# Patient Record
Sex: MALE | Race: OTHER | HISPANIC OR LATINO | URBAN - METROPOLITAN AREA
[De-identification: names, ages, dates, MRNs, and addresses within clinical notes are randomized per-mention and may not be internally consistent; named-entity substitution may affect disease eponyms.]

---

## 2022-10-02 ENCOUNTER — INPATIENT (INPATIENT)
Facility: HOSPITAL | Age: 27
LOS: 32 days | Discharge: HOME | DRG: 441 | End: 2022-11-04
Attending: STUDENT IN AN ORGANIZED HEALTH CARE EDUCATION/TRAINING PROGRAM | Admitting: STUDENT IN AN ORGANIZED HEALTH CARE EDUCATION/TRAINING PROGRAM
Payer: MEDICAID

## 2022-10-02 VITALS
RESPIRATION RATE: 18 BRPM | SYSTOLIC BLOOD PRESSURE: 112 MMHG | HEART RATE: 119 BPM | OXYGEN SATURATION: 96 % | TEMPERATURE: 100 F | DIASTOLIC BLOOD PRESSURE: 67 MMHG

## 2022-10-02 DIAGNOSIS — R56.9 UNSPECIFIED CONVULSIONS: ICD-10-CM

## 2022-10-02 LAB
ALBUMIN SERPL ELPH-MCNC: 1.6 G/DL — LOW (ref 3.5–5)
ALBUMIN SERPL ELPH-MCNC: 1.9 G/DL — LOW (ref 3.5–5)
ALP SERPL-CCNC: 552 U/L — HIGH (ref 40–120)
ALP SERPL-CCNC: 669 U/L — HIGH (ref 40–120)
ALT FLD-CCNC: 19 U/L DA — SIGNIFICANT CHANGE UP (ref 10–60)
ALT FLD-CCNC: 25 U/L DA — SIGNIFICANT CHANGE UP (ref 10–60)
AMMONIA BLD-MCNC: 92 UMOL/L — HIGH (ref 11–32)
AMPHET UR-MCNC: NEGATIVE — SIGNIFICANT CHANGE UP
ANION GAP SERPL CALC-SCNC: 12 MMOL/L — SIGNIFICANT CHANGE UP (ref 5–17)
ANION GAP SERPL CALC-SCNC: 16 MMOL/L — SIGNIFICANT CHANGE UP (ref 5–17)
APAP SERPL-MCNC: <2 UG/ML — SIGNIFICANT CHANGE UP (ref 10–30)
APPEARANCE UR: CLEAR — SIGNIFICANT CHANGE UP
APTT BLD: 43.6 SEC — HIGH (ref 27.5–35.5)
AST SERPL-CCNC: 202 U/L — HIGH (ref 10–40)
AST SERPL-CCNC: 253 U/L — HIGH (ref 10–40)
BACTERIA # UR AUTO: ABNORMAL /HPF
BARBITURATES UR SCN-MCNC: NEGATIVE — SIGNIFICANT CHANGE UP
BASOPHILS # BLD AUTO: 0 K/UL — SIGNIFICANT CHANGE UP (ref 0–0.2)
BASOPHILS # BLD AUTO: 0.05 K/UL — SIGNIFICANT CHANGE UP (ref 0–0.2)
BASOPHILS NFR BLD AUTO: 0 % — SIGNIFICANT CHANGE UP (ref 0–2)
BASOPHILS NFR BLD AUTO: 0.3 % — SIGNIFICANT CHANGE UP (ref 0–2)
BENZODIAZ UR-MCNC: NEGATIVE — SIGNIFICANT CHANGE UP
BILIRUB DIRECT SERPL-MCNC: 8 MG/DL — HIGH (ref 0–0.3)
BILIRUB INDIRECT FLD-MCNC: 1.3 MG/DL — HIGH (ref 0.2–1)
BILIRUB SERPL-MCNC: 10 MG/DL — HIGH (ref 0.2–1.2)
BILIRUB SERPL-MCNC: 8.8 MG/DL — HIGH (ref 0.2–1.2)
BILIRUB SERPL-MCNC: 9.3 MG/DL — HIGH (ref 0.2–1.2)
BILIRUB UR-MCNC: ABNORMAL
BLD GP AB SCN SERPL QL: SIGNIFICANT CHANGE UP
BUN SERPL-MCNC: 2 MG/DL — LOW (ref 7–18)
BUN SERPL-MCNC: 3 MG/DL — LOW (ref 7–18)
CALCIUM SERPL-MCNC: 6.7 MG/DL — LOW (ref 8.4–10.5)
CALCIUM SERPL-MCNC: 7.6 MG/DL — LOW (ref 8.4–10.5)
CHLORIDE SERPL-SCNC: 90 MMOL/L — LOW (ref 96–108)
CHLORIDE SERPL-SCNC: 96 MMOL/L — SIGNIFICANT CHANGE UP (ref 96–108)
CK SERPL-CCNC: 49 U/L — SIGNIFICANT CHANGE UP (ref 35–232)
CO2 SERPL-SCNC: 19 MMOL/L — LOW (ref 22–31)
CO2 SERPL-SCNC: 21 MMOL/L — LOW (ref 22–31)
COCAINE METAB.OTHER UR-MCNC: NEGATIVE — SIGNIFICANT CHANGE UP
COLOR SPEC: YELLOW — SIGNIFICANT CHANGE UP
COMMENT - URINE: SIGNIFICANT CHANGE UP
CREAT SERPL-MCNC: 0.57 MG/DL — SIGNIFICANT CHANGE UP (ref 0.5–1.3)
CREAT SERPL-MCNC: 0.64 MG/DL — SIGNIFICANT CHANGE UP (ref 0.5–1.3)
DIFF PNL FLD: ABNORMAL
EGFR: 136 ML/MIN/1.73M2 — SIGNIFICANT CHANGE UP
EGFR: 140 ML/MIN/1.73M2 — SIGNIFICANT CHANGE UP
EOSINOPHIL # BLD AUTO: 0 K/UL — SIGNIFICANT CHANGE UP (ref 0–0.5)
EOSINOPHIL # BLD AUTO: 0.01 K/UL — SIGNIFICANT CHANGE UP (ref 0–0.5)
EOSINOPHIL NFR BLD AUTO: 0 % — SIGNIFICANT CHANGE UP (ref 0–6)
EOSINOPHIL NFR BLD AUTO: 0.1 % — SIGNIFICANT CHANGE UP (ref 0–6)
EPI CELLS # UR: SIGNIFICANT CHANGE UP /HPF
ETHANOL SERPL-MCNC: 34 MG/DL — HIGH (ref 0–10)
GLUCOSE BLDC GLUCOMTR-MCNC: 93 MG/DL — SIGNIFICANT CHANGE UP (ref 70–99)
GLUCOSE SERPL-MCNC: 91 MG/DL — SIGNIFICANT CHANGE UP (ref 70–99)
GLUCOSE SERPL-MCNC: 94 MG/DL — SIGNIFICANT CHANGE UP (ref 70–99)
GLUCOSE UR QL: NEGATIVE — SIGNIFICANT CHANGE UP
HAPTOGLOB SERPL-MCNC: 148 MG/DL — SIGNIFICANT CHANGE UP (ref 34–200)
HCT VFR BLD CALC: 21.9 % — LOW (ref 39–50)
HCT VFR BLD CALC: 24.6 % — LOW (ref 39–50)
HGB BLD-MCNC: 6.9 G/DL — CRITICAL LOW (ref 13–17)
HGB BLD-MCNC: 7.7 G/DL — LOW (ref 13–17)
HIV 1 & 2 AB SERPL IA.RAPID: SIGNIFICANT CHANGE UP
IMM GRANULOCYTES NFR BLD AUTO: 0.7 % — SIGNIFICANT CHANGE UP (ref 0–0.9)
INR BLD: 2.18 RATIO — HIGH (ref 0.88–1.16)
KETONES UR-MCNC: ABNORMAL
LACTATE SERPL-SCNC: 2.6 MMOL/L — HIGH (ref 0.7–2)
LACTATE SERPL-SCNC: 6.4 MMOL/L — CRITICAL HIGH (ref 0.7–2)
LDH SERPL L TO P-CCNC: 175 U/L — SIGNIFICANT CHANGE UP (ref 120–225)
LEUKOCYTE ESTERASE UR-ACNC: ABNORMAL
LIDOCAIN IGE QN: 349 U/L — SIGNIFICANT CHANGE UP (ref 73–393)
LYMPHOCYTES # BLD AUTO: 0.2 K/UL — LOW (ref 1–3.3)
LYMPHOCYTES # BLD AUTO: 0.72 K/UL — LOW (ref 1–3.3)
LYMPHOCYTES # BLD AUTO: 1 % — LOW (ref 13–44)
LYMPHOCYTES # BLD AUTO: 4.6 % — LOW (ref 13–44)
MAGNESIUM SERPL-MCNC: 1 MG/DL — CRITICAL LOW (ref 1.6–2.6)
MAGNESIUM SERPL-MCNC: 1.2 MG/DL — LOW (ref 1.6–2.6)
MCHC RBC-ENTMCNC: 27.5 PG — SIGNIFICANT CHANGE UP (ref 27–34)
MCHC RBC-ENTMCNC: 27.8 PG — SIGNIFICANT CHANGE UP (ref 27–34)
MCHC RBC-ENTMCNC: 31.3 GM/DL — LOW (ref 32–36)
MCHC RBC-ENTMCNC: 31.5 GM/DL — LOW (ref 32–36)
MCV RBC AUTO: 87.3 FL — SIGNIFICANT CHANGE UP (ref 80–100)
MCV RBC AUTO: 88.8 FL — SIGNIFICANT CHANGE UP (ref 80–100)
METHADONE UR-MCNC: NEGATIVE — SIGNIFICANT CHANGE UP
MONOCYTES # BLD AUTO: 1 K/UL — HIGH (ref 0–0.9)
MONOCYTES # BLD AUTO: 1.22 K/UL — HIGH (ref 0–0.9)
MONOCYTES NFR BLD AUTO: 6 % — SIGNIFICANT CHANGE UP (ref 2–14)
MONOCYTES NFR BLD AUTO: 6.3 % — SIGNIFICANT CHANGE UP (ref 2–14)
NEUTROPHILS # BLD AUTO: 13.87 K/UL — HIGH (ref 1.8–7.4)
NEUTROPHILS # BLD AUTO: 18.93 K/UL — HIGH (ref 1.8–7.4)
NEUTROPHILS NFR BLD AUTO: 88 % — HIGH (ref 43–77)
NEUTROPHILS NFR BLD AUTO: 90 % — HIGH (ref 43–77)
NITRITE UR-MCNC: POSITIVE
NRBC # BLD: 0 /100 WBCS — SIGNIFICANT CHANGE UP (ref 0–0)
OPIATES UR-MCNC: NEGATIVE — SIGNIFICANT CHANGE UP
OSMOLALITY SERPL: 265 MOSMOL/KG — LOW (ref 275–300)
OSMOLALITY UR: 335 MOS/KG — SIGNIFICANT CHANGE UP (ref 50–1200)
PCP SPEC-MCNC: SIGNIFICANT CHANGE UP
PCP UR-MCNC: NEGATIVE — SIGNIFICANT CHANGE UP
PH UR: 7 — SIGNIFICANT CHANGE UP (ref 5–8)
PHOSPHATE SERPL-MCNC: 1.1 MG/DL — LOW (ref 2.5–4.5)
PLATELET # BLD AUTO: 151 K/UL — SIGNIFICANT CHANGE UP (ref 150–400)
PLATELET # BLD AUTO: 178 K/UL — SIGNIFICANT CHANGE UP (ref 150–400)
POTASSIUM SERPL-MCNC: 2.7 MMOL/L — CRITICAL LOW (ref 3.5–5.3)
POTASSIUM SERPL-MCNC: 3 MMOL/L — LOW (ref 3.5–5.3)
POTASSIUM SERPL-SCNC: 2.7 MMOL/L — CRITICAL LOW (ref 3.5–5.3)
POTASSIUM SERPL-SCNC: 3 MMOL/L — LOW (ref 3.5–5.3)
PROT SERPL-MCNC: 6.2 G/DL — SIGNIFICANT CHANGE UP (ref 6–8.3)
PROT SERPL-MCNC: 7.3 G/DL — SIGNIFICANT CHANGE UP (ref 6–8.3)
PROT UR-MCNC: 100
PROTHROM AB SERPL-ACNC: 26.2 SEC — HIGH (ref 10.5–13.4)
RAPID RVP RESULT: DETECTED
RBC # BLD: 2.51 M/UL — LOW (ref 4.2–5.8)
RBC # BLD: 2.77 M/UL — LOW (ref 4.2–5.8)
RBC # FLD: 19.2 % — HIGH (ref 10.3–14.5)
RBC # FLD: 20.4 % — HIGH (ref 10.3–14.5)
RBC CASTS # UR COMP ASSIST: ABNORMAL /HPF (ref 0–2)
RV+EV RNA SPEC QL NAA+PROBE: DETECTED
SALICYLATES SERPL-MCNC: <1.7 MG/DL — LOW (ref 2.8–20)
SARS-COV-2 RNA SPEC QL NAA+PROBE: SIGNIFICANT CHANGE UP
SODIUM SERPL-SCNC: 125 MMOL/L — LOW (ref 135–145)
SODIUM SERPL-SCNC: 129 MMOL/L — LOW (ref 135–145)
SODIUM UR-SCNC: 14 MMOL/L — SIGNIFICANT CHANGE UP
SP GR SPEC: 1.01 — SIGNIFICANT CHANGE UP (ref 1.01–1.02)
THC UR QL: NEGATIVE — SIGNIFICANT CHANGE UP
TRIGL SERPL-MCNC: 349 MG/DL — HIGH
UROBILINOGEN FLD QL: 8
WBC # BLD: 15.76 K/UL — HIGH (ref 3.8–10.5)
WBC # BLD: 20.35 K/UL — HIGH (ref 3.8–10.5)
WBC # FLD AUTO: 15.76 K/UL — HIGH (ref 3.8–10.5)
WBC # FLD AUTO: 20.35 K/UL — HIGH (ref 3.8–10.5)
WBC UR QL: ABNORMAL /HPF (ref 0–5)

## 2022-10-02 PROCEDURE — 70450 CT HEAD/BRAIN W/O DYE: CPT | Mod: 26,MA

## 2022-10-02 PROCEDURE — 71045 X-RAY EXAM CHEST 1 VIEW: CPT | Mod: 26

## 2022-10-02 PROCEDURE — 99291 CRITICAL CARE FIRST HOUR: CPT

## 2022-10-02 PROCEDURE — 99292 CRITICAL CARE ADDL 30 MIN: CPT

## 2022-10-02 PROCEDURE — 74177 CT ABD & PELVIS W/CONTRAST: CPT | Mod: 26,MA

## 2022-10-02 RX ORDER — PIPERACILLIN AND TAZOBACTAM 4; .5 G/20ML; G/20ML
3.38 INJECTION, POWDER, LYOPHILIZED, FOR SOLUTION INTRAVENOUS EVERY 8 HOURS
Refills: 0 | Status: DISCONTINUED | OUTPATIENT
Start: 2022-10-02 | End: 2022-10-04

## 2022-10-02 RX ORDER — PANTOPRAZOLE SODIUM 20 MG/1
40 TABLET, DELAYED RELEASE ORAL DAILY
Refills: 0 | Status: DISCONTINUED | OUTPATIENT
Start: 2022-10-02 | End: 2022-10-03

## 2022-10-02 RX ORDER — THIAMINE MONONITRATE (VIT B1) 100 MG
100 TABLET ORAL ONCE
Refills: 0 | Status: COMPLETED | OUTPATIENT
Start: 2022-10-02 | End: 2022-10-02

## 2022-10-02 RX ORDER — POTASSIUM CHLORIDE 20 MEQ
10 PACKET (EA) ORAL
Refills: 0 | Status: COMPLETED | OUTPATIENT
Start: 2022-10-02 | End: 2022-10-02

## 2022-10-02 RX ORDER — CHLORHEXIDINE GLUCONATE 213 G/1000ML
1 SOLUTION TOPICAL
Refills: 0 | Status: DISCONTINUED | OUTPATIENT
Start: 2022-10-02 | End: 2022-10-11

## 2022-10-02 RX ORDER — FOLIC ACID 0.8 MG
1 TABLET ORAL ONCE
Refills: 0 | Status: COMPLETED | OUTPATIENT
Start: 2022-10-02 | End: 2022-10-02

## 2022-10-02 RX ORDER — LACTULOSE 10 G/15ML
200 SOLUTION ORAL ONCE
Refills: 0 | Status: COMPLETED | OUTPATIENT
Start: 2022-10-02 | End: 2022-10-02

## 2022-10-02 RX ORDER — SODIUM CHLORIDE 9 MG/ML
1000 INJECTION INTRAMUSCULAR; INTRAVENOUS; SUBCUTANEOUS ONCE
Refills: 0 | Status: COMPLETED | OUTPATIENT
Start: 2022-10-02 | End: 2022-10-02

## 2022-10-02 RX ORDER — VANCOMYCIN HCL 1 G
1000 VIAL (EA) INTRAVENOUS ONCE
Refills: 0 | Status: COMPLETED | OUTPATIENT
Start: 2022-10-02 | End: 2022-10-02

## 2022-10-02 RX ORDER — MAGNESIUM SULFATE 500 MG/ML
1 VIAL (ML) INJECTION ONCE
Refills: 0 | Status: COMPLETED | OUTPATIENT
Start: 2022-10-02 | End: 2022-10-02

## 2022-10-02 RX ORDER — PIPERACILLIN AND TAZOBACTAM 4; .5 G/20ML; G/20ML
3.38 INJECTION, POWDER, LYOPHILIZED, FOR SOLUTION INTRAVENOUS ONCE
Refills: 0 | Status: COMPLETED | OUTPATIENT
Start: 2022-10-02 | End: 2022-10-02

## 2022-10-02 RX ORDER — THIAMINE MONONITRATE (VIT B1) 100 MG
500 TABLET ORAL THREE TIMES A DAY
Refills: 0 | Status: COMPLETED | OUTPATIENT
Start: 2022-10-02 | End: 2022-10-05

## 2022-10-02 RX ORDER — LEVETIRACETAM 250 MG/1
2000 TABLET, FILM COATED ORAL ONCE
Refills: 0 | Status: COMPLETED | OUTPATIENT
Start: 2022-10-02 | End: 2022-10-02

## 2022-10-02 RX ORDER — POTASSIUM PHOSPHATE, MONOBASIC POTASSIUM PHOSPHATE, DIBASIC 236; 224 MG/ML; MG/ML
30 INJECTION, SOLUTION INTRAVENOUS ONCE
Refills: 0 | Status: COMPLETED | OUTPATIENT
Start: 2022-10-02 | End: 2022-10-02

## 2022-10-02 RX ADMIN — Medication 100 MILLIEQUIVALENT(S): at 18:55

## 2022-10-02 RX ADMIN — SODIUM CHLORIDE 1000 MILLILITER(S): 9 INJECTION INTRAMUSCULAR; INTRAVENOUS; SUBCUTANEOUS at 16:15

## 2022-10-02 RX ADMIN — LACTULOSE 200 GRAM(S): 10 SOLUTION ORAL at 21:45

## 2022-10-02 RX ADMIN — Medication 1 MILLIGRAM(S): at 17:21

## 2022-10-02 RX ADMIN — Medication 100 GRAM(S): at 19:53

## 2022-10-02 RX ADMIN — LEVETIRACETAM 2000 MILLIGRAM(S): 250 TABLET, FILM COATED ORAL at 17:41

## 2022-10-02 RX ADMIN — PIPERACILLIN AND TAZOBACTAM 3.38 GRAM(S): 4; .5 INJECTION, POWDER, LYOPHILIZED, FOR SOLUTION INTRAVENOUS at 18:40

## 2022-10-02 RX ADMIN — Medication 100 MILLIGRAM(S): at 17:20

## 2022-10-02 RX ADMIN — Medication 100 MILLIEQUIVALENT(S): at 18:04

## 2022-10-02 RX ADMIN — Medication 250 MILLIGRAM(S): at 18:39

## 2022-10-02 RX ADMIN — Medication 100 GRAM(S): at 21:18

## 2022-10-02 RX ADMIN — Medication 100 MILLIEQUIVALENT(S): at 22:24

## 2022-10-02 RX ADMIN — SODIUM CHLORIDE 1000 MILLILITER(S): 9 INJECTION INTRAMUSCULAR; INTRAVENOUS; SUBCUTANEOUS at 17:24

## 2022-10-02 RX ADMIN — Medication 105 MILLIGRAM(S): at 21:45

## 2022-10-02 RX ADMIN — Medication 100 MILLIEQUIVALENT(S): at 20:03

## 2022-10-02 RX ADMIN — Medication 10 MILLIEQUIVALENT(S): at 18:57

## 2022-10-02 RX ADMIN — PIPERACILLIN AND TAZOBACTAM 25 GRAM(S): 4; .5 INJECTION, POWDER, LYOPHILIZED, FOR SOLUTION INTRAVENOUS at 21:26

## 2022-10-02 RX ADMIN — LEVETIRACETAM 600 MILLIGRAM(S): 250 TABLET, FILM COATED ORAL at 17:22

## 2022-10-02 RX ADMIN — PIPERACILLIN AND TAZOBACTAM 200 GRAM(S): 4; .5 INJECTION, POWDER, LYOPHILIZED, FOR SOLUTION INTRAVENOUS at 18:05

## 2022-10-02 RX ADMIN — Medication 1 MILLIGRAM(S): at 17:24

## 2022-10-02 NOTE — ED PROVIDER NOTE - CLINICAL SUMMARY MEDICAL DECISION MAKING FREE TEXT BOX
26M presenting with seizure and AMS. had one witnessed seizure in ED. patient quickly brought to CT to assess for any intracranial bleeding or pathology. CT head negative. concern for obstructive etiology of jaundice, but no obstruction visualized on CT abdomen/pelvis. ICU consulted 2/2 extensive metabolic derangements.

## 2022-10-02 NOTE — H&P ADULT - HISTORY OF PRESENT ILLNESS
Patient is a 27 yo Male, with no known past medical or surgical history, presenting to the ED with altered mental status since this morning. Pt moaning, not able to provide history and pt's brother's contact information not in chart as family left ED prior to phone number being collected. As per EMS charts, pt was found to be 'asleep' when EMS arrived..as per family, pt has a seizure that lasted about 2-4 minutes. Family states pt has a seizure earlier in the day as well. Pt family denies hx of seizures. They stated pt drinks alcohol on a daily basis and hasn't drank any alcohol in the past 2 days. Family denies any recent trauma related injuries to pt.'  Patient is a 27 yo Lithuanian speaking Male, with no known past medical or surgical history, presenting to the ED with altered mental status since this morning. Pt moaning, not able to provide history and pt's brother's contact information not in chart as family left ED prior to phone number being collected. As per EMS charts, pt was found to be 'asleep' when EMS arrived..as per family, pt has a seizure that lasted about 2-4 minutes. Family states pt has a seizure earlier in the day as well. Pt family denies hx of seizures. They stated pt drinks alcohol on a daily basis and hasn't drank any alcohol in the past 2 days. Family denies any recent trauma related injuries to pt.'     In the ED, pt's vitals were  Temp 100.1, , /67, RR 18 on room air O2 sat 96%  s/p IV Vanc + Zosyn, NS 1 L bolus, Keppra 2 gm x 1, Ativan, Mg, Kcl riders, Thiamine Patient is a 25 yo Turkish speaking Male, with no known past medical history, s/p Left temporal parietal craniotomy noted in imaging, presenting to the ED with altered mental status since this morning. Pt moaning, not able to provide history and pt's brother's contact information not in chart as family left ED prior to phone number being collected. As per EMS charts, pt was found to be 'asleep' when EMS arrived..as per family, pt has a seizure that lasted about 2-4 minutes. Family states pt has a seizure earlier in the day as well. Pt family denies hx of seizures. They stated pt drinks alcohol on a daily basis and hasn't drank any alcohol in the past 2 days. Family denies any recent trauma related injuries to pt.'     In the ED, pt's vitals were  Temp 100.1, , /67, RR 18 on room air O2 sat 96%  s/p IV Vanc + Zosyn, NS 1 L bolus, Keppra 2 gm x 1, Ativan, Mg, Kcl riders, Thiamine Patient is a 25 yo [place] Islander speaking Male, with no known past medical history, s/p Left temporal parietal craniotomy noted in imaging, presenting to the ED with altered mental status since this morning. Pt moaning, not able to provide history and pt's brother's contact information not in chart as family left ED prior to phone number being collected. As per EMS charts, pt was found to be 'asleep' when EMS arrived..as per family, pt has a seizure that lasted about 2-4 minutes. Family states pt has a seizure earlier in the day as well. Pt family denies hx of seizures. They stated pt drinks alcohol on a daily basis and hasn't drank any alcohol in the past 2 days. Family denies any recent trauma related injuries to pt.' Pt's brother told ED attending that the jaundice developed acutely over the last 1 week.     In the ED, pt's vitals were  Temp 100.1, , /67, RR 18 on room air O2 sat 96%  s/p IV Vanc + Zosyn, NS 1 L bolus, Keppra 2 gm x 1, Ativan, Mg, Kcl riders, Thiamine

## 2022-10-02 NOTE — H&P ADULT - ASSESSMENT
Patient is a 27 yo Georgian speaking Male, with no known past medical or surgical history, presenting to the ED with altered mental status since this morning. Patient is a 27 yo Bengali speaking Male, with no known past medical or surgical history, presenting to the ED with altered mental status since this morning s/p seizure in the setting of Alcohol abuse. Patient admitted to ICU for multiple metabolic derangements and close monitoring of airway.    ASSESSMENT  -Acute encephalopathy 2/2 Hepatic encephalopathy vs Sepsis   -Obstructive jaundice r/out cholangitis  -Alcohol withdrawal seizures  -Anemia  -Lactic acidosis  -Hyponatremia    Neuro:  As per records, appears AAO x 3    Cardiovascular:  #Sinus tachycardia  in the setting of sepsis/acute illness  monitor for now, c/w IVF    Pulmonary:   No acute issues    Infectious Diseases:  #R/out cholangitis    Gastrointestinal:  #Obstructive jaundice  p/w transaminitis, altered mental status, low grade temp 100.1, elevated WBC 20k, direct bili 8.0  severe hepatomegaly on CT abdomen w/gall bladder sludge  C/w Zosyn for suspected cholangitis  F/u blood cultures  f/u hepatitis panel    #Transaminitis  in the setting of Alcohol abuse, BAL 34  Utox negative for salicylate and acetaminophen levels  f/u hepatitis panel    #Elevated INR and hypoalbuminemia in the setting of liver dysfunction  monitor daily, c/w venodynes for DVT ppx    #Lactic acidosis    Renal:  #Hyponatremia  on admission, Na 125> 1L NS bolus > 129  f/u serum osm, urine osm, urine sodium    Endo:  NPO for now due to altered mental status  Monitor glucose Q4 hours in the setting of liver dysfunction    Heme/onc:   #Leukocytosis    #Anemia    Skin/ catheter: Yost placed 10/2, peripheral lines    Prophylaxis: Hold chemical DVT ppx in the setting of elevated INR    Goals of Care: Full code, no information available for pt's family at this time    Dispo: Accepted to ICU   Patient is a 27 yo Sinhala speaking Male, with no known past medical history, s/p Left temporal parietal craniotomy, presenting to the ED with altered mental status since this morning s/p seizure in the setting of Alcohol abuse. Patient admitted to ICU for multiple metabolic derangements and close monitoring of airway.    ASSESSMENT  -Acute encephalopathy 2/2 Hepatic encephalopathy vs Sepsis   -Obstructive jaundice r/out cholangitis  -Alcohol withdrawal seizures  -Anemia  -Lactic acidosis  -Hyponatremia  -s/p Left temporal parietal craniotomy    Neuro:  As per records, appears AAO x 3    Cardiovascular:  #Sinus tachycardia  in the setting of sepsis/acute illness  monitor for now, c/w IVF    Pulmonary:   No acute issues    Infectious Diseases:  #R/out cholangitis    Gastrointestinal:  #Obstructive jaundice  p/w transaminitis, altered mental status, low grade temp 100.1, elevated WBC 20k, direct bili 8.0  severe hepatomegaly on CT abdomen w/gall bladder sludge  C/w Zosyn for suspected cholangitis  F/u blood cultures  f/u hepatitis panel    #Transaminitis  in the setting of Alcohol abuse, BAL 34  Utox negative for salicylate and acetaminophen levels  f/u hepatitis panel    #Elevated INR and hypoalbuminemia in the setting of liver dysfunction  monitor daily, c/w venodynes for DVT ppx    #Lactic acidosis    Renal:  #Hyponatremia  on admission, Na 125> 1L NS bolus > 129  f/u serum osm, urine osm, urine sodium    Endo:  NPO for now due to altered mental status  Monitor glucose Q4 hours in the setting of liver dysfunction    Heme/onc:   #Leukocytosis    #Anemia    Skin/ catheter: Yost placed 10/2, peripheral lines    Prophylaxis: Hold chemical DVT ppx in the setting of elevated INR    Goals of Care: Full code, no information available for pt's family at this time    Dispo: Accepted to ICU   Patient is a 27 yo Turkish speaking Male, with no known past medical history, s/p Left temporal parietal craniotomy, presenting to the ED with altered mental status since this morning s/p seizure in the setting of Alcohol abuse. Patient admitted to ICU for multiple metabolic derangements and close monitoring of airway.    ASSESSMENT  -Acute encephalopathy 2/2 Hepatic encephalopathy vs Sepsis   -Obstructive jaundice r/out cholangitis  -Alcohol withdrawal seizures  -Anemia  -Lactic acidosis  -Hyponatremia  -s/p Left temporal parietal craniotomy    Neuro:  #Acute encephalopathy due to Hepatic encephalopathy vs Sepsis  Will give 1 dose of lactulose enema for probable hepatic encephalopathy  c/w IV Zosyn for Sepsis likely 2/2 cholangitis    #Alcohol withdrawal seizures  As per EMS charts, pt drinks alcohol everyday, last drink 2 days ago  No prior hx of seizures  Started on CIWA protocol, on IV Ativan PRN (hold for sedation)  C/w thiamine, can start folic acid and MVT after resumed on PO diet    Cardiovascular:  #Sinus tachycardia  in the setting of sepsis/acute illness  monitor for now, c/w IVF    Pulmonary:   No acute issues    Infectious Diseases:  #R/out cholangitis  p/w transaminitis, altered mental status, low grade temp 100.1, elevated WBC 20k, direct bili 8.0  f/u blood cultures, c/w IV Zosyn for now    Gastrointestinal:  #Obstructive jaundice  p/w transaminitis, altered mental status, low grade temp 100.1, elevated WBC 20k, direct bili 8.0  severe hepatomegaly on CT abdomen w/gall bladder sludge  C/w Zosyn for suspected cholangitis  F/u blood cultures  f/u hepatitis panel  GI consult in AM, pt will benefit from MRCP vs ERCP    #Transaminitis  in the setting of Alcohol abuse, BAL 34  Utox negative for salicylate and acetaminophen levels  f/u hepatitis panel    #Elevated INR and hypoalbuminemia in the setting of liver dysfunction  monitor daily, c/w venodynes for DVT ppx    #Lactic acidosis   Decreased clearance w/ liver dysfunction  Likely due to post-ictal state from seizure this AM vs sepsis, downtrending from 6.4>IV 1NS bolus> 2.6    Renal:  #Hyponatremia  on admission, Na 125> 1L NS bolus > 129  f/u serum osm, urine osm, urine sodium    Endo:  NPO for now due to altered mental status  Monitor glucose Q4 hours in the setting of liver dysfunction    Heme/onc:   #Leukocytosis    #Anemia    Skin/ catheter: Yost placed 10/2, peripheral lines    Prophylaxis: Hold chemical DVT ppx in the setting of elevated INR    Goals of Care: Full code, no information available for pt's family at this time    Dispo: Accepted to ICU   Patient is a 27 yo Yi speaking Male, with no known past medical history, s/p Left temporal parietal craniotomy, presenting to the ED with altered mental status since this morning s/p seizure in the setting of Alcohol abuse. Patient admitted to ICU for multiple metabolic derangements and close monitoring of airway.    ASSESSMENT  -Acute encephalopathy 2/2 Hepatic encephalopathy vs Sepsis   -Obstructive jaundice r/out cholangitis  -Alcohol withdrawal seizures  -Anemia  -Lactic acidosis  -Hyponatremia  -s/p Left temporal parietal craniotomy    Neuro:  #Acute encephalopathy due to Hepatic encephalopathy vs Sepsis  Will give 1 dose of lactulose enema for probable hepatic encephalopathy  c/w IV Zosyn for Sepsis likely 2/2 cholangitis    #Alcohol withdrawal seizures  As per EMS charts, pt drinks alcohol everyday, last drink 2 days ago  No prior hx of seizures  Started on CIWA protocol, on IV Ativan PRN (hold for sedation)  C/w thiamine, can start folic acid and MVT after resumed on PO diet    #S/p Left temporal parietal craniotomy  unknown reason    Cardiovascular:  #Sinus tachycardia  in the setting of sepsis/acute illness  monitor for now, c/w IVF    Pulmonary:   No acute issues    Infectious Diseases:  #R/out cholangitis  p/w transaminitis, altered mental status, low grade temp 100.1, elevated WBC 20k, direct bili 8.0  f/u blood cultures, c/w IV Zosyn for now    Gastrointestinal:  #Obstructive jaundice  p/w transaminitis, altered mental status, low grade temp 100.1, elevated WBC 20k, direct bili 8.0  severe hepatomegaly w/mild ascites on CT abdomen w/gall bladder sludge  lipase level normal, low suspicion for pancreatic involvement  C/w Zosyn for suspected cholangitis  F/u blood cultures  f/u hepatitis panel  f/u dedicated hepatic USG for characterization of gall bladder  GI consult in AM, pt will benefit from MRCP vs ERCP    #Transaminitis  in the setting of Alcohol abuse and obstructive jaundice, BAL 34  Utox negative for salicylate and acetaminophen levels  f/u hepatitis panel  trend LFTs daily    #Elevated INR and hypoalbuminemia in the setting of liver dysfunction  monitor daily, c/w venodynes for DVT ppx    #Lactic acidosis   Decreased clearance w/ liver dysfunction  Likely due to post-ictal state from seizure this AM vs sepsis, downtrending from 6.4>IV 1NS bolus> 2.6    Renal:  #Hyponatremia  on admission, Na 125> 1L NS bolus > 129  f/u serum osm, urine osm, urine sodium    Endo:  NPO for now due to altered mental status  Monitor glucose Q4 hours in the setting of liver dysfunction    Heme/onc:   #Leukocytosis  WBC 20k, low grade temp 100.1 on admission, r/out sepsis  Avoid tylenol w/elevated LFTs    #Anemia  P/w Hb 6.9, MCV 87 normocytic  no active s/s bleeding  s/p 1 unit PRBC    Skin/ catheter: Yost placed 10/2, peripheral lines    Prophylaxis: Hold chemical DVT ppx in the setting of elevated INR    Goals of Care: Full code, no information available for pt's family at this time    Dispo: Accepted to ICU   Patient is a 25 yo Pashto speaking Male, with no known past medical history, s/p Left temporal parietal craniotomy, presenting to the ED with altered mental status since this morning s/p seizure in the setting of Alcohol abuse. Patient admitted to ICU for multiple metabolic derangements and close monitoring of airway.    ASSESSMENT  -Acute encephalopathy 2/2 Hepatic encephalopathy vs Sepsis   -Obstructive jaundice r/out cholangitis  -Alcohol withdrawal seizures  -Anemia  -Lactic acidosis  -Hyponatremia  -s/p Left temporal parietal craniotomy    Neuro:  #Acute encephalopathy due to Hepatic encephalopathy vs Sepsis  Will give 1 dose of lactulose enema for probable hepatic encephalopathy  c/w IV Zosyn for Sepsis likely 2/2 cholangitis    #Alcohol withdrawal seizures  As per EMS charts, pt drinks alcohol everyday, last drink 2 days ago  No prior hx of seizures  Started on CIWA protocol, on IV Ativan PRN (hold for sedation)  C/w thiamine, can start folic acid and MVT after resumed on PO diet    #S/p Left temporal parietal craniotomy  unknown reason    Cardiovascular:  #Sinus tachycardia  in the setting of sepsis/acute illness  monitor for now, c/w IVF    Pulmonary:   No acute issues    Infectious Diseases:  #R/out cholangitis  p/w transaminitis, altered mental status, low grade temp 100.1, elevated WBC 20k, direct bili 8.0  f/u blood cultures, c/w IV Zosyn for now    Gastrointestinal:  #Obstructive jaundice  p/w transaminitis, altered mental status, low grade temp 100.1, elevated WBC 20k, direct bili 8.0  severe hepatomegaly w/mild ascites on CT abdomen w/gall bladder sludge  lipase level normal, low suspicion for pancreatic involvement  C/w Zosyn for suspected cholangitis  F/u blood cultures  f/u hepatitis panel  f/u dedicated hepatic USG for characterization of gall bladder  GI consult in AM, pt will benefit from MRCP vs ERCP    #Transaminitis  in the setting of Alcohol abuse and obstructive jaundice, BAL 34  Utox negative for salicylate and acetaminophen levels  f/u hepatitis panel  trend LFTs daily    #Elevated INR and hypoalbuminemia in the setting of liver dysfunction  monitor daily, c/w venodynes for DVT ppx    #Lactic acidosis   Decreased clearance w/ liver dysfunction  Likely due to post-ictal state from seizure this AM vs sepsis, downtrending from 6.4>IV 1NS bolus> 2.6    Renal:  #Hyponatremia  on admission, Na 125> 1L NS bolus > 129  f/u serum osm, urine osm, urine sodium  ****    Endo:  NPO for now due to altered mental status  Monitor glucose Q4 hours in the setting of liver dysfunction    Heme/onc:   #Leukocytosis  WBC 20k, low grade temp 100.1 on admission, r/out sepsis  Avoid tylenol w/elevated LFTs    #Anemia  P/w Hb 6.9, MCV 87 normocytic  no active s/s bleeding  s/p 1 unit PRBC    Skin/ catheter: Yost placed 10/2, peripheral lines    Prophylaxis: Hold chemical DVT ppx in the setting of elevated INR    Goals of Care: Full code, no information available for pt's family at this time    Dispo: Accepted to ICU   Patient is a 25 yo Portuguese speaking Male, with no known past medical history, s/p Left temporal parietal craniotomy, presenting to the ED with altered mental status since this morning s/p seizure in the setting of Alcohol abuse. Patient admitted to ICU for multiple metabolic derangements and close monitoring of airway.    ASSESSMENT  -Acute encephalopathy 2/2 Hepatic encephalopathy vs Sepsis   -Obstructive jaundice r/out cholangitis  -Alcohol withdrawal seizures  -Anemia  -Lactic acidosis  -Hyponatremia  -s/p Left temporal parietal craniotomy    Neuro:  #Acute encephalopathy due to Hepatic encephalopathy vs Sepsis  Will give 1 dose of lactulose enema for probable hepatic encephalopathy  c/w IV Zosyn for Sepsis likely 2/2 cholangitis    #Alcohol withdrawal seizures  As per EMS charts, pt drinks alcohol everyday, last drink 2 days ago  No prior hx of seizures  Started on CIWA protocol, on IV Ativan PRN (hold for sedation)  C/w thiamine, can start folic acid and MVT after resumed on PO diet    #S/p Left temporal parietal craniotomy  unknown reason    Cardiovascular:  #Sinus tachycardia  in the setting of sepsis/acute illness  monitor for now, c/w IVF    Pulmonary:   No acute issues    Infectious Diseases:  #R/out cholangitis  p/w transaminitis, altered mental status, low grade temp 100.1, elevated WBC 20k, direct bili 8.0  f/u blood cultures, c/w IV Zosyn for now    #UTI  UA w/WBC 6-10, trace leukocyte esterase, unable to obtain presence of symptoms  c/w Zosyn  f/u urine cultures    Gastrointestinal:  #Obstructive jaundice  p/w transaminitis, altered mental status, low grade temp 100.1, elevated WBC 20k, direct bili 8.0  severe hepatomegaly w/mild ascites on CT abdomen w/gall bladder sludge  lipase level normal, low suspicion for pancreatic involvement  C/w Zosyn for suspected cholangitis  F/u blood cultures  f/u hepatitis panel  f/u dedicated hepatic USG for characterization of gall bladder  GI consult in AM, pt will benefit from MRCP vs ERCP    #Transaminitis  in the setting of Alcohol abuse and obstructive jaundice, BAL 34  Utox negative for salicylate and acetaminophen levels  f/u hepatitis panel  trend LFTs daily    #Elevated INR and hypoalbuminemia in the setting of liver dysfunction  monitor daily, c/w venodynes for DVT ppx    #Lactic acidosis   Decreased clearance w/ liver dysfunction  Likely due to post-ictal state from seizure this AM vs sepsis, downtrending from 6.4>IV 1NS bolus> 2.6    Renal:  #Hyponatremia  on admission, Na 125> 1L NS bolus > 129  f/u serum osm, urine osm, urine sodium  ****    Endo:  NPO for now due to altered mental status  Monitor glucose Q4 hours in the setting of liver dysfunction    Heme/onc:   #Leukocytosis  WBC 20k, low grade temp 100.1 on admission, r/out sepsis  Avoid tylenol w/elevated LFTs    #Anemia  P/w Hb 6.9, MCV 87 normocytic  no active s/s bleeding  s/p 1 unit PRBC    Skin/ catheter: Yost placed 10/2, peripheral lines    Prophylaxis: Hold chemical DVT ppx in the setting of elevated INR    Goals of Care: Full code, no information available for pt's family at this time    Dispo: Accepted to ICU   Patient is a 25 yo Bulgarian speaking Male, with no known past medical history, s/p Left temporal parietal craniotomy, presenting to the ED with altered mental status since this morning s/p seizure in the setting of Alcohol abuse. Patient admitted to ICU for multiple metabolic derangements and close monitoring of airway.    ASSESSMENT  -Acute encephalopathy 2/2 Hepatic encephalopathy vs Sepsis   -Obstructive jaundice r/out cholangitis  -Alcohol withdrawal seizures  -Anemia  -Lactic acidosis  -Hyponatremia  -s/p Left temporal parietal craniotomy    Neuro:  #Acute encephalopathy due to Hepatic encephalopathy vs Sepsis  Will give 1 dose of lactulose enema for probable hepatic encephalopathy  c/w IV Zosyn for Sepsis likely 2/2 cholangitis    #Alcohol withdrawal seizures  As per EMS charts, pt drinks alcohol everyday, last drink 2 days ago  No prior hx of seizures  Started on CIWA protocol, on IV Ativan PRN (hold for sedation)  C/w thiamine, can start folic acid and MVT after resumed on PO diet    #S/p Left temporal parietal craniotomy  unknown reason    Cardiovascular:  #Sinus tachycardia  in the setting of sepsis/acute illness  monitor for now, c/w IVF    Pulmonary:   No acute issues    Infectious Diseases:  #R/out cholangitis  p/w transaminitis, altered mental status, low grade temp 100.1, elevated WBC 20k, direct bili 8.0  f/u blood cultures, c/w IV Zosyn for now    #UTI  UA w/WBC 6-10, trace leukocyte esterase, unable to obtain presence of symptoms  c/w Zosyn  f/u urine cultures    Gastrointestinal:  #Obstructive jaundice  p/w transaminitis, altered mental status, low grade temp 100.1, elevated WBC 20k, direct bili 8.0  severe hepatomegaly w/mild ascites on CT abdomen w/gall bladder sludge  lipase level normal, low suspicion for pancreatic involvement  C/w Zosyn for suspected cholangitis  F/u blood cultures  f/u hepatitis panel  f/u dedicated hepatic USG for characterization of gall bladder  GI consult in AM, pt will benefit from MRCP vs ERCP    #Transaminitis  in the setting of Alcohol abuse and obstructive jaundice, BAL 34  Utox negative for salicylate and acetaminophen levels  f/u hepatitis panel  trend LFTs daily  Maddrey score for Alcoholic hepatitis 74, pt would benefit from glucocorticoid therapy    #Elevated INR and hypoalbuminemia in the setting of liver dysfunction  monitor daily, c/w venodynes for DVT ppx    #Lactic acidosis   Decreased clearance w/ liver dysfunction  Likely due to post-ictal state from seizure this AM vs sepsis, downtrending from 6.4>IV 1NS bolus> 2.6    Renal:  #Hyponatremia  on admission, Na 125> 1L NS bolus > 129  f/u serum osm, urine osm, urine sodium  ****    Endo:  NPO for now due to altered mental status  Monitor glucose Q4 hours in the setting of liver dysfunction    Heme/onc:   #Leukocytosis  WBC 20k, low grade temp 100.1 on admission, r/out sepsis  Avoid tylenol w/elevated LFTs    #Anemia  P/w Hb 6.9, MCV 87 normocytic  no active s/s bleeding  s/p 1 unit PRBC    Skin/ catheter: Yost placed 10/2, peripheral lines    Prophylaxis: Hold chemical DVT ppx in the setting of elevated INR    Goals of Care: Full code, no information available for pt's family at this time    Dispo: Accepted to ICU   Patient is a 25 yo Macedonian speaking Male, with no known past medical history, s/p Left temporal parietal craniotomy, presenting to the ED with altered mental status since this morning s/p seizure in the setting of Alcohol abuse. Patient admitted to ICU for multiple metabolic derangements and close monitoring of airway.    ASSESSMENT  -Acute encephalopathy 2/2 Hepatic encephalopathy vs Sepsis   -Obstructive jaundice r/out cholangitis  -Alcohol withdrawal seizures  -Anemia  -Lactic acidosis  -Hyponatremia  -s/p Left temporal parietal craniotomy    Neuro:  #Acute encephalopathy due to Hepatic encephalopathy vs Sepsis  Will give 1 dose of lactulose enema for probable hepatic encephalopathy  c/w IV Zosyn for Sepsis likely 2/2 cholangitis    #Alcohol withdrawal seizures  As per EMS charts, pt drinks alcohol everyday, last drink 2 days ago  No prior hx of seizures  Started on CIWA protocol, on IV Ativan PRN (hold for sedation)  C/w thiamine, can start folic acid and MVT after resumed on PO diet    #S/p Left temporal parietal craniotomy  unknown reason    Cardiovascular:  #Sinus tachycardia  in the setting of sepsis/acute illness  monitor for now, c/w IVF    Pulmonary:   No acute issues    Infectious Diseases:  #R/out cholangitis  p/w transaminitis, altered mental status, low grade temp 100.1, elevated WBC 20k, direct bili 8.0  f/u blood cultures, c/w IV Zosyn for now    #UTI  UA w/WBC 6-10, trace leukocyte esterase, unable to obtain presence of symptoms  c/w Zosyn  f/u urine cultures    Gastrointestinal:  #Obstructive jaundice  p/w transaminitis, altered mental status, low grade temp 100.1, elevated WBC 20k, direct bili 8.0  severe hepatomegaly w/mild ascites on CT abdomen w/gall bladder sludge  lipase level normal, low suspicion for pancreatic involvement  C/w Zosyn for suspected cholangitis  F/u blood cultures  f/u hepatitis panel  f/u dedicated hepatic USG for characterization of gall bladder  GI consult in AM, pt will benefit from MRCP vs ERCP    #Transaminitis  in the setting of Alcohol abuse and obstructive jaundice, BAL 34  Utox negative for salicylate and acetaminophen levels  f/u hepatitis panel  trend LFTs daily  Maddrey score for Alcoholic hepatitis 74, pt would benefit from glucocorticoid therapy    #Elevated INR and hypoalbuminemia in the setting of liver dysfunction  monitor daily, c/w venodynes for DVT ppx    #Lactic acidosis   Decreased clearance w/ liver dysfunction  Likely due to post-ictal state from seizure this AM vs sepsis, downtrending from 6.4>IV 1NS bolus> 2.6    Renal:  #Hyponatremia  on admission, Na 125> 1L NS bolus > 129  f/u serum osm, urine osm, urine sodium  unknown baseline, beer potomania vs hx of head surgery ?salt wasting    Endo:  NPO for now due to altered mental status  Monitor glucose Q4 hours in the setting of liver dysfunction    Heme/onc:   #Leukocytosis  WBC 20k, low grade temp 100.1 on admission, r/out sepsis  Avoid tylenol w/elevated LFTs    #Anemia  P/w Hb 6.9, MCV 87 normocytic  no active s/s bleeding  s/p 1 unit PRBC    Skin/ catheter: Yost placed 10/2, peripheral lines    Prophylaxis: Hold chemical DVT ppx in the setting of elevated INR    Goals of Care: Full code, no information available for pt's family at this time    Dispo: Accepted to ICU   Patient is a 27 yo Icelandic speaking Male, with no known past medical history, s/p Left temporal parietal craniotomy, presenting to the ED with altered mental status since this morning s/p seizure in the setting of Alcohol abuse. Patient admitted to ICU for multiple metabolic derangements.    ASSESSMENT  -Acute encephalopathy 2/2 Hepatic encephalopathy vs Sepsis   -Obstructive jaundice r/out cholangitis  -Alcohol withdrawal seizures  -Anemia  -Lactic acidosis  -Hyponatremia  -s/p Left temporal parietal craniotomy    Neuro:  #Acute encephalopathy due to Hepatic encephalopathy vs Sepsis  Will give 1 dose of lactulose enema for probable hepatic encephalopathy  c/w IV Zosyn for Sepsis likely 2/2 cholangitis    #Alcohol withdrawal seizures  As per EMS charts, pt drinks alcohol everyday, last drink 2 days ago  No prior hx of seizures  Started on CIWA protocol, on IV Ativan PRN (hold for sedation)  C/w thiamine, can start folic acid and MVT after resumed on PO diet    #S/p Left temporal parietal craniotomy  unknown reason    Cardiovascular:  #Sinus tachycardia  in the setting of sepsis/acute illness  monitor for now, c/w IVF    Pulmonary:   No acute issues    Infectious Diseases:  #R/out cholangitis  p/w transaminitis, altered mental status, low grade temp 100.1, elevated WBC 20k, direct bili 8.0  f/u blood cultures, c/w IV Zosyn for now    #UTI  UA w/WBC 6-10, trace leukocyte esterase, unable to obtain presence of symptoms  c/w Zosyn  f/u urine cultures    Gastrointestinal:  #Obstructive jaundice  p/w transaminitis, altered mental status, low grade temp 100.1, elevated WBC 20k, direct bili 8.0  severe hepatomegaly w/mild ascites on CT abdomen w/gall bladder sludge  lipase level normal, low suspicion for pancreatic involvement  C/w Zosyn for suspected cholangitis  F/u blood cultures  f/u hepatitis panel  f/u dedicated hepatic USG for characterization of gall bladder  GI consult in AM, pt will benefit from MRCP vs ERCP    #Transaminitis  in the setting of Alcohol abuse and obstructive jaundice, BAL 34  Utox negative for salicylate and acetaminophen levels  f/u hepatitis panel  trend LFTs daily  Maddrey score for Alcoholic hepatitis 74, pt would benefit from glucocorticoid therapy    #Elevated INR and hypoalbuminemia in the setting of liver dysfunction  monitor daily, c/w venodynes for DVT ppx    #Lactic acidosis   Decreased clearance w/ liver dysfunction  Likely due to post-ictal state from seizure this AM vs sepsis, downtrending from 6.4>IV 1NS bolus> 2.6    Renal:  #Hyponatremia  on admission, Na 125> 1L NS bolus > 129  f/u serum osm, urine osm, urine sodium  unknown baseline, beer potomania vs hx of head surgery ?salt wasting    Endo:  NPO for now due to altered mental status  Monitor glucose Q4 hours in the setting of liver dysfunction    Heme/onc:   #Leukocytosis  WBC 20k, low grade temp 100.1 on admission, r/out sepsis  Avoid tylenol w/elevated LFTs    #Anemia  P/w Hb 6.9, MCV 87 normocytic  no active s/s bleeding  s/p 1 unit PRBC    Skin/ catheter: Yost placed 10/2, peripheral lines    Prophylaxis: Hold chemical DVT ppx in the setting of elevated INR    Goals of Care: Full code, no information available for pt's family at this time    Dispo: Accepted to ICU   Patient is a 27 yo Arabic speaking Male, with no known past medical history, s/p Left temporal parietal craniotomy, presenting to the ED with altered mental status since this morning s/p seizure in the setting of Alcohol abuse. Patient admitted to ICU for multiple metabolic derangements.    ASSESSMENT  -Acute encephalopathy 2/2 Hepatic encephalopathy vs Sepsis   -Obstructive jaundice r/out cholangitis  -Alcohol withdrawal seizures  -Anemia  -Lactic acidosis  -Hyponatremia  -s/p Left temporal parietal craniotomy    Neuro:  #Acute encephalopathy due to Hepatic encephalopathy vs Sepsis  Will give 1 dose of lactulose enema for probable hepatic encephalopathy  c/w IV Zosyn for Sepsis likely 2/2 cholangitis    #Alcohol withdrawal seizures  As per EMS charts, pt drinks alcohol everyday, last drink 2 days ago  No prior hx of seizures  Started on CIWA protocol, on IV Ativan PRN (hold for sedation)  C/w thiamine, can start folic acid and MVT after resumed on PO diet    #S/p Left temporal parietal craniotomy  unknown reason    Cardiovascular:  #Sinus tachycardia  in the setting of sepsis/acute illness  monitor for now, c/w IVF    Pulmonary:   No acute issues    Infectious Diseases:  #R/out cholangitis  p/w transaminitis, altered mental status, low grade temp 100.1, elevated WBC 20k, direct bili 8.0  f/u blood cultures, c/w IV Zosyn for now    #UTI  UA w/WBC 6-10, trace leukocyte esterase, unable to obtain presence of symptoms  c/w Zosyn  f/u urine cultures    Gastrointestinal:  #Obstructive jaundice  p/w transaminitis, altered mental status, low grade temp 100.1, elevated WBC 20k, direct bili 8.0  severe hepatomegaly w/mild ascites on CT abdomen w/gall bladder sludge  lipase level normal, low suspicion for pancreatic involvement  C/w Zosyn for suspected cholangitis  F/u blood cultures  f/u hepatitis panel  f/u dedicated hepatic USG for characterization of gall bladder  GI consult in AM, pt will benefit from MRCP vs ERCP    #Transaminitis  in the setting of Alcohol abuse and obstructive jaundice, BAL 34  Utox negative for salicylate and acetaminophen levels  f/u hepatitis panel  trend LFTs daily  Maddrey score for Alcoholic hepatitis 74, pt would benefit from glucocorticoid therapy    #Elevated INR and hypoalbuminemia in the setting of liver dysfunction  monitor daily, c/w venodynes for DVT ppx    #Lactic acidosis   Decreased clearance w/ liver dysfunction  Likely due to post-ictal state from seizure this AM vs sepsis, downtrending from 6.4>IV 1NS bolus> 2.6    Renal:  #Hyponatremia  on admission, Na 125> 1L NS bolus > 129  f/u serum osm, urine osm, urine sodium  unknown baseline, f/u TSH, cortisol    Endo:  NPO for now due to altered mental status  Monitor glucose Q4 hours in the setting of liver dysfunction    Heme/onc:   #Leukocytosis  WBC 20k, low grade temp 100.1 on admission, r/out sepsis  Avoid tylenol w/elevated LFTs    #Anemia  P/w Hb 6.9, MCV 87 normocytic  no active s/s bleeding  s/p 1 unit PRBC    Skin/ catheter: Yost placed 10/2, peripheral lines    Prophylaxis: Hold chemical DVT ppx in the setting of elevated INR    Goals of Care: Full code, no information available for pt's family at this time    Dispo: Accepted to ICU   Patient is a 25 yo Divehi speaking Male, with no known past medical history, s/p Left temporal parietal craniotomy, presenting to the ED with altered mental status since this morning s/p seizure in the setting of Alcohol abuse. Patient admitted to ICU for multiple metabolic derangements.    ASSESSMENT  -Acute encephalopathy 2/2 Hepatic encephalopathy vs Sepsis   -Obstructive jaundice r/out cholangitis  -Alcohol withdrawal seizures  -Anemia  -Lactic acidosis  -Hyponatremia  -s/p Left temporal parietal craniotomy    Neuro:  #Acute encephalopathy due to Hepatic encephalopathy vs Sepsis  Will give 1 dose of lactulose enema for probable hepatic encephalopathy  c/w IV Zosyn for Sepsis r/out cholangitis    #Alcohol withdrawal seizures  As per EMS charts, pt drinks alcohol everyday, last drink 2 days ago  No prior hx of seizures  Started on CIWA protocol, on IV Ativan PRN (hold for sedation)  C/w thiamine, can start folic acid and MVT after resumed on PO diet    #S/p Left temporal parietal craniotomy  unknown reason    Cardiovascular:  #Sinus tachycardia  in the setting of sepsis/acute illness  monitor for now, c/w IVF    Pulmonary:   No acute issues    Infectious Diseases:  #R/out cholangitis  p/w transaminitis, altered mental status, low grade temp 100.1, elevated WBC 20k, direct bili 8.0  f/u blood cultures, c/w IV Zosyn for now    #UTI  UA w/WBC 6-10, trace leukocyte esterase, unable to obtain presence of symptoms  c/w Zosyn  f/u urine cultures    Gastrointestinal:  #Obstructive jaundice  p/w transaminitis, altered mental status, low grade temp 100.1, elevated WBC 20k, direct bili 8.0  severe hepatomegaly w/mild ascites on CT abdomen w/gall bladder sludge  lipase level normal, low suspicion for pancreatic involvement  C/w Zosyn for suspected cholangitis  F/u blood cultures  f/u hepatitis panel  f/u hepatic USG for characterization of gall bladder  GI consult in AM, pt will benefit from MRCP vs ERCP    #Transaminitis  in the setting of Alcohol abuse and obstructive jaundice, BAL 34  Utox negative for salicylate and acetaminophen levels  f/u hepatitis panel  trend LFTs daily  Maddrey score for Alcoholic hepatitis 74, pt would benefit from glucocorticoid therapy    #Elevated INR and hypoalbuminemia in the setting of liver dysfunction  monitor daily, c/w venodynes for DVT ppx    #Lactic acidosis   Decreased clearance w/ liver dysfunction  Likely due to post-ictal state from seizure this AM vs sepsis, downtrending from 6.4>IV 1NS bolus> 2.6    Renal:  #Hyponatremia  on admission, Na 125> 1L NS bolus > 129  f/u serum osm, urine osm, urine sodium  unknown baseline, f/u TSH, cortisol    Endo:  NPO for now due to altered mental status  Monitor glucose Q4 hours in the setting of liver dysfunction    Heme/onc:   #Leukocytosis  WBC 20k, low grade temp 100.1 on admission, r/out sepsis  Avoid tylenol w/elevated LFTs    #Anemia  P/w Hb 6.9, MCV 87 normocytic  no active s/s bleeding  s/p 1 unit PRBC    Skin/ catheter: Yost placed 10/2, peripheral lines    Prophylaxis: Hold chemical DVT ppx in the setting of elevated INR    Goals of Care: Full code, no information available for pt's family at this time    Dispo: Accepted to ICU

## 2022-10-02 NOTE — ED PROVIDER NOTE - PHYSICAL EXAMINATION
General: moderate distress   HEENT: normocephalic, atraumatic, PERRL, scleral icterus   Respiratory: normal work of breathing, lungs clear to auscultation bilaterally   Cardiac: regular rate and rhythm   Abdomen: distended,   MSK: no swelling or tenderness of lower extremities, moving all extremities spontaneously   Skin: warm, dry   Neuro: A&Ox3  Psych: appropriate affect General: moderate distress   HEENT: normocephalic, atraumatic, PERRL, scleral icterus   Respiratory: normal work of breathing, lungs clear to auscultation bilaterally   Cardiac: regular rate and rhythm   Abdomen: distended, hepatomegaly    MSK: no swelling or tenderness of lower extremities, moving all extremities spontaneously   Skin: warm, dry, jaundice    Neuro: responsive to verbal stimuli

## 2022-10-02 NOTE — ED PROVIDER NOTE - CARE PLAN
Principal Discharge DX:	Seizure  Secondary Diagnosis:	Altered mental status  Secondary Diagnosis:	Hyponatremia  Secondary Diagnosis:	Jaundice  Secondary Diagnosis:	Alcohol abuse   1

## 2022-10-02 NOTE — ED ADULT TRIAGE NOTE - CHIEF COMPLAINT QUOTE
biba s/p  seizures x 2 today , fevers . family reports last drink was 2 days ago . lethargic , jaundice

## 2022-10-02 NOTE — H&P ADULT - NSHPPHYSICALEXAM_GEN_ALL_CORE
PHYSICAL EXAM:  GENERAL: Opens eyes, moaning, moving head from side to side  HEAD:  Atraumatic, Normocephalic  EYES: EOMI, PERRLA, conjunctiva and sclera clear  NECK: Supple, No JVD  CHEST/LUNG: Clear to auscultation bilaterally; No wheeze  HEART: Regular rate and rhythm; s1+ s2+  ABDOMEN: ++hepatomegaly  EXTREMITIES:, No clubbing, cyanosis, or edema  NEUROLOGY: Opens eyes, moving head from side to side, does not follow commands  SKIN: scar on left eyebrow area
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
## 2022-10-03 LAB
ALBUMIN SERPL ELPH-MCNC: 1.7 G/DL — LOW (ref 3.5–5)
ALBUMIN SERPL ELPH-MCNC: 1.8 G/DL — LOW (ref 3.5–5)
ALP SERPL-CCNC: 566 U/L — HIGH (ref 40–120)
ALP SERPL-CCNC: 600 U/L — HIGH (ref 40–120)
ALT FLD-CCNC: 19 U/L DA — SIGNIFICANT CHANGE UP (ref 10–60)
ALT FLD-CCNC: 22 U/L DA — SIGNIFICANT CHANGE UP (ref 10–60)
AMMONIA BLD-MCNC: 131 UMOL/L — HIGH (ref 11–32)
ANION GAP SERPL CALC-SCNC: 10 MMOL/L — SIGNIFICANT CHANGE UP (ref 5–17)
ANION GAP SERPL CALC-SCNC: 12 MMOL/L — SIGNIFICANT CHANGE UP (ref 5–17)
ANION GAP SERPL CALC-SCNC: 12 MMOL/L — SIGNIFICANT CHANGE UP (ref 5–17)
AST SERPL-CCNC: 210 U/L — HIGH (ref 10–40)
AST SERPL-CCNC: 212 U/L — HIGH (ref 10–40)
BASOPHILS # BLD AUTO: 0.07 K/UL — SIGNIFICANT CHANGE UP (ref 0–0.2)
BASOPHILS NFR BLD AUTO: 0.4 % — SIGNIFICANT CHANGE UP (ref 0–2)
BILIRUB DIRECT SERPL-MCNC: 8.2 MG/DL — HIGH (ref 0–0.3)
BILIRUB INDIRECT FLD-MCNC: 1.6 MG/DL — HIGH (ref 0.2–1)
BILIRUB SERPL-MCNC: 10.8 MG/DL — HIGH (ref 0.2–1.2)
BILIRUB SERPL-MCNC: 9.6 MG/DL — HIGH (ref 0.2–1.2)
BILIRUB SERPL-MCNC: 9.8 MG/DL — HIGH (ref 0.2–1.2)
BUN SERPL-MCNC: 2 MG/DL — LOW (ref 7–18)
C TRACH RRNA SPEC QL NAA+PROBE: SIGNIFICANT CHANGE UP
CALCIUM SERPL-MCNC: 6.8 MG/DL — LOW (ref 8.4–10.5)
CALCIUM SERPL-MCNC: 7.1 MG/DL — LOW (ref 8.4–10.5)
CALCIUM SERPL-MCNC: 7.2 MG/DL — LOW (ref 8.4–10.5)
CHLORIDE SERPL-SCNC: 102 MMOL/L — SIGNIFICANT CHANGE UP (ref 96–108)
CHLORIDE SERPL-SCNC: 96 MMOL/L — SIGNIFICANT CHANGE UP (ref 96–108)
CHLORIDE SERPL-SCNC: 97 MMOL/L — SIGNIFICANT CHANGE UP (ref 96–108)
CHOLEST SERPL-MCNC: 363 MG/DL — HIGH
CO2 SERPL-SCNC: 20 MMOL/L — LOW (ref 22–31)
CO2 SERPL-SCNC: 20 MMOL/L — LOW (ref 22–31)
CO2 SERPL-SCNC: 21 MMOL/L — LOW (ref 22–31)
CREAT SERPL-MCNC: 0.39 MG/DL — LOW (ref 0.5–1.3)
CREAT SERPL-MCNC: 0.51 MG/DL — SIGNIFICANT CHANGE UP (ref 0.5–1.3)
CREAT SERPL-MCNC: 0.58 MG/DL — SIGNIFICANT CHANGE UP (ref 0.5–1.3)
CULTURE RESULTS: NO GROWTH — SIGNIFICANT CHANGE UP
EGFR: 140 ML/MIN/1.73M2 — SIGNIFICANT CHANGE UP
EGFR: 145 ML/MIN/1.73M2 — SIGNIFICANT CHANGE UP
EGFR: 157 ML/MIN/1.73M2 — SIGNIFICANT CHANGE UP
EOSINOPHIL # BLD AUTO: 0.03 K/UL — SIGNIFICANT CHANGE UP (ref 0–0.5)
EOSINOPHIL NFR BLD AUTO: 0.2 % — SIGNIFICANT CHANGE UP (ref 0–6)
GLUCOSE BLDC GLUCOMTR-MCNC: 85 MG/DL — SIGNIFICANT CHANGE UP (ref 70–99)
GLUCOSE BLDC GLUCOMTR-MCNC: 87 MG/DL — SIGNIFICANT CHANGE UP (ref 70–99)
GLUCOSE BLDC GLUCOMTR-MCNC: 88 MG/DL — SIGNIFICANT CHANGE UP (ref 70–99)
GLUCOSE BLDC GLUCOMTR-MCNC: 97 MG/DL — SIGNIFICANT CHANGE UP (ref 70–99)
GLUCOSE SERPL-MCNC: 79 MG/DL — SIGNIFICANT CHANGE UP (ref 70–99)
GLUCOSE SERPL-MCNC: 83 MG/DL — SIGNIFICANT CHANGE UP (ref 70–99)
GLUCOSE SERPL-MCNC: 88 MG/DL — SIGNIFICANT CHANGE UP (ref 70–99)
HAPTOGLOB SERPL-MCNC: 142 MG/DL — SIGNIFICANT CHANGE UP (ref 34–200)
HAV IGM SER-ACNC: SIGNIFICANT CHANGE UP
HAV IGM SER-ACNC: SIGNIFICANT CHANGE UP
HBV CORE AB SER-ACNC: SIGNIFICANT CHANGE UP
HBV CORE IGM SER-ACNC: SIGNIFICANT CHANGE UP
HBV SURFACE AB SER-ACNC: SIGNIFICANT CHANGE UP
HBV SURFACE AG SER-ACNC: SIGNIFICANT CHANGE UP
HBV SURFACE AG SER-ACNC: SIGNIFICANT CHANGE UP
HCT VFR BLD CALC: 23.8 % — LOW (ref 39–50)
HCT VFR BLD CALC: 26.7 % — LOW (ref 39–50)
HCV AB S/CO SERPL IA: 0.09 S/CO — SIGNIFICANT CHANGE UP (ref 0–0.99)
HCV AB S/CO SERPL IA: 0.09 S/CO — SIGNIFICANT CHANGE UP (ref 0–0.99)
HCV AB SERPL-IMP: SIGNIFICANT CHANGE UP
HCV AB SERPL-IMP: SIGNIFICANT CHANGE UP
HDLC SERPL-MCNC: 14 MG/DL — LOW
HGB BLD-MCNC: 7.5 G/DL — LOW (ref 13–17)
HGB BLD-MCNC: 8.3 G/DL — LOW (ref 13–17)
IMM GRANULOCYTES NFR BLD AUTO: 0.9 % — SIGNIFICANT CHANGE UP (ref 0–0.9)
INR BLD: 2.13 RATIO — HIGH (ref 0.88–1.16)
LACTATE SERPL-SCNC: 1 MMOL/L — SIGNIFICANT CHANGE UP (ref 0.7–2)
LACTATE SERPL-SCNC: 3 MMOL/L — HIGH (ref 0.7–2)
LDH SERPL L TO P-CCNC: 146 U/L — SIGNIFICANT CHANGE UP (ref 120–225)
LIPID PNL WITH DIRECT LDL SERPL: 285 MG/DL — HIGH
LYMPHOCYTES # BLD AUTO: 0.66 K/UL — LOW (ref 1–3.3)
LYMPHOCYTES # BLD AUTO: 4.1 % — LOW (ref 13–44)
MAGNESIUM SERPL-MCNC: 1.9 MG/DL — SIGNIFICANT CHANGE UP (ref 1.6–2.6)
MCHC RBC-ENTMCNC: 27.3 PG — SIGNIFICANT CHANGE UP (ref 27–34)
MCHC RBC-ENTMCNC: 28 PG — SIGNIFICANT CHANGE UP (ref 27–34)
MCHC RBC-ENTMCNC: 31.1 GM/DL — LOW (ref 32–36)
MCHC RBC-ENTMCNC: 31.5 GM/DL — LOW (ref 32–36)
MCV RBC AUTO: 87.8 FL — SIGNIFICANT CHANGE UP (ref 80–100)
MCV RBC AUTO: 88.8 FL — SIGNIFICANT CHANGE UP (ref 80–100)
MONOCYTES # BLD AUTO: 0.95 K/UL — HIGH (ref 0–0.9)
MONOCYTES NFR BLD AUTO: 5.9 % — SIGNIFICANT CHANGE UP (ref 2–14)
MRSA PCR RESULT.: SIGNIFICANT CHANGE UP
N GONORRHOEA RRNA SPEC QL NAA+PROBE: SIGNIFICANT CHANGE UP
NEUTROPHILS # BLD AUTO: 14.16 K/UL — HIGH (ref 1.8–7.4)
NEUTROPHILS NFR BLD AUTO: 88.5 % — HIGH (ref 43–77)
NON HDL CHOLESTEROL: 349 MG/DL — HIGH
NRBC # BLD: 0 /100 WBCS — SIGNIFICANT CHANGE UP (ref 0–0)
PHOSPHATE SERPL-MCNC: 1.5 MG/DL — LOW (ref 2.5–4.5)
PLATELET # BLD AUTO: 151 K/UL — SIGNIFICANT CHANGE UP (ref 150–400)
PLATELET # BLD AUTO: 169 K/UL — SIGNIFICANT CHANGE UP (ref 150–400)
POTASSIUM SERPL-MCNC: 2.8 MMOL/L — CRITICAL LOW (ref 3.5–5.3)
POTASSIUM SERPL-MCNC: 3.1 MMOL/L — LOW (ref 3.5–5.3)
POTASSIUM SERPL-MCNC: 3.6 MMOL/L — SIGNIFICANT CHANGE UP (ref 3.5–5.3)
POTASSIUM SERPL-SCNC: 2.8 MMOL/L — CRITICAL LOW (ref 3.5–5.3)
POTASSIUM SERPL-SCNC: 3.1 MMOL/L — LOW (ref 3.5–5.3)
POTASSIUM SERPL-SCNC: 3.6 MMOL/L — SIGNIFICANT CHANGE UP (ref 3.5–5.3)
PROT SERPL-MCNC: 6.4 G/DL — SIGNIFICANT CHANGE UP (ref 6–8.3)
PROT SERPL-MCNC: 6.8 G/DL — SIGNIFICANT CHANGE UP (ref 6–8.3)
PROTHROM AB SERPL-ACNC: 25.6 SEC — HIGH (ref 10.5–13.4)
RBC # BLD: 2.56 M/UL — LOW (ref 4.2–5.8)
RBC # BLD: 2.68 M/UL — LOW (ref 4.2–5.8)
RBC # BLD: 3.04 M/UL — LOW (ref 4.2–5.8)
RBC # FLD: 19.6 % — HIGH (ref 10.3–14.5)
RBC # FLD: 20 % — HIGH (ref 10.3–14.5)
RETICS #: 69.4 K/UL — SIGNIFICANT CHANGE UP (ref 25–125)
RETICS/RBC NFR: 2.7 % — HIGH (ref 0.5–2.5)
S AUREUS DNA NOSE QL NAA+PROBE: DETECTED
SODIUM SERPL-SCNC: 129 MMOL/L — LOW (ref 135–145)
SODIUM SERPL-SCNC: 129 MMOL/L — LOW (ref 135–145)
SODIUM SERPL-SCNC: 132 MMOL/L — LOW (ref 135–145)
SPECIMEN SOURCE: SIGNIFICANT CHANGE UP
SPECIMEN SOURCE: SIGNIFICANT CHANGE UP
T PALLIDUM AB TITR SER: NEGATIVE — SIGNIFICANT CHANGE UP
T3 SERPL-MCNC: 33 NG/DL — LOW (ref 80–200)
T4 FREE SERPL-MCNC: 1.1 NG/DL — SIGNIFICANT CHANGE UP (ref 0.9–1.8)
TRIGL SERPL-MCNC: 319 MG/DL — HIGH
TSH SERPL-MCNC: 7.22 UU/ML — HIGH (ref 0.34–4.82)
WBC # BLD: 16.02 K/UL — HIGH (ref 3.8–10.5)
WBC # FLD AUTO: 16.02 K/UL — HIGH (ref 3.8–10.5)

## 2022-10-03 PROCEDURE — 99255 IP/OBS CONSLTJ NEW/EST HI 80: CPT

## 2022-10-03 PROCEDURE — 99222 1ST HOSP IP/OBS MODERATE 55: CPT

## 2022-10-03 RX ORDER — LACTULOSE 10 G/15ML
20 SOLUTION ORAL EVERY 8 HOURS
Refills: 0 | Status: DISCONTINUED | OUTPATIENT
Start: 2022-10-03 | End: 2022-10-04

## 2022-10-03 RX ORDER — LACTULOSE 10 G/15ML
200 SOLUTION ORAL ONCE
Refills: 0 | Status: COMPLETED | OUTPATIENT
Start: 2022-10-03 | End: 2022-10-03

## 2022-10-03 RX ORDER — PANTOPRAZOLE SODIUM 20 MG/1
40 TABLET, DELAYED RELEASE ORAL
Refills: 0 | Status: DISCONTINUED | OUTPATIENT
Start: 2022-10-03 | End: 2022-10-03

## 2022-10-03 RX ORDER — LACTULOSE 10 G/15ML
20 SOLUTION ORAL
Refills: 0 | Status: DISCONTINUED | OUTPATIENT
Start: 2022-10-03 | End: 2022-10-03

## 2022-10-03 RX ORDER — POTASSIUM PHOSPHATE, MONOBASIC POTASSIUM PHOSPHATE, DIBASIC 236; 224 MG/ML; MG/ML
30 INJECTION, SOLUTION INTRAVENOUS ONCE
Refills: 0 | Status: COMPLETED | OUTPATIENT
Start: 2022-10-03 | End: 2022-10-03

## 2022-10-03 RX ORDER — POTASSIUM CHLORIDE 20 MEQ
10 PACKET (EA) ORAL
Refills: 0 | Status: COMPLETED | OUTPATIENT
Start: 2022-10-03 | End: 2022-10-03

## 2022-10-03 RX ORDER — SODIUM CHLORIDE 9 MG/ML
1000 INJECTION INTRAMUSCULAR; INTRAVENOUS; SUBCUTANEOUS
Refills: 0 | Status: DISCONTINUED | OUTPATIENT
Start: 2022-10-03 | End: 2022-10-03

## 2022-10-03 RX ORDER — SODIUM CHLORIDE 9 MG/ML
1000 INJECTION, SOLUTION INTRAVENOUS
Refills: 0 | Status: DISCONTINUED | OUTPATIENT
Start: 2022-10-03 | End: 2022-10-04

## 2022-10-03 RX ORDER — FOLIC ACID 0.8 MG
1 TABLET ORAL DAILY
Refills: 0 | Status: DISCONTINUED | OUTPATIENT
Start: 2022-10-03 | End: 2022-10-05

## 2022-10-03 RX ORDER — PANTOPRAZOLE SODIUM 20 MG/1
40 TABLET, DELAYED RELEASE ORAL
Refills: 0 | Status: DISCONTINUED | OUTPATIENT
Start: 2022-10-03 | End: 2022-10-04

## 2022-10-03 RX ORDER — MUPIROCIN 20 MG/G
1 OINTMENT TOPICAL THREE TIMES A DAY
Refills: 0 | Status: DISCONTINUED | OUTPATIENT
Start: 2022-10-03 | End: 2022-10-11

## 2022-10-03 RX ADMIN — PIPERACILLIN AND TAZOBACTAM 25 GRAM(S): 4; .5 INJECTION, POWDER, LYOPHILIZED, FOR SOLUTION INTRAVENOUS at 06:09

## 2022-10-03 RX ADMIN — Medication 105 MILLIGRAM(S): at 22:05

## 2022-10-03 RX ADMIN — LACTULOSE 200 GRAM(S): 10 SOLUTION ORAL at 03:47

## 2022-10-03 RX ADMIN — POTASSIUM PHOSPHATE, MONOBASIC POTASSIUM PHOSPHATE, DIBASIC 83.33 MILLIMOLE(S): 236; 224 INJECTION, SOLUTION INTRAVENOUS at 01:52

## 2022-10-03 RX ADMIN — PIPERACILLIN AND TAZOBACTAM 25 GRAM(S): 4; .5 INJECTION, POWDER, LYOPHILIZED, FOR SOLUTION INTRAVENOUS at 14:26

## 2022-10-03 RX ADMIN — Medication 105 MILLIGRAM(S): at 14:27

## 2022-10-03 RX ADMIN — Medication 100 MILLIEQUIVALENT(S): at 06:46

## 2022-10-03 RX ADMIN — Medication 100 MILLIEQUIVALENT(S): at 05:43

## 2022-10-03 RX ADMIN — Medication 105 MILLIGRAM(S): at 05:03

## 2022-10-03 RX ADMIN — LACTULOSE 20 GRAM(S): 10 SOLUTION ORAL at 21:48

## 2022-10-03 RX ADMIN — LACTULOSE 20 GRAM(S): 10 SOLUTION ORAL at 14:26

## 2022-10-03 RX ADMIN — Medication 100 MILLIEQUIVALENT(S): at 00:49

## 2022-10-03 RX ADMIN — POTASSIUM PHOSPHATE, MONOBASIC POTASSIUM PHOSPHATE, DIBASIC 83.33 MILLIMOLE(S): 236; 224 INJECTION, SOLUTION INTRAVENOUS at 08:02

## 2022-10-03 RX ADMIN — CHLORHEXIDINE GLUCONATE 1 APPLICATION(S): 213 SOLUTION TOPICAL at 05:03

## 2022-10-03 RX ADMIN — SODIUM CHLORIDE 70 MILLILITER(S): 9 INJECTION INTRAMUSCULAR; INTRAVENOUS; SUBCUTANEOUS at 03:47

## 2022-10-03 RX ADMIN — Medication 1 MILLIGRAM(S): at 14:27

## 2022-10-03 RX ADMIN — Medication 100 MILLIEQUIVALENT(S): at 08:03

## 2022-10-03 RX ADMIN — MUPIROCIN 1 APPLICATION(S): 20 OINTMENT TOPICAL at 21:48

## 2022-10-03 RX ADMIN — PIPERACILLIN AND TAZOBACTAM 25 GRAM(S): 4; .5 INJECTION, POWDER, LYOPHILIZED, FOR SOLUTION INTRAVENOUS at 21:47

## 2022-10-03 RX ADMIN — Medication 100 MILLIEQUIVALENT(S): at 03:18

## 2022-10-03 RX ADMIN — Medication 100 MILLIEQUIVALENT(S): at 00:20

## 2022-10-03 RX ADMIN — PANTOPRAZOLE SODIUM 40 MILLIGRAM(S): 20 TABLET, DELAYED RELEASE ORAL at 12:34

## 2022-10-03 RX ADMIN — Medication 100 MILLIEQUIVALENT(S): at 04:31

## 2022-10-03 RX ADMIN — Medication 1 TABLET(S): at 14:27

## 2022-10-03 RX ADMIN — Medication 100 MILLIEQUIVALENT(S): at 09:17

## 2022-10-03 RX ADMIN — SODIUM CHLORIDE 75 MILLILITER(S): 9 INJECTION, SOLUTION INTRAVENOUS at 23:54
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
## 2022-10-03 NOTE — PROGRESS NOTE ADULT - SUBJECTIVE AND OBJECTIVE BOX
INTERVAL HPI/OVERNIGHT EVENTS: No acute events overnight. Patient did not require PRN ativan.     PRESSORS: [ ] YES [X ] NO  WHICH:    ANTIBIOTICS:                  DATE STARTED:    Antimicrobial:  piperacillin/tazobactam IVPB.. 3.375 Gram(s) IV Intermittent every 8 hours    Cardiovascular:    Pulmonary:    Hematalogic:    Other:  chlorhexidine 2% Cloths 1 Application(s) Topical <User Schedule>  LORazepam   Injectable 2 milliGRAM(s) IV Push every 4 hours PRN  pantoprazole  Injectable 40 milliGRAM(s) IV Push daily  thiamine IVPB 500 milliGRAM(s) IV Intermittent three times a day    chlorhexidine 2% Cloths 1 Application(s) Topical <User Schedule>  LORazepam   Injectable 2 milliGRAM(s) IV Push every 4 hours PRN  pantoprazole  Injectable 40 milliGRAM(s) IV Push daily  piperacillin/tazobactam IVPB.. 3.375 Gram(s) IV Intermittent every 8 hours  thiamine IVPB 500 milliGRAM(s) IV Intermittent three times a day    Drug Dosing Weight    Weight (kg): 78.9 (02 Oct 2022 21:05)    CENTRAL LINE: [ ] YES [ X] NO  LOCATION:         [place]: [X ] YES [ ] NO          A-LINE:  [ ] YES [X ] NO  LOCATION:         ICU Vital Signs Last 24 Hrs  T(C): 37.4 (03 Oct 2022 08:00), Max: 37.8 (02 Oct 2022 15:11)  T(F): 99.4 (03 Oct 2022 08:00), Max: 100.1 (02 Oct 2022 15:11)  HR: 105 (03 Oct 2022 11:00) (104 - 119)  BP: 106/58 (03 Oct 2022 11:00) (95/54 - 137/87)  BP(mean): 70 (03 Oct 2022 11:00) (65 - 95)  ABP: --  ABP(mean): --  RR: 20 (03 Oct 2022 11:00) (15 - 22)  SpO2: 99% (03 Oct 2022 11:00) (96% - 100%)    O2 Parameters below as of 03 Oct 2022 08:00  Patient On (Oxygen Delivery Method): room air                  10-02 @ 07:01  -  10-03 @ 07:00  --------------------------------------------------------  IN: 2413.1 mL / OUT: 1460 mL / NET: 953.1 mL              PHYSICAL EXAM:    GENERAL: NAD, delirious   HEAD: Nomocephalic, Atraumatic  EYES: scleral icterus   NECK: Supple, No JVD; Normal thyroid; Trachea midline  NERVOUS SYSTEM:  Alert & Oriented X3,  Motor Strength 5/5 B/L upper and lower extremities; DTRs 2+ intact and symmetric  CHEST/LUNG: No rales, rhonchi, wheezing   HEART: Regular rate and rhythm; No murmurs,   ABDOMEN: distended   EXTREMITIES:  2+ Peripheral Pulses, No clubbing, cyanosis, or edema  SKIN: No lesions      LABS:  CBC Full  -  ( 03 Oct 2022 04:30 )  WBC Count : 16.02 K/uL  RBC Count : 3.04 M/uL  Hemoglobin : 8.3 g/dL  Hematocrit : 26.7 %  Platelet Count - Automated : 151 K/uL  Mean Cell Volume : 87.8 fl  Mean Cell Hemoglobin : 27.3 pg  Mean Cell Hemoglobin Concentration : 31.1 gm/dL  Auto Neutrophil # : 14.16 K/uL  Auto Lymphocyte # : 0.66 K/uL  Auto Monocyte # : 0.95 K/uL  Auto Eosinophil # : 0.03 K/uL  Auto Basophil # : 0.07 K/uL  Auto Neutrophil % : 88.5 %  Auto Lymphocyte % : 4.1 %  Auto Monocyte % : 5.9 %  Auto Eosinophil % : 0.2 %  Auto Basophil % : 0.4 %    10-    129<L>  |  97  |  2<L>  ----------------------------<  79  3.1<L>   |  20<L>  |  0.51    Ca    7.2<L>      03 Oct 2022 04:30  Phos  1.5     10-03  Mg     1.9     10-03    TPro  6.8  /  Alb  1.8<L>  /  TBili  10.8<H>  /  DBili  x   /  AST  212<H>  /  ALT  22  /  AlkPhos  600<H>  10-03    PT/INR - ( 03 Oct 2022 04:30 )   PT: 25.6 sec;   INR: 2.13 ratio         PTT - ( 02 Oct 2022 17:00 )  PTT:43.6 sec  Urinalysis Basic - ( 02 Oct 2022 17:20 )    Color: Yellow / Appearance: Clear / S.010 / pH: x  Gluc: x / Ketone: Small  / Bili: Large / Urobili: 8   Blood: x / Protein: 100 / Nitrite: Positive   Leuk Esterase: Trace / RBC: 2-5 /HPF / WBC 6-10 /HPF   Sq Epi: x / Non Sq Epi: Few /HPF / Bacteria: Few /HPF          RADIOLOGY & ADDITIONAL STUDIES REVIEWED:  ***    [ ]GOALS OF CARE DISCUSSION WITH PATIENT/FAMILY/PROXY:    CRITICAL CARE TIME SPENT: 35 minutes

## 2022-10-03 NOTE — CONSULT NOTE ADULT - ASSESSMENT
25yo Mongolian speaking Male s/p Left temporal parietal craniotomy and alcohol abuse (reported current drinking) presented to [facility] ED on 10/2/22 with 1 day Hx of AMS, after reported witnessed seizure episodes and 1 week Hx of jaundice and was admitted to MICU with suspected EtOH withdrawal seizure, electrolyte abnormalities (hyponatremia with Na 125->129, hypokalemia with K 2.7->3.1, hypomagnesemia with Mg 1.0->1.9, hypophosphatemia with P 1.1->1.5), lactic acidosis (with lactate 6.4->3.0, bicarb 19->20), leukocytosis with neutrophilia (WBC 20.35->16.02, roderick 90%) with low grade T (100.1F), anemia without evidence of overt bleeding (Hb 6.9->8.3 s/p 1 U PRBC) and abnormal liver tests including coagulopathy (INR 2.13), hyperbilirubinemia (Se bi 9.3->10.8, direct 8.0), hyperammonemia (131), elevated liver enzymes in AST> ALT pattern (->212, ALT 22) and elevated ->600 and CT a/p w// iv suggesting diffuse hepatic steatosis, hepatomegaly with possible GB sludge and GB wall thickening, but normal bile ducts.      Hepatomegaly and diffuse hepatic steatosis  Jaundice, mostly direct hyperbilirubinemia  Coagulopathy  Elevated transaminases in AST>ALT pattern  Elevated ALP  Positive UA  Hyponatremia  - MELD 28  - MDF > 32 (68.8)  - Given AST>ALT pattern, EtOH use, recent jaundice, alcoholic hepatitis is in differential, but would need to rule out infection, sepsis, as tachycardic with low grade T, leukocytosis with neutrophilia  - Follow up UCx, Follow up 2 sets BCx, Obtain CXR.  - Pending US abd.,   - Consider MRCP  - C/w broad spectrum antibiotics, on Zosyn day #2, and consider ID consult  - Acute hepatitis viral panel, HIV, COVID-19 are neg, also send HBcAb total HBsAb, Hep E IgM, EBV and CMV PCRs/serologies, leptospirosis serology, ceruloplasmin  - Follow up GI recommendations  - Once or If infection ruled out, and no other etiology than alcoholic hepatitis, consider prednisolone (but only after infectious workup completed and negative) with NAC   - C/w supportive measures per ICU, correction of electrolytes, iv hydration. Monitor lactate.   - Consider sending procalcitonin.   - Review images for splanchnic veins patency      Acute encephalopathy, possible s/p seizures, but also elevated ammonia, liver disease, alcohol use and Hx of craniotomy  - Please, obtain collateral information about his baseline  - Aspiration, seizure, fall precautions  - Received lactulose enema. C/w lactulose to titrate to have 2-3 BM/s day and can consider adding rifaximin.  - Currently maintaining airways and per d/w ICU team he is at his baseline  - C/w sepsis mgmt. per ICU (pending r/o cholangitis, also pos UA)  - Consider neurology consult. CT head noted.     Alcohol use  - Aspiration, seizure, fall precautions  - C/w folic, thiamine  - Collateral information how he can obtain alcohol is his mental status at baseline  - CIWA per ICU team    Anemia, s/p  1U PRBC  - Monitor H/H  - Keep Hb > 7, PLT>50 and check fibrinogen, to keep > 120 if bleeding. PLT WNL.  - F/u GI recommendations    Thank you for consult   Will continue to follow  Was d/w ICU team 25yo Yakut speaking Male s/p Left temporal parietal craniotomy and alcohol abuse (reported current drinking) presented to [facility] ED on 10/2/22 with 1 day Hx of AMS, after reported witnessed seizure episodes and 1 week Hx of jaundice and was admitted to MICU with suspected EtOH withdrawal seizure, electrolyte abnormalities (hyponatremia with Na 125->129, hypokalemia with K 2.7->3.1, hypomagnesemia with Mg 1.0->1.9, hypophosphatemia with P 1.1->1.5), lactic acidosis (with lactate 6.4->3.0, bicarb 19->20), leukocytosis with neutrophilia (WBC 20.35->16.02, roderick 90%) with low grade T (100.1F), anemia without evidence of overt bleeding (Hb 6.9->8.3 s/p 1 U PRBC) and abnormal liver tests including coagulopathy (INR 2.13), hyperbilirubinemia (Se bi 9.3->10.8, direct 8.0), hyperammonemia (131), elevated liver enzymes in AST> ALT pattern (->212, ALT 22) and elevated ->600 and CT a/p w// iv suggesting diffuse hepatic steatosis, hepatomegaly with possible GB sludge and GB wall thickening, but normal bile ducts.      Hepatomegaly and diffuse hepatic steatosis  Jaundice, mostly direct hyperbilirubinemia  Coagulopathy  Elevated transaminases in AST>ALT pattern  Elevated ALP  Positive UA  Hyponatremia  - MELD 28  - MDF > 32 (68.8)  - Given AST>ALT pattern, EtOH use, recent jaundice, alcoholic hepatitis is in differential, but would need to rule out infection, sepsis, as tachycardic with low grade T, leukocytosis with neutrophilia  - Follow up UCx, Follow up 2 sets BCx, Obtain CXR.  - Pending US abd.,   - Consider MRCP  - C/w broad spectrum antibiotics, on Zosyn day #2, and consider ID consult  - Acute hepatitis viral panel, HIV, COVID-19 are neg, also send HBcAb total HBsAb, Hep E IgM, EBV and CMV PCRs/serologies, leptospirosis serology, ceruloplasmin, and can send AI panel  - Follow up GI recommendations  - Once or If infection ruled out, and no other etiology than alcoholic hepatitis, consider prednisolone (but only after infectious workup completed and negative) with NAC   - C/w supportive measures per ICU, correction of electrolytes, iv hydration. Monitor lactate.   - Consider sending procalcitonin.   - Review images for splanchnic veins patency      Acute encephalopathy, possible s/p seizures, but also elevated ammonia, liver disease, alcohol use and Hx of craniotomy  - Please, obtain collateral information about his baseline  - Aspiration, seizure, fall precautions  - Received lactulose enema. C/w lactulose to titrate to have 2-3 BM/s day and can consider adding rifaximin.  - Currently maintaining airways and per d/w ICU team he is at his baseline  - C/w sepsis mgmt. per ICU (pending r/o cholangitis, also pos UA)  - Consider neurology consult. CT head noted.     Alcohol use  - Aspiration, seizure, fall precautions  - C/w folic, thiamine  - Collateral information how he can obtain alcohol is his mental status at baseline  - CIWA per ICU team    Anemia, s/p  1U PRBC  - Monitor H/H  - Keep Hb > 7, PLT>50 and check fibrinogen, to keep > 120 if bleeding. PLT WNL.  - F/u GI recommendations    Thank you for consult   Will continue to follow  Was d/w ICU team

## 2022-10-03 NOTE — CONSULT NOTE ADULT - NS ATTEND AMEND GEN_ALL_CORE FT
- Acute liver failure.  - Jaundice.  - AMS.  - Etoh hepatitis.  - Abnormal CT abdomen.  - Anemia.    PAtient seen and examined. Altered, AAOx0. Arousable but lethargic. Hx of etoh abuse, actively drinking. Jaundiced on exam. Bilis elevated, AST>ALT, INR 2 suspicious for etoh hepatitis. Doubt choledocholithiasis given CT with hepatomegaly/steatosis and unremarkable biliary tree. Nonurgent MRCP. Trend LFTs/INR. Hepatology consultation.

## 2022-10-03 NOTE — PROGRESS NOTE ADULT - ATTENDING COMMENTS
27 yo Canadian speaking Male, with no known past medical history, s/p Left temporal parietal craniotomy, presenting to the ED with altered mental status. Patient admitted to ICU for multiple metabolic derangements with evidence of acute liver dysfunction    ASSESSMENT  -Acute encephalopathy - concern for hepatic encephalopathy  -Acute hepatitis - infection vs. inflammatory vs. due to alcohol abuse   -Hyperbilirubinemia   -Alcohol dependence   -Anemia  -Lactic acidosis  -Hyponatremia  -s/p Left temporal parietal craniotomy    Plan:  - Patient awake and alert, responds to his name, other wise tracks around the room  - Reported to be non verbal post craniotomy  - Monitor neurologic status  - Elevated ammonia, will start oral lactulose   - Monitor for signs of alcohol withdrawal  - Empiric antibiotics  - Follow up cultures  - Hepatology consult  - Thiamine and folate supplementation  - Aspiration and seizure precautions  - Supplement electrolytes  - Repeat labs in PM  - D/c jose juan   - Transfer out of ICU today

## 2022-10-03 NOTE — CONSULT NOTE ADULT - ASSESSMENT
Patient is a 25 yo Jamaican speaking Male, with no known past medical history, s/p Left temporal parietal craniotomy noted in imaging, presenting to the ED with altered mental status since this morning. GI consult for transaminitis. CT showed  Severe hepatomegaly and diffuse steatosis. Mild ascites and infiltration of the central mesenteric fat. Possible gallbladder sludge and likely secondary/reactive wall thickening.  Left temporal parietal craniotomy. Encephalomalacia subjacent to the craniotomy. Ex vacuo dilatation of the left lateral ventricle. No intracranial hemorrhage. No mass lesion. Labs significant for MWC 16 HH 8.3/26.7 MCV 87  INR 2 Tbili 8 ALk 600   ammonia 131 lactate 3 albumin 1.8. Patient is non verbal. Most information obtained from primary team and medical records. Pt moaning, not able to provide history and pt's brother's contact information not in chart as family left ED prior to phone number being collected. As per EMS charts, pt was found to be 'asleep' when EMS arrived. As per family, pt has a seizure that lasted about 2-4 minutes. Family states pt has a seizure earlier in the day as well. Pt family denies hx of seizures. They stated pt drinks alcohol on a daily basis and hasn't drank any alcohol in the past 2 days. Family denies any recent trauma related injuries to pt.' Pt's brother told ED attending that the jaundice developed acutely over the last 1 week. Patient seen and examined at bedside. Non verbal, only moaning. Abdominal distended and soft. Sclera icteric. Skin icteric. Stool color light brown.   Jamaican   Jacqueline  [id number]    #Transaminitis   Likely ETOH abuse Vs CBD stone VS malignancy Vs autoimmune Vs other  CT showed  Severe hepatomegaly and diffuse steatosis. Mild ascites and infiltration of the central mesenteric fat. Possible gallbladder sludge and likely secondary/reactive wall thickening.  Left temporal parietal craniotomy. Encephalomalacia subjacent to the craniotomy. Ex vacuo dilatation of the left lateral ventricle. No intracranial hemorrhage. No mass lesion.  Trend hepatic panel  Avoid hepatoxic agents   obtain hepatitis panel, LUCIUS. ASM, Gamma,   MRCP when patient is stable  Bedside abdominal sono  consider Hepatology consult    #Anemia  Likely due to ETOH suppression Vs nutrition Vs EV Vs other  Will need an EGD to assess for EV when medically stable and down graded   B12 and folate level   transfuse if Hgb <7 or symptomatic          Patient is a 25 yo Nauruan speaking Male, with no known past medical history, s/p Left temporal parietal craniotomy noted in imaging, presenting to the ED with altered mental status since this morning. GI consult for transaminitis. CT showed  Severe hepatomegaly and diffuse steatosis. Mild ascites and infiltration of the central mesenteric fat. Possible gallbladder sludge and likely secondary/reactive wall thickening.  Left temporal parietal craniotomy. Encephalomalacia subjacent to the craniotomy. Ex vacuo dilatation of the left lateral ventricle. No intracranial hemorrhage. No mass lesion. Labs significant for MWC 16 HH 8.3/26.7 MCV 87  INR 2 Tbili 8 ALk 600   ammonia 131 lactate 3 albumin 1.8. Patient is non verbal. Most information obtained from primary team and medical records. Pt moaning, not able to provide history and pt's brother's contact information not in chart as family left ED prior to phone number being collected. As per EMS charts, pt was found to be 'asleep' when EMS arrived. As per family, pt has a seizure that lasted about 2-4 minutes. Family states pt has a seizure earlier in the day as well. Pt family denies hx of seizures. They stated pt drinks alcohol on a daily basis and hasn't drank any alcohol in the past 2 days. Family denies any recent trauma related injuries to pt.' Pt's brother told ED attending that the jaundice developed acutely over the last 1 week. Patient seen and examined at bedside. Non verbal, only moaning. Abdominal distended and soft. Sclera icteric. Skin icteric. Stool color light brown.   Nauruan   Jacqueline  [id number]    #Transaminitis   likely etoh hepatitis.   CT showed  Severe hepatomegaly and diffuse steatosis. Mild ascites and infiltration of the central mesenteric fat. Possible gallbladder sludge and likely secondary/reactive wall thickening.  Left temporal parietal craniotomy. Encephalomalacia subjacent to the craniotomy. Ex vacuo dilatation of the left lateral ventricle. No intracranial hemorrhage. No mass lesion.  Trend hepatic panel, INR  Avoid hepatoxic agents   obtain hepatitis panel, LUCIUS. ASM, Gamma,   MRCP when patient is stable  Bedside abdominal sono  recommend Hepatology consult    #Anemia  Likely due to ETOH suppression Vs nutrition Vs EV Vs other  no overt bleeding  B12 and folate level   transfuse if Hgb <7 or symptomatic

## 2022-10-03 NOTE — CONSULT NOTE ADULT - SUBJECTIVE AND OBJECTIVE BOX
Chief Complaint:  Patient is a 26y old  Male who presents with a chief complaint of Altered mental status (02 Oct 2022 20:13)      HPI:JESSENIA SMITH is a 26y Male    PMHX/PSHX:  No pertinent past medical history    No significant past surgical history      Allergies:  No Known Allergies      Home Medications: reviewed  Hospital Medications:  chlorhexidine 2% Cloths 1 Application(s) Topical <User Schedule>  LORazepam   Injectable 2 milliGRAM(s) IV Push every 4 hours PRN  pantoprazole  Injectable 40 milliGRAM(s) IV Push daily  piperacillin/tazobactam IVPB.. 3.375 Gram(s) IV Intermittent every 8 hours  potassium chloride  10 mEq/100 mL IVPB 10 milliEquivalent(s) IV Intermittent every 1 hour  sodium chloride 0.9%. 1000 milliLiter(s) IV Continuous <Continuous>  thiamine IVPB 500 milliGRAM(s) IV Intermittent three times a day      Social History:   Tob: Denies  EtOH: Denies  Illicit Drugs: Denies    Family history:  No pertinent family history in first degree relatives      Denies family history of colon cancer/polyps, stomach cancer/polyps, pancreatic cancer/masses, liver cancer/disease, ovarian cancer and endometrial cancer.    ROS:   General:  No  fevers, chills, night sweats, fatigue  Eyes:  Good vision, no reported pain  ENT:  No sore throat, pain, runny nose  CV:  No pain, palpitations  Pulm:  No dyspnea, cough  GI:  See HPI, otherwise negative  :  No  incontinence, nocturia  Muscle:  No pain, weakness  Neuro:  No memory problems  Psych:  No insomnia, mood problems, depression  Endocrine:  No polyuria, polydipsia, cold/heat intolerance  Heme:  No petechiae, ecchymosis, easy bruisability  Skin:  No rash    PHYSICAL EXAM:   Vital Signs:  Vital Signs Last 24 Hrs  T(C): 37.6 (03 Oct 2022 04:00), Max: 37.8 (02 Oct 2022 15:11)  T(F): 99.6 (03 Oct 2022 04:00), Max: 100.1 (02 Oct 2022 15:11)  HR: 112 (03 Oct 2022 07:00) (104 - 119)  BP: 97/77 (03 Oct 2022 07:00) (97/77 - 137/87)  BP(mean): 81 (03 Oct 2022 07:00) (72 - 95)  RR: 17 (03 Oct 2022 07:00) (15 - 22)  SpO2: 100% (03 Oct 2022 07:00) (96% - 100%)    Parameters below as of 03 Oct 2022 07:00  Patient On (Oxygen Delivery Method): room air      Daily     Daily     GENERAL: no acute distress  NEURO: alert, no asterixis  HEENT: anicteric sclera, no conjunctival pallor appreciated  CHEST: no respiratory distress, no accessory muscle use  CARDIAC: regular rate, rhythm  ABDOMEN: soft, non-tender, non-distended, no rebound or guarding  EXTREMITIES: warm, well perfused, no edema  SKIN: no lesions noted    LABS: reviewed                        8.3    16.02 )-----------( 151      ( 03 Oct 2022 04:30 )             26.7     10-03    129<L>  |  97  |  2<L>  ----------------------------<  79  3.1<L>   |  20<L>  |  0.51    Ca    7.2<L>      03 Oct 2022 04:30  Phos  1.5     10-03  Mg     1.9     10-03    TPro  6.8  /  Alb  1.8<L>  /  TBili  10.8<H>  /  DBili  x   /  AST  212<H>  /  ALT  22  /  AlkPhos  600<H>  10-03    LIVER FUNCTIONS - ( 03 Oct 2022 04:30 )  Alb: 1.8 g/dL / Pro: 6.8 g/dL / ALK PHOS: 600 U/L / ALT: 22 U/L DA / AST: 212 U/L / GGT: x               Diagnostic Studies: see sunrise for full report         Chief Complaint:  Patient is a 26y old  Male who presents with a chief complaint of Altered mental status (02 Oct 2022 20:13)    Hx obtained from chart    HPI:  CARLOS SMITH is a 25yo Malian speaking Male s/p Left temporal parietal craniotomy and alcohol abuse presented to [facility] ED on 10/2/22 with 1 day Hx of AMS, after reported witnessed seizure episodes and 1 week Hx of jaundice and was admitted to MICU with suspected EtOH withdrawal seizure, electrolyte abnormalities (hyponatremia with Na 125->129, hypokalemia with K 2.7->3.1, hypomagnesemia with Mg 1.0->1.9, hypophosphatemia with P 1.1->1.5), lactic acidosis (with lactate 6.4->3.0, bicarb 19->20), leukocytosis with neutrophilia (WBC 20.35->16.02, roderick 90%), anemia without evidence of overt bleeding (Hb 6.9->8.3 s/p 1 U PRBC) and abnormal liver tests including coagulopathy (INR 2.13), hyperbilirubinemia (Se bi 9.3->10.8, direct 8.0), hyperammonemia (131), elevated liver enzymes in AST> ALT pattern (->212, ALT 22) but also elevated ->600. CT a/p w/ iv contrast 10/2/22 showed severe hepatomegaly, diffuse hepatic steatosis, normal bile ducts, partially contracted GB with possible sludge and non specific GB wall thickening, mild ascites, infiltration of central mesenteric fat, normal spleen size.   Hepatology was consulted for abnormal liver tests.     PMHX/PSHX:  As above      Allergies:  No Known Allergies      Home Medications: reviewed  Hospital Medications:  chlorhexidine 2% Cloths 1 Application(s) Topical <User Schedule>  LORazepam   Injectable 2 milliGRAM(s) IV Push every 4 hours PRN  pantoprazole  Injectable 40 milliGRAM(s) IV Push daily  piperacillin/tazobactam IVPB.. 3.375 Gram(s) IV Intermittent every 8 hours  potassium chloride  10 mEq/100 mL IVPB 10 milliEquivalent(s) IV Intermittent every 1 hour  sodium chloride 0.9%. 1000 milliLiter(s) IV Continuous <Continuous>  thiamine IVPB 500 milliGRAM(s) IV Intermittent three times a day      Social History:   Unable to obtain due to patient condition. Pe d/w ICU team, family reported continued EtOH abuse, last drink 2 days prior to admission.     Family history:  Unable to obtain due to patient condition.     ROS:   Unable to obtain due to patient condition.     PHYSICAL EXAM:   Vital Signs:  Vital Signs Last 24 Hrs  T(C): 37.6 (03 Oct 2022 04:00), Max: 37.8 (02 Oct 2022 15:11)  T(F): 99.6 (03 Oct 2022 04:00), Max: 100.1 (02 Oct 2022 15:11)  HR: 112 (03 Oct 2022 07:00) (104 - 119)  BP: 97/77 (03 Oct 2022 07:00) (97/77 - 137/87)  BP(mean): 81 (03 Oct 2022 07:00) (72 - 95)  RR: 17 (03 Oct 2022 07:00) (15 - 22)  SpO2: 100% (03 Oct 2022 07:00) (96% - 100%)    Parameters below as of 03 Oct 2022 07:00  Patient On (Oxygen Delivery Method): room air      Daily     Daily     GENERAL: no acute distress, but ill appearing  NEURO: awake, alert, but non verbal, not following commands, attempts to verbalize but not understandable (possibly mentioned his name, Carlos)  HEENT: icteric sclera  CHEST: no respiratory distress, no accessory muscle use  CARDIAC: tachycardic  ABDOMEN: soft, non-tender, mildly distended, no rebound or guarding, BS+, reported BM w/o evidence of bleeding  EXTREMITIES: warm, well perfused, no edema  SKIN: jaundice    LABS: reviewed                        8.3    16.02 )-----------( 151      ( 03 Oct 2022 04:30 )             26.7     10-03    129<L>  |  97  |  2<L>  ----------------------------<  79  3.1<L>   |  20<L>  |  0.51    Ca    7.2<L>      03 Oct 2022 04:30  Phos  1.5     10-03  Mg     1.9     10-03    TPro  6.8  /  Alb  1.8<L>  /  TBili  10.8<H>  /  DBili  x   /  AST  212<H>  /  ALT  22  /  AlkPhos  600<H>  10-03    LIVER FUNCTIONS - ( 03 Oct 2022 04:30 )  Alb: 1.8 g/dL / Pro: 6.8 g/dL / ALK PHOS: 600 U/L / ALT: 22 U/L DA / AST: 212 U/L / GGT: x               Diagnostic Studies: see sunrise for full report

## 2022-10-03 NOTE — PROGRESS NOTE ADULT - ASSESSMENT
Patient is a 25 yo Maori speaking Male, with no known past medical history, s/p Left temporal parietal craniotomy, presenting to the ED with altered mental status since this morning s/p seizure in the setting of Alcohol abuse. Patient admitted to ICU for multiple metabolic derangements.    ASSESSMENT  -Acute encephalopathy 2/2 Hepatic encephalopathy vs Sepsis   -Obstructive jaundice r/out cholangitis  -Alcohol withdrawal seizures  -Anemia  -Lactic acidosis  -Hyponatremia  -s/p Left temporal parietal craniotomy    Neuro:  #Acute encephalopathy due to Hepatic encephalopathy vs Sepsis  s/p 1 dose of lactulose enema for probable hepatic encephalopathy  -started on PO lactulose 20grams q2h. hold for 3 or more bowel movements.     #Alcohol withdrawal seizures  As per EMS charts, pt drinks alcohol everyday, last drink 2 days ago  No prior hx of seizures  Started on CIWA protocol, on IV Ativan PRN (hold for sedation)  C/w thiamine, folic acid and MVT    #S/p Left temporal parietal craniotomy  unknown reason    Cardiovascular:  #Sinus tachycardia  in the setting of sepsis/acute illness  monitor for now, c/w IVF    Pulmonary:   No acute issues    Infectious Diseases:  #R/out cholangitis  p/w transaminitis, altered mental status, low grade temp 100.1, elevated WBC 20k, direct bili 8.0  f/u blood cultures, c/w IV Zosyn for now    #UTI  UA w/WBC 6-10, trace leukocyte esterase, unable to obtain presence of symptoms  c/w Zosyn  f/u urine cultures    Gastrointestinal:  #Obstructive jaundice  p/w transaminitis, altered mental status, low grade temp 100.1, elevated WBC 20k, direct bili 8.0  severe hepatomegaly w/mild ascites on CT abdomen w/gall bladder sludge  lipase level normal, low suspicion for pancreatic involvement  C/w Zosyn for suspected cholangitis  F/u blood cultures  f/u hepatitis panel  f/u hepatic USG for characterization of gall bladder  GI consulted, no further GI interventions as of now      #Transaminitis  in the setting of Alcohol abuse and obstructive jaundice, BAL 34  Utox negative for salicylate and acetaminophen levels  f/u hepatitis panel  trend LFTs daily  Maddrey score for Alcoholic hepatitis 74, pt would benefit from glucocorticoid therapy  Dr. Galan hepatology consulted     #Elevated INR and hypoalbuminemia in the setting of liver dysfunction  monitor daily, c/w venodynes for DVT ppx    #Lactic acidosis   Decreased clearance w/ liver dysfunction  Likely due to post-ictal state from seizure this AM vs sepsis, downtrending from 6.4>IV 1NS bolus> 2.6    Renal:  #Hyponatremia  on admission, Na 125> 1L NS bolus > 129  129 this AM    serum osm, urine osm, urine sodium noted   TSH 7.22, f/u T3 and T4     Endo:  TSh 7.22, f/u t3 and t4  Advance diet as tolerated   Monitor glucose Q4 hours in the setting of liver dysfunction    Heme/onc:   #Leukocytosis  WBC 20k, low grade temp 100.1 on admission, r/out sepsis  Avoid tylenol w/elevated LFTs    #Anemia  P/w Hb 6.9, MCV 87 normocytic  no active s/s bleeding  s/p 1 unit PRBC    Skin/ catheter: Yost placed 10/2, peripheral lines    Prophylaxis: Hold chemical DVT ppx in the setting of elevated INR/ SCD     Goals of Care: Full code, attempted to reach family/emergency contact however no number founded. Consulted social work for more assistance in reaching family     Dispo: Accepted to ICU

## 2022-10-04 DIAGNOSIS — K92.2 GASTROINTESTINAL HEMORRHAGE, UNSPECIFIED: ICD-10-CM

## 2022-10-04 DIAGNOSIS — D64.9 ANEMIA, UNSPECIFIED: ICD-10-CM

## 2022-10-04 DIAGNOSIS — K92.1 MELENA: ICD-10-CM

## 2022-10-04 DIAGNOSIS — R74.01 ELEVATION OF LEVELS OF LIVER TRANSAMINASE LEVELS: ICD-10-CM

## 2022-10-04 LAB
ALBUMIN SERPL ELPH-MCNC: 1.3 G/DL — LOW (ref 3.5–5)
ALBUMIN SERPL ELPH-MCNC: 1.5 G/DL — LOW (ref 3.5–5)
ALBUMIN SERPL ELPH-MCNC: 1.6 G/DL — LOW (ref 3.5–5)
ALP SERPL-CCNC: 322 U/L — HIGH (ref 40–120)
ALP SERPL-CCNC: 404 U/L — HIGH (ref 40–120)
ALP SERPL-CCNC: 425 U/L — HIGH (ref 40–120)
ALT FLD-CCNC: 15 U/L DA — SIGNIFICANT CHANGE UP (ref 10–60)
AMMONIA BLD-MCNC: 81 UMOL/L — HIGH (ref 11–32)
ANION GAP SERPL CALC-SCNC: 12 MMOL/L — SIGNIFICANT CHANGE UP (ref 5–17)
ANION GAP SERPL CALC-SCNC: 12 MMOL/L — SIGNIFICANT CHANGE UP (ref 5–17)
ANION GAP SERPL CALC-SCNC: 13 MMOL/L — SIGNIFICANT CHANGE UP (ref 5–17)
APTT BLD: 40.5 SEC — HIGH (ref 27.5–35.5)
AST SERPL-CCNC: 111 U/L — HIGH (ref 10–40)
AST SERPL-CCNC: 123 U/L — HIGH (ref 10–40)
AST SERPL-CCNC: 91 U/L — HIGH (ref 10–40)
BILIRUB DIRECT SERPL-MCNC: 9.2 MG/DL — HIGH (ref 0–0.3)
BILIRUB INDIRECT FLD-MCNC: 1.7 MG/DL — HIGH (ref 0.2–1)
BILIRUB SERPL-MCNC: 10.1 MG/DL — HIGH (ref 0.2–1.2)
BILIRUB SERPL-MCNC: 10.2 MG/DL — HIGH (ref 0.2–1.2)
BILIRUB SERPL-MCNC: 10.5 MG/DL — HIGH (ref 0.2–1.2)
BILIRUB SERPL-MCNC: 10.9 MG/DL — HIGH (ref 0.2–1.2)
BUN SERPL-MCNC: 1 MG/DL — LOW (ref 7–18)
BUN SERPL-MCNC: 1 MG/DL — LOW (ref 7–18)
BUN SERPL-MCNC: <1 MG/DL — LOW (ref 7–18)
CALCIUM SERPL-MCNC: 6.8 MG/DL — LOW (ref 8.4–10.5)
CALCIUM SERPL-MCNC: 6.9 MG/DL — LOW (ref 8.4–10.5)
CALCIUM SERPL-MCNC: 7.1 MG/DL — LOW (ref 8.4–10.5)
CERULOPLASMIN SERPL-MCNC: 25 MG/DL — SIGNIFICANT CHANGE UP (ref 15–30)
CHLORIDE SERPL-SCNC: 101 MMOL/L — SIGNIFICANT CHANGE UP (ref 96–108)
CHLORIDE SERPL-SCNC: 102 MMOL/L — SIGNIFICANT CHANGE UP (ref 96–108)
CHLORIDE SERPL-SCNC: 103 MMOL/L — SIGNIFICANT CHANGE UP (ref 96–108)
CO2 SERPL-SCNC: 17 MMOL/L — LOW (ref 22–31)
CO2 SERPL-SCNC: 19 MMOL/L — LOW (ref 22–31)
CO2 SERPL-SCNC: 21 MMOL/L — LOW (ref 22–31)
CREAT SERPL-MCNC: 0.32 MG/DL — LOW (ref 0.5–1.3)
CREAT SERPL-MCNC: 0.37 MG/DL — LOW (ref 0.5–1.3)
CREAT SERPL-MCNC: 0.38 MG/DL — LOW (ref 0.5–1.3)
EBV EA AB SER IA-ACNC: <5 U/ML — SIGNIFICANT CHANGE UP
EBV EA AB TITR SER IF: POSITIVE
EBV EA IGG SER-ACNC: NEGATIVE — SIGNIFICANT CHANGE UP
EBV NA IGG SER IA-ACNC: 104 U/ML — HIGH
EBV PATRN SPEC IB-IMP: SIGNIFICANT CHANGE UP
EBV VCA IGG AVIDITY SER QL IA: POSITIVE
EBV VCA IGM SER IA-ACNC: 290 U/ML — HIGH
EBV VCA IGM SER IA-ACNC: <10 U/ML — SIGNIFICANT CHANGE UP
EBV VCA IGM TITR FLD: NEGATIVE — SIGNIFICANT CHANGE UP
EGFR: 159 ML/MIN/1.73M2 — SIGNIFICANT CHANGE UP
EGFR: 160 ML/MIN/1.73M2 — SIGNIFICANT CHANGE UP
EGFR: 167 ML/MIN/1.73M2 — SIGNIFICANT CHANGE UP
GLUCOSE SERPL-MCNC: 204 MG/DL — HIGH (ref 70–99)
GLUCOSE SERPL-MCNC: 81 MG/DL — SIGNIFICANT CHANGE UP (ref 70–99)
GLUCOSE SERPL-MCNC: 84 MG/DL — SIGNIFICANT CHANGE UP (ref 70–99)
HAPTOGLOB SERPL-MCNC: 144 MG/DL — SIGNIFICANT CHANGE UP (ref 34–200)
HCT VFR BLD CALC: 22.2 % — LOW (ref 39–50)
HCT VFR BLD CALC: 23.6 % — LOW (ref 39–50)
HCT VFR BLD CALC: 23.7 % — LOW (ref 39–50)
HGB BLD-MCNC: 6.9 G/DL — CRITICAL LOW (ref 13–17)
HGB BLD-MCNC: 7.3 G/DL — LOW (ref 13–17)
HGB BLD-MCNC: 7.3 G/DL — LOW (ref 13–17)
INR BLD: 2.53 RATIO — HIGH (ref 0.88–1.16)
LDH SERPL L TO P-CCNC: 179 U/L — SIGNIFICANT CHANGE UP (ref 120–225)
MAGNESIUM SERPL-MCNC: 1.6 MG/DL — SIGNIFICANT CHANGE UP (ref 1.6–2.6)
MAGNESIUM SERPL-MCNC: 1.7 MG/DL — SIGNIFICANT CHANGE UP (ref 1.6–2.6)
MCHC RBC-ENTMCNC: 27.9 PG — SIGNIFICANT CHANGE UP (ref 27–34)
MCHC RBC-ENTMCNC: 27.9 PG — SIGNIFICANT CHANGE UP (ref 27–34)
MCHC RBC-ENTMCNC: 28.4 PG — SIGNIFICANT CHANGE UP (ref 27–34)
MCHC RBC-ENTMCNC: 30.8 GM/DL — LOW (ref 32–36)
MCHC RBC-ENTMCNC: 30.9 GM/DL — LOW (ref 32–36)
MCHC RBC-ENTMCNC: 31.1 GM/DL — LOW (ref 32–36)
MCV RBC AUTO: 90.1 FL — SIGNIFICANT CHANGE UP (ref 80–100)
MCV RBC AUTO: 90.5 FL — SIGNIFICANT CHANGE UP (ref 80–100)
MCV RBC AUTO: 91.4 FL — SIGNIFICANT CHANGE UP (ref 80–100)
NRBC # BLD: 0 /100 WBCS — SIGNIFICANT CHANGE UP (ref 0–0)
PHOSPHATE SERPL-MCNC: 1.2 MG/DL — LOW (ref 2.5–4.5)
PHOSPHATE SERPL-MCNC: 1.4 MG/DL — LOW (ref 2.5–4.5)
PHOSPHATE SERPL-MCNC: 2.2 MG/DL — LOW (ref 2.5–4.5)
PLATELET # BLD AUTO: 168 K/UL — SIGNIFICANT CHANGE UP (ref 150–400)
PLATELET # BLD AUTO: 170 K/UL — SIGNIFICANT CHANGE UP (ref 150–400)
PLATELET # BLD AUTO: 175 K/UL — SIGNIFICANT CHANGE UP (ref 150–400)
POTASSIUM SERPL-MCNC: 2.9 MMOL/L — CRITICAL LOW (ref 3.5–5.3)
POTASSIUM SERPL-MCNC: 3.6 MMOL/L — SIGNIFICANT CHANGE UP (ref 3.5–5.3)
POTASSIUM SERPL-MCNC: 4.3 MMOL/L — SIGNIFICANT CHANGE UP (ref 3.5–5.3)
POTASSIUM SERPL-SCNC: 2.9 MMOL/L — CRITICAL LOW (ref 3.5–5.3)
POTASSIUM SERPL-SCNC: 3.6 MMOL/L — SIGNIFICANT CHANGE UP (ref 3.5–5.3)
POTASSIUM SERPL-SCNC: 4.3 MMOL/L — SIGNIFICANT CHANGE UP (ref 3.5–5.3)
PROCALCITONIN SERPL-MCNC: 0.32 NG/ML — HIGH (ref 0.02–0.1)
PROT SERPL-MCNC: 5 G/DL — LOW (ref 6–8.3)
PROT SERPL-MCNC: 5.6 G/DL — LOW (ref 6–8.3)
PROT SERPL-MCNC: 5.7 G/DL — LOW (ref 6–8.3)
PROTHROM AB SERPL-ACNC: 30.4 SEC — HIGH (ref 10.5–13.4)
RBC # BLD: 2.43 M/UL — LOW (ref 4.2–5.8)
RBC # BLD: 2.62 M/UL — LOW (ref 4.2–5.8)
RBC # FLD: 20.2 % — HIGH (ref 10.3–14.5)
RBC # FLD: 20.2 % — HIGH (ref 10.3–14.5)
RBC # FLD: 20.5 % — HIGH (ref 10.3–14.5)
RETICS #: 76.8 K/UL — SIGNIFICANT CHANGE UP (ref 25–125)
RETICS/RBC NFR: 2.9 % — HIGH (ref 0.5–2.5)
SODIUM SERPL-SCNC: 130 MMOL/L — LOW (ref 135–145)
SODIUM SERPL-SCNC: 135 MMOL/L — SIGNIFICANT CHANGE UP (ref 135–145)
SODIUM SERPL-SCNC: 135 MMOL/L — SIGNIFICANT CHANGE UP (ref 135–145)
WBC # BLD: 14.73 K/UL — HIGH (ref 3.8–10.5)
WBC # BLD: 15.14 K/UL — HIGH (ref 3.8–10.5)
WBC # BLD: 15.35 K/UL — HIGH (ref 3.8–10.5)
WBC # BLD: 16.8 K/UL — HIGH (ref 3.8–10.5)
WBC # FLD AUTO: 14.73 K/UL — HIGH (ref 3.8–10.5)
WBC # FLD AUTO: 15.14 K/UL — HIGH (ref 3.8–10.5)
WBC # FLD AUTO: 15.35 K/UL — HIGH (ref 3.8–10.5)
WBC # FLD AUTO: 16.8 K/UL — HIGH (ref 3.8–10.5)

## 2022-10-04 PROCEDURE — 99291 CRITICAL CARE FIRST HOUR: CPT

## 2022-10-04 PROCEDURE — 99233 SBSQ HOSP IP/OBS HIGH 50: CPT

## 2022-10-04 PROCEDURE — 99232 SBSQ HOSP IP/OBS MODERATE 35: CPT

## 2022-10-04 RX ORDER — ACETYLCYSTEINE 200 MG/ML
8 VIAL (ML) MISCELLANEOUS ONCE
Refills: 0 | Status: COMPLETED | OUTPATIENT
Start: 2022-10-04 | End: 2022-10-04

## 2022-10-04 RX ORDER — OCTREOTIDE ACETATE 200 UG/ML
50 INJECTION, SOLUTION INTRAVENOUS; SUBCUTANEOUS
Qty: 500 | Refills: 0 | Status: DISCONTINUED | OUTPATIENT
Start: 2022-10-04 | End: 2022-10-05

## 2022-10-04 RX ORDER — POTASSIUM CHLORIDE 20 MEQ
40 PACKET (EA) ORAL ONCE
Refills: 0 | Status: COMPLETED | OUTPATIENT
Start: 2022-10-04 | End: 2022-10-04

## 2022-10-04 RX ORDER — PANTOPRAZOLE SODIUM 20 MG/1
8 TABLET, DELAYED RELEASE ORAL
Qty: 80 | Refills: 0 | Status: DISCONTINUED | OUTPATIENT
Start: 2022-10-04 | End: 2022-10-05

## 2022-10-04 RX ORDER — POTASSIUM PHOSPHATE, MONOBASIC POTASSIUM PHOSPHATE, DIBASIC 236; 224 MG/ML; MG/ML
30 INJECTION, SOLUTION INTRAVENOUS ONCE
Refills: 0 | Status: COMPLETED | OUTPATIENT
Start: 2022-10-04 | End: 2022-10-04

## 2022-10-04 RX ORDER — ACETYLCYSTEINE 200 MG/ML
3.9 VIAL (ML) MISCELLANEOUS ONCE
Refills: 0 | Status: DISCONTINUED | OUTPATIENT
Start: 2022-10-04 | End: 2022-10-04

## 2022-10-04 RX ORDER — SODIUM CHLORIDE 9 MG/ML
1000 INJECTION, SOLUTION INTRAVENOUS
Refills: 0 | Status: DISCONTINUED | OUTPATIENT
Start: 2022-10-04 | End: 2022-10-05

## 2022-10-04 RX ORDER — CEFTRIAXONE 500 MG/1
2000 INJECTION, POWDER, FOR SOLUTION INTRAMUSCULAR; INTRAVENOUS EVERY 24 HOURS
Refills: 0 | Status: DISCONTINUED | OUTPATIENT
Start: 2022-10-05 | End: 2022-10-07

## 2022-10-04 RX ORDER — PHYTONADIONE (VIT K1) 5 MG
10 TABLET ORAL DAILY
Refills: 0 | Status: DISCONTINUED | OUTPATIENT
Start: 2022-10-04 | End: 2022-10-05

## 2022-10-04 RX ORDER — CEFTRIAXONE 500 MG/1
INJECTION, POWDER, FOR SOLUTION INTRAMUSCULAR; INTRAVENOUS
Refills: 0 | Status: DISCONTINUED | OUTPATIENT
Start: 2022-10-04 | End: 2022-10-07

## 2022-10-04 RX ORDER — MAGNESIUM SULFATE 500 MG/ML
2 VIAL (ML) INJECTION ONCE
Refills: 0 | Status: COMPLETED | OUTPATIENT
Start: 2022-10-04 | End: 2022-10-04

## 2022-10-04 RX ORDER — POTASSIUM CHLORIDE 20 MEQ
10 PACKET (EA) ORAL
Refills: 0 | Status: COMPLETED | OUTPATIENT
Start: 2022-10-04 | End: 2022-10-04

## 2022-10-04 RX ORDER — ACETYLCYSTEINE 200 MG/ML
12 VIAL (ML) MISCELLANEOUS ONCE
Refills: 0 | Status: DISCONTINUED | OUTPATIENT
Start: 2022-10-04 | End: 2022-10-04

## 2022-10-04 RX ORDER — ACETYLCYSTEINE 200 MG/ML
8 VIAL (ML) MISCELLANEOUS ONCE
Refills: 0 | Status: DISCONTINUED | OUTPATIENT
Start: 2022-10-04 | End: 2022-10-04

## 2022-10-04 RX ORDER — ACETYLCYSTEINE 200 MG/ML
3.9 VIAL (ML) MISCELLANEOUS ONCE
Refills: 0 | Status: COMPLETED | OUTPATIENT
Start: 2022-10-04 | End: 2022-10-04

## 2022-10-04 RX ORDER — ACETYLCYSTEINE 200 MG/ML
12 VIAL (ML) MISCELLANEOUS ONCE
Refills: 0 | Status: COMPLETED | OUTPATIENT
Start: 2022-10-04 | End: 2022-10-04

## 2022-10-04 RX ORDER — OCTREOTIDE ACETATE 200 UG/ML
50 INJECTION, SOLUTION INTRAVENOUS; SUBCUTANEOUS ONCE
Refills: 0 | Status: DISCONTINUED | OUTPATIENT
Start: 2022-10-04 | End: 2022-10-04

## 2022-10-04 RX ORDER — CEFTRIAXONE 500 MG/1
2000 INJECTION, POWDER, FOR SOLUTION INTRAMUSCULAR; INTRAVENOUS ONCE
Refills: 0 | Status: COMPLETED | OUTPATIENT
Start: 2022-10-04 | End: 2022-10-04

## 2022-10-04 RX ORDER — ACETYLCYSTEINE 200 MG/ML
10 VIAL (ML) MISCELLANEOUS EVERY 24 HOURS
Refills: 0 | Status: DISCONTINUED | OUTPATIENT
Start: 2022-10-04 | End: 2022-10-04

## 2022-10-04 RX ADMIN — POTASSIUM PHOSPHATE, MONOBASIC POTASSIUM PHOSPHATE, DIBASIC 83.33 MILLIMOLE(S): 236; 224 INJECTION, SOLUTION INTRAVENOUS at 21:43

## 2022-10-04 RX ADMIN — PANTOPRAZOLE SODIUM 40 MILLIGRAM(S): 20 TABLET, DELAYED RELEASE ORAL at 05:13

## 2022-10-04 RX ADMIN — Medication 100 MILLIEQUIVALENT(S): at 20:14

## 2022-10-04 RX ADMIN — CEFTRIAXONE 100 MILLIGRAM(S): 500 INJECTION, POWDER, FOR SOLUTION INTRAMUSCULAR; INTRAVENOUS at 16:13

## 2022-10-04 RX ADMIN — PIPERACILLIN AND TAZOBACTAM 25 GRAM(S): 4; .5 INJECTION, POWDER, LYOPHILIZED, FOR SOLUTION INTRAVENOUS at 14:22

## 2022-10-04 RX ADMIN — CHLORHEXIDINE GLUCONATE 1 APPLICATION(S): 213 SOLUTION TOPICAL at 05:13

## 2022-10-04 RX ADMIN — Medication 310 GRAM(S): at 16:00

## 2022-10-04 RX ADMIN — Medication 105 MILLIGRAM(S): at 21:41

## 2022-10-04 RX ADMIN — Medication 129.88 GRAM(S): at 17:00

## 2022-10-04 RX ADMIN — OCTREOTIDE ACETATE 10 MICROGRAM(S)/HR: 200 INJECTION, SOLUTION INTRAVENOUS; SUBCUTANEOUS at 14:17

## 2022-10-04 RX ADMIN — Medication 2 MILLIGRAM(S): at 13:23

## 2022-10-04 RX ADMIN — PANTOPRAZOLE SODIUM 10 MG/HR: 20 TABLET, DELAYED RELEASE ORAL at 14:17

## 2022-10-04 RX ADMIN — MUPIROCIN 1 APPLICATION(S): 20 OINTMENT TOPICAL at 21:43

## 2022-10-04 RX ADMIN — Medication 10 MILLIGRAM(S): at 13:24

## 2022-10-04 RX ADMIN — Medication 2 MILLIGRAM(S): at 18:20

## 2022-10-04 RX ADMIN — MUPIROCIN 1 APPLICATION(S): 20 OINTMENT TOPICAL at 05:14

## 2022-10-04 RX ADMIN — Medication 65 GRAM(S): at 21:43

## 2022-10-04 RX ADMIN — Medication 2 MILLIGRAM(S): at 22:34

## 2022-10-04 RX ADMIN — Medication 100 MILLIEQUIVALENT(S): at 04:26

## 2022-10-04 RX ADMIN — Medication 100 MILLIEQUIVALENT(S): at 18:15

## 2022-10-04 RX ADMIN — OCTREOTIDE ACETATE 10 MICROGRAM(S)/HR: 200 INJECTION, SOLUTION INTRAVENOUS; SUBCUTANEOUS at 21:41

## 2022-10-04 RX ADMIN — Medication 25 GRAM(S): at 17:56

## 2022-10-04 RX ADMIN — POTASSIUM PHOSPHATE, MONOBASIC POTASSIUM PHOSPHATE, DIBASIC 83.33 MILLIMOLE(S): 236; 224 INJECTION, SOLUTION INTRAVENOUS at 07:18

## 2022-10-04 RX ADMIN — Medication 100 MILLIEQUIVALENT(S): at 20:25

## 2022-10-04 RX ADMIN — PIPERACILLIN AND TAZOBACTAM 25 GRAM(S): 4; .5 INJECTION, POWDER, LYOPHILIZED, FOR SOLUTION INTRAVENOUS at 05:14

## 2022-10-04 RX ADMIN — Medication 40 MILLIEQUIVALENT(S): at 04:27

## 2022-10-04 RX ADMIN — Medication 100 MILLIEQUIVALENT(S): at 05:13

## 2022-10-04 RX ADMIN — PANTOPRAZOLE SODIUM 10 MG/HR: 20 TABLET, DELAYED RELEASE ORAL at 21:41

## 2022-10-04 RX ADMIN — Medication 2 MILLIGRAM(S): at 01:59

## 2022-10-04 RX ADMIN — MUPIROCIN 1 APPLICATION(S): 20 OINTMENT TOPICAL at 14:50

## 2022-10-04 RX ADMIN — Medication 100 MILLIEQUIVALENT(S): at 06:13

## 2022-10-04 RX ADMIN — Medication 105 MILLIGRAM(S): at 05:12

## 2022-10-04 RX ADMIN — Medication 105 MILLIGRAM(S): at 15:32

## 2022-10-04 NOTE — DIETITIAN INITIAL EVALUATION ADULT - PERTINENT MEDS FT
MEDICATIONS  (STANDING):  chlorhexidine 2% Cloths 1 Application(s) Topical <User Schedule>  folic acid 1 milliGRAM(s) Oral daily  lactated ringers. 1000 milliLiter(s) (75 mL/Hr) IV Continuous <Continuous>  multivitamin 1 Tablet(s) Oral daily  mupirocin 2% Ointment 1 Application(s) Topical three times a day  pantoprazole  Injectable 40 milliGRAM(s) IV Push two times a day  phytonadione   Solution 10 milliGRAM(s) Oral daily  piperacillin/tazobactam IVPB.. 3.375 Gram(s) IV Intermittent every 8 hours  thiamine IVPB 500 milliGRAM(s) IV Intermittent three times a day    MEDICATIONS  (PRN):  LORazepam   Injectable 2 milliGRAM(s) IV Push every 4 hours PRN CIWA > 8

## 2022-10-04 NOTE — DIETITIAN INITIAL EVALUATION ADULT - OTHER INFO
Patient from home, admitted for convulsions in the setting of alcohol abuse. Visited pt awake & alert, altered mental status. Per RN, pt is disoriented. NPO except medications 09/04 secondary to possible GI bleed, no chew/swallow difficulty at this time. NKFA. Patient from home, admitted for convulsions in the setting of alcohol abuse. Visited pt awake & alert. Pt speaks Malay, per RN pt is disoriented. NPO except medications 09/03, diet advanced to soft & bite sized diet 09/04 & discontinued 09/04 secondary to possible GI bleed. Per RN, no chew/swallow difficulty at this time, NKFA. Patient from home, admitted for convulsions in the setting of alcohol abuse. Visited pt awake & alert. Pt speaks Mongolian, per RN pt is disoriented. NPO except medications 09/03, diet advanced to soft & bite sized diet 09/04 & discontinued 09/04 secondary to GI bleed. Per RN, no chew/swallow difficulty at this time, NKFA.

## 2022-10-04 NOTE — PROGRESS NOTE ADULT - SUBJECTIVE AND OBJECTIVE BOX
Chief Complaint:  Patient is a 26y old  Male who presents with a chief complaint of Altered mental status (04 Oct 2022 14:19)      Reason for consult: SYEDA likely due to EtOH    Interval Events: Patient was seen and examined at bedside. Reportedly was agitated, got Ativan prior to exam, lethargic on exam. There was 2 episodes of dark stool / melena reported, Hb downtrending, but >7. Was started on octreotide, continued on pantoprazole and antibiotics and has EGD scheduled for tomorrow. Bilirubin and INR slightly uptrending.     Hospital Medications:  acetylcysteine IVPB 3.9 Gram(s) IV Intermittent once  acetylcysteine IVPB 8 Gram(s) IV Intermittent once  cefTRIAXone   IVPB      chlorhexidine 2% Cloths 1 Application(s) Topical <User Schedule>  folic acid 1 milliGRAM(s) Oral daily  lactated ringers. 1000 milliLiter(s) IV Continuous <Continuous>  LORazepam   Injectable 2 milliGRAM(s) IV Push every 4 hours PRN  magnesium sulfate  IVPB 2 Gram(s) IV Intermittent once  multivitamin 1 Tablet(s) Oral daily  mupirocin 2% Ointment 1 Application(s) Topical three times a day  octreotide  Infusion 50 MICROgram(s)/Hr IV Continuous <Continuous>  pantoprazole Infusion 8 mG/Hr IV Continuous <Continuous>  phytonadione   Solution 10 milliGRAM(s) Oral daily  potassium chloride  10 mEq/100 mL IVPB 10 milliEquivalent(s) IV Intermittent every 1 hour  potassium phosphate IVPB 30 milliMole(s) IV Intermittent once  thiamine IVPB 500 milliGRAM(s) IV Intermittent three times a day      ROS:   Unable to obtain due to patient condition.     PHYSICAL EXAM:   Vital Signs:  Vital Signs Last 24 Hrs  T(C): 36.7 (04 Oct 2022 14:00), Max: 37.3 (03 Oct 2022 16:41)  T(F): 98 (04 Oct 2022 14:00), Max: 99.1 (03 Oct 2022 16:41)  HR: 101 (04 Oct 2022 16:00) (94 - 112)  BP: 103/63 (04 Oct 2022 16:00) (93/57 - 122/69)  BP(mean): 73 (04 Oct 2022 16:00) (63 - 84)  RR: 23 (04 Oct 2022 16:00) (18 - 25)  SpO2: 100% (04 Oct 2022 16:00) (97% - 100%)    Parameters below as of 03 Oct 2022 20:00  Patient On (Oxygen Delivery Method): room air      Daily     Daily     GENERAL: ill appearing  NEURO: lethargic, received Ativan prior to exam  HEENT: icteric sclera  CHEST: no respiratory distress, no accessory muscle use  CARDIAC: mildly tachycardic  ABDOMEN: soft, mildly distended, mild diffuse tenderness (he pushes away hand upon examining), no rebound or guarding, BS+  EXTREMITIES: warm, well perfused, no edema  SKIN: jaundice    LABS: reviewed                        7.3    15.14 )-----------( 175      ( 04 Oct 2022 12:35 )             23.7     10-04    135  |  103  |  <1<L>  ----------------------------<  84  3.6   |  19<L>  |  0.37<L>    Ca    7.1<L>      04 Oct 2022 12:35  Phos  2.2     10-04  Mg     1.6     10-04    TPro  5.6<L>  /  Alb  1.5<L>  /  TBili  10.5<H>  /  DBili  x   /  AST  111<H>  /  ALT  15  /  AlkPhos  404<H>  10-04    LIVER FUNCTIONS - ( 04 Oct 2022 12:35 )  Alb: 1.5 g/dL / Pro: 5.6 g/dL / ALK PHOS: 404 U/L / ALT: 15 U/L DA / AST: 111 U/L / GGT: x             Interval Diagnostic Studies: see sunrise for full report

## 2022-10-04 NOTE — CHART NOTE - NSCHARTNOTEFT_GEN_A_CORE
Patients brother Rosalio/HCP called hospital. Number is [phone]. Gave him an update on his brothers status.

## 2022-10-04 NOTE — PROGRESS NOTE ADULT - NS ATTEND AMEND GEN_ALL_CORE FT
- Elevated LFTs.  - Alcoholic hepatitis.  - Hematochezia.  - Anemia.     Patient seen and examined. Reported episodes of heamtochezia today. Unclear if lower or upper bleed but in light of severe liver dysfunction, warrants EGD to evaluate for varices. Plan tentatively for tomorrow.

## 2022-10-04 NOTE — PROGRESS NOTE ADULT - SUBJECTIVE AND OBJECTIVE BOX
INTERVAL HPI/OVERNIGHT EVENTS: ***    PRESSORS: [ ] YES [ ] NO  WHICH:    ANTIBIOTICS:                  DATE STARTED:    Antimicrobial:  piperacillin/tazobactam IVPB.. 3.375 Gram(s) IV Intermittent every 8 hours    Cardiovascular:    Pulmonary:    Hematalogic:    Other:  chlorhexidine 2% Cloths 1 Application(s) Topical <User Schedule>  folic acid 1 milliGRAM(s) Oral daily  lactated ringers. 1000 milliLiter(s) IV Continuous <Continuous>  LORazepam   Injectable 2 milliGRAM(s) IV Push every 4 hours PRN  multivitamin 1 Tablet(s) Oral daily  mupirocin 2% Ointment 1 Application(s) Topical three times a day  pantoprazole  Injectable 40 milliGRAM(s) IV Push two times a day  phytonadione   Solution 10 milliGRAM(s) Oral daily  thiamine IVPB 500 milliGRAM(s) IV Intermittent three times a day    chlorhexidine 2% Cloths 1 Application(s) Topical <User Schedule>  folic acid 1 milliGRAM(s) Oral daily  lactated ringers. 1000 milliLiter(s) IV Continuous <Continuous>  LORazepam   Injectable 2 milliGRAM(s) IV Push every 4 hours PRN  multivitamin 1 Tablet(s) Oral daily  mupirocin 2% Ointment 1 Application(s) Topical three times a day  pantoprazole  Injectable 40 milliGRAM(s) IV Push two times a day  phytonadione   Solution 10 milliGRAM(s) Oral daily  piperacillin/tazobactam IVPB.. 3.375 Gram(s) IV Intermittent every 8 hours  thiamine IVPB 500 milliGRAM(s) IV Intermittent three times a day    Drug Dosing Weight    Weight (kg): 78.9 (02 Oct 2022 21:05)    CENTRAL LINE: [ ] YES [ ] NO  LOCATION:         [place]ILLO: [ ] YES [ ] NO          A-LINE:  [ ] YES [ ] NO  LOCATION:         ICU Vital Signs Last 24 Hrs  T(C): 36.3 (04 Oct 2022 04:00), Max: 37.3 (03 Oct 2022 16:41)  T(F): 97.3 (04 Oct 2022 04:00), Max: 99.1 (03 Oct 2022 16:41)  HR: 103 (04 Oct 2022 11:00) (94 - 112)  BP: 110/62 (04 Oct 2022 11:00) (93/57 - 122/69)  BP(mean): 74 (04 Oct 2022 11:00) (63 - 83)  ABP: --  ABP(mean): --  RR: 20 (04 Oct 2022 11:00) (18 - 25)  SpO2: 98% (04 Oct 2022 11:00) (97% - 100%)    O2 Parameters below as of 03 Oct 2022 20:00  Patient On (Oxygen Delivery Method): room air                  10-03 @ 07:01  -  10-04 @ 07:00  --------------------------------------------------------  IN: 2404.8 mL / OUT: 1010 mL / NET: 1394.8 mL              PHYSICAL EXAM:    GENERAL: NAD, well-groomed, well-developed  HEAD: Nomocephalic, Atraumatic  EYES: EOMI, PERRLA,   NECK: Supple, No JVD; Normal thyroid; Trachea midline  NERVOUS SYSTEM:  Alert & Oriented X3,  Motor Strength 5/5 B/L upper and lower extremities; DTRs 2+ intact and symmetric  CHEST/LUNG: No rales, rhonchi, wheezing   HEART: Regular rate and rhythm; No murmurs,   ABDOMEN: Soft, Nontender, Nondistended; Bowel sounds present  EXTREMITIES:  2+ Peripheral Pulses, No clubbing, cyanosis, or edema  SKIN: No lesions      LABS:  CBC Full  -  ( 04 Oct 2022 05:49 )  WBC Count : x  RBC Count : 2.62 M/uL  Hemoglobin : x  Hematocrit : x  Platelet Count - Automated : x  Mean Cell Volume : x  Mean Cell Hemoglobin : x  Mean Cell Hemoglobin Concentration : x  Auto Neutrophil # : x  Auto Lymphocyte # : x  Auto Monocyte # : x  Auto Eosinophil # : x  Auto Basophil # : x  Auto Neutrophil % : x  Auto Lymphocyte % : x  Auto Monocyte % : x  Auto Eosinophil % : x  Auto Basophil % : x    10    135  |  102  |  1<L>  ----------------------------<  81  2.9<LL>   |  21<L>  |  0.38<L>    Ca    6.9<L>      04 Oct 2022 03:31  Phos  1.2     10-  Mg     1.6     10-    TPro  x   /  Alb  x   /  TBili  10.9<H>  /  DBili  9.2<H>  /  AST  x   /  ALT  x   /  AlkPhos  x   10-04    PT/INR - ( 04 Oct 2022 03:31 )   PT: 30.4 sec;   INR: 2.53 ratio         PTT - ( 04 Oct 2022 03:31 )  PTT:40.5 sec  Urinalysis Basic - ( 02 Oct 2022 17:20 )    Color: Yellow / Appearance: Clear / S.010 / pH: x  Gluc: x / Ketone: Small  / Bili: Large / Urobili: 8   Blood: x / Protein: 100 / Nitrite: Positive   Leuk Esterase: Trace / RBC: 2-5 /HPF / WBC 6-10 /HPF   Sq Epi: x / Non Sq Epi: Few /HPF / Bacteria: Few /HPF      Culture Results:   No growth (10-02 @ 17:20)  Culture Results:   No growth to date. (10-02 @ 16:05)  Culture Results:   No growth to date. (10-02 @ 16:00)      RADIOLOGY & ADDITIONAL STUDIES REVIEWED:  ***    [ ]GOALS OF CARE DISCUSSION WITH PATIENT/FAMILY/PROXY:    CRITICAL CARE TIME SPENT: 35 minutes INTERVAL HPI/OVERNIGHT EVENTS: Patient was agitated overnight. Received 1 dose of Ativan 2mg. Had one episode of melena.     PRESSORS: [ ] YES [X ] NO  WHICH:    ANTIBIOTICS:                  DATE STARTED:    Antimicrobial:  piperacillin/tazobactam IVPB.. 3.375 Gram(s) IV Intermittent every 8 hours    Cardiovascular:    Pulmonary:    Hematalogic:    Other:  chlorhexidine 2% Cloths 1 Application(s) Topical <User Schedule>  folic acid 1 milliGRAM(s) Oral daily  lactated ringers. 1000 milliLiter(s) IV Continuous <Continuous>  LORazepam   Injectable 2 milliGRAM(s) IV Push every 4 hours PRN  multivitamin 1 Tablet(s) Oral daily  mupirocin 2% Ointment 1 Application(s) Topical three times a day  pantoprazole  Injectable 40 milliGRAM(s) IV Push two times a day  phytonadione   Solution 10 milliGRAM(s) Oral daily  thiamine IVPB 500 milliGRAM(s) IV Intermittent three times a day    chlorhexidine 2% Cloths 1 Application(s) Topical <User Schedule>  folic acid 1 milliGRAM(s) Oral daily  lactated ringers. 1000 milliLiter(s) IV Continuous <Continuous>  LORazepam   Injectable 2 milliGRAM(s) IV Push every 4 hours PRN  multivitamin 1 Tablet(s) Oral daily  mupirocin 2% Ointment 1 Application(s) Topical three times a day  pantoprazole  Injectable 40 milliGRAM(s) IV Push two times a day  phytonadione   Solution 10 milliGRAM(s) Oral daily  piperacillin/tazobactam IVPB.. 3.375 Gram(s) IV Intermittent every 8 hours  thiamine IVPB 500 milliGRAM(s) IV Intermittent three times a day    Drug Dosing Weight    Weight (kg): 78.9 (02 Oct 2022 21:05)    CENTRAL LINE: [ ] YES [X ] NO  LOCATION:         [place]: [ ] YES [X ] NO          A-LINE:  [ ] YES [X] NO  LOCATION:         ICU Vital Signs Last 24 Hrs  T(C): 36.3 (04 Oct 2022 04:00), Max: 37.3 (03 Oct 2022 16:41)  T(F): 97.3 (04 Oct 2022 04:00), Max: 99.1 (03 Oct 2022 16:41)  HR: 103 (04 Oct 2022 11:00) (94 - 112)  BP: 110/62 (04 Oct 2022 11:00) (93/57 - 122/69)  BP(mean): 74 (04 Oct 2022 11:00) (63 - 83)  ABP: --  ABP(mean): --  RR: 20 (04 Oct 2022 11:00) (18 - 25)  SpO2: 98% (04 Oct 2022 11:00) (97% - 100%)    O2 Parameters below as of 03 Oct 2022 20:00  Patient On (Oxygen Delivery Method): room air                  10-03 @ 07:01  -  10-04 @ 07:00  --------------------------------------------------------  IN: 2404.8 mL / OUT: 1010 mL / NET: 1394.8 mL              PHYSICAL EXAM:    GENERAL: NAD, delirious   HEAD: Nomocephalic, Atraumatic  EYES: scleral icterus   NECK: Supple, No JVD; Normal thyroid; Trachea midline  NERVOUS SYSTEM:  Alert & Oriented X3,  Motor Strength 5/5 B/L upper and lower extremities; DTRs 2+ intact and symmetric  CHEST/LUNG: No rales, rhonchi, wheezing   HEART: Regular rate and rhythm; No murmurs,   ABDOMEN: distended   EXTREMITIES:  2+ Peripheral Pulses, No clubbing, cyanosis, or edema  SKIN: No lesions    LABS:  CBC Full  -  ( 04 Oct 2022 05:49 )  WBC Count : x  RBC Count : 2.62 M/uL  Hemoglobin : x  Hematocrit : x  Platelet Count - Automated : x  Mean Cell Volume : x  Mean Cell Hemoglobin : x  Mean Cell Hemoglobin Concentration : x  Auto Neutrophil # : x  Auto Lymphocyte # : x  Auto Monocyte # : x  Auto Eosinophil # : x  Auto Basophil # : x  Auto Neutrophil % : x  Auto Lymphocyte % : x  Auto Monocyte % : x  Auto Eosinophil % : x  Auto Basophil % : x    10    135  |  102  |  1<L>  ----------------------------<  81  2.9<LL>   |  21<L>  |  0.38<L>    Ca    6.9<L>      04 Oct 2022 03:31  Phos  1.2     10-  Mg     1.6     10-    TPro  x   /  Alb  x   /  TBili  10.9<H>  /  DBili  9.2<H>  /  AST  x   /  ALT  x   /  AlkPhos  x   10-04    PT/INR - ( 04 Oct 2022 03:31 )   PT: 30.4 sec;   INR: 2.53 ratio         PTT - ( 04 Oct 2022 03:31 )  PTT:40.5 sec  Urinalysis Basic - ( 02 Oct 2022 17:20 )    Color: Yellow / Appearance: Clear / S.010 / pH: x  Gluc: x / Ketone: Small  / Bili: Large / Urobili: 8   Blood: x / Protein: 100 / Nitrite: Positive   Leuk Esterase: Trace / RBC: 2-5 /HPF / WBC 6-10 /HPF   Sq Epi: x / Non Sq Epi: Few /HPF / Bacteria: Few /HPF      Culture Results:   No growth (10-02 @ 17:20)  Culture Results:   No growth to date. (10-02 @ 16:05)  Culture Results:   No growth to date. (10-02 @ 16:00)      RADIOLOGY & ADDITIONAL STUDIES REVIEWED:  ***    [ ]GOALS OF CARE DISCUSSION WITH PATIENT/FAMILY/PROXY:    CRITICAL CARE TIME SPENT: 35 minutes

## 2022-10-04 NOTE — PROGRESS NOTE ADULT - ASSESSMENT
25 yo Solomon Islander speaking Male, with no known past medical history, s/p Left temporal parietal craniotomy noted in imaging, presenting to the ED with altered mental status . GI consult for transaminitis. CT showed  Severe hepatomegaly and diffuse steatosis. Mild ascites and infiltration of the central mesenteric fat. Possible gallbladder sludge and likely secondary/reactive wall thickening.  Left temporal parietal craniotomy. Encephalomalacia subjacent to the craniotomy. Ex vacuo dilatation of the left lateral ventricle. No intracranial hemorrhage. No mass lesion. Labs significant for MWC 16 HH 8.3/26.7 MCV 87  INR 2 Tbili 8 ALk 600   ammonia 131 lactate 3 albumin 1.8. Patient is non verbal. Most information obtained from primary team and medical records. Pt moaning, not able to provide history and pt's brother's contact information not in chart as family left ED prior to phone number being collected. As per EMS charts, pt was found to be 'asleep' when EMS arrived. As per family, pt has a seizure that lasted about 2-4 minutes. Family states pt has a seizure earlier in the day as well. Pt family denies hx of seizures. They stated pt drinks alcohol on a daily basis and hasn't drank any alcohol in the past 2 days. Family denies any recent trauma related injuries to pt.' Pt's brother told ED attending that the jaundice developed acutely over the last 1 week. Patient seen and examined at bedside. Non verbal, only moaning. Abdominal distended and soft. Sclera icteric. Skin icteric. Stool color light brown.

## 2022-10-04 NOTE — PROGRESS NOTE ADULT - PROBLEM SELECTOR PLAN 3
CT showed  Severe hepatomegaly and diffuse steatosis. Mild ascites and infiltration of the central mesenteric fat. Possible gallbladder sludge and likely secondary/reactive wall thickening.  Left temporal parietal craniotomy. Encephalomalacia subjacent to the craniotomy. Ex vacuo dilatation of the left lateral ventricle. No intracranial hemorrhage. No mass lesion.  Tbili uptrending  Trend hepatic panel, INR  Avoid hepatoxic agents   US Abdominal (bedside)  MRCP when patient is stable Likely alcohol transaminitis  CT showed  Severe hepatomegaly and diffuse steatosis. Mild ascites and infiltration of the central mesenteric fat. Possible gallbladder sludge and likely secondary/reactive wall thickening.  Left temporal parietal craniotomy. Encephalomalacia subjacent to the craniotomy. Ex vacuo dilatation of the left lateral ventricle. No intracranial hemorrhage. No mass lesion.  Tbili uptrending  Trend hepatic panel, INR  Avoid hepatoxic agents   US Abdominal (bedside)  MRCP when patient is stable  Hepatology following

## 2022-10-04 NOTE — PROGRESS NOTE ADULT - ASSESSMENT
27yo Yakut speaking Male s/p Left temporal parietal craniotomy and alcohol abuse (reported current drinking) presented to [facility] ED on 10/2/22 with 1 day Hx of AMS, after reported witnessed seizure episodes and 1 week Hx of jaundice and was admitted to MICU with suspected EtOH withdrawal seizure, electrolyte abnormalities (hyponatremia with Na 125->129, hypokalemia with K 2.7->3.1, hypomagnesemia with Mg 1.0->1.9, hypophosphatemia with P 1.1->1.5), lactic acidosis (with lactate 6.4->3.0, bicarb 19->20), leukocytosis with neutrophilia (WBC 20.35->16.02, roderick 90%) with low grade T (100.1F), anemia without evidence of overt bleeding (Hb 6.9->8.3 s/p 1 U PRBC) and abnormal liver tests including coagulopathy (INR 2.13), hyperbilirubinemia (Se bi 9.3->10.8, direct 8.0), hyperammonemia (131), elevated liver enzymes in AST> ALT pattern (->212, ALT 22) and elevated ->600 and CT a/p w// iv suggesting diffuse hepatic steatosis, hepatomegaly with possible GB sludge and GB wall thickening, but normal bile ducts.      Hepatomegaly and diffuse hepatic steatosis  Jaundice, mostly direct hyperbilirubinemia - uptrending  Coagulopathy - uptrending  Elevated transaminases in AST>ALT pattern - improving  Elevated ALP - improving  Positive UA - UCx neg  Hyponatremia - resolved  - MELD 28 - >27  - MDF > 32 (68.8)  - Given AST>ALT pattern, EtOH use, recent jaundice, alcoholic hepatitis is high in differential, but would need to rule out infection, sepsis, as he was tachycardic with low grade T, leukocytosis with neutrophilia. BCx neg so far. UCx neg. CXR neg.  - Pending US abd and consider MRCP  - C/w antibiotics per ID, consult appreciated. Was switched from Zosyn to Ceftriaxone.   - Acute hepatitis viral panel, HIV, COVID-19, Hep BcAb, HBsAb all are neg, also send Hep E IgM, EBV and CMV PCRs/serologies, leptospirosis serology, ceruloplasmin, and can send AI panel  - Follow up GI recommendations  - Once or If infection ruled out, and no other etiology than alcoholic hepatitis, consider prednisolone (but only after infectious workup completed and negative) with NAC. Currently he also has concern for GIB, that would be contraindication for steroid. Consider starting the NAC until above workup completed.    - C/w supportive measures per ICU, correction of electrolytes, iv hydration. Monitor lactate.   - Procalcitonin 0.32.  - Review images for splanchnic veins patency      Acute encephalopathy, possible s/p seizures, but also elevated ammonia, liver disease, alcohol use and Hx of craniotomy  - Please, obtain collateral information about his baseline and contact information of family, NOK  - Aspiration, seizure, fall precautions  - Received lactulose enema. C/w lactulose to titrate to have 2-3 BM/s day and can consider adding rifaximin.  - Currently maintaining airways and per d/w ICU team he is at his baseline  - C/w sepsis mgmt. per ICU / ID (pending r/o cholangitis, also pos UA)   - Consider neurology consult. CT head noted.     Alcohol use  - Aspiration, seizure, fall precautions  - C/w folic, thiamine  - Collateral information how he can obtain alcohol is his mental status at baseline  - CIWA per ICU team    Anemia, s/p  1U PRBC  - Monitor H/H  - Keep Hb > 7, PLT>50 and check fibrinogen, to keep > 120. PLT WNL.  - Now concern for GIB, 2 episodes. Agree with vit K, agree with PPI and octreotide and c/w antibiotics.   - F/u GI recommendations, scheduled for EGD for tomorrow per GI    Thank you for consult   Will continue to follow  Was d/w ICU team

## 2022-10-04 NOTE — PROGRESS NOTE ADULT - PROBLEM SELECTOR PLAN 1
had hematochezia x2   Hgb downtrending  Monitor CBC and transfuse to keep Hgb >7.0, Platelets >50K, fibrinogen >120 if bleeding.   Continue Protonix 40mg IVP BID    (((incomplete)))) had hematochezia x2   Hgb downtrending  Monitor CBC and transfuse to keep Hgb >7.0, Platelets >50K, fibrinogen >120 if bleeding  Change protonix IVP to continuous drip  Start Octreotide drip  Vitamin K for elevated INR, keep less than 1.5  EGD tomorrow . Keep NPO after midnight. COVID test within 72 hrs.   D/w ICU team

## 2022-10-04 NOTE — PROGRESS NOTE ADULT - PROBLEM SELECTOR PLAN 2
likely due to ETOH suppression vs nutrition vs EV vs GIB  Noted with  hematochezia x 2 past 12 hrs. Plan as above.   B12 and folate level

## 2022-10-04 NOTE — PROGRESS NOTE ADULT - ATTENDING COMMENTS
25 yo Malian speaking Male, with no known past medical history, s/p Left temporal parietal craniotomy, presenting to the ED with altered mental status. Patient admitted to ICU for multiple metabolic derangements with evidence of acute liver dysfunction    ASSESSMENT  -Acute encephalopathy - concern for hepatic encephalopathy  -Acute hepatitis - infection vs. inflammatory vs. due to alcohol abuse   -GI bleeding  -Hyperbilirubinemia   -Alcohol dependence   -Anemia  -Lactic acidosis  -Hyponatremia  -Enter/Rhino virus infection  -s/p Left temporal parietal craniotomy    Plan:  - Remains confused. Does not follow commands. Awake and alert  - Overnight reported with episodes of melena vs. rectal bleeding   - NPO for now  - PPI BID  - Trend hgb  - Reported to be non verbal post craniotomy  - Monitor neurologic status  - Lactulose held given GI bleeding  - Monitor for signs of alcohol withdrawal  - Empiric antibiotics  - Follow up cultures  - Hepatology consult  - Thiamine and folate supplementation  - Aspiration and seizure precautions  - Supplement electrolytes  - Repeat labs in PM  - D/c jose juan
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
## 2022-10-04 NOTE — PROGRESS NOTE ADULT - ASSESSMENT
Patient is a 27 yo Moh[place] speaking Male, with no known past medical history, s/p Left temporal parietal craniotomy, presenting to the ED with altered mental status since this morning s/p seizure in the setting of Alcohol abuse. Patient admitted to ICU for multiple metabolic derangements.    ASSESSMENT  -Acute encephalopathy 2/2 Hepatic encephalopathy vs Sepsis   -Obstructive jaundice r/out cholangitis  -Alcohol withdrawal seizures  -Anemia  -Lactic acidosis  -Hyponatremia  -s/p Left temporal parietal craniotomy    Neuro:  #Acute encephalopathy due to Hepatic encephalopathy vs Sepsis  s/p 1 dose of lactulose enema for probable hepatic encephalopathy  -PO lactulose held d/t possible GI bleeding   -Unknown mental status baseline. Unable to reach family member next of kin. Social work consulted for assistance.     #Alcohol withdrawal seizures  As per EMS charts, pt drinks alcohol everyday, last drink 2 days ago  No prior hx of seizures  Started on CIWA protocol, on IV Ativan PRN (hold for sedation)  C/w thiamine, folic acid and MVT  required 2mg IV ativan overnight.     #S/p Left temporal parietal craniotomy  unknown reason    Cardiovascular:  #Sinus tachycardia  in the setting of sepsis/acute illness  monitor for now, c/w IVF  resolved     Pulmonary:   No acute issues    Infectious Diseases:  #R/out cholangitis  p/w transaminitis, altered mental status, low grade temp 100.1, elevated WBC 20k, direct bili 8.0  c/w zosyn  Dr. Puentes ID consulted     #UTI  UA w/WBC 6-10, trace leukocyte esterase, unable to obtain presence of symptoms  c/w Zosyn  f/u urine cultures. NGTD     Gastrointestinal:  #Obstructive jaundice  p/w transaminitis, altered mental status, low grade temp 100.1, elevated WBC 20k, direct bili 8.0  severe hepatomegaly w/mild ascites on CT abdomen w/gall bladder sludge  lipase level normal, low suspicion for pancreatic involvement  C/w Zosyn for suspected cholangitis  F/u blood cultures  hepatitis panel neg   hepatic USG for characterization of gall bladder attempted however patient was very agitated. Will reattempt.   GI consulted, recommending possible MRCP   Patient had episode of melena overnight. will follow up with GI   f/u CBC q6h  NPO  IV protonix BID       #Transaminitis  in the setting of Alcohol abuse and obstructive jaundice, BAL 34  Utox negative for salicylate and acetaminophen levels  f/u hepatitis panel  trend LFTs daily  Angdrey score for Alcoholic hepatitis 74, pt would benefit from glucocorticoid therapy  Dr. Galan hepatology consulted     #Elevated INR and hypoalbuminemia in the setting of liver dysfunction  monitor daily, c/w venodynes for DVT ppx  -started on vitamin K for 3 days     #Lactic acidosis   Decreased clearance w/ liver dysfunction  Likely due to post-ictal state from seizure this AM vs sepsis, downtrending from 6.4>IV 1NS bolus> 2.6  resolved     Renal:  #Hyponatremia  on admission, Na 125> 1L NS bolus > 129  129 this AM    serum osm, urine osm, urine sodium noted       Endo:  TSH 7.22, likely inaccurate in current state. Will re check after patient more stable.   Advance diet as tolerated   Monitor glucose Q4 hours in the setting of liver dysfunction    Heme/onc:   #Leukocytosis  WBC 20k, low grade temp 100.1 on admission, r/out sepsis  Avoid tylenol w/elevated LFTs    #Anemia  P/w Hb 6.9, MCV 87 normocytic  s/p 1 unit PRBC  HB 7.3 on 10/4 with episode of melena  CBC q6h     Skin/ catheter: Yost removed 10/4 , peripheral lines    Prophylaxis: Hold chemical DVT ppx in the setting of elevated INR/ SCD     Goals of Care: Full code, attempted to reach family/emergency contact however no number founded. Consulted social work for more assistance in reaching family     Dispo: Accepted to ICU

## 2022-10-04 NOTE — DIETITIAN INITIAL EVALUATION ADULT - PERTINENT LABORATORY DATA
10-04    135  |  102  |  1<L>  ----------------------------<  81  2.9<LL>   |  21<L>  |  0.38<L>    Ca    6.9<L>      04 Oct 2022 03:31  Phos  1.2     10-04  Mg     1.6     10-04    TPro  x   /  Alb  x   /  TBili  10.9<H>  /  DBili  9.2<H>  /  AST  x   /  ALT  x   /  AlkPhos  x   10-04  POCT Blood Glucose.: 87 mg/dL (10-03-22 @ 23:12)

## 2022-10-04 NOTE — PROGRESS NOTE ADULT - SUBJECTIVE AND OBJECTIVE BOX
GI Progress Note    Patient is a 26y old  Male who presents with a chief complaint of Altered mental status (04 Oct 2022 11:44)       GI INTERVAL HPI/OVERNIGHT EVENTS: RN reports BRBPR x1 last night , and 1 episode of  bright red blood streaks in BM this morning. Hgb downtrending 8.3-->>7.5-->>7.3.  Pt seen and examined in ICU this morning.   Pt awake/alert, in bed, speaking in Albanian, repeatedly saying " Im okay, feeling good. " Pt denies c/o.     MEDICATIONS  (STANDING):  chlorhexidine 2% Cloths 1 Application(s) Topical <User Schedule>  folic acid 1 milliGRAM(s) Oral daily  lactated ringers. 1000 milliLiter(s) (75 mL/Hr) IV Continuous <Continuous>  multivitamin 1 Tablet(s) Oral daily  mupirocin 2% Ointment 1 Application(s) Topical three times a day  pantoprazole  Injectable 40 milliGRAM(s) IV Push two times a day  phytonadione   Solution 10 milliGRAM(s) Oral daily  piperacillin/tazobactam IVPB.. 3.375 Gram(s) IV Intermittent every 8 hours  thiamine IVPB 500 milliGRAM(s) IV Intermittent three times a day    MEDICATIONS  (PRN):  LORazepam   Injectable 2 milliGRAM(s) IV Push every 4 hours PRN CIWA > 8      __________________________________________________  REVIEW OF SYSTEMS:    Limited due pt's cognitive status.   GI HPI above.   ________________________________________________  PHYSICAL EXAM    Vital Signs Last 24 Hrs  T(C): 36.3 (04 Oct 2022 04:00), Max: 37.3 (03 Oct 2022 16:41)  T(F): 97.3 (04 Oct 2022 04:00), Max: 99.1 (03 Oct 2022 16:41)  HR: 106 (04 Oct 2022 12:00) (94 - 112)  BP: 107/71 (04 Oct 2022 12:00) (93/57 - 122/69)  BP(mean): 76 (04 Oct 2022 12:00) (63 - 83)  RR: 19 (04 Oct 2022 12:00) (18 - 25)  SpO2: 98% (04 Oct 2022 12:00) (97% - 100%)    Parameters below as of 03 Oct 2022 20:00  Patient On (Oxygen Delivery Method): room air    Gen: NAD  HEENT: PERRL, icteric sclera, no oral mucositis  Resp: Clear breath sounds on auscultation. No rales, no wheezing  CVS: S1S2 present, regular  GI: BS(+), Soft, ND/NT. Rectal exam with light red streaks.   Extremities : BLE (+)  edema . No  calf tenderness. PPP  Neuro: Awake/alert.  no new neuro deficits  Skin: warm,dry,no rash, jaundice       _________________________________________________  LABS:                        7.3    15.35 )-----------( 170      ( 04 Oct 2022 03:31 )             23.6     10-04    135  |  102  |  1<L>  ----------------------------<  81  2.9<LL>   |  21<L>  |  0.38<L>    Ca    6.9<L>      04 Oct 2022 03:31  Phos  1.2     10-04  Mg     1.6     10-04    TPro  x   /  Alb  x   /  TBili  10.9<H>  /  DBili  9.2<H>  /  AST  x   /  ALT  x   /  AlkPhos  x   10-04    PT/INR - ( 04 Oct 2022 03:31 )   PT: 30.4 sec;   INR: 2.53 ratio         PTT - ( 04 Oct 2022 03:31 )  PTT:40.5 sec  Urinalysis Basic - ( 02 Oct 2022 17:20 )    Color: Yellow / Appearance: Clear / S.010 / pH: x  Gluc: x / Ketone: Small  / Bili: Large / Urobili: 8   Blood: x / Protein: 100 / Nitrite: Positive   Leuk Esterase: Trace / RBC: 2-5 /HPF / WBC 6-10 /HPF   Sq Epi: x / Non Sq Epi: Few /HPF / Bacteria: Few /HPF      CAPILLARY BLOOD GLUCOSE      POCT Blood Glucose.: 87 mg/dL (03 Oct 2022 23:12)  POCT Blood Glucose.: 88 mg/dL (03 Oct 2022 17:59)    SARS-CoV-2: NotDetec (02 Oct 2022 16:00)    RADIOLOGY & ADDITIONAL TESTS:    < from: CT Abdomen and Pelvis w/ IV Cont (10.02.22 @ 16:56) >  ACC: [id number] EXAM:  CT ABDOMEN AND PELVIS IC                          PROCEDURE DATE:  10/02/2022          INTERPRETATION:  CLINICAL INFORMATION: Jaundice and hepatomegaly    COMPARISON: None.    CONTRAST/COMPLICATIONS:  IV Contrast: Omnipaque 350 90 cc administered   10 cc discarded  Oral Contrast: NONE  Complications: None reported at time of study completion    PROCEDURE:  CT of the Abdomen and Pelvis was performed.  Sagittal and coronal reformats were performed.    FINDINGS:  LOWER CHEST:Within normal limits.    LIVER: Severe hepatomegaly and diffuse steatosis.  BILE DUCTS: Normal caliber.  GALLBLADDER: Partially contracted with high attenuation related to   enhancement and/or sludge. Nonspecific wall thickening, likely   secondary/reactive.  SPLEEN: Within normal limits.  PANCREAS: Within normal limits.  ADRENALS: Within normal limits.  KIDNEYS/URETERS: Too small to characterize low attenuating focus in the   left kidney.    BLADDER: Within normal limits.  REPRODUCTIVE ORGANS: Within normal limits.    BOWEL: No bowel obstruction. Appendix is normal.  PERITONEUM: Mild ascites and infiltration of the central mesenteric fat.  VESSELS: Within normal limits.  RETROPERITONEUM/LYMPH NODES: No lymphadenopathy.  ABDOMINAL WALL: Tiny fat-containing inguinal hernias.  BONES: Bilateral L5-S1 pars defects. Old fractures of the 6th and 8th-9th   left ribs.    IMPRESSION:  Severe hepatomegaly and diffuse steatosis.  Mild ascites and infiltration of the central mesenteric fat.  Possiblegallbladder sludge and likely secondary/reactive wall thickening.    < end of copied text >  < from: Xray Chest 1 View- PORTABLE-Urgent (Xray Chest 1 View- PORTABLE-Urgent .) (10.02.22 @ 23:18) >  Impression:    No acute pulmonary disease.    < end of copied text >  < from: CT Head No Cont (10.02.22 @ 16:39) >    INTERPRETATION:  INDICATIONS:  seizure    TECHNIQUE:  Serial axial images were obtained from the skull base to the   vertex without intravenous contrast. Sagittal and Coronal reformats were   performed    COMPARISON EXAMINATION: None.    FINDINGS:  VENTRICLES AND SULCI:  Ex vacuo dilatation of the left lateral ventricle   to a mild degree.  INTRA-AXIAL:  Left temporal parietal encephalomalacia subjacent to the   craniotomy.  EXTRA-AXIAL:  No mass or collection is seen.  VISUALIZED SINUSES: Left maxillary mucosal thickening. Right maxillary   mucosal thickening  VISUALIZED MASTOIDS:  Clear.  CALVARIUM: Left temporal parietal craniotomy. Right parietalsoft tissue   swelling.  MISCELLANEOUS:  None.    IMPRESSION:  Left temporal parietal craniotomy. Encephalomalacia   subjacent to the craniotomy. Ex vacuo dilatation of the left lateral   ventricle. No intracranial hemorrhage. No mass lesion    < end of copied text >

## 2022-10-05 LAB
ALBUMIN SERPL ELPH-MCNC: 1.4 G/DL — LOW (ref 3.5–5)
ALBUMIN SERPL ELPH-MCNC: 1.4 G/DL — LOW (ref 3.5–5)
ALBUMIN SERPL ELPH-MCNC: 1.6 G/DL — LOW (ref 3.5–5)
ALP SERPL-CCNC: 307 U/L — HIGH (ref 40–120)
ALP SERPL-CCNC: 323 U/L — HIGH (ref 40–120)
ALP SERPL-CCNC: 377 U/L — HIGH (ref 40–120)
ALT FLD-CCNC: 13 U/L DA — SIGNIFICANT CHANGE UP (ref 10–60)
ALT FLD-CCNC: 14 U/L DA — SIGNIFICANT CHANGE UP (ref 10–60)
ALT FLD-CCNC: 16 U/L DA — SIGNIFICANT CHANGE UP (ref 10–60)
ANION GAP SERPL CALC-SCNC: 10 MMOL/L — SIGNIFICANT CHANGE UP (ref 5–17)
ANION GAP SERPL CALC-SCNC: 11 MMOL/L — SIGNIFICANT CHANGE UP (ref 5–17)
ANION GAP SERPL CALC-SCNC: 9 MMOL/L — SIGNIFICANT CHANGE UP (ref 5–17)
AST SERPL-CCNC: 101 U/L — HIGH (ref 10–40)
AST SERPL-CCNC: 82 U/L — HIGH (ref 10–40)
AST SERPL-CCNC: 90 U/L — HIGH (ref 10–40)
BILIRUB SERPL-MCNC: 11.1 MG/DL — HIGH (ref 0.2–1.2)
BILIRUB SERPL-MCNC: 11.6 MG/DL — HIGH (ref 0.2–1.2)
BILIRUB SERPL-MCNC: 12.7 MG/DL — HIGH (ref 0.2–1.2)
BLD GP AB SCN SERPL QL: SIGNIFICANT CHANGE UP
BUN SERPL-MCNC: 1 MG/DL — LOW (ref 7–18)
BUN SERPL-MCNC: <1 MG/DL — LOW (ref 7–18)
BUN SERPL-MCNC: <1 MG/DL — LOW (ref 7–18)
CALCIUM SERPL-MCNC: 7.2 MG/DL — LOW (ref 8.4–10.5)
CALCIUM SERPL-MCNC: 7.3 MG/DL — LOW (ref 8.4–10.5)
CALCIUM SERPL-MCNC: 7.4 MG/DL — LOW (ref 8.4–10.5)
CHLORIDE SERPL-SCNC: 105 MMOL/L — SIGNIFICANT CHANGE UP (ref 96–108)
CHLORIDE SERPL-SCNC: 106 MMOL/L — SIGNIFICANT CHANGE UP (ref 96–108)
CHLORIDE SERPL-SCNC: 109 MMOL/L — HIGH (ref 96–108)
CO2 SERPL-SCNC: 18 MMOL/L — LOW (ref 22–31)
CO2 SERPL-SCNC: 19 MMOL/L — LOW (ref 22–31)
CO2 SERPL-SCNC: 20 MMOL/L — LOW (ref 22–31)
CREAT SERPL-MCNC: 0.32 MG/DL — LOW (ref 0.5–1.3)
CREAT SERPL-MCNC: 0.33 MG/DL — LOW (ref 0.5–1.3)
CREAT SERPL-MCNC: 0.37 MG/DL — LOW (ref 0.5–1.3)
EGFR: 160 ML/MIN/1.73M2 — SIGNIFICANT CHANGE UP
EGFR: 166 ML/MIN/1.73M2 — SIGNIFICANT CHANGE UP
EGFR: 167 ML/MIN/1.73M2 — SIGNIFICANT CHANGE UP
FIBRINOGEN AG PPP IA-MCNC: 446 MG/DL — SIGNIFICANT CHANGE UP (ref 206–478)
FIBRINOGEN PPP-MCNC: 307 MG/DL — LOW (ref 340–550)
GLUCOSE BLDC GLUCOMTR-MCNC: 115 MG/DL — HIGH (ref 70–99)
GLUCOSE BLDC GLUCOMTR-MCNC: 118 MG/DL — HIGH (ref 70–99)
GLUCOSE SERPL-MCNC: 116 MG/DL — HIGH (ref 70–99)
GLUCOSE SERPL-MCNC: 89 MG/DL — SIGNIFICANT CHANGE UP (ref 70–99)
GLUCOSE SERPL-MCNC: 93 MG/DL — SIGNIFICANT CHANGE UP (ref 70–99)
HCT VFR BLD CALC: 24.9 % — LOW (ref 39–50)
HCT VFR BLD CALC: 28.3 % — LOW (ref 39–50)
HCT VFR BLD CALC: 28.4 % — LOW (ref 39–50)
HGB BLD-MCNC: 7.8 G/DL — LOW (ref 13–17)
HGB BLD-MCNC: 8.8 G/DL — LOW (ref 13–17)
HGB BLD-MCNC: 8.9 G/DL — LOW (ref 13–17)
INR BLD: 2.38 RATIO — HIGH (ref 0.88–1.16)
LEPTOSPIRA AB TITR SER: NEGATIVE — SIGNIFICANT CHANGE UP
MAGNESIUM SERPL-MCNC: 1.7 MG/DL — SIGNIFICANT CHANGE UP (ref 1.6–2.6)
MAGNESIUM SERPL-MCNC: 1.8 MG/DL — SIGNIFICANT CHANGE UP (ref 1.6–2.6)
MCHC RBC-ENTMCNC: 28.3 PG — SIGNIFICANT CHANGE UP (ref 27–34)
MCHC RBC-ENTMCNC: 28.5 PG — SIGNIFICANT CHANGE UP (ref 27–34)
MCHC RBC-ENTMCNC: 29.1 PG — SIGNIFICANT CHANGE UP (ref 27–34)
MCHC RBC-ENTMCNC: 31 GM/DL — LOW (ref 32–36)
MCHC RBC-ENTMCNC: 31.3 GM/DL — LOW (ref 32–36)
MCHC RBC-ENTMCNC: 31.4 GM/DL — LOW (ref 32–36)
MCV RBC AUTO: 90.9 FL — SIGNIFICANT CHANGE UP (ref 80–100)
MCV RBC AUTO: 91.3 FL — SIGNIFICANT CHANGE UP (ref 80–100)
MCV RBC AUTO: 92.5 FL — SIGNIFICANT CHANGE UP (ref 80–100)
NRBC # BLD: 0 /100 WBCS — SIGNIFICANT CHANGE UP (ref 0–0)
PHOSPHATE SERPL-MCNC: 2.4 MG/DL — LOW (ref 2.5–4.5)
PHOSPHATE SERPL-MCNC: 2.7 MG/DL — SIGNIFICANT CHANGE UP (ref 2.5–4.5)
PLATELET # BLD AUTO: 160 K/UL — SIGNIFICANT CHANGE UP (ref 150–400)
PLATELET # BLD AUTO: 166 K/UL — SIGNIFICANT CHANGE UP (ref 150–400)
PLATELET # BLD AUTO: 173 K/UL — SIGNIFICANT CHANGE UP (ref 150–400)
POTASSIUM SERPL-MCNC: 3.6 MMOL/L — SIGNIFICANT CHANGE UP (ref 3.5–5.3)
POTASSIUM SERPL-MCNC: 3.7 MMOL/L — SIGNIFICANT CHANGE UP (ref 3.5–5.3)
POTASSIUM SERPL-MCNC: 4.1 MMOL/L — SIGNIFICANT CHANGE UP (ref 3.5–5.3)
POTASSIUM SERPL-SCNC: 3.6 MMOL/L — SIGNIFICANT CHANGE UP (ref 3.5–5.3)
POTASSIUM SERPL-SCNC: 3.7 MMOL/L — SIGNIFICANT CHANGE UP (ref 3.5–5.3)
POTASSIUM SERPL-SCNC: 4.1 MMOL/L — SIGNIFICANT CHANGE UP (ref 3.5–5.3)
PROT SERPL-MCNC: 5 G/DL — LOW (ref 6–8.3)
PROT SERPL-MCNC: 5.3 G/DL — LOW (ref 6–8.3)
PROT SERPL-MCNC: 5.9 G/DL — LOW (ref 6–8.3)
PROTHROM AB SERPL-ACNC: 28.6 SEC — HIGH (ref 10.5–13.4)
RBC # BLD: 2.74 M/UL — LOW (ref 4.2–5.8)
RBC # BLD: 3.06 M/UL — LOW (ref 4.2–5.8)
RBC # BLD: 3.11 M/UL — LOW (ref 4.2–5.8)
RBC # FLD: 18.8 % — HIGH (ref 10.3–14.5)
RBC # FLD: 19.4 % — HIGH (ref 10.3–14.5)
RBC # FLD: 19.8 % — HIGH (ref 10.3–14.5)
SARS-COV-2 RNA SPEC QL NAA+PROBE: SIGNIFICANT CHANGE UP
SODIUM SERPL-SCNC: 134 MMOL/L — LOW (ref 135–145)
SODIUM SERPL-SCNC: 136 MMOL/L — SIGNIFICANT CHANGE UP (ref 135–145)
SODIUM SERPL-SCNC: 137 MMOL/L — SIGNIFICANT CHANGE UP (ref 135–145)
WBC # BLD: 15.85 K/UL — HIGH (ref 3.8–10.5)
WBC # BLD: 16.54 K/UL — HIGH (ref 3.8–10.5)
WBC # BLD: 17.36 K/UL — HIGH (ref 3.8–10.5)
WBC # FLD AUTO: 15.85 K/UL — HIGH (ref 3.8–10.5)
WBC # FLD AUTO: 16.54 K/UL — HIGH (ref 3.8–10.5)
WBC # FLD AUTO: 17.36 K/UL — HIGH (ref 3.8–10.5)

## 2022-10-05 PROCEDURE — 43235 EGD DIAGNOSTIC BRUSH WASH: CPT

## 2022-10-05 PROCEDURE — 99233 SBSQ HOSP IP/OBS HIGH 50: CPT

## 2022-10-05 PROCEDURE — 45334 SIGMOIDOSCOPY FOR BLEEDING: CPT

## 2022-10-05 DEVICE — IMPLANTABLE DEVICE: Type: IMPLANTABLE DEVICE | Status: FUNCTIONAL

## 2022-10-05 RX ORDER — FOLIC ACID 0.8 MG
1 TABLET ORAL DAILY
Refills: 0 | Status: DISCONTINUED | OUTPATIENT
Start: 2022-10-05 | End: 2022-10-07

## 2022-10-05 RX ORDER — PHYTONADIONE (VIT K1) 5 MG
5 TABLET ORAL DAILY
Refills: 0 | Status: COMPLETED | OUTPATIENT
Start: 2022-10-05 | End: 2022-10-07

## 2022-10-05 RX ORDER — SODIUM CHLORIDE 9 MG/ML
1000 INJECTION INTRAMUSCULAR; INTRAVENOUS; SUBCUTANEOUS
Refills: 0 | Status: DISCONTINUED | OUTPATIENT
Start: 2022-10-05 | End: 2022-10-06

## 2022-10-05 RX ORDER — POTASSIUM PHOSPHATE, MONOBASIC POTASSIUM PHOSPHATE, DIBASIC 236; 224 MG/ML; MG/ML
30 INJECTION, SOLUTION INTRAVENOUS ONCE
Refills: 0 | Status: COMPLETED | OUTPATIENT
Start: 2022-10-05 | End: 2022-10-05

## 2022-10-05 RX ORDER — PANTOPRAZOLE SODIUM 20 MG/1
40 TABLET, DELAYED RELEASE ORAL ONCE
Refills: 0 | Status: COMPLETED | OUTPATIENT
Start: 2022-10-05 | End: 2022-10-06

## 2022-10-05 RX ORDER — DEXMEDETOMIDINE HYDROCHLORIDE IN 0.9% SODIUM CHLORIDE 4 UG/ML
0.2 INJECTION INTRAVENOUS
Qty: 200 | Refills: 0 | Status: DISCONTINUED | OUTPATIENT
Start: 2022-10-05 | End: 2022-10-06

## 2022-10-05 RX ORDER — MAGNESIUM SULFATE 500 MG/ML
1 VIAL (ML) INJECTION ONCE
Refills: 0 | Status: COMPLETED | OUTPATIENT
Start: 2022-10-05 | End: 2022-10-05

## 2022-10-05 RX ORDER — POTASSIUM PHOSPHATE, MONOBASIC POTASSIUM PHOSPHATE, DIBASIC 236; 224 MG/ML; MG/ML
15 INJECTION, SOLUTION INTRAVENOUS ONCE
Refills: 0 | Status: COMPLETED | OUTPATIENT
Start: 2022-10-05 | End: 2022-10-05

## 2022-10-05 RX ORDER — SODIUM CHLORIDE 9 MG/ML
1000 INJECTION INTRAMUSCULAR; INTRAVENOUS; SUBCUTANEOUS ONCE
Refills: 0 | Status: COMPLETED | OUTPATIENT
Start: 2022-10-05 | End: 2022-10-05

## 2022-10-05 RX ORDER — POTASSIUM CHLORIDE 20 MEQ
40 PACKET (EA) ORAL ONCE
Refills: 0 | Status: COMPLETED | OUTPATIENT
Start: 2022-10-05 | End: 2022-10-05

## 2022-10-05 RX ADMIN — CEFTRIAXONE 100 MILLIGRAM(S): 500 INJECTION, POWDER, FOR SOLUTION INTRAMUSCULAR; INTRAVENOUS at 14:59

## 2022-10-05 RX ADMIN — Medication 105 MILLIGRAM(S): at 05:39

## 2022-10-05 RX ADMIN — DEXMEDETOMIDINE HYDROCHLORIDE IN 0.9% SODIUM CHLORIDE 3.95 MICROGRAM(S)/KG/HR: 4 INJECTION INTRAVENOUS at 15:46

## 2022-10-05 RX ADMIN — SODIUM CHLORIDE 1000 MILLILITER(S): 9 INJECTION INTRAMUSCULAR; INTRAVENOUS; SUBCUTANEOUS at 17:45

## 2022-10-05 RX ADMIN — MUPIROCIN 1 APPLICATION(S): 20 OINTMENT TOPICAL at 23:33

## 2022-10-05 RX ADMIN — Medication 1 MILLIGRAM(S): at 14:59

## 2022-10-05 RX ADMIN — POTASSIUM PHOSPHATE, MONOBASIC POTASSIUM PHOSPHATE, DIBASIC 83.33 MILLIMOLE(S): 236; 224 INJECTION, SOLUTION INTRAVENOUS at 09:34

## 2022-10-05 RX ADMIN — MUPIROCIN 1 APPLICATION(S): 20 OINTMENT TOPICAL at 05:48

## 2022-10-05 RX ADMIN — Medication 2 MILLIGRAM(S): at 05:42

## 2022-10-05 RX ADMIN — PANTOPRAZOLE SODIUM 10 MG/HR: 20 TABLET, DELAYED RELEASE ORAL at 08:12

## 2022-10-05 RX ADMIN — Medication 101 MILLIGRAM(S): at 10:32

## 2022-10-05 RX ADMIN — Medication 2 MILLIGRAM(S): at 01:19

## 2022-10-05 RX ADMIN — CHLORHEXIDINE GLUCONATE 1 APPLICATION(S): 213 SOLUTION TOPICAL at 05:47

## 2022-10-05 RX ADMIN — Medication 100 GRAM(S): at 08:12

## 2022-10-05 RX ADMIN — POTASSIUM PHOSPHATE, MONOBASIC POTASSIUM PHOSPHATE, DIBASIC 62.5 MILLIMOLE(S): 236; 224 INJECTION, SOLUTION INTRAVENOUS at 05:42

## 2022-10-05 RX ADMIN — SODIUM CHLORIDE 75 MILLILITER(S): 9 INJECTION, SOLUTION INTRAVENOUS at 04:04

## 2022-10-05 RX ADMIN — Medication 2 MILLIGRAM(S): at 08:13

## 2022-10-05 RX ADMIN — Medication 105 MILLIGRAM(S): at 14:55

## 2022-10-05 RX ADMIN — DEXMEDETOMIDINE HYDROCHLORIDE IN 0.9% SODIUM CHLORIDE 3.95 MICROGRAM(S)/KG/HR: 4 INJECTION INTRAVENOUS at 09:33

## 2022-10-05 RX ADMIN — OCTREOTIDE ACETATE 10 MICROGRAM(S)/HR: 200 INJECTION, SOLUTION INTRAVENOUS; SUBCUTANEOUS at 10:32

## 2022-10-05 NOTE — PROGRESS NOTE ADULT - SUBJECTIVE AND OBJECTIVE BOX
Chief Complaint:  Patient is a 26y old  Male who presents with a chief complaint of Altered mental status (05 Oct 2022 11:52)      Reason for consult: SYEDA    Interval Events: Patient was seen and examined at bedside. Remains lethargic, but was also started on Precedex b/o withdrawal. Remains afebrile. Continued on ceftriaxone. Noted Hb drop, s/p 1 U PRBC last night, pending EGD today. Bilirubin still slightly up-trending. MELD remains 27.     Hospital Medications:  cefTRIAXone   IVPB      cefTRIAXone   IVPB 2000 milliGRAM(s) IV Intermittent every 24 hours  chlorhexidine 2% Cloths 1 Application(s) Topical <User Schedule>  dexMEDEtomidine Infusion 0.2 MICROgram(s)/kG/Hr IV Continuous <Continuous>  folic acid Injectable 1 milliGRAM(s) IV Push daily  LORazepam   Injectable 2 milliGRAM(s) IV Push every 4 hours PRN  multivitamin 1 Tablet(s) Oral daily  mupirocin 2% Ointment 1 Application(s) Topical three times a day  octreotide  Infusion 50 MICROgram(s)/Hr IV Continuous <Continuous>  pantoprazole Infusion 8 mG/Hr IV Continuous <Continuous>  phytonadione  IVPB 5 milliGRAM(s) IV Intermittent daily      ROS:   Unable to obtain due to patient condition.     PHYSICAL EXAM:   Vital Signs:  Vital Signs Last 24 Hrs  T(C): 36.2 (05 Oct 2022 12:00), Max: 37.2 (05 Oct 2022 07:14)  T(F): 97.2 (05 Oct 2022 12:00), Max: 98.9 (05 Oct 2022 07:14)  HR: 79 (05 Oct 2022 16:10) (79 - 120)  BP: 83/56 (05 Oct 2022 16:00) (83/56 - 133/82)  BP(mean): 62 (05 Oct 2022 15:00) (61 - 94)  RR: 10 (05 Oct 2022 16:10) (10 - 35)  SpO2: 100% (05 Oct 2022 16:00) (97% - 100%)    Parameters below as of 05 Oct 2022 12:00  Patient On (Oxygen Delivery Method): room air      Daily     Daily     GENERAL: Ill appearing  NEURO: opens eyes to verbal stimuli, but on Precedex  HEENT: icteric sclera  CHEST: no respiratory distress, no accessory muscle use  CARDIAC: tachycardic   ABDOMEN: soft, slightly more distended compared to yesterday, non tender, no rebound or guarding, BS+  EXTREMITIES: warm, well perfused, no edema  SKIN: jaundice    LABS: reviewed                        7.8    16.54 )-----------( 160      ( 05 Oct 2022 13:00 )             24.9     10-05    136  |  106  |  <1<L>  ----------------------------<  116<H>  3.7   |  20<L>  |  0.32<L>    Ca    7.3<L>      05 Oct 2022 13:00  Phos  2.4     10-05  Mg     1.7     10-05    TPro  5.0<L>  /  Alb  1.4<L>  /  TBili  11.1<H>  /  DBili  x   /  AST  82<H>  /  ALT  13  /  AlkPhos  323<H>  10-05    LIVER FUNCTIONS - ( 05 Oct 2022 13:00 )  Alb: 1.4 g/dL / Pro: 5.0 g/dL / ALK PHOS: 323 U/L / ALT: 13 U/L DA / AST: 82 U/L / GGT: x             Interval Diagnostic Studies: see sunrise for full report
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
## 2022-10-05 NOTE — PROGRESS NOTE ADULT - SUBJECTIVE AND OBJECTIVE BOX
INTERVAL HPI/OVERNIGHT EVENTS: Patient agitated overnight requiring 2mg IV ativan. Hb 6.9 s/p 1uPRBC.     PRESSORS: [ ] YES [X] NO  WHICH:    ANTIBIOTICS:                  DATE STARTED:    Antimicrobial:  cefTRIAXone   IVPB      cefTRIAXone   IVPB 2000 milliGRAM(s) IV Intermittent every 24 hours    Cardiovascular:    Pulmonary:    Hematalogic:    Other:  chlorhexidine 2% Cloths 1 Application(s) Topical <User Schedule>  dexMEDEtomidine Infusion 0.2 MICROgram(s)/kG/Hr IV Continuous <Continuous>  folic acid Injectable 1 milliGRAM(s) IV Push daily  LORazepam   Injectable 2 milliGRAM(s) IV Push every 4 hours PRN  multivitamin 1 Tablet(s) Oral daily  mupirocin 2% Ointment 1 Application(s) Topical three times a day  octreotide  Infusion 50 MICROgram(s)/Hr IV Continuous <Continuous>  pantoprazole Infusion 8 mG/Hr IV Continuous <Continuous>  phytonadione  IVPB 5 milliGRAM(s) IV Intermittent daily  thiamine IVPB 500 milliGRAM(s) IV Intermittent three times a day    cefTRIAXone   IVPB      cefTRIAXone   IVPB 2000 milliGRAM(s) IV Intermittent every 24 hours  chlorhexidine 2% Cloths 1 Application(s) Topical <User Schedule>  dexMEDEtomidine Infusion 0.2 MICROgram(s)/kG/Hr IV Continuous <Continuous>  folic acid Injectable 1 milliGRAM(s) IV Push daily  LORazepam   Injectable 2 milliGRAM(s) IV Push every 4 hours PRN  multivitamin 1 Tablet(s) Oral daily  mupirocin 2% Ointment 1 Application(s) Topical three times a day  octreotide  Infusion 50 MICROgram(s)/Hr IV Continuous <Continuous>  pantoprazole Infusion 8 mG/Hr IV Continuous <Continuous>  phytonadione  IVPB 5 milliGRAM(s) IV Intermittent daily  thiamine IVPB 500 milliGRAM(s) IV Intermittent three times a day    Drug Dosing Weight    Weight (kg): 78.9 (02 Oct 2022 21:05)    CENTRAL LINE: [ ] YES [ X] NO  LOCATION:         [place]: [ ] YES [X ] NO          A-LINE:  [ ] YES [X] NO  LOCATION:         ICU Vital Signs Last 24 Hrs  T(C): 37.2 (05 Oct 2022 07:14), Max: 37.2 (05 Oct 2022 07:14)  T(F): 98.9 (05 Oct 2022 07:14), Max: 98.9 (05 Oct 2022 07:14)  HR: 103 (05 Oct 2022 09:00) (94 - 120)  BP: 100/61 (05 Oct 2022 09:00) (91/68 - 133/82)  BP(mean): 70 (05 Oct 2022 09:00) (61 - 94)  ABP: --  ABP(mean): --  RR: 26 (05 Oct 2022 09:00) (17 - 35)  SpO2: 100% (05 Oct 2022 09:00) (97% - 100%)    O2 Parameters below as of 05 Oct 2022 08:00  Patient On (Oxygen Delivery Method): room air                  10-04 @ 07:01  -  10-05 @ 07:00  --------------------------------------------------------  IN: 3809.8 mL / OUT: 1850 mL / NET: 1959.8 mL              PHYSICAL EXAM:    GENERAL: resting comfortably   HEAD: Nomocephalic, Atraumatic  EYES: scleral icterus   NECK: Supple, No JVD; Normal thyroid; Trachea midline  NERVOUS SYSTEM:  unable to assess as patient is very lethargic   CHEST/LUNG: No rales, rhonchi, wheezing   HEART: Regular rate and rhythm; No murmurs,   ABDOMEN: distended   EXTREMITIES:  2+ Peripheral Pulses, No clubbing, cyanosis, or edema  SKIN: No lesions      LABS:  CBC Full  -  ( 05 Oct 2022 03:52 )  WBC Count : 15.85 K/uL  RBC Count : 3.11 M/uL  Hemoglobin : 8.8 g/dL  Hematocrit : 28.4 %  Platelet Count - Automated : 166 K/uL  Mean Cell Volume : 91.3 fl  Mean Cell Hemoglobin : 28.3 pg  Mean Cell Hemoglobin Concentration : 31.0 gm/dL  Auto Neutrophil # : x  Auto Lymphocyte # : x  Auto Monocyte # : x  Auto Eosinophil # : x  Auto Basophil # : x  Auto Neutrophil % : x  Auto Lymphocyte % : x  Auto Monocyte % : x  Auto Eosinophil % : x  Auto Basophil % : x    10-05    134<L>  |  105  |  1<L>  ----------------------------<  89  3.6   |  18<L>  |  0.33<L>    Ca    7.4<L>      05 Oct 2022 03:52  Phos  2.4     10-05  Mg     1.7     10-05    TPro  5.9<L>  /  Alb  1.6<L>  /  TBili  12.7<H>  /  DBili  x   /  AST  101<H>  /  ALT  16  /  AlkPhos  377<H>  10-05    PT/INR - ( 05 Oct 2022 03:52 )   PT: 28.6 sec;   INR: 2.38 ratio         PTT - ( 04 Oct 2022 03:31 )  PTT:40.5 sec    Culture Results:   No growth (10-02 @ 17:20)  Culture Results:   No growth to date. (10-02 @ 16:05)  Culture Results:   No growth to date. (10-02 @ 16:00)      RADIOLOGY & ADDITIONAL STUDIES REVIEWED:  ***    [ ]GOALS OF CARE DISCUSSION WITH PATIENT/FAMILY/PROXY:    CRITICAL CARE TIME SPENT: 35 minutes
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
## 2022-10-05 NOTE — PROGRESS NOTE ADULT - ATTENDING COMMENTS
25 yo Albanian speaking Male, with no known past medical history, s/p Left temporal parietal craniotomy, presenting to the ED with altered mental status. Patient admitted to ICU for multiple metabolic derangements with evidence of acute liver dysfunction    ASSESSMENT  -Acute encephalopathy - concern for hepatic encephalopathy  -Acute hepatitis - infection vs. inflammatory vs. due to alcohol abuse   -GI bleeding  -Hyperbilirubinemia   -Alcohol dependence   -Anemia  -Lactic acidosis  -Hyponatremia  -Enter/Rhino virus infection  -s/p Left temporal parietal craniotomy    Plan:  - Overnight with episodes of hyperactive delirium, likely alcohol withdrawal syndrome   - Start precedex with ativan for break through symptoms  - Close observation and restraints to prevent harm to self and staff  - No episodes of melena reported  - NPO for now  - PPI BID  - Trend hgb  - Reported to be non verbal post craniotomy  - Monitor neurologic status  - Lactulose held given GI bleeding  - GI follow up regarding EGD  - Aspiration precautions  - Empiric antibiotics  - Follow up cultures  - Hepatology consult  - Thiamine and folate supplementation  - Aspiration and seizure precautions  - Supplement electrolytes  - Repeat labs in PM

## 2022-10-06 LAB
ALBUMIN SERPL ELPH-MCNC: 1.3 G/DL — LOW (ref 3.5–5)
ALP SERPL-CCNC: 286 U/L — HIGH (ref 40–120)
ALT FLD-CCNC: 13 U/L DA — SIGNIFICANT CHANGE UP (ref 10–60)
ANION GAP SERPL CALC-SCNC: 8 MMOL/L — SIGNIFICANT CHANGE UP (ref 5–17)
AST SERPL-CCNC: 103 U/L — HIGH (ref 10–40)
BILIRUB SERPL-MCNC: 11.5 MG/DL — HIGH (ref 0.2–1.2)
BUN SERPL-MCNC: <1 MG/DL — LOW (ref 7–18)
CALCIUM SERPL-MCNC: 7.6 MG/DL — LOW (ref 8.4–10.5)
CHLORIDE SERPL-SCNC: 110 MMOL/L — HIGH (ref 96–108)
CO2 SERPL-SCNC: 18 MMOL/L — LOW (ref 22–31)
CREAT SERPL-MCNC: 0.42 MG/DL — LOW (ref 0.5–1.3)
EGFR: 154 ML/MIN/1.73M2 — SIGNIFICANT CHANGE UP
GLUCOSE BLDC GLUCOMTR-MCNC: 112 MG/DL — HIGH (ref 70–99)
GLUCOSE BLDC GLUCOMTR-MCNC: 115 MG/DL — HIGH (ref 70–99)
GLUCOSE BLDC GLUCOMTR-MCNC: 88 MG/DL — SIGNIFICANT CHANGE UP (ref 70–99)
GLUCOSE SERPL-MCNC: 69 MG/DL — LOW (ref 70–99)
HCT VFR BLD CALC: 29.3 % — LOW (ref 39–50)
HCT VFR BLD CALC: 29.5 % — LOW (ref 39–50)
HGB BLD-MCNC: 9.1 G/DL — LOW (ref 13–17)
HGB BLD-MCNC: 9.2 G/DL — LOW (ref 13–17)
INR BLD: 1.75 RATIO — HIGH (ref 0.88–1.16)
MAGNESIUM SERPL-MCNC: 2.1 MG/DL — SIGNIFICANT CHANGE UP (ref 1.6–2.6)
MCHC RBC-ENTMCNC: 28.8 PG — SIGNIFICANT CHANGE UP (ref 27–34)
MCHC RBC-ENTMCNC: 29.3 PG — SIGNIFICANT CHANGE UP (ref 27–34)
MCHC RBC-ENTMCNC: 31.1 GM/DL — LOW (ref 32–36)
MCHC RBC-ENTMCNC: 31.2 GM/DL — LOW (ref 32–36)
MCV RBC AUTO: 92.7 FL — SIGNIFICANT CHANGE UP (ref 80–100)
MCV RBC AUTO: 93.9 FL — SIGNIFICANT CHANGE UP (ref 80–100)
NRBC # BLD: 0 /100 WBCS — SIGNIFICANT CHANGE UP (ref 0–0)
NRBC # BLD: 0 /100 WBCS — SIGNIFICANT CHANGE UP (ref 0–0)
PHOSPHATE SERPL-MCNC: 2.8 MG/DL — SIGNIFICANT CHANGE UP (ref 2.5–4.5)
PLATELET # BLD AUTO: 182 K/UL — SIGNIFICANT CHANGE UP (ref 150–400)
PLATELET # BLD AUTO: 212 K/UL — SIGNIFICANT CHANGE UP (ref 150–400)
POTASSIUM SERPL-MCNC: 4.7 MMOL/L — SIGNIFICANT CHANGE UP (ref 3.5–5.3)
POTASSIUM SERPL-SCNC: 4.7 MMOL/L — SIGNIFICANT CHANGE UP (ref 3.5–5.3)
PROT SERPL-MCNC: 5.2 G/DL — LOW (ref 6–8.3)
PROTHROM AB SERPL-ACNC: 21 SEC — HIGH (ref 10.5–13.4)
RBC # BLD: 3.14 M/UL — LOW (ref 4.2–5.8)
RBC # BLD: 3.16 M/UL — LOW (ref 4.2–5.8)
RBC # FLD: 18.9 % — HIGH (ref 10.3–14.5)
RBC # FLD: 19.8 % — HIGH (ref 10.3–14.5)
SODIUM SERPL-SCNC: 136 MMOL/L — SIGNIFICANT CHANGE UP (ref 135–145)
WBC # BLD: 16.69 K/UL — HIGH (ref 3.8–10.5)
WBC # BLD: 17.28 K/UL — HIGH (ref 3.8–10.5)
WBC # FLD AUTO: 16.69 K/UL — HIGH (ref 3.8–10.5)
WBC # FLD AUTO: 17.28 K/UL — HIGH (ref 3.8–10.5)

## 2022-10-06 PROCEDURE — 99232 SBSQ HOSP IP/OBS MODERATE 35: CPT

## 2022-10-06 PROCEDURE — 99233 SBSQ HOSP IP/OBS HIGH 50: CPT

## 2022-10-06 RX ORDER — DEXTROSE 50 % IN WATER 50 %
10 SYRINGE (ML) INTRAVENOUS ONCE
Refills: 0 | Status: COMPLETED | OUTPATIENT
Start: 2022-10-06 | End: 2022-10-06

## 2022-10-06 RX ORDER — LACTULOSE 10 G/15ML
20 SOLUTION ORAL EVERY 8 HOURS
Refills: 0 | Status: DISCONTINUED | OUTPATIENT
Start: 2022-10-06 | End: 2022-10-09

## 2022-10-06 RX ADMIN — MUPIROCIN 1 APPLICATION(S): 20 OINTMENT TOPICAL at 06:00

## 2022-10-06 RX ADMIN — Medication 10 GRAM(S): at 08:22

## 2022-10-06 RX ADMIN — DEXMEDETOMIDINE HYDROCHLORIDE IN 0.9% SODIUM CHLORIDE 3.95 MICROGRAM(S)/KG/HR: 4 INJECTION INTRAVENOUS at 06:00

## 2022-10-06 RX ADMIN — Medication 101 MILLIGRAM(S): at 14:25

## 2022-10-06 RX ADMIN — MUPIROCIN 1 APPLICATION(S): 20 OINTMENT TOPICAL at 22:34

## 2022-10-06 RX ADMIN — Medication 50 MILLIGRAM(S): at 22:31

## 2022-10-06 RX ADMIN — SODIUM CHLORIDE 75 MILLILITER(S): 9 INJECTION INTRAMUSCULAR; INTRAVENOUS; SUBCUTANEOUS at 00:18

## 2022-10-06 RX ADMIN — CHLORHEXIDINE GLUCONATE 1 APPLICATION(S): 213 SOLUTION TOPICAL at 06:00

## 2022-10-06 RX ADMIN — CEFTRIAXONE 100 MILLIGRAM(S): 500 INJECTION, POWDER, FOR SOLUTION INTRAMUSCULAR; INTRAVENOUS at 14:26

## 2022-10-06 RX ADMIN — PANTOPRAZOLE SODIUM 40 MILLIGRAM(S): 20 TABLET, DELAYED RELEASE ORAL at 09:25

## 2022-10-06 RX ADMIN — Medication 2 MILLIGRAM(S): at 09:26

## 2022-10-06 NOTE — PROGRESS NOTE ADULT - SUBJECTIVE AND OBJECTIVE BOX
GI PROGRESS NOTE    Patient is a 27y old  Male who presents with a chief complaint of Altered mental status (06 Oct 2022 12:31)      HPI:  Patient is a 25 yo Lao speaking Male, with no known past medical history, s/p Left temporal parietal craniotomy noted in imaging, presenting to the ED with altered mental status since this morning. Pt moaning, not able to provide history and pt's brother's contact information not in chart as family left ED prior to phone number being collected. As per EMS charts, pt was found to be 'asleep' when EMS arrived..as per family, pt has a seizure that lasted about 2-4 minutes. Family states pt has a seizure earlier in the day as well. Pt family denies hx of seizures. They stated pt drinks alcohol on a daily basis and hasn't drank any alcohol in the past 2 days. Family denies any recent trauma related injuries to pt.' Pt's brother told ED attending that the jaundice developed acutely over the last 1 week.     In the ED, pt's vitals were  Temp 100.1, , /67, RR 18 on room air O2 sat 96%  s/p IV Vanc + Zosyn, NS 1 L bolus, Keppra 2 gm x 1, Ativan, Mg, Kcl riders, Thiamine (02 Oct 2022 20:13)      GI HPI:  Patient remains altered.  Tolerating CLD.  No reports of bloody BM s/p sigmoindoscopy.     ______________________________________________________________________  PMH/PSH:  PAST MEDICAL & SURGICAL HISTORY:  No pertinent past medical history      No significant past surgical history        ______________________________________________________________________  MEDS:  MEDICATIONS  (STANDING):  cefTRIAXone   IVPB      cefTRIAXone   IVPB 2000 milliGRAM(s) IV Intermittent every 24 hours  chlorhexidine 2% Cloths 1 Application(s) Topical <User Schedule>  folic acid Injectable 1 milliGRAM(s) IV Push daily  lactulose Syrup 20 Gram(s) Oral every 8 hours  multivitamin 1 Tablet(s) Oral daily  mupirocin 2% Ointment 1 Application(s) Topical three times a day  phytonadione  IVPB 5 milliGRAM(s) IV Intermittent daily    MEDICATIONS  (PRN):  LORazepam   Injectable 2 milliGRAM(s) IV Push every 4 hours PRN CIWA > 8    ______________________________________________________________________  ALL:   Allergies    No Known Allergies    Intolerances      ______________________________________________________________________  SH: Social History:  From home  Hx of Alcohol abuse from family  Unknown drug hx/smoking hx (02 Oct 2022 20:13)    ______________________________________________________________________  FH:  FAMILY HISTORY:  No pertinent family history in first degree relatives      ______________________________________________________________________  ROS:    CONSTITUTIONAL:  No weight loss, fever, chills, weakness or fatigue.    HEENT:  Eyes:  No visual loss, blurred vision, double vision or yellow sclerae. Ears, Nose, Throat:  No hearing loss, sneezing, congestion, runny nose or sore throat.    SKIN:  No rash or itching.    CARDIOVASCULAR:  No chest pain, chest pressure or chest discomfort. No palpitations or edema.    RESPIRATORY:  No shortness of breath, cough or sputum.    GASTROINTESTINAL:  SEE HPI    GENITOURINARY:  No dysuria, hematuria, urinary frequency    NEUROLOGICAL:  No headache, dizziness, syncope, paralysis, ataxia, numbness or tingling in the extremities. No change in bowel or bladder control.    MUSCULOSKELETAL:  No muscle, back pain, joint pain or stiffness.    HEMATOLOGIC:  No anemia, bleeding or bruising.    LYMPHATICS:  No enlarged nodes. No history of splenectomy.    PSYCHIATRIC:  No history of depression or anxiety.    ENDOCRINOLOGIC:  No reports of sweating, cold or heat intolerance. No polyuria or polydipsia.    ALLERGIES:  No history of asthma, hives, eczema or rhinitis.  ______________________________________________________________________  PHYSICAL EXAM:  T(C): 36.6 (10-06-22 @ 08:00), Max: 36.7 (10-06-22 @ 04:37)  HR: 85 (10-06-22 @ 12:00)  BP: 101/51 (10-06-22 @ 12:00)  RR: 22 (10-06-22 @ 12:00)  SpO2: 93% (10-06-22 @ 12:00)  Wt(kg): --    10-05  -  10-06  --------------------------------------------------------  IN:    Dexmedetomidine: 52 mL    IV PiggyBack: 100 mL    IV PiggyBack: 250 mL    Lactated Ringers: 150 mL    Octreotide: 20 mL    Pantoprazole: 20 mL  Total IN: 592 mL    OUT:  Total OUT: 0 mL    Total NET: 592 mL          GEN: NAD   CVS- S1 S2  ABD: soft nontender, non distended, bowel sounds+  LUNGS: clear to auscultation  NEURO:  AAO x 3  Extremities: no cyanosis, no calf tenderness, no edema, no clubbing      ______________________________________________________________________  LABS:                        9.1    16.69 )-----------( 182      ( 06 Oct 2022 05:38 )             29.3     10-06    136  |  110<H>  |  <1<L>  ----------------------------<  69<L>  4.7   |  18<L>  |  0.42<L>    Ca    7.6<L>      06 Oct 2022 05:38  Phos  2.8     10-06  Mg     2.1     10-06    TPro  5.2<L>  /  Alb  1.3<L>  /  TBili  11.5<H>  /  DBili  x   /  AST  103<H>  /  ALT  13  /  AlkPhos  286<H>  10-06    LIVER FUNCTIONS - ( 06 Oct 2022 05:38 )  Alb: 1.3 g/dL / Pro: 5.2 g/dL / ALK PHOS: 286 U/L / ALT: 13 U/L DA / AST: 103 U/L / GGT: x           ______________________________________________________________________  IMAGING:    < from: CT Abdomen and Pelvis w/ IV Cont (10.02.22 @ 16:56) >  FINDINGS:  LOWER CHEST:Within normal limits.    LIVER: Severe hepatomegaly and diffuse steatosis.  BILE DUCTS: Normal caliber.  GALLBLADDER: Partially contracted with high attenuation related to   enhancement and/or sludge. Nonspecific wall thickening, likely   secondary/reactive.  SPLEEN: Within normal limits.  PANCREAS: Within normal limits.  ADRENALS: Within normal limits.  KIDNEYS/URETERS: Too small to characterize low attenuating focus in the   left kidney.    BLADDER: Within normal limits.  REPRODUCTIVE ORGANS: Within normal limits.    BOWEL: No bowel obstruction. Appendix is normal.  PERITONEUM: Mild ascites and infiltration of the central mesenteric fat.  VESSELS: Within normal limits.  RETROPERITONEUM/LYMPH NODES: No lymphadenopathy.  ABDOMINAL WALL: Tiny fat-containing inguinal hernias.  BONES: Bilateral L5-S1 pars defects. Old fractures of the 6th and 8th-9th   left ribs.    IMPRESSION:  Severe hepatomegaly and diffuse steatosis.  Mild ascites and infiltration of the central mesenteric fat.  Possiblegallbladder sludge and likely secondary/reactive wall thickening.    < end of copied text >    < from: EGD-Sigmoidoscopy (10.05.22 @ 17:01) >  EGD Impressions:        Normal esophagus.        Normal duodenum.        Mild portal gastropathy.        Sigmoidoscopy Impressions:        Large rectal Dieulafoy lesion. Hemostasis achieved with over-the-scope clip    and cautery.    < end of copied text >

## 2022-10-06 NOTE — PROGRESS NOTE ADULT - SUBJECTIVE AND OBJECTIVE BOX
INTERVAL HPI/OVERNIGHT EVENTS: s/p EGD yesterday. patient received 1unit PRBC and 1 Unit FFP last night. he also received 1LR NS for hypotension overnight. Patient is more alert and oriented this AM     PRESSORS: [ ] YES [X ] NO  WHICH:    ANTIBIOTICS:                  DATE STARTED:    Antimicrobial:  cefTRIAXone   IVPB      cefTRIAXone   IVPB 2000 milliGRAM(s) IV Intermittent every 24 hours    Cardiovascular:    Pulmonary:    Hematalogic:    Other:  chlorhexidine 2% Cloths 1 Application(s) Topical <User Schedule>  folic acid Injectable 1 milliGRAM(s) IV Push daily  LORazepam   Injectable 2 milliGRAM(s) IV Push every 4 hours PRN  multivitamin 1 Tablet(s) Oral daily  mupirocin 2% Ointment 1 Application(s) Topical three times a day  phytonadione  IVPB 5 milliGRAM(s) IV Intermittent daily    cefTRIAXone   IVPB      cefTRIAXone   IVPB 2000 milliGRAM(s) IV Intermittent every 24 hours  chlorhexidine 2% Cloths 1 Application(s) Topical <User Schedule>  folic acid Injectable 1 milliGRAM(s) IV Push daily  LORazepam   Injectable 2 milliGRAM(s) IV Push every 4 hours PRN  multivitamin 1 Tablet(s) Oral daily  mupirocin 2% Ointment 1 Application(s) Topical three times a day  phytonadione  IVPB 5 milliGRAM(s) IV Intermittent daily    Drug Dosing Weight    Weight (kg): 78.9 (02 Oct 2022 21:05)    CENTRAL LINE: [ ] YES [X ] NO  LOCATION:         [place]: [ X] YES [ ] NO          A-LINE:  [ ] YES [X ] NO  LOCATION:         ICU Vital Signs Last 24 Hrs  T(C): 36.6 (06 Oct 2022 08:00), Max: 36.7 (06 Oct 2022 04:37)  T(F): 97.9 (06 Oct 2022 08:00), Max: 98 (06 Oct 2022 04:37)  HR: 77 (06 Oct 2022 08:00) (67 - 99)  BP: 97/43 (06 Oct 2022 08:00) (77/50 - 115/64)  BP(mean): 54 (06 Oct 2022 08:00) (54 - 79)  ABP: --  ABP(mean): --  RR: 31 (06 Oct 2022 08:00) (10 - 32)  SpO2: 93% (06 Oct 2022 08:00) (91% - 100%)    O2 Parameters below as of 06 Oct 2022 08:00  Patient On (Oxygen Delivery Method): room air                  10-05 @ 07:01  -  10-06 @ 07:00  --------------------------------------------------------  IN: 592 mL / OUT: 0 mL / NET: 592 mL              PHYSICAL EXAM:    GENERAL: NAD, more awake and alert   HEAD: Nomocephalic, Atraumatic  EYES: scleral icterus    NECK: Supple, No JVD; Normal thyroid; Trachea midline  NERVOUS SYSTEM: AOOX1-2   CHEST/LUNG: No rales, rhonchi, wheezing   HEART: Regular rate and rhythm; No murmurs,   ABDOMEN: distended abdomen   EXTREMITIES:  2+ Peripheral Pulses, No clubbing, cyanosis, or edema  SKIN: No lesions      LABS:  CBC Full  -  ( 06 Oct 2022 05:38 )  WBC Count : 16.69 K/uL  RBC Count : 3.16 M/uL  Hemoglobin : 9.1 g/dL  Hematocrit : 29.3 %  Platelet Count - Automated : 182 K/uL  Mean Cell Volume : 92.7 fl  Mean Cell Hemoglobin : 28.8 pg  Mean Cell Hemoglobin Concentration : 31.1 gm/dL  Auto Neutrophil # : x  Auto Lymphocyte # : x  Auto Monocyte # : x  Auto Eosinophil # : x  Auto Basophil # : x  Auto Neutrophil % : x  Auto Lymphocyte % : x  Auto Monocyte % : x  Auto Eosinophil % : x  Auto Basophil % : x    10-06    136  |  110<H>  |  <1<L>  ----------------------------<  69<L>  4.7   |  18<L>  |  0.42<L>    Ca    7.6<L>      06 Oct 2022 05:38  Phos  2.8     10-06  Mg     2.1     10-06    TPro  5.2<L>  /  Alb  1.3<L>  /  TBili  11.5<H>  /  DBili  x   /  AST  103<H>  /  ALT  13  /  AlkPhos  286<H>  10-06    PT/INR - ( 06 Oct 2022 05:38 )   PT: 21.0 sec;   INR: 1.75 ratio                 RADIOLOGY & ADDITIONAL STUDIES REVIEWED:  ***    [ ]GOALS OF CARE DISCUSSION WITH PATIENT/FAMILY/PROXY:    CRITICAL CARE TIME SPENT: 35 minutes

## 2022-10-06 NOTE — PROGRESS NOTE ADULT - ATTENDING COMMENTS
27 yo Latvian speaking Male, with no known past medical history, s/p Left temporal parietal craniotomy, presenting to the ED with altered mental status. Patient admitted to ICU for multiple metabolic derangements with evidence of acute liver dysfunction    ASSESSMENT  -Acute encephalopathy - concern for hepatic encephalopathy  -Acute hepatitis - infection vs. inflammatory vs. due to alcohol abuse   -GI bleeding  -Hyperbilirubinemia   -Alcohol dependence   -Anemia  -Lactic acidosis  -Hyponatremia  -Enter/Rhino virus infection  -s/p Left temporal parietal craniotomy    Plan:  - S/p EGD and sigmoidoscopy, showing rectal Dieulafoy lesion which was clipped  - Monitor for further bleeding  - Taper off precedex   - Cont. ativan for break through symptoms with CIWA > 8  - Close observation and restraints to prevent harm to self and staff  - No episodes of melena reported  - Clear liquid diet  - Cont. PPI  - Trend hgb  - Monitor neurologic status  - Lactulose held given GI bleeding  - Aspiration precautions  - Empiric antibiotics for SBP prophylaxis  - Follow up cultures  - Hepatology consult  - Thiamine and folate supplementation  - Aspiration and seizure precautions  - Supplement electrolytes

## 2022-10-06 NOTE — PROGRESS NOTE ADULT - NS ATTEND AMEND GEN_ALL_CORE FT
- Etoh hepatitis.  - Hematochezia.  - GI Bleed.    PAtient seen and examined. Doing well post EGD/sig yesterday. No further signs of bleeding. No hematcoehzia overnight. Hb stable. Advance diet as tolerated. Etoh hepatitis mgmt per Hepatology, no contraindication for steroids from bleeding perspective. PPI daily while on steroids. Reconsult GI PRN.
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
## 2022-10-06 NOTE — PROGRESS NOTE ADULT - SUBJECTIVE AND OBJECTIVE BOX
Chief Complaint:  Patient is a 27y old  Male who presents with a chief complaint of Altered mental status (06 Oct 2022 13:43)      Reason for consult:    Interval Events:     Hospital Medications:  cefTRIAXone   IVPB      cefTRIAXone   IVPB 2000 milliGRAM(s) IV Intermittent every 24 hours  chlorhexidine 2% Cloths 1 Application(s) Topical <User Schedule>  folic acid Injectable 1 milliGRAM(s) IV Push daily  lactulose Syrup 20 Gram(s) Oral every 8 hours  LORazepam   Injectable 2 milliGRAM(s) IV Push every 4 hours PRN  multivitamin 1 Tablet(s) Oral daily  mupirocin 2% Ointment 1 Application(s) Topical three times a day  phytonadione  IVPB 5 milliGRAM(s) IV Intermittent daily      ROS:   General:  No  fevers, chills, night sweats, fatigue  Eyes:  Good vision, no reported pain  ENT:  No sore throat, pain, runny nose  CV:  No pain, palpitations  Pulm:  No dyspnea, cough  GI:  See HPI, otherwise negative  :  No  incontinence, nocturia  Muscle:  No pain, weakness  Neuro:  No memory problems  Psych:  No insomnia, mood problems, depression  Endocrine:  No polyuria, polydipsia, cold/heat intolerance  Heme:  No petechiae, ecchymosis, easy bruisability  Skin:  No rash    PHYSICAL EXAM:   Vital Signs:  Vital Signs Last 24 Hrs  T(C): 36.4 (06 Oct 2022 16:10), Max: 36.7 (06 Oct 2022 04:37)  T(F): 97.6 (06 Oct 2022 16:10), Max: 98 (06 Oct 2022 04:37)  HR: 85 (06 Oct 2022 14:00) (67 - 93)  BP: 96/48 (06 Oct 2022 14:00) (77/50 - 115/64)  BP(mean): 60 (06 Oct 2022 14:00) (51 - 79)  RR: 26 (06 Oct 2022 14:00) (10 - 32)  SpO2: 94% (06 Oct 2022 14:00) (91% - 100%)    Parameters below as of 06 Oct 2022 12:00  Patient On (Oxygen Delivery Method): room air      Daily     Daily     GENERAL: no acute distress  NEURO: alert, no asterixis  HEENT: anicteric sclera, no conjunctival pallor appreciated  CHEST: no respiratory distress, no accessory muscle use  CARDIAC: regular rate, rhythm  ABDOMEN: soft, non-tender, non-distended, no rebound or guarding  EXTREMITIES: warm, well perfused, no edema  SKIN: no lesions noted    LABS: reviewed                        9.2    17.28 )-----------( 212      ( 06 Oct 2022 15:30 )             29.5     10-06    136  |  110<H>  |  <1<L>  ----------------------------<  69<L>  4.7   |  18<L>  |  0.42<L>    Ca    7.6<L>      06 Oct 2022 05:38  Phos  2.8     10-06  Mg     2.1     10-06    TPro  5.2<L>  /  Alb  1.3<L>  /  TBili  11.5<H>  /  DBili  x   /  AST  103<H>  /  ALT  13  /  AlkPhos  286<H>  10-06    LIVER FUNCTIONS - ( 06 Oct 2022 05:38 )  Alb: 1.3 g/dL / Pro: 5.2 g/dL / ALK PHOS: 286 U/L / ALT: 13 U/L DA / AST: 103 U/L / GGT: x             Interval Diagnostic Studies: see sunrise for full report   Chief Complaint:  Patient is a 27y old  Male who presents with a chief complaint of Altered mental status (06 Oct 2022 13:43)      Reason for consult: liver failure     Interval Events: Patient was seen and examined at bedside.    ID [id number] (Ne[place]).   Mental status improved, awake, alert, oriented to self and place, not in time, following commands and answers adequately but not consistently. Still has mild asterixis. On lactulose, but per d/w RN, has not had BM yet till afternoon. Please, uptitrate lactulose to have 2-3 BMs/day.   Hb remains stable. s/p EGD w/ mild portal gastropathy and s/p sigmoidoscopy w/ large rectal Dieulafoy lesion s/p clip and cauterization. Agree with d/c octreotide, c/w ceftriaxone to complete 7 days and pantoprazole.    Leukocytosis persists, thought to be rather due to alc hep.     Hospital Medications:  cefTRIAXone   IVPB      cefTRIAXone   IVPB 2000 milliGRAM(s) IV Intermittent every 24 hours  chlorhexidine 2% Cloths 1 Application(s) Topical <User Schedule>  folic acid Injectable 1 milliGRAM(s) IV Push daily  lactulose Syrup 20 Gram(s) Oral every 8 hours  LORazepam   Injectable 2 milliGRAM(s) IV Push every 4 hours PRN  multivitamin 1 Tablet(s) Oral daily  mupirocin 2% Ointment 1 Application(s) Topical three times a day  phytonadione  IVPB 5 milliGRAM(s) IV Intermittent daily      ROS: Still limited b/o patient condition. C/o cough. Denies abdominal or chest pain. Unable to tell if he had bowel movement.     PHYSICAL EXAM:   Vital Signs:  Vital Signs Last 24 Hrs  T(C): 36.4 (06 Oct 2022 16:10), Max: 36.7 (06 Oct 2022 04:37)  T(F): 97.6 (06 Oct 2022 16:10), Max: 98 (06 Oct 2022 04:37)  HR: 85 (06 Oct 2022 14:00) (67 - 93)  BP: 96/48 (06 Oct 2022 14:00) (77/50 - 115/64)  BP(mean): 60 (06 Oct 2022 14:00) (51 - 79)  RR: 26 (06 Oct 2022 14:00) (10 - 32)  SpO2: 94% (06 Oct 2022 14:00) (91% - 100%)    Parameters below as of 06 Oct 2022 12:00  Patient On (Oxygen Delivery Method): room air      Daily     Daily     GENERAL: no acute distress, but ill appearing  NEURO: awake, alert, improved, oriented to self and place, not in time. Still has asterixis.   HEENT: icteric sclera, no conjunctival pallor appreciated  CHEST: no respiratory distress, no accessory muscle use. Noted coughing. On RA.   CARDIAC: regular rate, rhythm  ABDOMEN: soft, non-tender, distended, no rebound or guarding, BS+. Hepatomegaly.   EXTREMITIES: warm, well perfused, no edema  SKIN: jaundice    LABS: reviewed                        9.2    17.28 )-----------( 212      ( 06 Oct 2022 15:30 )             29.5     10-06    136  |  110<H>  |  <1<L>  ----------------------------<  69<L>  4.7   |  18<L>  |  0.42<L>    Ca    7.6<L>      06 Oct 2022 05:38  Phos  2.8     10-06  Mg     2.1     10-06    TPro  5.2<L>  /  Alb  1.3<L>  /  TBili  11.5<H>  /  DBili  x   /  AST  103<H>  /  ALT  13  /  AlkPhos  286<H>  10-06    LIVER FUNCTIONS - ( 06 Oct 2022 05:38 )  Alb: 1.3 g/dL / Pro: 5.2 g/dL / ALK PHOS: 286 U/L / ALT: 13 U/L DA / AST: 103 U/L / GGT: x             Interval Diagnostic Studies: see sunrise for full report

## 2022-10-06 NOTE — PROGRESS NOTE ADULT - PROBLEM SELECTOR PLAN 3
Likely alcohol transaminitis  CT showed  Severe hepatomegaly and diffuse steatosis. Mild ascites and infiltration of the central mesenteric fat. Possible gallbladder sludge and likely secondary/reactive wall thickening.  Left temporal parietal craniotomy. Encephalomalacia subjacent to the craniotomy. Ex vacuo dilatation of the left lateral ventricle. No intracranial hemorrhage. No mass lesion.  Tbili uptrending  Trend hepatic panel, INR  Avoid hepatoxic agents   US Abdominal (bedside)  MRCP when patient is stable  Hepatology following

## 2022-10-06 NOTE — PROGRESS NOTE ADULT - ASSESSMENT
25yo Frisian speaking Male s/p Left temporal parietal craniotomy and alcohol abuse (reported current drinking) presented to [facility] ED on 10/2/22 with 1 day Hx of AMS, after reported witnessed seizure episodes and 1 week Hx of jaundice and was admitted to MICU with suspected EtOH withdrawal seizure, electrolyte abnormalities (hyponatremia with Na 125->129, hypokalemia with K 2.7->3.1, hypomagnesemia with Mg 1.0->1.9, hypophosphatemia with P 1.1->1.5), lactic acidosis (with lactate 6.4->3.0, bicarb 19->20), leukocytosis with neutrophilia (WBC 20.35->16.02, roderick 90%) with low grade T (100.1F), anemia without evidence of overt bleeding (Hb 6.9->8.3 s/p 1 U PRBC) and abnormal liver tests including coagulopathy (INR 2.13), hyperbilirubinemia (Se bi 9.3->10.8, direct 8.0), hyperammonemia (131), elevated liver enzymes in AST> ALT pattern (->212, ALT 22) and elevated ->600 and CT a/p w// iv suggesting diffuse hepatic steatosis, hepatomegaly with possible GB sludge and GB wall thickening, but normal bile ducts.      Hepatomegaly and diffuse hepatic steatosis likely due to Alcoholic hepatitis  Jaundice, mostly direct hyperbilirubinemia - Bili 10.2->12.7->11.5  Coagulopathy - improving, but got vit K and FFP   Elevated transaminases in AST>ALT pattern - improving (-->103)  Elevated ALP - improving (669->286)  Positive UA - UCx neg  Hyponatremia - resolved  - MELD 28 - >27->23  - MDF > 32 (68.8->48.3)  - Given AST>ALT pattern, EtOH use, recent jaundice, alcoholic hepatitis is high in differential, but needed to rule out infection, sepsis, as he was tachycardic with low grade T, leukocytosis with neutrophilia. BCx neg so far. UCx neg. CXR neg. Being on SBP ppx b/o GI bleed, abx day#5. Only mild ascites thus Dx paracentesis was not feasible  Noted + rhinovirus.  Procalcitonin 0.32.  - Please, still obtain US abd, abdomen even more distended today. If ascites increased and suitable pocket, would need Dx paracentesis. Consider MRCP  - C/w antibiotics per ID, s/p Zosyn 10/2-10/4. On ceftriaxone since 10/5, on therapeutic dose per ID. Appreciated.   - Liver workup so far:  Acute hepatitis viral panel, HIV, COVID-19, Hep BcAb, HBsAb all are neg. EBV past infection. Leptospirosis neg. Ceruloplasmin 25. Please, also send / follow up Hep E IgM, and CMV PCRs/serologies,  Autoimmune panel, Peth and Fungitell  - C/w supportive measures per ICU, correction of electrolytes, iv hydration. Monitor lactate.   - Was given NAC from 10/4, consider continue  - Has not been started on prednisolone yet, b/o septic workup and GIB. Was d/w GI, please, also d/w ID if steroid can be considered.   - Follow up GI recommendations     - Review images for splanchnic veins patency      Acute encephalopathy, s/p seizures, but also elevated ammonia, liver disease, alcohol use and Hx of craniotomy and on CIWA. - Improving.   - Per d/w primary team, patient was functional prior to admission, returned to work after his craniotomy / cerebral hemorrhage (that was likely due to trauma) and rehab  - Aspiration, seizure, fall precautions  - C/w lactulose to titrate to have 2-3 BM/s day. Reports only one BM, consider uptitrate.  Consider adding rifaximin.  - Currently maintaining airways.  - C/w supportive measures per  ICU and antibiotics per ID.   - CT head noted. Consider neurology consult.     Alcohol use (BAL 34 on admission)  - Aspiration, seizure, fall precautions  - C/w folic, thiamine  - CIWA per ICU team  - SW involvement, rehab once acute issues resolved    Anemia, s/p  3U PRBCs (10/2, 10/4 and 10/5)  S/p EGD - mild portal gastropathy  S/p sigmoidoscopy - Dieulafoy lesion s/p clip  Coagulopathy - improved, s/p 5 mg vit K, s/p 1U FFP  - Monitor H/H closely, keep T/S active. Hb remains stable today.  - Keep Hb > 7, PLT>50 and fibrinogen > 120. PLT WNL. Fibrinogen 307.   - F/u GI recommendations  - Complete 7 day course of ceftriaxone (day #5).     Thank you for consult   Will continue to follow  Was d/w ICU team,

## 2022-10-06 NOTE — PROGRESS NOTE ADULT - PROBLEM SELECTOR PLAN 1
Had hematochezia x2   Hgb downtrending  Vitamin K for elevated INR, keep less than 1.5  S/p EGD-Sigmoidoscopy which revealed large rectal Dieulafoy lesion.  Hemostasis achieved with over-the-scope clip and cautery.  Advance diet as tolerated  D/w ICU team Had hematochezia x2   Hgb downtrending  Vitamin K for elevated INR  S/p EGD-Sigmoidoscopy which revealed large rectal Dieulafoy lesion.  Hemostasis achieved with over-the-scope clip and cautery.  Advance diet as tolerated  D/w ICU team

## 2022-10-07 LAB
ALBUMIN SERPL ELPH-MCNC: 1.6 G/DL — LOW (ref 3.5–5)
ALP SERPL-CCNC: 294 U/L — HIGH (ref 40–120)
ALT FLD-CCNC: 11 U/L DA — SIGNIFICANT CHANGE UP (ref 10–60)
ANION GAP SERPL CALC-SCNC: 9 MMOL/L — SIGNIFICANT CHANGE UP (ref 5–17)
AST SERPL-CCNC: 75 U/L — HIGH (ref 10–40)
BILIRUB SERPL-MCNC: 13.2 MG/DL — HIGH (ref 0.2–1.2)
BUN SERPL-MCNC: <1 MG/DL — LOW (ref 7–18)
CALCIUM SERPL-MCNC: 7.9 MG/DL — LOW (ref 8.4–10.5)
CHLORIDE SERPL-SCNC: 108 MMOL/L — SIGNIFICANT CHANGE UP (ref 96–108)
CO2 SERPL-SCNC: 21 MMOL/L — LOW (ref 22–31)
CREAT SERPL-MCNC: 0.42 MG/DL — LOW (ref 0.5–1.3)
EGFR: 153 ML/MIN/1.73M2 — SIGNIFICANT CHANGE UP
GLUCOSE BLDC GLUCOMTR-MCNC: 121 MG/DL — HIGH (ref 70–99)
GLUCOSE SERPL-MCNC: 93 MG/DL — SIGNIFICANT CHANGE UP (ref 70–99)
HCT VFR BLD CALC: 28.7 % — LOW (ref 39–50)
HGB BLD-MCNC: 9.1 G/DL — LOW (ref 13–17)
INR BLD: 1.81 RATIO — HIGH (ref 0.88–1.16)
MAGNESIUM SERPL-MCNC: 1.8 MG/DL — SIGNIFICANT CHANGE UP (ref 1.6–2.6)
MCHC RBC-ENTMCNC: 29.4 PG — SIGNIFICANT CHANGE UP (ref 27–34)
MCHC RBC-ENTMCNC: 31.7 GM/DL — LOW (ref 32–36)
MCV RBC AUTO: 92.9 FL — SIGNIFICANT CHANGE UP (ref 80–100)
NRBC # BLD: 0 /100 WBCS — SIGNIFICANT CHANGE UP (ref 0–0)
PHOSPHATE SERPL-MCNC: 1.4 MG/DL — LOW (ref 2.5–4.5)
PLATELET # BLD AUTO: 235 K/UL — SIGNIFICANT CHANGE UP (ref 150–400)
POTASSIUM SERPL-MCNC: 3.3 MMOL/L — LOW (ref 3.5–5.3)
POTASSIUM SERPL-SCNC: 3.3 MMOL/L — LOW (ref 3.5–5.3)
PROT SERPL-MCNC: 5.6 G/DL — LOW (ref 6–8.3)
PROTHROM AB SERPL-ACNC: 21.7 SEC — HIGH (ref 10.5–13.4)
RBC # BLD: 3.09 M/UL — LOW (ref 4.2–5.8)
RBC # FLD: 20.3 % — HIGH (ref 10.3–14.5)
SODIUM SERPL-SCNC: 138 MMOL/L — SIGNIFICANT CHANGE UP (ref 135–145)
WBC # BLD: 18.5 K/UL — HIGH (ref 3.8–10.5)
WBC # FLD AUTO: 18.5 K/UL — HIGH (ref 3.8–10.5)

## 2022-10-07 PROCEDURE — 74018 RADEX ABDOMEN 1 VIEW: CPT | Mod: 26

## 2022-10-07 PROCEDURE — 99233 SBSQ HOSP IP/OBS HIGH 50: CPT

## 2022-10-07 PROCEDURE — 71045 X-RAY EXAM CHEST 1 VIEW: CPT | Mod: 26

## 2022-10-07 RX ORDER — FUROSEMIDE 40 MG
40 TABLET ORAL ONCE
Refills: 0 | Status: COMPLETED | OUTPATIENT
Start: 2022-10-07 | End: 2022-10-07

## 2022-10-07 RX ORDER — SODIUM,POTASSIUM PHOSPHATES 278-250MG
1 POWDER IN PACKET (EA) ORAL ONCE
Refills: 0 | Status: COMPLETED | OUTPATIENT
Start: 2022-10-07 | End: 2022-10-07

## 2022-10-07 RX ORDER — FOLIC ACID 0.8 MG
1 TABLET ORAL DAILY
Refills: 0 | Status: DISCONTINUED | OUTPATIENT
Start: 2022-10-07 | End: 2022-10-18

## 2022-10-07 RX ORDER — THIAMINE MONONITRATE (VIT B1) 100 MG
100 TABLET ORAL DAILY
Refills: 0 | Status: DISCONTINUED | OUTPATIENT
Start: 2022-10-07 | End: 2022-10-08

## 2022-10-07 RX ORDER — POTASSIUM CHLORIDE 20 MEQ
40 PACKET (EA) ORAL ONCE
Refills: 0 | Status: COMPLETED | OUTPATIENT
Start: 2022-10-07 | End: 2022-10-07

## 2022-10-07 RX ORDER — CEFTRIAXONE 500 MG/1
1000 INJECTION, POWDER, FOR SOLUTION INTRAMUSCULAR; INTRAVENOUS EVERY 24 HOURS
Refills: 0 | Status: DISCONTINUED | OUTPATIENT
Start: 2022-10-07 | End: 2022-10-12

## 2022-10-07 RX ORDER — MAGNESIUM SULFATE 500 MG/ML
2 VIAL (ML) INJECTION ONCE
Refills: 0 | Status: COMPLETED | OUTPATIENT
Start: 2022-10-07 | End: 2022-10-07

## 2022-10-07 RX ADMIN — Medication 1 PACKET(S): at 08:49

## 2022-10-07 RX ADMIN — Medication 101 MILLIGRAM(S): at 13:04

## 2022-10-07 RX ADMIN — Medication 100 MILLIGRAM(S): at 17:01

## 2022-10-07 RX ADMIN — Medication 25 GRAM(S): at 10:47

## 2022-10-07 RX ADMIN — Medication 40 MILLIEQUIVALENT(S): at 06:49

## 2022-10-07 RX ADMIN — MUPIROCIN 1 APPLICATION(S): 20 OINTMENT TOPICAL at 05:15

## 2022-10-07 RX ADMIN — Medication 2 MILLIGRAM(S): at 05:15

## 2022-10-07 RX ADMIN — Medication 50 MILLIGRAM(S): at 10:46

## 2022-10-07 RX ADMIN — CHLORHEXIDINE GLUCONATE 1 APPLICATION(S): 213 SOLUTION TOPICAL at 05:15

## 2022-10-07 RX ADMIN — Medication 2 MILLIGRAM(S): at 08:50

## 2022-10-07 RX ADMIN — Medication 1 MILLIGRAM(S): at 13:05

## 2022-10-07 RX ADMIN — Medication 40 MILLIGRAM(S): at 10:46

## 2022-10-07 RX ADMIN — Medication 50 MILLIGRAM(S): at 05:15

## 2022-10-07 RX ADMIN — CEFTRIAXONE 100 MILLIGRAM(S): 500 INJECTION, POWDER, FOR SOLUTION INTRAMUSCULAR; INTRAVENOUS at 13:04

## 2022-10-07 NOTE — PROGRESS NOTE ADULT - SUBJECTIVE AND OBJECTIVE BOX
Chief Complaint:  Patient is a 27y old  Male who presents with a chief complaint of Altered mental status (07 Oct 2022 12:59)      Reason for consult:    Interval Events:     Hospital Medications:  cefTRIAXone   IVPB 1000 milliGRAM(s) IV Intermittent every 24 hours  chlordiazePOXIDE 100 milliGRAM(s) Oral every 8 hours  chlorhexidine 2% Cloths 1 Application(s) Topical <User Schedule>  folic acid 1 milliGRAM(s) Oral daily  lactulose Syrup 20 Gram(s) Oral every 8 hours  LORazepam   Injectable 2 milliGRAM(s) IV Push every 4 hours PRN  multivitamin 1 Tablet(s) Oral daily  mupirocin 2% Ointment 1 Application(s) Topical three times a day  thiamine 100 milliGRAM(s) Oral daily      ROS:   General:  No  fevers, chills, night sweats, fatigue  Eyes:  Good vision, no reported pain  ENT:  No sore throat, pain, runny nose  CV:  No pain, palpitations  Pulm:  No dyspnea, cough  GI:  See HPI, otherwise negative  :  No  incontinence, nocturia  Muscle:  No pain, weakness  Neuro:  No memory problems  Psych:  No insomnia, mood problems, depression  Endocrine:  No polyuria, polydipsia, cold/heat intolerance  Heme:  No petechiae, ecchymosis, easy bruisability  Skin:  No rash    PHYSICAL EXAM:   Vital Signs:  Vital Signs Last 24 Hrs  T(C): 36.6 (07 Oct 2022 12:00), Max: 37.5 (06 Oct 2022 23:16)  T(F): 97.8 (07 Oct 2022 12:00), Max: 99.5 (06 Oct 2022 23:16)  HR: 115 (07 Oct 2022 13:00) (93 - 120)  BP: 133/80 (07 Oct 2022 13:00) (89/72 - 137/85)  BP(mean): 91 (07 Oct 2022 13:00) (61 - 102)  RR: 39 (07 Oct 2022 13:00) (13 - 44)  SpO2: 96% (07 Oct 2022 13:00) (81% - 100%)    Parameters below as of 07 Oct 2022 08:00  Patient On (Oxygen Delivery Method): room air      Daily     Daily     GENERAL: no acute distress  NEURO: alert, no asterixis  HEENT: anicteric sclera, no conjunctival pallor appreciated  CHEST: no respiratory distress, no accessory muscle use  CARDIAC: regular rate, rhythm  ABDOMEN: soft, non-tender, non-distended, no rebound or guarding  EXTREMITIES: warm, well perfused, no edema  SKIN: no lesions noted    LABS: reviewed                        9.1    18.50 )-----------( 235      ( 07 Oct 2022 05:18 )             28.7     10-07    138  |  108  |  <1<L>  ----------------------------<  93  3.3<L>   |  21<L>  |  0.42<L>    Ca    7.9<L>      07 Oct 2022 05:18  Phos  1.4     10-07  Mg     1.8     10-07    TPro  5.6<L>  /  Alb  1.6<L>  /  TBili  13.2<H>  /  DBili  x   /  AST  75<H>  /  ALT  11  /  AlkPhos  294<H>  10-07    LIVER FUNCTIONS - ( 07 Oct 2022 05:18 )  Alb: 1.6 g/dL / Pro: 5.6 g/dL / ALK PHOS: 294 U/L / ALT: 11 U/L DA / AST: 75 U/L / GGT: x             Interval Diagnostic Studies: see sunrise for full report   Chief Complaint:  Patient is a 27y old  Male who presents with a chief complaint of Altered mental status (07 Oct 2022 12:59)     ID: [id number]    Reason for consult: Liver failure    Interval Events: Patient was seen and examined at bedside. Noted eating at bedside. Less oriented than yesterday, oriented only to self. Was started on Librium per ICU. Only 1 BM recorded in flowsheet, but per med list he is not getting the lactulose b/o 5 BMs. MELD 23, bilirubin rising, INR now been stable. Low P.     Hospital Medications:  cefTRIAXone   IVPB 1000 milliGRAM(s) IV Intermittent every 24 hours  chlordiazePOXIDE 100 milliGRAM(s) Oral every 8 hours  chlorhexidine 2% Cloths 1 Application(s) Topical <User Schedule>  folic acid 1 milliGRAM(s) Oral daily  lactulose Syrup 20 Gram(s) Oral every 8 hours  LORazepam   Injectable 2 milliGRAM(s) IV Push every 4 hours PRN  multivitamin 1 Tablet(s) Oral daily  mupirocin 2% Ointment 1 Application(s) Topical three times a day  thiamine 100 milliGRAM(s) Oral daily      ROS:   Unable to obtain due to patient condition.    PHYSICAL EXAM:   Vital Signs:  Vital Signs Last 24 Hrs  T(C): 36.6 (07 Oct 2022 12:00), Max: 37.5 (06 Oct 2022 23:16)  T(F): 97.8 (07 Oct 2022 12:00), Max: 99.5 (06 Oct 2022 23:16)  HR: 115 (07 Oct 2022 13:00) (93 - 120)  BP: 133/80 (07 Oct 2022 13:00) (89/72 - 137/85)  BP(mean): 91 (07 Oct 2022 13:00) (61 - 102)  RR: 39 (07 Oct 2022 13:00) (13 - 44)  SpO2: 96% (07 Oct 2022 13:00) (81% - 100%)    Parameters below as of 07 Oct 2022 08:00  Patient On (Oxygen Delivery Method): room air      Daily     Daily     GENERAL: no acute distress, but ill appearing  NEURO: awake, alert, today oriented only to self, appears to be more confused compared to yesterday Still has asterixis.   HEENT: icteric sclera, no conjunctival pallor appreciated  CHEST: no respiratory distress, no accessory muscle use. Noted coughing. On RA.   CARDIAC: regular rate, rhythm  ABDOMEN: soft, non-tender, distended, no rebound or guarding, BS+. Hepatomegaly.   EXTREMITIES: warm, well perfused, no edema  SKIN: jaundice    LABS: reviewed                        9.1    18.50 )-----------( 235      ( 07 Oct 2022 05:18 )             28.7     10-07    138  |  108  |  <1<L>  ----------------------------<  93  3.3<L>   |  21<L>  |  0.42<L>    Ca    7.9<L>      07 Oct 2022 05:18  Phos  1.4     10-07  Mg     1.8     10-07    TPro  5.6<L>  /  Alb  1.6<L>  /  TBili  13.2<H>  /  DBili  x   /  AST  75<H>  /  ALT  11  /  AlkPhos  294<H>  10-07    LIVER FUNCTIONS - ( 07 Oct 2022 05:18 )  Alb: 1.6 g/dL / Pro: 5.6 g/dL / ALK PHOS: 294 U/L / ALT: 11 U/L DA / AST: 75 U/L / GGT: x             Interval Diagnostic Studies: see sunrise for full report

## 2022-10-07 NOTE — PROGRESS NOTE ADULT - ASSESSMENT
25yo German speaking Male s/p Left temporal parietal craniotomy and alcohol abuse (reported current drinking) presented to [facility] ED on 10/2/22 with 1 day Hx of AMS, after reported witnessed seizure episodes and 1 week Hx of jaundice and was admitted to MICU with suspected EtOH withdrawal seizure, electrolyte abnormalities (hyponatremia with Na 125->129, hypokalemia with K 2.7->3.1, hypomagnesemia with Mg 1.0->1.9, hypophosphatemia with P 1.1->1.5), lactic acidosis (with lactate 6.4->3.0, bicarb 19->20), leukocytosis with neutrophilia (WBC 20.35->16.02, roderick 90%) with low grade T (100.1F), anemia without evidence of overt bleeding (Hb 6.9->8.3 s/p 1 U PRBC) and abnormal liver tests including coagulopathy (INR 2.13), hyperbilirubinemia (Se bi 9.3->10.8, direct 8.0), hyperammonemia (131), elevated liver enzymes in AST> ALT pattern (->212, ALT 22) and elevated ->600 and CT a/p w// iv suggesting diffuse hepatic steatosis, hepatomegaly with possible GB sludge and GB wall thickening, but normal bile ducts.      Hepatomegaly and diffuse hepatic steatosis likely due to Alcoholic hepatitis  Jaundice, mostly direct hyperbilirubinemia - Bili 10.2->12.7->11.5  Coagulopathy - improving, but got vit K and FFP   Elevated transaminases in AST>ALT pattern - improving (-->103)  Elevated ALP - improving (669->286)  Positive UA - UCx neg  Hyponatremia - resolved  - MELD 28 - >27->23  - MDF > 32 (68.8->48.3)  - Given AST>ALT pattern, EtOH use, recent jaundice, alcoholic hepatitis is high in differential, but needed to rule out infection, sepsis, as he was tachycardic with low grade T, leukocytosis with neutrophilia. BCx neg so far. UCx neg. CXR neg. Being on SBP ppx b/o GI bleed, abx day#5. Only mild ascites thus Dx paracentesis was not feasible  Noted + rhinovirus.  Procalcitonin 0.32.  - Please, still obtain US abd, abdomen even more distended. If ascites increased and suitable pocket, would need Dx paracentesis. Consider MRCP  - C/w antibiotics per ID, s/p Zosyn 10/2-10/4. On ceftriaxone since 10/5, on therapeutic dose per ID. Appreciated.   - Liver workup so far:  Acute hepatitis viral panel, HIV, COVID-19, Hep BcAb, HBsAb all are neg. EBV past infection. Leptospirosis neg. Ceruloplasmin 25. Please, also send / follow up Hep E IgM, and CMV PCRs/serologies,  Autoimmune panel, Peth and Fungitell  - C/w supportive measures per ICU, correction of electrolytes, iv hydration. Monitor lactate.   - Was given NAC from 10/4, consider continue  - Has not been started on prednisolone yet, b/o septic workup and GIB. Was d/w GI, please, also d/w ID if steroid can be considered.   - Follow up GI recommendations     - Review images for splanchnic veins patency      Acute encephalopathy, s/p seizures, but also elevated ammonia, liver disease, alcohol use and Hx of craniotomy and on CIWA. - Improving.   - Per d/w primary team, patient was functional prior to admission, returned to work after his craniotomy / cerebral hemorrhage (that was likely due to trauma) and rehab  - Aspiration, seizure, fall precautions  - C/w lactulose to titrate to have 2-3 BM/s day. Only one BM documented in flowsheet, but per med. list, he is not getting lactulose b/o 5 BMs. Please, clarify and record BMs.  Consider adding rifaximin.  - Currently maintaining airways.  - C/w supportive measures per  ICU and antibiotics per ID.   - CT head noted. Consider neurology consult.     Alcohol use (BAL 34 on admission)  - Aspiration, seizure, fall precautions  - C/w folic, thiamine  - CIWA per ICU team. Consider avoiding long acting BZD. Consider switch back from Librium to Ativan.  - SW involvement, rehab once acute issues resolved    Anemia, s/p  3U PRBCs (10/2, 10/4 and 10/5)  S/p EGD - mild portal gastropathy  S/p sigmoidoscopy - Dieulafoy lesion s/p clip  Coagulopathy - improved, s/p 5 mg vit K, s/p 1U FFP  - Monitor H/H closely, keep T/S active. Hb remains stable today.  - Keep Hb > 7, PLT>50 and fibrinogen > 120. PLT WNL. Fibrinogen 307.   - F/u GI recommendations  - Complete 7 day course of ceftriaxone (day #6).     Thank you for consult   Will continue to follow. Hepatology returns Monday. Please, consult GI on call if change in status, questions, concerns.   Was d/w ICU team,

## 2022-10-07 NOTE — PROGRESS NOTE ADULT - ATTENDING COMMENTS
27 yo Danish speaking Male, with no known past medical history, s/p Left temporal parietal craniotomy, presenting to the ED with altered mental status. Patient admitted to ICU for multiple metabolic derangements with evidence of acute liver dysfunction    ASSESSMENT  -Acute encephalopathy - concern for hepatic encephalopathy  -Acute hepatitis - infection vs. inflammatory vs. due to alcohol abuse   -GI bleeding  -Hyperbilirubinemia   -Alcohol dependence   -Anemia  -Lactic acidosis  -Hyponatremia  -Enter/Rhino virus infection  -s/p Left temporal parietal craniotomy    Plan:  - S/p EGD and sigmoidoscopy, showing rectal Dieulafoy lesion which was clipped  - Monitor for further bleeding  - Taper off precedex   - Cont. ativan for break through symptoms with CIWA > 8  - Close observation and restraints to prevent harm to self and staff  - H/H stable   - No episodes of melena reported  - Clear liquid diet  - Cont. PPI  - Trend hgb  - Monitor neurologic status  - Lactulose held given GI bleeding  - Aspiration precautions  - Empiric antibiotics for SBP prophylaxis  - Follow up cultures  - Hepatology consult  - Thiamine and folate supplementation  - Aspiration and seizure precautions  - Supplement electrolytes .

## 2022-10-07 NOTE — PROGRESS NOTE ADULT - SUBJECTIVE AND OBJECTIVE BOX
INTERVAL HPI/OVERNIGHT EVENTS: No acute events overnight.     PRESSORS: [ ] YES [X ] NO  WHICH:    ANTIBIOTICS:                  DATE STARTED:    Antimicrobial:  cefTRIAXone   IVPB 1000 milliGRAM(s) IV Intermittent every 24 hours    Cardiovascular:    Pulmonary:    Hematalogic:    Other:  chlordiazePOXIDE 100 milliGRAM(s) Oral every 8 hours  chlorhexidine 2% Cloths 1 Application(s) Topical <User Schedule>  folic acid Injectable 1 milliGRAM(s) IV Push daily  lactulose Syrup 20 Gram(s) Oral every 8 hours  LORazepam   Injectable 2 milliGRAM(s) IV Push every 4 hours PRN  multivitamin 1 Tablet(s) Oral daily  mupirocin 2% Ointment 1 Application(s) Topical three times a day  phytonadione  IVPB 5 milliGRAM(s) IV Intermittent daily    cefTRIAXone   IVPB 1000 milliGRAM(s) IV Intermittent every 24 hours  chlordiazePOXIDE 100 milliGRAM(s) Oral every 8 hours  chlorhexidine 2% Cloths 1 Application(s) Topical <User Schedule>  folic acid Injectable 1 milliGRAM(s) IV Push daily  lactulose Syrup 20 Gram(s) Oral every 8 hours  LORazepam   Injectable 2 milliGRAM(s) IV Push every 4 hours PRN  multivitamin 1 Tablet(s) Oral daily  mupirocin 2% Ointment 1 Application(s) Topical three times a day  phytonadione  IVPB 5 milliGRAM(s) IV Intermittent daily    Drug Dosing Weight    Weight (kg): 78.9 (02 Oct 2022 21:05)    CENTRAL LINE: [ ] YES [X ] NO  LOCATION:         [place]: [ ] YES [X ] NO          A-LINE:  [ ] YES [ X] NO  LOCATION:         ICU Vital Signs Last 24 Hrs  T(C): 36.6 (07 Oct 2022 12:00), Max: 37.5 (06 Oct 2022 23:16)  T(F): 97.8 (07 Oct 2022 12:00), Max: 99.5 (06 Oct 2022 23:16)  HR: 120 (07 Oct 2022 12:00) (85 - 120)  BP: 136/64 (07 Oct 2022 12:00) (89/72 - 137/85)  BP(mean): 81 (07 Oct 2022 12:00) (58 - 102)  ABP: --  ABP(mean): --  RR: 37 (07 Oct 2022 12:00) (13 - 44)  SpO2: 96% (07 Oct 2022 12:00) (81% - 100%)    O2 Parameters below as of 07 Oct 2022 08:00  Patient On (Oxygen Delivery Method): room air                  10-06 @ 07:01  -  10-07 @ 07:00  --------------------------------------------------------  IN: 500 mL / OUT: 1 mL / NET: 499 mL              PHYSICAL EXAM:    GENERAL: drowsy lethargic, jaundiced.   HEAD: Nomocephalic, Atraumatic  EYES: sclera icterus   NECK: Supple, No JVD; Normal thyroid; Trachea midline  NERVOUS SYSTEM:  Alert & Oriented X3,  Motor Strength 5/5 B/L upper and lower extremities; DTRs 2+ intact and symmetric  CHEST/LUNG: wheezing   HEART: Regular rate and rhythm; No murmurs,   ABDOMEN: distended   EXTREMITIES:  2+ Peripheral Pulses, No clubbing, cyanosis, or edema  SKIN: No lesions      LABS:  CBC Full  -  ( 07 Oct 2022 05:18 )  WBC Count : 18.50 K/uL  RBC Count : 3.09 M/uL  Hemoglobin : 9.1 g/dL  Hematocrit : 28.7 %  Platelet Count - Automated : 235 K/uL  Mean Cell Volume : 92.9 fl  Mean Cell Hemoglobin : 29.4 pg  Mean Cell Hemoglobin Concentration : 31.7 gm/dL  Auto Neutrophil # : x  Auto Lymphocyte # : x  Auto Monocyte # : x  Auto Eosinophil # : x  Auto Basophil # : x  Auto Neutrophil % : x  Auto Lymphocyte % : x  Auto Monocyte % : x  Auto Eosinophil % : x  Auto Basophil % : x    10-07    138  |  108  |  <1<L>  ----------------------------<  93  3.3<L>   |  21<L>  |  0.42<L>    Ca    7.9<L>      07 Oct 2022 05:18  Phos  1.4     10-07  Mg     1.8     10-07    TPro  5.6<L>  /  Alb  1.6<L>  /  TBili  13.2<H>  /  DBili  x   /  AST  75<H>  /  ALT  11  /  AlkPhos  294<H>  10-07    PT/INR - ( 07 Oct 2022 05:18 )   PT: 21.7 sec;   INR: 1.81 ratio                 RADIOLOGY & ADDITIONAL STUDIES REVIEWED:  ***    [ ]GOALS OF CARE DISCUSSION WITH PATIENT/FAMILY/PROXY:    CRITICAL CARE TIME SPENT: 35 minutes

## 2022-10-07 NOTE — PROGRESS NOTE ADULT - ASSESSMENT
Patient is a 27 yo Slovenian speaking Male, with no known past medical history, s/p Left temporal parietal craniotomy, presenting to the ED with altered mental status since this morning s/p seizure in the setting of Alcohol abuse. Patient admitted to ICU for multiple metabolic derangements.    ASSESSMENT  -Acute encephalopathy 2/2 Hepatic encephalopathy vs Sepsis   -Obstructive jaundice r/out cholangitis  -Alcohol withdrawal seizures  -Anemia  -Lactic acidosis  -Hyponatremia  -s/p Left temporal parietal craniotomy    Neuro:  #Acute encephalopathy due to Hepatic encephalopathy vs Sepsis  s/p 1 dose of lactulose enema for probable hepatic encephalopathy  -PO lactulose resumed now patient is able to tolerate diet   -As per family, patient had head trauma 6 months ago which required craniotomy. He went to rehab and returned home. He was able to work after the accident.   -patient is more alert and oriented although waxes and wanes throughout the day.     #Alcohol withdrawal seizures  As per EMS charts, pt drinks alcohol everyday, last drink 2 days ago  No prior hx of seizures  Started on CIWA protocol,  C/w thiamine, folic acid and MVT   librium taper   c/w 2mg IV ativan PRN for CIWA>8     #S/p Left temporal parietal craniotomy  Head trauma 6 months ago. Family unsure which hospital he was at.     Cardiovascular:  #Sinus tachycardia  in the setting of sepsis/acute illness  monitor for now, c/w IVF  resolved     Pulmonary:   No acute issues    Infectious Diseases:  #R/out cholangitis  p/w transaminitis, altered mental status, low grade temp 100.1, elevated WBC 20k, direct bili 8.0  Dr. Puentes ID consulted, switched zosyn to ceftriaxone for SBP px    #UTI  UA w/WBC 6-10, trace leukocyte esterase, unable to obtain presence of symptoms  c/w ceftriaxone   f/u urine cultures. NGTD     Gastrointestinal:  #Obstructive jaundice in setting of Acute Liver Failure   p/w transaminitis, altered mental status, low grade temp 100.1, elevated WBC 20k, direct bili 8.0  severe hepatomegaly w/mild ascites on CT abdomen w/gall bladder sludge  lipase level normal, low suspicion for pancreatic involvement  C/w ceftriaxone for suspected cholangitis  F/u blood cultures NGTD   hepatitis panel neg   hepatic USG for characterization of gall bladder attempted however patient was very agitated. Will reattempt.   GI consulted, recommending possible MRCP   EGD on 10/5 with GI Dr. Johnson showed Large rectal Dieulafoy lesion. Hemostasis achieved with over-the-scope clip  Hb stable, no longer requiring CBC 18h   PPI  abdomen distended this AM, s/o 40mg IV lasix  Bladder scan showing no retention  will follow up abd xray       #Transaminitis  in the setting of Alcohol abuse and obstructive jaundice, BAL 34  Utox negative for salicylate and acetaminophen levels  hep panel neg   trend LFTs daily  Maddrey score for Alcoholic hepatitis 74, pt would benefit from glucocorticoid therapy  Dr. Galan hepatology consulted. started on NAC     #Elevated INR and hypoalbuminemia in the setting of liver dysfunction  monitor daily, c/w venodynes for DVT ppx  -started on vitamin K for 3 days  INR improving      #Lactic acidosis   Decreased clearance w/ liver dysfunction  Likely due to post-ictal state from seizure this AM vs sepsis, downtrending from 6.4>IV 1NS bolus> 2.6  resolved     Renal:  #Hyponatremia  on admission, Na 125> 1L NS bolus > 129  resolved    serum osm, urine osm, urine sodium noted       Endo:  TSH 7.22, likely inaccurate in current state. Will re check after patient more stable.   Advance diet as tolerated   Monitor glucose Q4 hours in the setting of liver dysfunction    Heme/onc:   #Leukocytosis  WBC 20k, low grade temp 100.1 on admission, r/out sepsis  Avoid tylenol w/elevated LFTs    #Anemia  P/w Hb 6.9, MCV 87 normocytic  s/p 1 unit PRBC  Hb 9 this AM s/p 3 units PRBC total       Skin/ catheter: Yost removed 10/4 , peripheral lines    Prophylaxis: Hold chemical DVT ppx in the setting of elevated INR/ SCD     Goals of Care: Full code,    Dispo: stays in ICU

## 2022-10-08 LAB
ALBUMIN SERPL ELPH-MCNC: 1.6 G/DL — LOW (ref 3.5–5)
ALP SERPL-CCNC: 257 U/L — HIGH (ref 40–120)
ALT FLD-CCNC: 9 U/L DA — LOW (ref 10–60)
ANION GAP SERPL CALC-SCNC: 10 MMOL/L — SIGNIFICANT CHANGE UP (ref 5–17)
ANION GAP SERPL CALC-SCNC: 9 MMOL/L — SIGNIFICANT CHANGE UP (ref 5–17)
AST SERPL-CCNC: 72 U/L — HIGH (ref 10–40)
BILIRUB SERPL-MCNC: 14.1 MG/DL — HIGH (ref 0.2–1.2)
BUN SERPL-MCNC: <1 MG/DL — LOW (ref 7–18)
BUN SERPL-MCNC: <1 MG/DL — LOW (ref 7–18)
CALCIUM SERPL-MCNC: 8.1 MG/DL — LOW (ref 8.4–10.5)
CALCIUM SERPL-MCNC: 8.5 MG/DL — SIGNIFICANT CHANGE UP (ref 8.4–10.5)
CHLORIDE SERPL-SCNC: 107 MMOL/L — SIGNIFICANT CHANGE UP (ref 96–108)
CHLORIDE SERPL-SCNC: 107 MMOL/L — SIGNIFICANT CHANGE UP (ref 96–108)
CO2 SERPL-SCNC: 22 MMOL/L — SIGNIFICANT CHANGE UP (ref 22–31)
CO2 SERPL-SCNC: 22 MMOL/L — SIGNIFICANT CHANGE UP (ref 22–31)
CREAT SERPL-MCNC: 0.26 MG/DL — LOW (ref 0.5–1.3)
CREAT SERPL-MCNC: 0.41 MG/DL — LOW (ref 0.5–1.3)
CULTURE RESULTS: SIGNIFICANT CHANGE UP
CULTURE RESULTS: SIGNIFICANT CHANGE UP
EGFR: 154 ML/MIN/1.73M2 — SIGNIFICANT CHANGE UP
EGFR: 177 ML/MIN/1.73M2 — SIGNIFICANT CHANGE UP
GLUCOSE BLDC GLUCOMTR-MCNC: 127 MG/DL — HIGH (ref 70–99)
GLUCOSE BLDC GLUCOMTR-MCNC: 85 MG/DL — SIGNIFICANT CHANGE UP (ref 70–99)
GLUCOSE BLDC GLUCOMTR-MCNC: 93 MG/DL — SIGNIFICANT CHANGE UP (ref 70–99)
GLUCOSE SERPL-MCNC: 89 MG/DL — SIGNIFICANT CHANGE UP (ref 70–99)
GLUCOSE SERPL-MCNC: 99 MG/DL — SIGNIFICANT CHANGE UP (ref 70–99)
HCT VFR BLD CALC: 29.9 % — LOW (ref 39–50)
HGB BLD-MCNC: 9.5 G/DL — LOW (ref 13–17)
INR BLD: 1.94 RATIO — HIGH (ref 0.88–1.16)
MAGNESIUM SERPL-MCNC: 1.9 MG/DL — SIGNIFICANT CHANGE UP (ref 1.6–2.6)
MAGNESIUM SERPL-MCNC: 2.2 MG/DL — SIGNIFICANT CHANGE UP (ref 1.6–2.6)
MCHC RBC-ENTMCNC: 29.5 PG — SIGNIFICANT CHANGE UP (ref 27–34)
MCHC RBC-ENTMCNC: 31.8 GM/DL — LOW (ref 32–36)
MCV RBC AUTO: 92.9 FL — SIGNIFICANT CHANGE UP (ref 80–100)
NRBC # BLD: 0 /100 WBCS — SIGNIFICANT CHANGE UP (ref 0–0)
PHOSPHATE SERPL-MCNC: 2.1 MG/DL — LOW (ref 2.5–4.5)
PHOSPHATE SERPL-MCNC: 2.8 MG/DL — SIGNIFICANT CHANGE UP (ref 2.5–4.5)
PLATELET # BLD AUTO: 256 K/UL — SIGNIFICANT CHANGE UP (ref 150–400)
POTASSIUM SERPL-MCNC: 2.6 MMOL/L — CRITICAL LOW (ref 3.5–5.3)
POTASSIUM SERPL-MCNC: 3.9 MMOL/L — SIGNIFICANT CHANGE UP (ref 3.5–5.3)
POTASSIUM SERPL-SCNC: 2.6 MMOL/L — CRITICAL LOW (ref 3.5–5.3)
POTASSIUM SERPL-SCNC: 3.9 MMOL/L — SIGNIFICANT CHANGE UP (ref 3.5–5.3)
PROT SERPL-MCNC: 5.5 G/DL — LOW (ref 6–8.3)
PROTHROM AB SERPL-ACNC: 23.3 SEC — HIGH (ref 10.5–13.4)
RBC # BLD: 3.22 M/UL — LOW (ref 4.2–5.8)
RBC # FLD: 21 % — HIGH (ref 10.3–14.5)
SODIUM SERPL-SCNC: 138 MMOL/L — SIGNIFICANT CHANGE UP (ref 135–145)
SODIUM SERPL-SCNC: 139 MMOL/L — SIGNIFICANT CHANGE UP (ref 135–145)
SPECIMEN SOURCE: SIGNIFICANT CHANGE UP
SPECIMEN SOURCE: SIGNIFICANT CHANGE UP
WBC # BLD: 17.28 K/UL — HIGH (ref 3.8–10.5)
WBC # FLD AUTO: 17.28 K/UL — HIGH (ref 3.8–10.5)

## 2022-10-08 PROCEDURE — 99291 CRITICAL CARE FIRST HOUR: CPT | Mod: GC

## 2022-10-08 RX ORDER — SODIUM CHLORIDE 9 MG/ML
1000 INJECTION, SOLUTION INTRAVENOUS
Refills: 0 | Status: DISCONTINUED | OUTPATIENT
Start: 2022-10-08 | End: 2022-11-02

## 2022-10-08 RX ORDER — POTASSIUM CHLORIDE 20 MEQ
10 PACKET (EA) ORAL
Refills: 0 | Status: COMPLETED | OUTPATIENT
Start: 2022-10-08 | End: 2022-10-08

## 2022-10-08 RX ORDER — DEXTROSE 50 % IN WATER 50 %
12.5 SYRINGE (ML) INTRAVENOUS ONCE
Refills: 0 | Status: DISCONTINUED | OUTPATIENT
Start: 2022-10-08 | End: 2022-11-02

## 2022-10-08 RX ORDER — ACETYLCYSTEINE 200 MG/ML
12 VIAL (ML) MISCELLANEOUS ONCE
Refills: 0 | Status: COMPLETED | OUTPATIENT
Start: 2022-10-08 | End: 2022-10-08

## 2022-10-08 RX ORDER — ACETYLCYSTEINE 200 MG/ML
3.9 VIAL (ML) MISCELLANEOUS ONCE
Refills: 0 | Status: DISCONTINUED | OUTPATIENT
Start: 2022-10-08 | End: 2022-10-10

## 2022-10-08 RX ORDER — HEPARIN SODIUM 5000 [USP'U]/ML
5000 INJECTION INTRAVENOUS; SUBCUTANEOUS EVERY 12 HOURS
Refills: 0 | Status: DISCONTINUED | OUTPATIENT
Start: 2022-10-08 | End: 2022-10-09

## 2022-10-08 RX ORDER — DEXTROSE 50 % IN WATER 50 %
15 SYRINGE (ML) INTRAVENOUS ONCE
Refills: 0 | Status: DISCONTINUED | OUTPATIENT
Start: 2022-10-08 | End: 2022-11-04

## 2022-10-08 RX ORDER — POTASSIUM CHLORIDE 20 MEQ
40 PACKET (EA) ORAL EVERY 4 HOURS
Refills: 0 | Status: COMPLETED | OUTPATIENT
Start: 2022-10-08 | End: 2022-10-08

## 2022-10-08 RX ORDER — DEXTROSE 50 % IN WATER 50 %
25 SYRINGE (ML) INTRAVENOUS ONCE
Refills: 0 | Status: DISCONTINUED | OUTPATIENT
Start: 2022-10-08 | End: 2022-11-04

## 2022-10-08 RX ORDER — DEXTROSE 50 % IN WATER 50 %
25 SYRINGE (ML) INTRAVENOUS ONCE
Refills: 0 | Status: DISCONTINUED | OUTPATIENT
Start: 2022-10-08 | End: 2022-11-02

## 2022-10-08 RX ORDER — POTASSIUM CHLORIDE 20 MEQ
40 PACKET (EA) ORAL ONCE
Refills: 0 | Status: COMPLETED | OUTPATIENT
Start: 2022-10-08 | End: 2022-10-08

## 2022-10-08 RX ORDER — GLUCAGON INJECTION, SOLUTION 0.5 MG/.1ML
1 INJECTION, SOLUTION SUBCUTANEOUS ONCE
Refills: 0 | Status: DISCONTINUED | OUTPATIENT
Start: 2022-10-08 | End: 2022-11-04

## 2022-10-08 RX ORDER — ACETYLCYSTEINE 200 MG/ML
8 VIAL (ML) MISCELLANEOUS ONCE
Refills: 0 | Status: DISCONTINUED | OUTPATIENT
Start: 2022-10-08 | End: 2022-10-10

## 2022-10-08 RX ORDER — THIAMINE MONONITRATE (VIT B1) 100 MG
100 TABLET ORAL DAILY
Refills: 0 | Status: DISCONTINUED | OUTPATIENT
Start: 2022-10-08 | End: 2022-10-18

## 2022-10-08 RX ORDER — INSULIN LISPRO 100/ML
VIAL (ML) SUBCUTANEOUS
Refills: 0 | Status: DISCONTINUED | OUTPATIENT
Start: 2022-10-08 | End: 2022-11-04

## 2022-10-08 RX ORDER — PREDNISOLONE 5 MG
40 TABLET ORAL DAILY
Refills: 0 | Status: DISCONTINUED | OUTPATIENT
Start: 2022-10-08 | End: 2022-10-15

## 2022-10-08 RX ORDER — POTASSIUM PHOSPHATE, MONOBASIC POTASSIUM PHOSPHATE, DIBASIC 236; 224 MG/ML; MG/ML
30 INJECTION, SOLUTION INTRAVENOUS ONCE
Refills: 0 | Status: COMPLETED | OUTPATIENT
Start: 2022-10-08 | End: 2022-10-08

## 2022-10-08 RX ORDER — MAGNESIUM SULFATE 500 MG/ML
1 VIAL (ML) INJECTION ONCE
Refills: 0 | Status: COMPLETED | OUTPATIENT
Start: 2022-10-08 | End: 2022-10-08

## 2022-10-08 RX ORDER — SODIUM CHLORIDE 9 MG/ML
1000 INJECTION, SOLUTION INTRAVENOUS
Refills: 0 | Status: DISCONTINUED | OUTPATIENT
Start: 2022-10-08 | End: 2022-11-04

## 2022-10-08 RX ADMIN — MUPIROCIN 1 APPLICATION(S): 20 OINTMENT TOPICAL at 21:43

## 2022-10-08 RX ADMIN — Medication 100 MILLIEQUIVALENT(S): at 07:51

## 2022-10-08 RX ADMIN — Medication 100 MILLIEQUIVALENT(S): at 10:31

## 2022-10-08 RX ADMIN — Medication 100 GRAM(S): at 10:31

## 2022-10-08 RX ADMIN — Medication 40 MILLIEQUIVALENT(S): at 17:16

## 2022-10-08 RX ADMIN — Medication 100 MILLIGRAM(S): at 05:17

## 2022-10-08 RX ADMIN — HEPARIN SODIUM 5000 UNIT(S): 5000 INJECTION INTRAVENOUS; SUBCUTANEOUS at 17:17

## 2022-10-08 RX ADMIN — Medication 1 MILLIGRAM(S): at 11:53

## 2022-10-08 RX ADMIN — POTASSIUM PHOSPHATE, MONOBASIC POTASSIUM PHOSPHATE, DIBASIC 83.33 MILLIMOLE(S): 236; 224 INJECTION, SOLUTION INTRAVENOUS at 11:49

## 2022-10-08 RX ADMIN — Medication 1 TABLET(S): at 11:52

## 2022-10-08 RX ADMIN — Medication 40 MILLIGRAM(S): at 18:41

## 2022-10-08 RX ADMIN — Medication 100 MILLIEQUIVALENT(S): at 05:17

## 2022-10-08 RX ADMIN — CEFTRIAXONE 100 MILLIGRAM(S): 500 INJECTION, POWDER, FOR SOLUTION INTRAMUSCULAR; INTRAVENOUS at 12:05

## 2022-10-08 RX ADMIN — MUPIROCIN 1 APPLICATION(S): 20 OINTMENT TOPICAL at 05:17

## 2022-10-08 RX ADMIN — Medication 310 GRAM(S): at 18:00

## 2022-10-08 RX ADMIN — MUPIROCIN 1 APPLICATION(S): 20 OINTMENT TOPICAL at 13:58

## 2022-10-08 RX ADMIN — Medication 40 MILLIEQUIVALENT(S): at 13:59

## 2022-10-08 RX ADMIN — LACTULOSE 20 GRAM(S): 10 SOLUTION ORAL at 05:17

## 2022-10-08 RX ADMIN — Medication 100 MILLIGRAM(S): at 12:06

## 2022-10-08 RX ADMIN — CHLORHEXIDINE GLUCONATE 1 APPLICATION(S): 213 SOLUTION TOPICAL at 05:18

## 2022-10-08 RX ADMIN — Medication 40 MILLIEQUIVALENT(S): at 11:53

## 2022-10-08 NOTE — PROGRESS NOTE ADULT - ATTENDING COMMENTS
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 cholangitis and also w/ LGIB s/p sigmoidoscopy    ASSESSMENT:  Acute encephalopathy - most likely hepatic, vs. septic  EtOH withdrawal complicated by withdrawal seizure  Lactic acidosis  Acute liver failure  EtOH hepatitis  Elevated INR  Blood loss anemia  Hypokalemia  Hypophosphatemia    Plan:  - continuous hemodynamic monitoring  - appr hepatology & GI recs  - start xifaxan, cont lactulose; stool count  - cont CIWA monitoring  - re-attempt RUQ sono   - start prednisolone if ok with ID  - cont CFX  - aggressive lyte repletion for hypoK, phos  - resume pharm vte ppx as high risk, SCDs  - trend CBC/BMP  - seizure and aspiration precautions

## 2022-10-08 NOTE — PROGRESS NOTE ADULT - ASSESSMENT
Patient is a 27 yo Portuguese speaking Male, with no known past medical history, s/p Left temporal parietal craniotomy, presenting to the ED with altered mental status since this morning s/p seizure in the setting of Alcohol abuse. Patient admitted to ICU for multiple metabolic derangements.    ASSESSMENT  -Acute encephalopathy 2/2 Hepatic encephalopathy vs Sepsis   -Obstructive jaundice r/out cholangitis  -Alcohol withdrawal seizures  -Anemia  -Lactic acidosis  -Hyponatremia  -s/p Left temporal parietal craniotomy    Neuro:  #Acute encephalopathy due to Hepatic encephalopathy vs Sepsis  s/p 1 dose of lactulose enema for probable hepatic encephalopathy  -PO lactulose resumed now patient is able to tolerate diet   -As per family, patient had head trauma 6 months ago which required craniotomy. He went to rehab and returned home. He was able to work after the accident.   -patient is more alert and oriented although waxes and wanes throughout the day.     #Alcohol withdrawal seizures  As per EMS charts, pt drinks alcohol everyday, last drink 2 days ago  No prior hx of seizures  Started on CIWA protocol,  C/w thiamine, folic acid and MVT   librium taper   c/w 2mg IV ativan PRN for CIWA>8     #S/p Left temporal parietal craniotomy  Head trauma 6 months ago. Family unsure which hospital he was at.     Cardiovascular:  #Sinus tachycardia  in the setting of sepsis/acute illness  monitor for now, c/w IVF  resolved     Pulmonary:   No acute issues    Infectious Diseases:  #R/out cholangitis  p/w transaminitis, altered mental status, low grade temp 100.1, elevated WBC 20k, direct bili 8.0  Dr. Puentes ID consulted, switched zosyn to ceftriaxone for SBP px    #UTI  UA w/WBC 6-10, trace leukocyte esterase, unable to obtain presence of symptoms  c/w ceftriaxone   f/u urine cultures. NGTD     Gastrointestinal:  #Obstructive jaundice in setting of Acute Liver Failure   p/w transaminitis, altered mental status, low grade temp 100.1, elevated WBC 20k, direct bili 8.0  severe hepatomegaly w/mild ascites on CT abdomen w/gall bladder sludge  lipase level normal, low suspicion for pancreatic involvement  C/w ceftriaxone for suspected cholangitis  F/u blood cultures NGTD   hepatitis panel neg   hepatic USG for characterization of gall bladder attempted however patient was very agitated. Will reattempt.   GI consulted, recommending possible MRCP   EGD on 10/5 with GI Dr. Johnson showed Large rectal Dieulafoy lesion. Hemostasis achieved with over-the-scope clip  Hb stable, no longer requiring CBC 18h   PPI  abdomen distended this AM, s/o 40mg IV lasix  Bladder scan showing no retention  will follow up abd xray       #Transaminitis  in the setting of Alcohol abuse and obstructive jaundice, BAL 34  Utox negative for salicylate and acetaminophen levels  hep panel neg   trend LFTs daily  Maddrey score for Alcoholic hepatitis 74, pt would benefit from glucocorticoid therapy  Dr. Galan hepatology consulted. started on NAC     #Elevated INR and hypoalbuminemia in the setting of liver dysfunction  monitor daily, c/w venodynes for DVT ppx  -started on vitamin K for 3 days  INR improving      #Lactic acidosis   Decreased clearance w/ liver dysfunction  Likely due to post-ictal state from seizure this AM vs sepsis, downtrending from 6.4>IV 1NS bolus> 2.6  resolved     Renal:  #Hyponatremia  on admission, Na 125> 1L NS bolus > 129  resolved    serum osm, urine osm, urine sodium noted       Endo:  TSH 7.22, likely inaccurate in current state. Will re check after patient more stable.   Advance diet as tolerated   Monitor glucose Q4 hours in the setting of liver dysfunction    Heme/onc:   #Leukocytosis  WBC 20k, low grade temp 100.1 on admission, r/out sepsis  Avoid tylenol w/elevated LFTs    #Anemia  P/w Hb 6.9, MCV 87 normocytic  s/p 1 unit PRBC  Hb 9 this AM s/p 3 units PRBC total       Skin/ catheter: Yost removed 10/4 , peripheral lines    Prophylaxis: Hold chemical DVT ppx in the setting of elevated INR/ SCD     Goals of Care: Full code,    Dispo: stays in ICU   Patient is a 25 yo Bengali speaking Male, with no known past medical history, s/p Left temporal parietal craniotomy, presenting to the ED with altered mental status since this morning s/p seizure in the setting of Alcohol abuse. Patient admitted to ICU for multiple metabolic derangements.    ASSESSMENT  -Acute encephalopathy 2/2 Hepatic encephalopathy vs Sepsis   -Obstructive jaundice r/out cholangitis  -Alcohol withdrawal seizures  -Anemia  -Lactic acidosis  -Hyponatremia  -s/p Left temporal parietal craniotomy    Neuro:  #Acute encephalopathy due to Hepatic encephalopathy vs Sepsis  s/p 1 dose of lactulose enema for probable hepatic encephalopathy  -PO lactulose resumed now patient is able to tolerate diet  - Start rifaximin  -As per family, patient had head trauma 6 months ago which required craniotomy. He went to rehab and returned home. He was able to work after the accident.   -patient is more alert and oriented although waxes and wanes throughout the day.     #Alcohol withdrawal seizures  As per EMS charts, pt drinks alcohol everyday, last drink 2 days ago  No prior hx of seizures  Started on CIWA protocol,  C/w thiamine, folic acid and MVT   librium taper   D/C 2mg IV ativan PRN     #S/p Left temporal parietal craniotomy  Head trauma 6 months ago. Family unsure which hospital he was at.     Cardiovascular:  #Sinus tachycardia  in the setting of sepsis/acute illness  monitor for now, c/w IVF  resolved     Pulmonary:   No acute issues    Infectious Diseases:  #SBP prophylaxis  p/w transaminitis, altered mental status, low grade temp 100.1, elevated WBC 20k, direct bili 8.0  Dr. Puentes ID consulted, switched zosyn to ceftriaxone for SBP px    #UTI  UA w/WBC 6-10, trace leukocyte esterase, unable to obtain presence of symptoms  c/w ceftriaxone   f/u urine cultures. NGTD     Gastrointestinal:  #Obstructive jaundice in setting of Acute Liver Failure   p/w transaminitis, altered mental status, low grade temp 100.1, elevated WBC 20k, direct bili 8.0  severe hepatomegaly w/mild ascites on CT abdomen w/gall bladder sludge  lipase level normal, low suspicion for pancreatic involvement  C/w ceftriaxone for suspected cholangitis  F/u blood cultures NGTD   hepatitis panel neg   hepatic USG for characterization of gall bladder attempted however patient was very agitated. Will reattempt.   GI consulted, recommending possible MRCP   EGD on 10/5 with GI Dr. Johnson showed Large rectal Dieulafoy lesion. Hemostasis achieved with over-the-scope clip  Hb stable, no longer requiring CBC 18h   PPI  abdomen distended this AM, s/o 40mg IV lasix  Bladder scan showing no retention  will follow up abd xray       #Transaminitis  in the setting of Alcohol abuse and obstructive jaundice, BAL 34  Utox negative for salicylate and acetaminophen levels  hep panel neg   trend LFTs daily  Maddrey score for Alcoholic hepatitis 74, pt would benefit from glucocorticoid therapy  Dr. Galan hepatology consulted. started on NAC     #Elevated INR and hypoalbuminemia in the setting of liver dysfunction  monitor daily, c/w venodynes for DVT ppx  -started on vitamin K for 3 days  INR improving      #Lactic acidosis   Decreased clearance w/ liver dysfunction  Likely due to post-ictal state from seizure this AM vs sepsis, downtrending from 6.4>IV 1NS bolus> 2.6  resolved     Renal:  #Hyponatremia  on admission, Na 125> 1L NS bolus > 129  resolved    serum osm, urine osm, urine sodium noted       Endo:  TSH 7.22, likely inaccurate in current state. Will re check after patient more stable.   Advance diet as tolerated   Monitor glucose Q4 hours in the setting of liver dysfunction    Heme/onc:   #Leukocytosis  WBC 20k, low grade temp 100.1 on admission, r/out sepsis  Avoid tylenol w/elevated LFTs    #Anemia  P/w Hb 6.9, MCV 87 normocytic  s/p 1 unit PRBC  Hb 9 this AM s/p 3 units PRBC total       Skin/ catheter: Yost removed 10/4 , peripheral lines    Prophylaxis: Hold chemical DVT ppx in the setting of elevated INR/ SCD     Goals of Care: Full code,    Dispo: stays in ICU   Patient is a 27 yo Japanese speaking Male, with no known past medical history, s/p Left temporal parietal craniotomy, presenting to the ED with altered mental status since this morning s/p seizure in the setting of Alcohol abuse. Patient admitted to ICU for multiple metabolic derangements.    ASSESSMENT  -Acute encephalopathy 2/2 Hepatic encephalopathy vs Sepsis   -Obstructive jaundice r/out cholangitis  -Alcohol withdrawal seizures  -Anemia  -Lactic acidosis  -Hyponatremia  -s/p Left temporal parietal craniotomy    Neuro:  #Acute encephalopathy due to Hepatic encephalopathy vs Sepsis  s/p 1 dose of lactulose enema for probable hepatic encephalopathy  -PO lactulose resumed now patient is able to tolerate diet  - Start rifaximin  -As per family, patient had head trauma 6 months ago which required craniotomy. He went to rehab and returned home. He was able to work after the accident.   -patient is more alert and oriented although waxes and wanes throughout the day.     #Alcohol withdrawal seizures  As per EMS charts, pt drinks alcohol everyday, last drink 2 days ago  No prior hx of seizures  Started on CIWA protocol,  C/w thiamine, folic acid and MVT   D/C librium taper   D/C 2mg IV ativan PRN     #S/p Left temporal parietal craniotomy  Head trauma 6 months ago. Family unsure which hospital he was at.     Cardiovascular:  #Sinus tachycardia  in the setting of sepsis/acute illness  monitor for now, c/w IVF  resolved     Pulmonary:   No acute issues    Infectious Diseases:  #SBP prophylaxis  p/w transaminitis, altered mental status, low grade temp 100.1, elevated WBC 20k, direct bili 8.0  Dr. Puentes ID consulted, switched zosyn to ceftriaxone for SBP px    #UTI  UA w/WBC 6-10, trace leukocyte esterase, unable to obtain presence of symptoms  c/w ceftriaxone   f/u urine cultures. NGTD     Gastrointestinal:  #Obstructive jaundice in setting of Acute Liver Failure   p/w transaminitis, altered mental status, low grade temp 100.1, elevated WBC 20k, direct bili 8.0  severe hepatomegaly w/mild ascites on CT abdomen w/gall bladder sludge  lipase level normal, low suspicion for pancreatic involvement  C/w ceftriaxone for suspected cholangitis  F/u blood cultures NGTD   hepatitis panel neg   hepatic USG for characterization of gall bladder attempted however patient was very agitated. Will reattempt.   GI consulted, recommending possible MRCP   EGD on 10/5 with GI Dr. Johnson showed Large rectal Dieulafoy lesion. Hemostasis achieved with over-the-scope clip  Hb stable, no longer requiring CBC 18h   PPI  F/U RUQ US      #Transaminitis  in the setting of Alcohol abuse and obstructive jaundice, BAL 34  Utox negative for salicylate and acetaminophen levels  hep panel neg   trend LFTs daily  Maddrey score for Alcoholic hepatitis 74, pt would benefit from glucocorticoid therapy  Dr. Galan hepatology consulted. started on NAC     #Elevated INR and hypoalbuminemia in the setting of liver dysfunction  monitor daily, c/w venodynes for DVT ppx  -started on vitamin K for 3 days  INR improving      #Lactic acidosis   Decreased clearance w/ liver dysfunction  Likely due to post-ictal state from seizure this AM vs sepsis, downtrending from 6.4>IV 1NS bolus> 2.6  resolved     Renal:  #Hyponatremia  on admission, Na 125> 1L NS bolus > 129  resolved    serum osm, urine osm, urine sodium noted     #Hypokalemia  - aggressive repletion.  C/w monitoring    #Hypophophatemia  - replete  - c/w monitoring      Endo:  TSH 7.22, likely inaccurate in current state. Will re check after patient more stable.   Diet: Soft and miced.   FS ACHS as no longer NPO    Heme/onc:   #Leukocytosis  WBC 20k, low grade temp 100.1 on admission, r/out sepsis  Avoid tylenol w/elevated LFTs    #Anemia  P/w Hb 6.9, MCV 87 normocytic  s/p 1 unit PRBC  Hb 9 this AM s/p 3 units PRBC total       Skin/ catheter: Yost removed 10/4 , peripheral lines    Prophylaxis: Heparin Sub Q, SCD boots    Goals of Care: Full code,    Dispo: stays in ICU

## 2022-10-08 NOTE — PHARMACOTHERAPY INTERVENTION NOTE - INTERVENTION TYPE RECOOMEND
Therapy Recommended - Drug indicated but not ordered
Therapy Discontinuation Recommended - No indication

## 2022-10-08 NOTE — PROGRESS NOTE ADULT - SUBJECTIVE AND OBJECTIVE BOX
INTERVAL HPI/OVERNIGHT EVENTS: ***    PRESSORS: [ ] YES [ ] NO  WHICH:    ANTIBIOTICS:                      Antimicrobial:  cefTRIAXone   IVPB 1000 milliGRAM(s) IV Intermittent every 24 hours    Cardiovascular:    Pulmonary:    Hematalogic:    Other:  chlordiazePOXIDE 100 milliGRAM(s) Oral every 8 hours  chlordiazePOXIDE 100 milliGRAM(s) Oral every 12 hours  chlorhexidine 2% Cloths 1 Application(s) Topical <User Schedule>  folic acid 1 milliGRAM(s) Oral daily  lactulose Syrup 20 Gram(s) Oral every 8 hours  LORazepam   Injectable 2 milliGRAM(s) IV Push every 4 hours PRN  multivitamin 1 Tablet(s) Oral daily  mupirocin 2% Ointment 1 Application(s) Topical three times a day  thiamine 100 milliGRAM(s) Oral daily    cefTRIAXone   IVPB 1000 milliGRAM(s) IV Intermittent every 24 hours  chlordiazePOXIDE 100 milliGRAM(s) Oral every 8 hours  chlordiazePOXIDE 100 milliGRAM(s) Oral every 12 hours  chlorhexidine 2% Cloths 1 Application(s) Topical <User Schedule>  folic acid 1 milliGRAM(s) Oral daily  lactulose Syrup 20 Gram(s) Oral every 8 hours  LORazepam   Injectable 2 milliGRAM(s) IV Push every 4 hours PRN  multivitamin 1 Tablet(s) Oral daily  mupirocin 2% Ointment 1 Application(s) Topical three times a day  thiamine 100 milliGRAM(s) Oral daily    Drug Dosing Weight    Weight (kg): 78.9 (02 Oct 2022 21:05)    CENTRAL LINE: [ ] YES [ ] NO  LOCATION:   DATE INSERTED:  REMOVE: [ ] YES [ ] NO  EXPLAIN:    [place]: [ ] YES [ ] NO    DATE INSERTED:  REMOVE:  [ ] YES [ ] NO  EXPLAIN:    A-LINE:  [ ] YES [ ] NO  LOCATION:   DATE INSERTED:  REMOVE:  [ ] YES [ ] NO  EXPLAIN:    PMH -reviewed admission note, no change since admission    ICU Vital Signs Last 24 Hrs  T(C): 37.2 (07 Oct 2022 23:11), Max: 37.2 (07 Oct 2022 20:00)  T(F): 98.9 (07 Oct 2022 23:11), Max: 99 (07 Oct 2022 20:00)  HR: 107 (07 Oct 2022 23:00) (101 - 120)  BP: 124/76 (07 Oct 2022 23:00) (103/54 - 138/92)  BP(mean): 86 (07 Oct 2022 23:00) (67 - 102)  ABP: --  ABP(mean): --  RR: 37 (07 Oct 2022 23:00) (13 - 44)  SpO2: 97% (07 Oct 2022 23:00) (95% - 100%)    O2 Parameters below as of 07 Oct 2022 08:00  Patient On (Oxygen Delivery Method): room air                  10-06 @ 07:01  -  10-07 @ 07:00  --------------------------------------------------------  IN: 500 mL / OUT: 1 mL / NET: 499 mL            PHYSICAL EXAM:    GENERAL: NAD, well-groomed, well-developed  HEAD:  Atraumatic, Normocephalic  EYES: EOMI, PERRLA, conjunctiva and sclera clear  ENMT: No tonsillar erythema, exudates, or enlargement; Moist mucous membranes, Good dentition, No lesions  NECK: Supple, normal appearance, No JVD; Normal thyroid; Trachea midline  NERVOUS SYSTEM:  Alert & Oriented X3, Good concentration; Motor Strength 5/5 B/L upper and lower extremities; DTRs 2+ intact and symmetric  CHEST/LUNG: No chest deformity; Normal percussion bilaterally; No rales, rhonchi, wheezing   HEART: Regular rate and rhythm; No murmurs, rubs, or gallops  ABDOMEN: Soft, Nontender, Nondistended; Bowel sounds present  EXTREMITIES:  2+ Peripheral Pulses, No clubbing, cyanosis, or edema  LYMPH: No lymphadenopathy noted  SKIN: No rashes or lesions; Good capillary refill      LABS:  CBC Full  -  ( 07 Oct 2022 05:18 )  WBC Count : 18.50 K/uL  RBC Count : 3.09 M/uL  Hemoglobin : 9.1 g/dL  Hematocrit : 28.7 %  Platelet Count - Automated : 235 K/uL  Mean Cell Volume : 92.9 fl  Mean Cell Hemoglobin : 29.4 pg  Mean Cell Hemoglobin Concentration : 31.7 gm/dL  Auto Neutrophil # : x  Auto Lymphocyte # : x  Auto Monocyte # : x  Auto Eosinophil # : x  Auto Basophil # : x  Auto Neutrophil % : x  Auto Lymphocyte % : x  Auto Monocyte % : x  Auto Eosinophil % : x  Auto Basophil % : x    10-07    138  |  108  |  <1<L>  ----------------------------<  93  3.3<L>   |  21<L>  |  0.42<L>    Ca    7.9<L>      07 Oct 2022 05:18  Phos  1.4     10-07  Mg     1.8     10-07    TPro  5.6<L>  /  Alb  1.6<L>  /  TBili  13.2<H>  /  DBili  x   /  AST  75<H>  /  ALT  11  /  AlkPhos  294<H>  10-07    PT/INR - ( 07 Oct 2022 05:18 )   PT: 21.7 sec;   INR: 1.81 ratio                 RADIOLOGY & ADDITIONAL STUDIES REVIEWED:  ***    GOALS OF CARE DISCUSSION WITH PATIENT/FAMILY/PROXY:    CRITICAL CARE TIME SPENT: 35 minutes INTERVAL HPI/OVERNIGHT EVENTS: Patient was examined at bedside, stable, NAD. No acute events overnight. Pending downgrade to Medicine.    PRESSORS: [ ] YES [ ] NO  WHICH:    ANTIBIOTICS:                      Antimicrobial:  cefTRIAXone   IVPB 1000 milliGRAM(s) IV Intermittent every 24 hours    Cardiovascular:    Pulmonary:    Hematalogic:    Other:  chlordiazePOXIDE 100 milliGRAM(s) Oral every 8 hours  chlordiazePOXIDE 100 milliGRAM(s) Oral every 12 hours  chlorhexidine 2% Cloths 1 Application(s) Topical <User Schedule>  folic acid 1 milliGRAM(s) Oral daily  lactulose Syrup 20 Gram(s) Oral every 8 hours  LORazepam   Injectable 2 milliGRAM(s) IV Push every 4 hours PRN  multivitamin 1 Tablet(s) Oral daily  mupirocin 2% Ointment 1 Application(s) Topical three times a day  thiamine 100 milliGRAM(s) Oral daily    cefTRIAXone   IVPB 1000 milliGRAM(s) IV Intermittent every 24 hours  chlordiazePOXIDE 100 milliGRAM(s) Oral every 8 hours  chlordiazePOXIDE 100 milliGRAM(s) Oral every 12 hours  chlorhexidine 2% Cloths 1 Application(s) Topical <User Schedule>  folic acid 1 milliGRAM(s) Oral daily  lactulose Syrup 20 Gram(s) Oral every 8 hours  LORazepam   Injectable 2 milliGRAM(s) IV Push every 4 hours PRN  multivitamin 1 Tablet(s) Oral daily  mupirocin 2% Ointment 1 Application(s) Topical three times a day  thiamine 100 milliGRAM(s) Oral daily    Drug Dosing Weight    Weight (kg): 78.9 (02 Oct 2022 21:05)    CENTRAL LINE: [ ] YES [ ] NO  LOCATION:   DATE INSERTED:  REMOVE: [ ] YES [ ] NO  EXPLAIN:    [place]: [ ] YES [ ] NO    DATE INSERTED:  REMOVE:  [ ] YES [ ] NO  EXPLAIN:    A-LINE:  [ ] YES [ ] NO  LOCATION:   DATE INSERTED:  REMOVE:  [ ] YES [ ] NO  EXPLAIN:    PMH -reviewed admission note, no change since admission    ICU Vital Signs Last 24 Hrs  T(C): 37.2 (07 Oct 2022 23:11), Max: 37.2 (07 Oct 2022 20:00)  T(F): 98.9 (07 Oct 2022 23:11), Max: 99 (07 Oct 2022 20:00)  HR: 107 (07 Oct 2022 23:00) (101 - 120)  BP: 124/76 (07 Oct 2022 23:00) (103/54 - 138/92)  BP(mean): 86 (07 Oct 2022 23:00) (67 - 102)  ABP: --  ABP(mean): --  RR: 37 (07 Oct 2022 23:00) (13 - 44)  SpO2: 97% (07 Oct 2022 23:00) (95% - 100%)    O2 Parameters below as of 07 Oct 2022 08:00  Patient On (Oxygen Delivery Method): room air                  10-06 @ 07:01  -  10-07 @ 07:00  --------------------------------------------------------  IN: 500 mL / OUT: 1 mL / NET: 499 mL            PHYSICAL EXAM:    GENERAL: NAD, well-groomed, well-developed  HEAD:  Atraumatic, Normocephalic  EYES: EOMI, PERRLA, conjunctiva and sclera clear  ENMT: No tonsillar erythema, exudates, or enlargement; Moist mucous membranes, Good dentition, No lesions  NECK: Supple, normal appearance, No JVD; Normal thyroid; Trachea midline  NERVOUS SYSTEM:  Alert & Oriented X3, Good concentration; Motor Strength 5/5 B/L upper and lower extremities; DTRs 2+ intact and symmetric  CHEST/LUNG: No chest deformity; Normal percussion bilaterally; No rales, rhonchi, wheezing   HEART: Regular rate and rhythm; No murmurs, rubs, or gallops  ABDOMEN: Soft, Nontender, Nondistended; Bowel sounds present  EXTREMITIES:  2+ Peripheral Pulses, No clubbing, cyanosis, or edema  LYMPH: No lymphadenopathy noted  SKIN: No rashes or lesions; Good capillary refill      LABS:  CBC Full  -  ( 07 Oct 2022 05:18 )  WBC Count : 18.50 K/uL  RBC Count : 3.09 M/uL  Hemoglobin : 9.1 g/dL  Hematocrit : 28.7 %  Platelet Count - Automated : 235 K/uL  Mean Cell Volume : 92.9 fl  Mean Cell Hemoglobin : 29.4 pg  Mean Cell Hemoglobin Concentration : 31.7 gm/dL  Auto Neutrophil # : x  Auto Lymphocyte # : x  Auto Monocyte # : x  Auto Eosinophil # : x  Auto Basophil # : x  Auto Neutrophil % : x  Auto Lymphocyte % : x  Auto Monocyte % : x  Auto Eosinophil % : x  Auto Basophil % : x    10-07    138  |  108  |  <1<L>  ----------------------------<  93  3.3<L>   |  21<L>  |  0.42<L>    Ca    7.9<L>      07 Oct 2022 05:18  Phos  1.4     10-07  Mg     1.8     10-07    TPro  5.6<L>  /  Alb  1.6<L>  /  TBili  13.2<H>  /  DBili  x   /  AST  75<H>  /  ALT  11  /  AlkPhos  294<H>  10-07    PT/INR - ( 07 Oct 2022 05:18 )   PT: 21.7 sec;   INR: 1.81 ratio                 RADIOLOGY & ADDITIONAL STUDIES REVIEWED:  ***    GOALS OF CARE DISCUSSION WITH PATIENT/FAMILY/PROXY:    CRITICAL CARE TIME SPENT: 35 minutes INTERVAL HPI/OVERNIGHT EVENTS: Patient was examined at bedside, stable, NAD. No acute events overnight.  PRESSORS: [ ] YES [ ] NO  WHICH:    ANTIBIOTICS:                      Antimicrobial:  cefTRIAXone   IVPB 1000 milliGRAM(s) IV Intermittent every 24 hours    Cardiovascular:    Pulmonary:    Hematalogic:    Other:  chlordiazePOXIDE 100 milliGRAM(s) Oral every 8 hours  chlordiazePOXIDE 100 milliGRAM(s) Oral every 12 hours  chlorhexidine 2% Cloths 1 Application(s) Topical <User Schedule>  folic acid 1 milliGRAM(s) Oral daily  lactulose Syrup 20 Gram(s) Oral every 8 hours  LORazepam   Injectable 2 milliGRAM(s) IV Push every 4 hours PRN  multivitamin 1 Tablet(s) Oral daily  mupirocin 2% Ointment 1 Application(s) Topical three times a day  thiamine 100 milliGRAM(s) Oral daily    cefTRIAXone   IVPB 1000 milliGRAM(s) IV Intermittent every 24 hours  chlordiazePOXIDE 100 milliGRAM(s) Oral every 8 hours  chlordiazePOXIDE 100 milliGRAM(s) Oral every 12 hours  chlorhexidine 2% Cloths 1 Application(s) Topical <User Schedule>  folic acid 1 milliGRAM(s) Oral daily  lactulose Syrup 20 Gram(s) Oral every 8 hours  LORazepam   Injectable 2 milliGRAM(s) IV Push every 4 hours PRN  multivitamin 1 Tablet(s) Oral daily  mupirocin 2% Ointment 1 Application(s) Topical three times a day  thiamine 100 milliGRAM(s) Oral daily    Drug Dosing Weight    Weight (kg): 78.9 (02 Oct 2022 21:05)    CENTRAL LINE: [ ] YES [ ] NO  LOCATION:   DATE INSERTED:  REMOVE: [ ] YES [ ] NO  EXPLAIN:    [place]: [ ] YES [ ] NO    DATE INSERTED:  REMOVE:  [ ] YES [ ] NO  EXPLAIN:    A-LINE:  [ ] YES [ ] NO  LOCATION:   DATE INSERTED:  REMOVE:  [ ] YES [ ] NO  EXPLAIN:    PMH -reviewed admission note, no change since admission    ICU Vital Signs Last 24 Hrs  T(C): 37.2 (07 Oct 2022 23:11), Max: 37.2 (07 Oct 2022 20:00)  T(F): 98.9 (07 Oct 2022 23:11), Max: 99 (07 Oct 2022 20:00)  HR: 107 (07 Oct 2022 23:00) (101 - 120)  BP: 124/76 (07 Oct 2022 23:00) (103/54 - 138/92)  BP(mean): 86 (07 Oct 2022 23:00) (67 - 102)  ABP: --  ABP(mean): --  RR: 37 (07 Oct 2022 23:00) (13 - 44)  SpO2: 97% (07 Oct 2022 23:00) (95% - 100%)    O2 Parameters below as of 07 Oct 2022 08:00  Patient On (Oxygen Delivery Method): room air                  10-06 @ 07:01  -  10-07 @ 07:00  --------------------------------------------------------  IN: 500 mL / OUT: 1 mL / NET: 499 mL            PHYSICAL EXAM:    GENERAL: NAD, well-groomed, well-developed  HEAD:  Atraumatic, Normocephalic  EYES: EOMI, PERRLA, conjunctiva and sclera clear  ENMT: No tonsillar erythema, exudates, or enlargement; Moist mucous membranes, Good dentition, No lesions  NECK: Supple, normal appearance, No JVD; Normal thyroid; Trachea midline  NERVOUS SYSTEM:  Alert & Oriented X3, Good concentration; Motor Strength 5/5 B/L upper and lower extremities; DTRs 2+ intact and symmetric  CHEST/LUNG: No chest deformity; Normal percussion bilaterally; No rales, rhonchi, wheezing   HEART: Regular rate and rhythm; No murmurs, rubs, or gallops  ABDOMEN: Soft, Nontender, Nondistended; Bowel sounds present  EXTREMITIES:  2+ Peripheral Pulses, No clubbing, cyanosis, or edema  LYMPH: No lymphadenopathy noted  SKIN: No rashes or lesions; Good capillary refill      LABS:  CBC Full  -  ( 07 Oct 2022 05:18 )  WBC Count : 18.50 K/uL  RBC Count : 3.09 M/uL  Hemoglobin : 9.1 g/dL  Hematocrit : 28.7 %  Platelet Count - Automated : 235 K/uL  Mean Cell Volume : 92.9 fl  Mean Cell Hemoglobin : 29.4 pg  Mean Cell Hemoglobin Concentration : 31.7 gm/dL  Auto Neutrophil # : x  Auto Lymphocyte # : x  Auto Monocyte # : x  Auto Eosinophil # : x  Auto Basophil # : x  Auto Neutrophil % : x  Auto Lymphocyte % : x  Auto Monocyte % : x  Auto Eosinophil % : x  Auto Basophil % : x    10-07    138  |  108  |  <1<L>  ----------------------------<  93  3.3<L>   |  21<L>  |  0.42<L>    Ca    7.9<L>      07 Oct 2022 05:18  Phos  1.4     10-07  Mg     1.8     10-07    TPro  5.6<L>  /  Alb  1.6<L>  /  TBili  13.2<H>  /  DBili  x   /  AST  75<H>  /  ALT  11  /  AlkPhos  294<H>  10-07    PT/INR - ( 07 Oct 2022 05:18 )   PT: 21.7 sec;   INR: 1.81 ratio                 RADIOLOGY & ADDITIONAL STUDIES REVIEWED:  ***    GOALS OF CARE DISCUSSION WITH PATIENT/FAMILY/PROXY:    CRITICAL CARE TIME SPENT: 35 minutes INTERVAL HPI/OVERNIGHT EVENTS: Patient was examined at bedside, stable, NAD. No acute events overnight.  PRESSORS: [ ] YES [X] NO  WHICH:    ANTIBIOTICS:                      Antimicrobial:  cefTRIAXone   IVPB 1000 milliGRAM(s) IV Intermittent every 24 hours    Cardiovascular:    Pulmonary:    Hematalogic:    Other:  chlordiazePOXIDE 100 milliGRAM(s) Oral every 8 hours  chlordiazePOXIDE 100 milliGRAM(s) Oral every 12 hours  chlorhexidine 2% Cloths 1 Application(s) Topical <User Schedule>  folic acid 1 milliGRAM(s) Oral daily  lactulose Syrup 20 Gram(s) Oral every 8 hours  LORazepam   Injectable 2 milliGRAM(s) IV Push every 4 hours PRN  multivitamin 1 Tablet(s) Oral daily  mupirocin 2% Ointment 1 Application(s) Topical three times a day  thiamine 100 milliGRAM(s) Oral daily    cefTRIAXone   IVPB 1000 milliGRAM(s) IV Intermittent every 24 hours  chlordiazePOXIDE 100 milliGRAM(s) Oral every 8 hours  chlordiazePOXIDE 100 milliGRAM(s) Oral every 12 hours  chlorhexidine 2% Cloths 1 Application(s) Topical <User Schedule>  folic acid 1 milliGRAM(s) Oral daily  lactulose Syrup 20 Gram(s) Oral every 8 hours  LORazepam   Injectable 2 milliGRAM(s) IV Push every 4 hours PRN  multivitamin 1 Tablet(s) Oral daily  mupirocin 2% Ointment 1 Application(s) Topical three times a day  thiamine 100 milliGRAM(s) Oral daily    Drug Dosing Weight    Weight (kg): 78.9 (02 Oct 2022 21:05)    CENTRAL LINE: [ ] YES [ ] NO  LOCATION:   DATE INSERTED:  REMOVE: [ ] YES [ ] NO  EXPLAIN:    [place]: [ ] YES [ ] NO    DATE INSERTED:  REMOVE:  [ ] YES [ ] NO  EXPLAIN:    A-LINE:  [ ] YES [ ] NO  LOCATION:   DATE INSERTED:  REMOVE:  [ ] YES [ ] NO  EXPLAIN:    PMH -reviewed admission note, no change since admission    ICU Vital Signs Last 24 Hrs  T(C): 37.2 (07 Oct 2022 23:11), Max: 37.2 (07 Oct 2022 20:00)  T(F): 98.9 (07 Oct 2022 23:11), Max: 99 (07 Oct 2022 20:00)  HR: 107 (07 Oct 2022 23:00) (101 - 120)  BP: 124/76 (07 Oct 2022 23:00) (103/54 - 138/92)  BP(mean): 86 (07 Oct 2022 23:00) (67 - 102)  ABP: --  ABP(mean): --  RR: 37 (07 Oct 2022 23:00) (13 - 44)  SpO2: 97% (07 Oct 2022 23:00) (95% - 100%)    O2 Parameters below as of 07 Oct 2022 08:00  Patient On (Oxygen Delivery Method): room air                  10-06 @ 07:01  -  10-07 @ 07:00  --------------------------------------------------------  IN: 500 mL / OUT: 1 mL / NET: 499 mL            PHYSICAL EXAM:    GENERAL: drowsy lethargic, jaundiced.   HEAD: Nomocephalic, Atraumatic  EYES: sclera icterus   NECK: Supple, No JVD; Normal thyroid; Trachea midline  NERVOUS SYSTEM:  Alert & Oriented X3,  Motor Strength 5/5 B/L upper and lower extremities;   CHEST/LUNG: wheezing   HEART: Regular rate and rhythm; No murmurs,   ABDOMEN: nondistended   EXTREMITIES:  2+ Peripheral Pulses, No clubbing, cyanosis, or edema  SKIN: No lesions        LABS:  CBC Full  -  ( 07 Oct 2022 05:18 )  WBC Count : 18.50 K/uL  RBC Count : 3.09 M/uL  Hemoglobin : 9.1 g/dL  Hematocrit : 28.7 %  Platelet Count - Automated : 235 K/uL  Mean Cell Volume : 92.9 fl  Mean Cell Hemoglobin : 29.4 pg  Mean Cell Hemoglobin Concentration : 31.7 gm/dL  Auto Neutrophil # : x  Auto Lymphocyte # : x  Auto Monocyte # : x  Auto Eosinophil # : x  Auto Basophil # : x  Auto Neutrophil % : x  Auto Lymphocyte % : x  Auto Monocyte % : x  Auto Eosinophil % : x  Auto Basophil % : x    10-07    138  |  108  |  <1<L>  ----------------------------<  93  3.3<L>   |  21<L>  |  0.42<L>    Ca    7.9<L>      07 Oct 2022 05:18  Phos  1.4     10-07  Mg     1.8     10-07    TPro  5.6<L>  /  Alb  1.6<L>  /  TBili  13.2<H>  /  DBili  x   /  AST  75<H>  /  ALT  11  /  AlkPhos  294<H>  10-07    PT/INR - ( 07 Oct 2022 05:18 )   PT: 21.7 sec;   INR: 1.81 ratio                 RADIOLOGY & ADDITIONAL STUDIES REVIEWED:  ***    GOALS OF CARE DISCUSSION WITH PATIENT/FAMILY/PROXY:    CRITICAL CARE TIME SPENT: 35 minutes INTERVAL HPI/OVERNIGHT EVENTS: Patient was examined at bedside, stable, NAD. No acute events overnight.  PRESSORS: [ ] YES [X] NO  WHICH:    ANTIBIOTICS:                      Antimicrobial:  cefTRIAXone   IVPB 1000 milliGRAM(s) IV Intermittent every 24 hours    Cardiovascular:    Pulmonary:    Hematalogic:    Other:  chlordiazePOXIDE 100 milliGRAM(s) Oral every 8 hours  chlordiazePOXIDE 100 milliGRAM(s) Oral every 12 hours  chlorhexidine 2% Cloths 1 Application(s) Topical <User Schedule>  folic acid 1 milliGRAM(s) Oral daily  lactulose Syrup 20 Gram(s) Oral every 8 hours  LORazepam   Injectable 2 milliGRAM(s) IV Push every 4 hours PRN  multivitamin 1 Tablet(s) Oral daily  mupirocin 2% Ointment 1 Application(s) Topical three times a day  thiamine 100 milliGRAM(s) Oral daily    cefTRIAXone   IVPB 1000 milliGRAM(s) IV Intermittent every 24 hours  chlordiazePOXIDE 100 milliGRAM(s) Oral every 8 hours  chlordiazePOXIDE 100 milliGRAM(s) Oral every 12 hours  chlorhexidine 2% Cloths 1 Application(s) Topical <User Schedule>  folic acid 1 milliGRAM(s) Oral daily  lactulose Syrup 20 Gram(s) Oral every 8 hours  LORazepam   Injectable 2 milliGRAM(s) IV Push every 4 hours PRN  multivitamin 1 Tablet(s) Oral daily  mupirocin 2% Ointment 1 Application(s) Topical three times a day  thiamine 100 milliGRAM(s) Oral daily    Drug Dosing Weight    Weight (kg): 78.9 (02 Oct 2022 21:05)    CENTRAL LINE: [ ] YES [X] NO  LOCATION:   DATE INSERTED:  REMOVE: [ ] YES [ ] NO  EXPLAIN:    [place]: [X] YES [ ] NO    DATE INSERTED:  REMOVE:  [ ] YES [ ] NO  EXPLAIN:    A-LINE:  [ ] YES [X] NO  LOCATION:   DATE INSERTED:  REMOVE:  [ ] YES [ ] NO  EXPLAIN:    PMH -reviewed admission note, no change since admission    ICU Vital Signs Last 24 Hrs  T(C): 37.2 (07 Oct 2022 23:11), Max: 37.2 (07 Oct 2022 20:00)  T(F): 98.9 (07 Oct 2022 23:11), Max: 99 (07 Oct 2022 20:00)  HR: 107 (07 Oct 2022 23:00) (101 - 120)  BP: 124/76 (07 Oct 2022 23:00) (103/54 - 138/92)  BP(mean): 86 (07 Oct 2022 23:00) (67 - 102)  ABP: --  ABP(mean): --  RR: 37 (07 Oct 2022 23:00) (13 - 44)  SpO2: 97% (07 Oct 2022 23:00) (95% - 100%)    O2 Parameters below as of 07 Oct 2022 08:00  Patient On (Oxygen Delivery Method): room air                  10-06 @ 07:01  -  10-07 @ 07:00  --------------------------------------------------------  IN: 500 mL / OUT: 1 mL / NET: 499 mL            PHYSICAL EXAM:    GENERAL: drowsy lethargic, jaundiced.   HEAD: Nomocephalic, Atraumatic  EYES: sclera icterus   NECK: Supple, No JVD; Normal thyroid; Trachea midline  NERVOUS SYSTEM:  Alert & Oriented X3,  Motor Strength 5/5 B/L upper and lower extremities;   CHEST/LUNG: wheezing   HEART: Regular rate and rhythm; No murmurs,   ABDOMEN: nondistended   EXTREMITIES:  2+ Peripheral Pulses, No clubbing, cyanosis, or edema  SKIN: No lesions        LABS:  CBC Full  -  ( 07 Oct 2022 05:18 )  WBC Count : 18.50 K/uL  RBC Count : 3.09 M/uL  Hemoglobin : 9.1 g/dL  Hematocrit : 28.7 %  Platelet Count - Automated : 235 K/uL  Mean Cell Volume : 92.9 fl  Mean Cell Hemoglobin : 29.4 pg  Mean Cell Hemoglobin Concentration : 31.7 gm/dL  Auto Neutrophil # : x  Auto Lymphocyte # : x  Auto Monocyte # : x  Auto Eosinophil # : x  Auto Basophil # : x  Auto Neutrophil % : x  Auto Lymphocyte % : x  Auto Monocyte % : x  Auto Eosinophil % : x  Auto Basophil % : x    10-07    138  |  108  |  <1<L>  ----------------------------<  93  3.3<L>   |  21<L>  |  0.42<L>    Ca    7.9<L>      07 Oct 2022 05:18  Phos  1.4     10-07  Mg     1.8     10-07    TPro  5.6<L>  /  Alb  1.6<L>  /  TBili  13.2<H>  /  DBili  x   /  AST  75<H>  /  ALT  11  /  AlkPhos  294<H>  10-07    PT/INR - ( 07 Oct 2022 05:18 )   PT: 21.7 sec;   INR: 1.81 ratio                 RADIOLOGY & ADDITIONAL STUDIES REVIEWED:  ***    GOALS OF CARE DISCUSSION WITH PATIENT/FAMILY/PROXY:    CRITICAL CARE TIME SPENT: 35 minutes

## 2022-10-09 LAB
A1C WITH ESTIMATED AVERAGE GLUCOSE RESULT: 4.8 % — SIGNIFICANT CHANGE UP (ref 4–5.6)
ALBUMIN SERPL ELPH-MCNC: 1.5 G/DL — LOW (ref 3.5–5)
ALP SERPL-CCNC: 241 U/L — HIGH (ref 40–120)
ALT FLD-CCNC: 10 U/L DA — SIGNIFICANT CHANGE UP (ref 10–60)
ANION GAP SERPL CALC-SCNC: 10 MMOL/L — SIGNIFICANT CHANGE UP (ref 5–17)
AST SERPL-CCNC: 60 U/L — HIGH (ref 10–40)
BILIRUB SERPL-MCNC: 14.4 MG/DL — HIGH (ref 0.2–1.2)
BUN SERPL-MCNC: 2 MG/DL — LOW (ref 7–18)
CALCIUM SERPL-MCNC: 8.1 MG/DL — LOW (ref 8.4–10.5)
CHLORIDE SERPL-SCNC: 109 MMOL/L — HIGH (ref 96–108)
CO2 SERPL-SCNC: 20 MMOL/L — LOW (ref 22–31)
CREAT SERPL-MCNC: 0.33 MG/DL — LOW (ref 0.5–1.3)
CRP SERPL-MCNC: 78 MG/L — HIGH
EGFR: 165 ML/MIN/1.73M2 — SIGNIFICANT CHANGE UP
ERYTHROCYTE [SEDIMENTATION RATE] IN BLOOD: 10 MM/HR — SIGNIFICANT CHANGE UP (ref 0–15)
ESTIMATED AVERAGE GLUCOSE: 91 MG/DL — SIGNIFICANT CHANGE UP (ref 68–114)
GLUCOSE BLDC GLUCOMTR-MCNC: 107 MG/DL — HIGH (ref 70–99)
GLUCOSE BLDC GLUCOMTR-MCNC: 120 MG/DL — HIGH (ref 70–99)
GLUCOSE BLDC GLUCOMTR-MCNC: 121 MG/DL — HIGH (ref 70–99)
GLUCOSE BLDC GLUCOMTR-MCNC: 96 MG/DL — SIGNIFICANT CHANGE UP (ref 70–99)
GLUCOSE SERPL-MCNC: 113 MG/DL — HIGH (ref 70–99)
HCT VFR BLD CALC: 29.9 % — LOW (ref 39–50)
HGB BLD-MCNC: 9.5 G/DL — LOW (ref 13–17)
INR BLD: 1.94 RATIO — HIGH (ref 0.88–1.16)
MAGNESIUM SERPL-MCNC: 2 MG/DL — SIGNIFICANT CHANGE UP (ref 1.6–2.6)
MCHC RBC-ENTMCNC: 29.7 PG — SIGNIFICANT CHANGE UP (ref 27–34)
MCHC RBC-ENTMCNC: 31.8 GM/DL — LOW (ref 32–36)
MCV RBC AUTO: 93.4 FL — SIGNIFICANT CHANGE UP (ref 80–100)
NRBC # BLD: 0 /100 WBCS — SIGNIFICANT CHANGE UP (ref 0–0)
PHOSPHATE SERPL-MCNC: 3.6 MG/DL — SIGNIFICANT CHANGE UP (ref 2.5–4.5)
PLATELET # BLD AUTO: 317 K/UL — SIGNIFICANT CHANGE UP (ref 150–400)
POTASSIUM SERPL-MCNC: 3.7 MMOL/L — SIGNIFICANT CHANGE UP (ref 3.5–5.3)
POTASSIUM SERPL-SCNC: 3.7 MMOL/L — SIGNIFICANT CHANGE UP (ref 3.5–5.3)
PROT SERPL-MCNC: 5.6 G/DL — LOW (ref 6–8.3)
PROTHROM AB SERPL-ACNC: 23.2 SEC — HIGH (ref 10.5–13.4)
RBC # BLD: 3.2 M/UL — LOW (ref 4.2–5.8)
RBC # FLD: 21.2 % — HIGH (ref 10.3–14.5)
SODIUM SERPL-SCNC: 139 MMOL/L — SIGNIFICANT CHANGE UP (ref 135–145)
VIT B12 SERPL-MCNC: >2000 PG/ML — HIGH (ref 232–1245)
WBC # BLD: 19.04 K/UL — HIGH (ref 3.8–10.5)
WBC # FLD AUTO: 19.04 K/UL — HIGH (ref 3.8–10.5)

## 2022-10-09 PROCEDURE — 99233 SBSQ HOSP IP/OBS HIGH 50: CPT | Mod: GC

## 2022-10-09 RX ORDER — HEPARIN SODIUM 5000 [USP'U]/ML
5000 INJECTION INTRAVENOUS; SUBCUTANEOUS EVERY 8 HOURS
Refills: 0 | Status: DISCONTINUED | OUTPATIENT
Start: 2022-10-09 | End: 2022-10-20

## 2022-10-09 RX ORDER — LACTULOSE 10 G/15ML
10 SOLUTION ORAL EVERY 8 HOURS
Refills: 0 | Status: DISCONTINUED | OUTPATIENT
Start: 2022-10-09 | End: 2022-10-14

## 2022-10-09 RX ADMIN — Medication 40 MILLIGRAM(S): at 06:42

## 2022-10-09 RX ADMIN — LACTULOSE 10 GRAM(S): 10 SOLUTION ORAL at 22:46

## 2022-10-09 RX ADMIN — LACTULOSE 20 GRAM(S): 10 SOLUTION ORAL at 13:09

## 2022-10-09 RX ADMIN — MUPIROCIN 1 APPLICATION(S): 20 OINTMENT TOPICAL at 15:49

## 2022-10-09 RX ADMIN — Medication 1 MILLIGRAM(S): at 11:26

## 2022-10-09 RX ADMIN — MUPIROCIN 1 APPLICATION(S): 20 OINTMENT TOPICAL at 06:13

## 2022-10-09 RX ADMIN — MUPIROCIN 1 APPLICATION(S): 20 OINTMENT TOPICAL at 22:47

## 2022-10-09 RX ADMIN — Medication 0: at 11:24

## 2022-10-09 RX ADMIN — Medication 1 TABLET(S): at 11:14

## 2022-10-09 RX ADMIN — CEFTRIAXONE 100 MILLIGRAM(S): 500 INJECTION, POWDER, FOR SOLUTION INTRAMUSCULAR; INTRAVENOUS at 13:09

## 2022-10-09 RX ADMIN — HEPARIN SODIUM 5000 UNIT(S): 5000 INJECTION INTRAVENOUS; SUBCUTANEOUS at 06:11

## 2022-10-09 RX ADMIN — HEPARIN SODIUM 5000 UNIT(S): 5000 INJECTION INTRAVENOUS; SUBCUTANEOUS at 13:09

## 2022-10-09 RX ADMIN — CHLORHEXIDINE GLUCONATE 1 APPLICATION(S): 213 SOLUTION TOPICAL at 06:13

## 2022-10-09 RX ADMIN — Medication 100 MILLIGRAM(S): at 22:46

## 2022-10-09 RX ADMIN — HEPARIN SODIUM 5000 UNIT(S): 5000 INJECTION INTRAVENOUS; SUBCUTANEOUS at 22:46

## 2022-10-09 RX ADMIN — Medication 100 MILLIGRAM(S): at 11:14

## 2022-10-09 NOTE — PROGRESS NOTE ADULT - ATTENDING COMMENTS
I personally reviewed patient's labs, flowsheets, pertinent imaging, and consultant notes. MDM of high complexity.    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 cholangitis and also w/ LGIB s/p sigmoidoscopy    ASSESSMENT:  Acute encephalopathy - most likely hepatic, vs. septic  EtOH withdrawal complicated by withdrawal seizure  Lactic acidosis  Acute liver failure  EtOH hepatitis  Elevated INR  Blood loss anemia  Hypokalemia  Hypophosphatemia    Plan:  - appr hepatology & GI recs - mental improving today  - cont xifaxan, lactulose; stool count  - re-attempt RUQ sono   - cont prednisolone  - cont CFX  - bedside abd sono w/o minimal ascites  - electrolyte repletion  - resume pharm vte ppx as high risk, SCDs  - trend CBC/BMP  - Hgb stable   - seizure and aspiration precautions I personally reviewed patient's labs, flowsheets, pertinent imaging, and consultant notes. MDM of high complexity.    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 cholangitis and also w/ LGIB s/p sigmoidoscopy    ASSESSMENT:  Acute encephalopathy - most likely hepatic, vs. septic  EtOH withdrawal complicated by withdrawal seizure  Lactic acidosis  Acute liver failure  EtOH hepatitis  Elevated INR  Blood loss anemia  Hypokalemia  Hypophosphatemia    Plan:  - appr hepatology & GI recs - mental status improving today  - cont xifaxan, lactulose; stool count  - re-attempt RUQ sono   - cont prednisolone  - cont CFX  - bedside abd sono w/o minimal ascites  - electrolyte repletion  - resume pharm vte ppx as high risk, SCDs  - trend CBC/BMP  - Hgb stable   - seizure and aspiration precautions    Transfer to medicine

## 2022-10-09 NOTE — PROGRESS NOTE ADULT - ASSESSMENT
Patient is a 27 yo Maori speaking Male, with no known past medical history, s/p Left temporal parietal craniotomy, presenting to the ED with altered mental status since this morning s/p seizure in the setting of Alcohol abuse. Patient admitted to ICU for multiple metabolic derangements.    ASSESSMENT  -Acute encephalopathy 2/2 Hepatic encephalopathy vs Sepsis   -Obstructive jaundice r/out cholangitis  -Alcohol withdrawal seizures  -Anemia  -Lactic acidosis  -Hyponatremia  -s/p Left temporal parietal craniotomy    Neuro:  #Acute encephalopathy due to Hepatic encephalopathy vs Sepsis  s/p 1 dose of lactulose enema for probable hepatic encephalopathy  -PO lactulose resumed now patient is able to tolerate diet  - Start rifaximin  -As per family, patient had head trauma 6 months ago which required craniotomy. He went to rehab and returned home. He was able to work after the accident.   -patient is more alert and oriented although waxes and wanes throughout the day.     #Alcohol withdrawal seizures  As per EMS charts, pt drinks alcohol everyday, last drink 2 days ago  No prior hx of seizures  Started on CIWA protocol,  C/w thiamine, folic acid and MVT   D/C librium taper   D/C 2mg IV ativan PRN     #S/p Left temporal parietal craniotomy  Head trauma 6 months ago. Family unsure which hospital he was at.     Cardiovascular:  #Sinus tachycardia  in the setting of sepsis/acute illness  monitor for now, c/w IVF  resolved     Pulmonary:   No acute issues    Infectious Diseases:  #SBP prophylaxis  p/w transaminitis, altered mental status, low grade temp 100.1, elevated WBC 20k, direct bili 8.0  Dr. Puentes ID consulted, switched zosyn to ceftriaxone for SBP px    #UTI  UA w/WBC 6-10, trace leukocyte esterase, unable to obtain presence of symptoms  c/w ceftriaxone   f/u urine cultures. NGTD     Gastrointestinal:  #Obstructive jaundice in setting of Acute Liver Failure   p/w transaminitis, altered mental status, low grade temp 100.1, elevated WBC 20k, direct bili 8.0  severe hepatomegaly w/mild ascites on CT abdomen w/gall bladder sludge  lipase level normal, low suspicion for pancreatic involvement  C/w ceftriaxone for suspected cholangitis  F/u blood cultures NGTD   hepatitis panel neg   hepatic USG for characterization of gall bladder attempted however patient was very agitated. Will reattempt.   GI consulted, recommending possible MRCP   EGD on 10/5 with GI Dr. Johnson showed Large rectal Dieulafoy lesion. Hemostasis achieved with over-the-scope clip  Hb stable, no longer requiring CBC 18h   PPI  F/U RUQ US      #Transaminitis  in the setting of Alcohol abuse and obstructive jaundice, BAL 34  Utox negative for salicylate and acetaminophen levels  hep panel neg   trend LFTs daily  Maddrey score for Alcoholic hepatitis 74, pt would benefit from glucocorticoid therapy  Dr. Galan hepatology consulted. started on NAC     #Elevated INR and hypoalbuminemia in the setting of liver dysfunction  monitor daily, c/w venodynes for DVT ppx  -started on vitamin K for 3 days  INR improving      #Lactic acidosis   Decreased clearance w/ liver dysfunction  Likely due to post-ictal state from seizure this AM vs sepsis, downtrending from 6.4>IV 1NS bolus> 2.6  resolved     Renal:  #Hyponatremia  on admission, Na 125> 1L NS bolus > 129  resolved    serum osm, urine osm, urine sodium noted     #Hypokalemia  - aggressive repletion.  C/w monitoring    #Hypophophatemia  - replete  - c/w monitoring      Endo:  TSH 7.22, likely inaccurate in current state. Will re check after patient more stable.   Diet: Soft and miced.   FS ACHS as no longer NPO    Heme/onc:   #Leukocytosis  WBC 20k, low grade temp 100.1 on admission, r/out sepsis  Avoid tylenol w/elevated LFTs    #Anemia  P/w Hb 6.9, MCV 87 normocytic  s/p 1 unit PRBC  Hb 9 this AM s/p 3 units PRBC total       Skin/ catheter: Yost removed 10/4 , peripheral lines    Prophylaxis: Heparin Sub Q, SCD boots    Goals of Care: Full code,    Dispo: stays in ICU   Patient is a 25 yo Albanian speaking Male, with no known past medical history, s/p Left temporal parietal craniotomy, presenting to the ED with altered mental status since this morning s/p seizure in the setting of Alcohol abuse. Patient admitted to ICU for multiple metabolic derangements.    ASSESSMENT  -Acute encephalopathy 2/2 Hepatic encephalopathy vs Sepsis   -Obstructive jaundice r/out cholangitis  -Alcohol withdrawal seizures  -Anemia  -Lactic acidosis  -Hyponatremia  -s/p Left temporal parietal craniotomy    Neuro:  #Acute encephalopathy due to Hepatic encephalopathy vs Sepsis  s/p 1 dose of lactulose enema for probable hepatic encephalopathy  -PO lactulose resumed now patient is able to tolerate diet - 2 BM reported so far today  - Start rifaximin  -As per family, patient had head trauma 6 months ago which required craniotomy. He went to rehab and returned home. He was able to work after the accident.   -patient is more alert and oriented although waxes and wanes throughout the day.     #Alcohol withdrawal seizures  As per EMS charts, pt drinks alcohol everyday, last drink 2 days ago  No prior hx of seizures  Started on CIWA protocol - CIWA score 2  C/w thiamine, folic acid and MVT   D/C librium taper   D/C 2mg IV ativan PRN     #S/p Left temporal parietal craniotomy  Head trauma 6 months ago. Family unsure which hospital he was at.     Cardiovascular:  #Sinus tachycardia  in the setting of sepsis/acute illness  monitor for now, c/w IVF  resolved     Pulmonary:   No acute issues    Infectious Diseases:  #SBP prophylaxis  Continue Ceftriaxone 1 gram daily  Continue Rifaximin 550 mg twice daily  p/w transaminitis, improved mental status, afebrile ,  elevated WBC trending up 19.4k, direct bili 8.0  Dr. Puentes ID consulted, switched zosyn to ceftriaxone for SBP px  started on Rifaximin 550mg twice daily    #UTI  UA w/WBC 6-10, trace leukocyte esterase, unable to obtain presence of symptoms  c/w ceftriaxone   f/u urine cultures. NGTD     Gastrointestinal:  #Obstructive jaundice in setting of Acute Liver Failure   p/w transaminitis, altered mental status, low grade temp 100.1, elevated WBC 20k, direct bili 8.0  severe hepatomegaly w/mild ascites on CT abdomen w/gall bladder sludge  lipase level normal, low suspicion for pancreatic involvement  C/w ceftriaxone for suspected cholangitis  F/u blood cultures NGTD   hepatitis panel neg   hepatic USG for characterization of gall bladder attempted however patient was very agitated. Will reattempt.   GI consulted, recommending possible MRCP   EGD on 10/5 with GI Dr. Johnson showed Large rectal Dieulafoy lesion. Hemostasis achieved with over-the-scope clip  Hb stable, no longer requiring CBC 18h   PPI  F/U RUQ US      #Transaminitis  in the setting of Alcohol abuse and obstructive jaundice, BAL 34  Utox negative for salicylate and acetaminophen levels  hep panel neg   trend LFTs daily  Maddrey score for Alcoholic hepatitis 74, pt would benefit from glucocorticoid therapy  Dr. Galan hepatology consulted. started on NAC  started on Steroids - prednisone      #Elevated INR and hypoalbuminemia in the setting of liver dysfunction  monitor daily, c/w venodynes and Heparin SQ  for DVT ppx  -started on vitamin K for 3 days  INR improving      #Lactic acidosis   Decreased clearance w/ liver dysfunction  Likely due to post-ictal state from seizure this AM vs sepsis, downtrending from 6.4>IV 1NS bolus> 2.6  resolved     Renal:  #Hyponatremia  on admission, Na 125> 1L NS bolus > 129  resolved    serum osm, urine osm, urine sodium noted     #Hypokalemia  - resolved,  today (10/9) 3.7  C/w monitoring    #Hypophophatemia  - resolved,  today (10/9) 3.6  - c/w monitoring      Endo:  TSH 7.22, likely inaccurate in current state. Will re check after patient more stable.   Diet: Soft and miced.   FS ACHS as no longer NPO    Heme/onc:   #Leukocytosis-  low grade temp 100.1 on admission, r/out sepsis  WBC trending up 19.4k, afebrile on ABT  Avoid Tylenol w/elevated LFTs    #Anemia  P/w Hb 6.9, MCV 87 normocytic  s/p 1 unit PRBC  Hb 9.5 this AM s/p 3 units PRBC total       Skin/ catheter: Yost removed 10/4 , peripheral lines    Prophylaxis: change Heparin Sub Q to Every 8HRS, SCD boots    Goals of Care: Full code,    Dispo: Consider downgrade to medicine

## 2022-10-09 NOTE — PROGRESS NOTE ADULT - SUBJECTIVE AND OBJECTIVE BOX
INTERVAL HPI/OVERNIGHT EVENTS: No acute events overnight. Patient resting comfortably.    PRESSORS: [ ] YES [X ] NO  WHICH:    ANTIBIOTICS:                  DATE STARTED:    Antimicrobial:  cefTRIAXone   IVPB 1000 milliGRAM(s) IV Intermittent every 24 hours  rifAXIMin 550 milliGRAM(s) Oral two times a day    Cardiovascular:    Pulmonary:    Hematalogic:  heparin   Injectable 5000 Unit(s) SubCutaneous every 12 hours    Other:  acetylcysteine IVPB 3.9 Gram(s) IV Intermittent once  acetylcysteine IVPB 8 Gram(s) IV Intermittent once  chlorhexidine 2% Cloths 1 Application(s) Topical <User Schedule>  dextrose 5%. 1000 milliLiter(s) IV Continuous <Continuous>  dextrose 5%. 1000 milliLiter(s) IV Continuous <Continuous>  dextrose 50% Injectable 25 Gram(s) IV Push once  dextrose 50% Injectable 12.5 Gram(s) IV Push once  dextrose 50% Injectable 25 Gram(s) IV Push once  dextrose Oral Gel 15 Gram(s) Oral once PRN  folic acid 1 milliGRAM(s) Oral daily  glucagon  Injectable 1 milliGRAM(s) IntraMuscular once  insulin lispro (ADMELOG) corrective regimen sliding scale   SubCutaneous Before meals and at bedtime  lactulose Syrup 20 Gram(s) Oral every 8 hours  multivitamin 1 Tablet(s) Oral daily  mupirocin 2% Ointment 1 Application(s) Topical three times a day  prednisoLONE    3 mG/mL Solution (ORAPRED) 40 milliGRAM(s) Oral daily  thiamine 100 milliGRAM(s) Oral daily    acetylcysteine IVPB 3.9 Gram(s) IV Intermittent once  acetylcysteine IVPB 8 Gram(s) IV Intermittent once  cefTRIAXone   IVPB 1000 milliGRAM(s) IV Intermittent every 24 hours  chlorhexidine 2% Cloths 1 Application(s) Topical <User Schedule>  dextrose 5%. 1000 milliLiter(s) IV Continuous <Continuous>  dextrose 5%. 1000 milliLiter(s) IV Continuous <Continuous>  dextrose 50% Injectable 25 Gram(s) IV Push once  dextrose 50% Injectable 12.5 Gram(s) IV Push once  dextrose 50% Injectable 25 Gram(s) IV Push once  dextrose Oral Gel 15 Gram(s) Oral once PRN  folic acid 1 milliGRAM(s) Oral daily  glucagon  Injectable 1 milliGRAM(s) IntraMuscular once  heparin   Injectable 5000 Unit(s) SubCutaneous every 12 hours  insulin lispro (ADMELOG) corrective regimen sliding scale   SubCutaneous Before meals and at bedtime  lactulose Syrup 20 Gram(s) Oral every 8 hours  multivitamin 1 Tablet(s) Oral daily  mupirocin 2% Ointment 1 Application(s) Topical three times a day  prednisoLONE    3 mG/mL Solution (ORAPRED) 40 milliGRAM(s) Oral daily  rifAXIMin 550 milliGRAM(s) Oral two times a day  thiamine 100 milliGRAM(s) Oral daily    Drug Dosing Weight    Weight (kg): 78.9 (02 Oct 2022 21:05)    CENTRAL LINE: [ ] YES [x] NO  LOCATION:         [place]: [ ] YES [x ] NO          A-LINE:  [ ] YES [x ] NO  LOCATION:         ICU Vital Signs Last 24 Hrs  T(C): 36.7 (08 Oct 2022 23:39), Max: 37.2 (08 Oct 2022 04:00)  T(F): 98 (08 Oct 2022 23:39), Max: 99 (08 Oct 2022 04:00)  HR: 97 (08 Oct 2022 18:00) (97 - 110)  BP: 116/70 (08 Oct 2022 17:00) (110/58 - 130/91)  BP(mean): 80 (08 Oct 2022 17:00) (69 - 101)  ABP: --  ABP(mean): --  RR: 32 (08 Oct 2022 18:00) (25 - 39)  SpO2: 100% (08 Oct 2022 17:00) (96% - 100%)    O2 Parameters below as of 08 Oct 2022 12:00  Patient On (Oxygen Delivery Method): room air                  10-07 @ 07:01  -  10-08 @ 07:00  --------------------------------------------------------  IN: 300 mL / OUT: 2100 mL / NET: -1800 mL              PHYSICAL EXAM:    GENERAL: drowsy lethargic, jaundiced.   HEAD: Nomocephalic, Atraumatic  EYES: sclera icterus   NECK: Supple, No JVD; Normal thyroid; Trachea midline  NERVOUS SYSTEM:  Alert & Oriented X3,  Motor Strength 5/5 B/L upper and lower extremities;   CHEST/LUNG: wheezing   HEART: Regular rate and rhythm; No murmurs,   ABDOMEN: nondistended   EXTREMITIES:  2+ Peripheral Pulses, No clubbing, cyanosis, or edema  SKIN: No lesions    LABS:  CBC Full  -  ( 08 Oct 2022 03:45 )  WBC Count : 17.28 K/uL  RBC Count : 3.22 M/uL  Hemoglobin : 9.5 g/dL  Hematocrit : 29.9 %  Platelet Count - Automated : 256 K/uL  Mean Cell Volume : 92.9 fl  Mean Cell Hemoglobin : 29.5 pg  Mean Cell Hemoglobin Concentration : 31.8 gm/dL  Auto Neutrophil # : x  Auto Lymphocyte # : x  Auto Monocyte # : x  Auto Eosinophil # : x  Auto Basophil # : x  Auto Neutrophil % : x  Auto Lymphocyte % : x  Auto Monocyte % : x  Auto Eosinophil % : x  Auto Basophil % : x    10-08    139  |  107  |  <1<L>  ----------------------------<  99  3.9   |  22  |  0.41<L>    Ca    8.5      08 Oct 2022 14:10  Phos  2.8     10-08  Mg     2.2     10-08    TPro  5.5<L>  /  Alb  1.6<L>  /  TBili  14.1<H>  /  DBili  x   /  AST  72<H>  /  ALT  9<L>  /  AlkPhos  257<H>  10-08    PT/INR - ( 08 Oct 2022 03:45 )   PT: 23.3 sec;   INR: 1.94 ratio                 RADIOLOGY & ADDITIONAL STUDIES REVIEWED:  ***    [ ]GOALS OF CARE DISCUSSION WITH PATIENT/FAMILY/PROXY:    CRITICAL CARE TIME SPENT: 35 minutes INTERVAL HPI/OVERNIGHT EVENTS: No acute events overnight. Patient resting comfortably.    PRESSORS: [ ] YES [X ] NO  WHICH:    ANTIBIOTICS:                  DATE STARTED:    Antimicrobial:  cefTRIAXone   IVPB 1000 milliGRAM(s) IV Intermittent every 24 hours  rifAXIMin 550 milliGRAM(s) Oral two times a day    Cardiovascular:    Pulmonary:    Hematalogic:  heparin   Injectable 5000 Unit(s) SubCutaneous every 12 hours    Other:  acetylcysteine IVPB 3.9 Gram(s) IV Intermittent once  acetylcysteine IVPB 8 Gram(s) IV Intermittent once  chlorhexidine 2% Cloths 1 Application(s) Topical <User Schedule>  dextrose 5%. 1000 milliLiter(s) IV Continuous <Continuous>  dextrose 5%. 1000 milliLiter(s) IV Continuous <Continuous>  dextrose 50% Injectable 25 Gram(s) IV Push once  dextrose 50% Injectable 12.5 Gram(s) IV Push once  dextrose 50% Injectable 25 Gram(s) IV Push once  dextrose Oral Gel 15 Gram(s) Oral once PRN  folic acid 1 milliGRAM(s) Oral daily  glucagon  Injectable 1 milliGRAM(s) IntraMuscular once  insulin lispro (ADMELOG) corrective regimen sliding scale   SubCutaneous Before meals and at bedtime  lactulose Syrup 20 Gram(s) Oral every 8 hours  multivitamin 1 Tablet(s) Oral daily  mupirocin 2% Ointment 1 Application(s) Topical three times a day  prednisoLONE    3 mG/mL Solution (ORAPRED) 40 milliGRAM(s) Oral daily  thiamine 100 milliGRAM(s) Oral daily    acetylcysteine IVPB 3.9 Gram(s) IV Intermittent once  acetylcysteine IVPB 8 Gram(s) IV Intermittent once  cefTRIAXone   IVPB 1000 milliGRAM(s) IV Intermittent every 24 hours  chlorhexidine 2% Cloths 1 Application(s) Topical <User Schedule>  dextrose 5%. 1000 milliLiter(s) IV Continuous <Continuous>  dextrose 5%. 1000 milliLiter(s) IV Continuous <Continuous>  dextrose 50% Injectable 25 Gram(s) IV Push once  dextrose 50% Injectable 12.5 Gram(s) IV Push once  dextrose 50% Injectable 25 Gram(s) IV Push once  dextrose Oral Gel 15 Gram(s) Oral once PRN  folic acid 1 milliGRAM(s) Oral daily  glucagon  Injectable 1 milliGRAM(s) IntraMuscular once  heparin   Injectable 5000 Unit(s) SubCutaneous every 12 hours  insulin lispro (ADMELOG) corrective regimen sliding scale   SubCutaneous Before meals and at bedtime  lactulose Syrup 20 Gram(s) Oral every 8 hours  multivitamin 1 Tablet(s) Oral daily  mupirocin 2% Ointment 1 Application(s) Topical three times a day  prednisoLONE    3 mG/mL Solution (ORAPRED) 40 milliGRAM(s) Oral daily  rifAXIMin 550 milliGRAM(s) Oral two times a day  thiamine 100 milliGRAM(s) Oral daily    Drug Dosing Weight    Weight (kg): 78.9 (02 Oct 2022 21:05)    CENTRAL LINE: [ ] YES [x] NO  LOCATION:         [place]: [ ] YES [x ] NO          A-LINE:  [ ] YES [x ] NO  LOCATION:       ICU Vital Signs Last 24 Hrs  T(C): 35.7 (09 Oct 2022 11:00), Max: 36.7 (08 Oct 2022 23:39)  T(F): 96.3 (09 Oct 2022 11:00), Max: 98 (08 Oct 2022 23:39)  HR: 96 (09 Oct 2022 13:00) (90 - 109)  BP: 123/77 (09 Oct 2022 13:00) (113/77 - 133/87)  BP(mean): 87 (09 Oct 2022 13:00) (78 - 97)  ABP: --  ABP(mean): --  RR: 15 (09 Oct 2022 13:00) (15 - 38)  SpO2: 92% (09 Oct 2022 13:00) (92% - 100%)    O2 Parameters below as of 09 Oct 2022 10:00  Patient On (Oxygen Delivery Method): room air      10-08 @ 07:01  -  10-09 @ 07:00  --------------------------------------------------------  IN: 1600 mL / OUT: 950 mL / NET: 650        PHYSICAL EXAM:    GENERAL: drowsy lethargic, jaundiced.   HEAD: Nomocephalic, Atraumatic  EYES: sclera icterus   NECK: Supple, No JVD; Normal thyroid; Trachea midline  NERVOUS SYSTEM:  Alert & Oriented X3,  Motor Strength 5/5 B/L upper and lower extremities;   CHEST/LUNG: wheezing   HEART: Regular rate and rhythm; No murmurs,   ABDOMEN: distended   EXTREMITIES:  2+ Peripheral Pulses, No clubbing, cyanosis, or edema  SKIN: No lesions    LABS:  Complete Blood Count (10.09.22 @ 05:57)   Nucleated RBC: 0 /100 WBCs   WBC Count: 19.04 K/uL   RBC Count: 3.20 M/uL   Hemoglobin: 9.5 g/dL   Hematocrit: 29.9 %   Mean Cell Volume: 93.4 fl   Mean Cell Hemoglobin: 29.7 pg   Mean Cell Hemoglobin Conc: 31.8 gm/dL   Red Cell Distrib Width: 21.2 %   Platelet Count - Automated: 317 K/uL   10-09     139  |  109<H>  |  2<L>  ----------------------------( 113<H>     10-09 @ 05:57  3.7   |  20<L>  |  0.33<L>    Ca: 8.1<L>     Phos: 3.6   M.0    TPro: 5.6<L> / Alb: 1.5<L> /  TBili 14.4<H> / DBili x  /  AST 60<H> /  ALT 10 /  AlkPhos  241<H>    PT/INR - ( 09 Oct 2022 05:57 )   PT: 23.2 sec;   INR: 1.94 ratio          RADIOLOGY & ADDITIONAL STUDIES REVIEWED:  ***    [ ]GOALS OF CARE DISCUSSION WITH PATIENT/FAMILY/PROXY:    CRITICAL CARE TIME SPENT: 35 minutes

## 2022-10-10 LAB
ALBUMIN SERPL ELPH-MCNC: 1.6 G/DL — LOW (ref 3.5–5)
ALP SERPL-CCNC: 236 U/L — HIGH (ref 40–120)
ALT FLD-CCNC: 13 U/L DA — SIGNIFICANT CHANGE UP (ref 10–60)
AMMONIA BLD-MCNC: 33 UMOL/L — HIGH (ref 11–32)
ANION GAP SERPL CALC-SCNC: 9 MMOL/L — SIGNIFICANT CHANGE UP (ref 5–17)
ANISOCYTOSIS BLD QL: SLIGHT — SIGNIFICANT CHANGE UP
AST SERPL-CCNC: 74 U/L — HIGH (ref 10–40)
BASOPHILS # BLD AUTO: 0.09 K/UL — SIGNIFICANT CHANGE UP (ref 0–0.2)
BASOPHILS NFR BLD AUTO: 0.4 % — SIGNIFICANT CHANGE UP (ref 0–2)
BILIRUB SERPL-MCNC: 13.7 MG/DL — HIGH (ref 0.2–1.2)
BUN SERPL-MCNC: 5 MG/DL — LOW (ref 7–18)
CALCIUM SERPL-MCNC: 8.3 MG/DL — LOW (ref 8.4–10.5)
CHLORIDE SERPL-SCNC: 110 MMOL/L — HIGH (ref 96–108)
CMV DNA CSF QL NAA+PROBE: SIGNIFICANT CHANGE UP
CMV DNA SPEC NAA+PROBE-LOG#: ABNORMAL LOG10IU/ML
CO2 SERPL-SCNC: 21 MMOL/L — LOW (ref 22–31)
CREAT SERPL-MCNC: 0.36 MG/DL — LOW (ref 0.5–1.3)
EGFR: 160 ML/MIN/1.73M2 — SIGNIFICANT CHANGE UP
EOSINOPHIL # BLD AUTO: 0.06 K/UL — SIGNIFICANT CHANGE UP (ref 0–0.5)
EOSINOPHIL NFR BLD AUTO: 0.3 % — SIGNIFICANT CHANGE UP (ref 0–6)
GLUCOSE BLDC GLUCOMTR-MCNC: 107 MG/DL — HIGH (ref 70–99)
GLUCOSE BLDC GLUCOMTR-MCNC: 113 MG/DL — HIGH (ref 70–99)
GLUCOSE BLDC GLUCOMTR-MCNC: 71 MG/DL — SIGNIFICANT CHANGE UP (ref 70–99)
GLUCOSE BLDC GLUCOMTR-MCNC: 95 MG/DL — SIGNIFICANT CHANGE UP (ref 70–99)
GLUCOSE SERPL-MCNC: 83 MG/DL — SIGNIFICANT CHANGE UP (ref 70–99)
HCT VFR BLD CALC: 29.3 % — LOW (ref 39–50)
HCT VFR BLD CALC: 30.8 % — LOW (ref 39–50)
HGB BLD-MCNC: 9.2 G/DL — LOW (ref 13–17)
HGB BLD-MCNC: 9.6 G/DL — LOW (ref 13–17)
HYPOCHROMIA BLD QL: SLIGHT — SIGNIFICANT CHANGE UP
IMM GRANULOCYTES NFR BLD AUTO: 1.4 % — HIGH (ref 0–0.9)
INR BLD: 1.72 RATIO — HIGH (ref 0.88–1.16)
LYMPHOCYTES # BLD AUTO: 2.27 K/UL — SIGNIFICANT CHANGE UP (ref 1–3.3)
LYMPHOCYTES # BLD AUTO: 9.6 % — LOW (ref 13–44)
MACROCYTES BLD QL: SLIGHT — SIGNIFICANT CHANGE UP
MAGNESIUM SERPL-MCNC: 2 MG/DL — SIGNIFICANT CHANGE UP (ref 1.6–2.6)
MANUAL SMEAR VERIFICATION: SIGNIFICANT CHANGE UP
MCHC RBC-ENTMCNC: 29.4 PG — SIGNIFICANT CHANGE UP (ref 27–34)
MCHC RBC-ENTMCNC: 29.5 PG — SIGNIFICANT CHANGE UP (ref 27–34)
MCHC RBC-ENTMCNC: 31.2 GM/DL — LOW (ref 32–36)
MCHC RBC-ENTMCNC: 31.4 GM/DL — LOW (ref 32–36)
MCV RBC AUTO: 93.9 FL — SIGNIFICANT CHANGE UP (ref 80–100)
MCV RBC AUTO: 94.5 FL — SIGNIFICANT CHANGE UP (ref 80–100)
MONOCYTES # BLD AUTO: 2.28 K/UL — HIGH (ref 0–0.9)
MONOCYTES NFR BLD AUTO: 9.7 % — SIGNIFICANT CHANGE UP (ref 2–14)
NEUTROPHILS # BLD AUTO: 18.55 K/UL — HIGH (ref 1.8–7.4)
NEUTROPHILS NFR BLD AUTO: 78.6 % — HIGH (ref 43–77)
NRBC # BLD: 0 /100 WBCS — SIGNIFICANT CHANGE UP (ref 0–0)
NRBC # BLD: 0 /100 WBCS — SIGNIFICANT CHANGE UP (ref 0–0)
OVALOCYTES BLD QL SMEAR: SLIGHT — SIGNIFICANT CHANGE UP
PHOSPHATE SERPL-MCNC: 3.8 MG/DL — SIGNIFICANT CHANGE UP (ref 2.5–4.5)
PLAT MORPH BLD: NORMAL — SIGNIFICANT CHANGE UP
PLATELET # BLD AUTO: 390 K/UL — SIGNIFICANT CHANGE UP (ref 150–400)
PLATELET # BLD AUTO: 431 K/UL — HIGH (ref 150–400)
PLATELET COUNT - ESTIMATE: NORMAL — SIGNIFICANT CHANGE UP
POIKILOCYTOSIS BLD QL AUTO: SLIGHT — SIGNIFICANT CHANGE UP
POLYCHROMASIA BLD QL SMEAR: SLIGHT — SIGNIFICANT CHANGE UP
POTASSIUM SERPL-MCNC: 3.4 MMOL/L — LOW (ref 3.5–5.3)
POTASSIUM SERPL-SCNC: 3.4 MMOL/L — LOW (ref 3.5–5.3)
PROT SERPL-MCNC: 5.7 G/DL — LOW (ref 6–8.3)
PROTHROM AB SERPL-ACNC: 20.6 SEC — HIGH (ref 10.5–13.4)
RBC # BLD: 3.12 M/UL — LOW (ref 4.2–5.8)
RBC # BLD: 3.26 M/UL — LOW (ref 4.2–5.8)
RBC # FLD: 21.1 % — HIGH (ref 10.3–14.5)
RBC # FLD: 21.2 % — HIGH (ref 10.3–14.5)
RBC BLD AUTO: ABNORMAL
SODIUM SERPL-SCNC: 140 MMOL/L — SIGNIFICANT CHANGE UP (ref 135–145)
TARGETS BLD QL SMEAR: SLIGHT — SIGNIFICANT CHANGE UP
WBC # BLD: 22.62 K/UL — HIGH (ref 3.8–10.5)
WBC # BLD: 23.59 K/UL — HIGH (ref 3.8–10.5)
WBC # FLD AUTO: 22.62 K/UL — HIGH (ref 3.8–10.5)
WBC # FLD AUTO: 23.59 K/UL — HIGH (ref 3.8–10.5)

## 2022-10-10 PROCEDURE — 99232 SBSQ HOSP IP/OBS MODERATE 35: CPT

## 2022-10-10 PROCEDURE — 99233 SBSQ HOSP IP/OBS HIGH 50: CPT | Mod: GC

## 2022-10-10 PROCEDURE — 76705 ECHO EXAM OF ABDOMEN: CPT | Mod: 26

## 2022-10-10 PROCEDURE — 99233 SBSQ HOSP IP/OBS HIGH 50: CPT

## 2022-10-10 RX ORDER — ACETYLCYSTEINE 200 MG/ML
8 VIAL (ML) MISCELLANEOUS ONCE
Refills: 0 | Status: COMPLETED | OUTPATIENT
Start: 2022-10-10 | End: 2022-10-10

## 2022-10-10 RX ORDER — NYSTATIN CREAM 100000 [USP'U]/G
1 CREAM TOPICAL
Refills: 0 | Status: DISCONTINUED | OUTPATIENT
Start: 2022-10-10 | End: 2022-11-04

## 2022-10-10 RX ORDER — PANTOPRAZOLE SODIUM 20 MG/1
40 TABLET, DELAYED RELEASE ORAL
Refills: 0 | Status: DISCONTINUED | OUTPATIENT
Start: 2022-10-10 | End: 2022-11-04

## 2022-10-10 RX ORDER — POTASSIUM CHLORIDE 20 MEQ
40 PACKET (EA) ORAL ONCE
Refills: 0 | Status: COMPLETED | OUTPATIENT
Start: 2022-10-10 | End: 2022-10-10

## 2022-10-10 RX ADMIN — NYSTATIN CREAM 1 APPLICATION(S): 100000 CREAM TOPICAL at 22:43

## 2022-10-10 RX ADMIN — Medication 40 MILLIGRAM(S): at 06:32

## 2022-10-10 RX ADMIN — Medication 100 MILLIGRAM(S): at 06:11

## 2022-10-10 RX ADMIN — Medication 1 TABLET(S): at 11:05

## 2022-10-10 RX ADMIN — Medication 100 MILLIGRAM(S): at 11:05

## 2022-10-10 RX ADMIN — Medication 1 MILLIGRAM(S): at 11:05

## 2022-10-10 RX ADMIN — CHLORHEXIDINE GLUCONATE 1 APPLICATION(S): 213 SOLUTION TOPICAL at 06:13

## 2022-10-10 RX ADMIN — LACTULOSE 10 GRAM(S): 10 SOLUTION ORAL at 22:42

## 2022-10-10 RX ADMIN — HEPARIN SODIUM 5000 UNIT(S): 5000 INJECTION INTRAVENOUS; SUBCUTANEOUS at 14:51

## 2022-10-10 RX ADMIN — HEPARIN SODIUM 5000 UNIT(S): 5000 INJECTION INTRAVENOUS; SUBCUTANEOUS at 06:12

## 2022-10-10 RX ADMIN — CEFTRIAXONE 100 MILLIGRAM(S): 500 INJECTION, POWDER, FOR SOLUTION INTRAMUSCULAR; INTRAVENOUS at 14:32

## 2022-10-10 RX ADMIN — MUPIROCIN 1 APPLICATION(S): 20 OINTMENT TOPICAL at 06:13

## 2022-10-10 RX ADMIN — Medication 65 GRAM(S): at 14:33

## 2022-10-10 RX ADMIN — HEPARIN SODIUM 5000 UNIT(S): 5000 INJECTION INTRAVENOUS; SUBCUTANEOUS at 22:43

## 2022-10-10 RX ADMIN — MUPIROCIN 1 APPLICATION(S): 20 OINTMENT TOPICAL at 14:51

## 2022-10-10 RX ADMIN — PANTOPRAZOLE SODIUM 40 MILLIGRAM(S): 20 TABLET, DELAYED RELEASE ORAL at 14:53

## 2022-10-10 RX ADMIN — Medication 40 MILLIEQUIVALENT(S): at 06:12

## 2022-10-10 RX ADMIN — LACTULOSE 10 GRAM(S): 10 SOLUTION ORAL at 14:33

## 2022-10-10 NOTE — PROGRESS NOTE ADULT - ATTENDING COMMENTS
I personally reviewed patient's labs, flowsheets, pertinent imaging, and consultant notes. MDM of high complexity.    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 cholangitis and also w/ LGIB s/p sigmoidoscopy    ASSESSMENT:  Hepatic encephalopathy  EtOH withdrawal complicated by withdrawal seizure  Lactic acidosis  Acute liver failure  EtOH hepatitis  Elevated INR  Blood loss anemia  Hypokalemia  Hypophosphatemia    Plan:  - appr hepatology & GI recs - mental status slowly improving, still confused at times  - cont xifaxan, lactulose; monitor stool count  - re-attempt RUQ sono - pt had not been cooperative with exam on prior attempt  - cont prednisolone  - cont CFX  - bedside abd sono w/ minimal ascites, no drainable pocket; f/u formal RUQ US  - electrolyte repletion  - resumed HSQ for DVT PPx, CBC remains stable after bleed  - trend CBC/BMP  - seizure and aspiration precautions    Transfer to medicine

## 2022-10-10 NOTE — PROGRESS NOTE ADULT - SUBJECTIVE AND OBJECTIVE BOX
Chief Complaint:  Patient is a 27y old  Male who presents with a chief complaint of Altered mental status (09 Oct 2022 00:45)      Reason for consult: Acute on chronic EtOH liver disease    Interval Events: Remains tachypneic, but hemodynamically stable.  Hb remains stable. Been on prednisolone 40 mg and NAC since 10/8, today day #3. INR and bili with slight improvement, INR 1.72 from 1.94, bili 13.7 from 14.4. MELD 22, was 28. Bedside US by ICU team did not show sufficient ascites pocket. Pending official US abd.    Hospital Medications:  cefTRIAXone   IVPB 1000 milliGRAM(s) IV Intermittent every 24 hours  chlorhexidine 2% Cloths 1 Application(s) Topical <User Schedule>  dextrose 5%. 1000 milliLiter(s) IV Continuous <Continuous>  dextrose 5%. 1000 milliLiter(s) IV Continuous <Continuous>  dextrose 50% Injectable 25 Gram(s) IV Push once  dextrose 50% Injectable 12.5 Gram(s) IV Push once  dextrose 50% Injectable 25 Gram(s) IV Push once  dextrose Oral Gel 15 Gram(s) Oral once PRN  folic acid 1 milliGRAM(s) Oral daily  glucagon  Injectable 1 milliGRAM(s) IntraMuscular once  heparin   Injectable 5000 Unit(s) SubCutaneous every 8 hours  insulin lispro (ADMELOG) corrective regimen sliding scale   SubCutaneous Before meals and at bedtime  lactulose Syrup 10 Gram(s) Oral every 8 hours  multivitamin 1 Tablet(s) Oral daily  mupirocin 2% Ointment 1 Application(s) Topical three times a day  prednisoLONE    3 mG/mL Solution (ORAPRED) 40 milliGRAM(s) Oral daily  rifAXIMin 550 milliGRAM(s) Oral two times a day  thiamine 100 milliGRAM(s) Oral daily      ROS:   General:  No  fevers, chills, night sweats, fatigue  Eyes:  Good vision, no reported pain  ENT:  No sore throat, pain, runny nose  CV:  No pain, palpitations  Pulm:  No dyspnea, cough  GI:  See HPI, otherwise negative  :  No  incontinence, nocturia  Muscle:  No pain, weakness  Neuro:  No memory problems  Psych:  No insomnia, mood problems, depression  Endocrine:  No polyuria, polydipsia, cold/heat intolerance  Heme:  No petechiae, ecchymosis, easy bruisability  Skin:  No rash    PHYSICAL EXAM:   Vital Signs:  Vital Signs Last 24 Hrs  T(C): 36.7 (10 Oct 2022 08:00), Max: 36.7 (10 Oct 2022 08:00)  T(F): 98 (10 Oct 2022 08:00), Max: 98 (10 Oct 2022 08:00)  HR: 101 (10 Oct 2022 10:00) (92 - 103)  BP: 139/82 (10 Oct 2022 09:00) (117/99 - 139/82)  BP(mean): 91 (10 Oct 2022 09:00) (86 - 104)  RR: 29 (10 Oct 2022 10:00) (15 - 34)  SpO2: 95% (10 Oct 2022 10:00) (91% - 100%)    Parameters below as of 10 Oct 2022 08:00  Patient On (Oxygen Delivery Method): room air      Daily     Daily     GENERAL: no acute distress  NEURO: alert, no asterixis  HEENT: anicteric sclera, no conjunctival pallor appreciated  CHEST: no respiratory distress, no accessory muscle use  CARDIAC: regular rate, rhythm  ABDOMEN: soft, non-tender, non-distended, no rebound or guarding  EXTREMITIES: warm, well perfused, no edema  SKIN: no lesions noted    LABS: reviewed                        9.2    23.59 )-----------( 431      ( 10 Oct 2022 06:00 )             29.3     10-10    140  |  110<H>  |  5<L>  ----------------------------<  83  3.4<L>   |  21<L>  |  0.36<L>    Ca    8.3<L>      10 Oct 2022 03:41  Phos  3.8     10-10  Mg     2.0     10-10    TPro  5.7<L>  /  Alb  1.6<L>  /  TBili  13.7<H>  /  DBili  x   /  AST  74<H>  /  ALT  13  /  AlkPhos  236<H>  10-10    LIVER FUNCTIONS - ( 10 Oct 2022 03:41 )  Alb: 1.6 g/dL / Pro: 5.7 g/dL / ALK PHOS: 236 U/L / ALT: 13 U/L DA / AST: 74 U/L / GGT: x             Interval Diagnostic Studies: see sunrise for full report   Chief Complaint:  Patient is a 27y old  Male who presents with a chief complaint of Altered mental status (09 Oct 2022 00:45)      Reason for consult: Acute on chronic EtOH liver disease    Interval Events: Patient was seen and examined at bedside.  ID [id number].  unable to understand patient. He could tell his first name, but not oriented in place or time. Remains tachypneic, but hemodynamically stable.  Hb remains stable. Been on prednisolone 40 mg and NAC since 10/8, today day #3. INR and bili with slight improvement, INR 1.72 from 1.94, bili 13.7 from 14.4. MELD 22, was 28. Bedside US by ICU team did not show sufficient ascites pocket. Pending official US abd.    Hospital Medications:  cefTRIAXone   IVPB 1000 milliGRAM(s) IV Intermittent every 24 hours  chlorhexidine 2% Cloths 1 Application(s) Topical <User Schedule>  dextrose 5%. 1000 milliLiter(s) IV Continuous <Continuous>  dextrose 5%. 1000 milliLiter(s) IV Continuous <Continuous>  dextrose 50% Injectable 25 Gram(s) IV Push once  dextrose 50% Injectable 12.5 Gram(s) IV Push once  dextrose 50% Injectable 25 Gram(s) IV Push once  dextrose Oral Gel 15 Gram(s) Oral once PRN  folic acid 1 milliGRAM(s) Oral daily  glucagon  Injectable 1 milliGRAM(s) IntraMuscular once  heparin   Injectable 5000 Unit(s) SubCutaneous every 8 hours  insulin lispro (ADMELOG) corrective regimen sliding scale   SubCutaneous Before meals and at bedtime  lactulose Syrup 10 Gram(s) Oral every 8 hours  multivitamin 1 Tablet(s) Oral daily  mupirocin 2% Ointment 1 Application(s) Topical three times a day  prednisoLONE    3 mG/mL Solution (ORAPRED) 40 milliGRAM(s) Oral daily  rifAXIMin 550 milliGRAM(s) Oral two times a day  thiamine 100 milliGRAM(s) Oral daily      ROS:   Unable to obtain due to patient condition. Denies abdominal pain.     PHYSICAL EXAM:   Vital Signs:  Vital Signs Last 24 Hrs  T(C): 36.7 (10 Oct 2022 08:00), Max: 36.7 (10 Oct 2022 08:00)  T(F): 98 (10 Oct 2022 08:00), Max: 98 (10 Oct 2022 08:00)  HR: 101 (10 Oct 2022 10:00) (92 - 103)  BP: 139/82 (10 Oct 2022 09:00) (117/99 - 139/82)  BP(mean): 91 (10 Oct 2022 09:00) (86 - 104)  RR: 29 (10 Oct 2022 10:00) (15 - 34)  SpO2: 95% (10 Oct 2022 10:00) (91% - 100%)    Parameters below as of 10 Oct 2022 08:00  Patient On (Oxygen Delivery Method): room air      Daily     Daily     GENERAL: no acute distress but ill appearing  NEURO: awake, alert, oriented to self, no obvious asterixis but very poor effort. Following simple command.   HEENT: icteric sclera  CHEST: no respiratory distress, no accessory muscle use  CARDIAC: regular rate, rhythm  ABDOMEN: soft, more distended, non tender, no rebound or guarding, BS+  EXTREMITIES: warm, well perfused, no edema  SKIN: icterus    LABS: reviewed                        9.2    23.59 )-----------( 431      ( 10 Oct 2022 06:00 )             29.3     10-10    140  |  110<H>  |  5<L>  ----------------------------<  83  3.4<L>   |  21<L>  |  0.36<L>    Ca    8.3<L>      10 Oct 2022 03:41  Phos  3.8     10-10  Mg     2.0     10-10    TPro  5.7<L>  /  Alb  1.6<L>  /  TBili  13.7<H>  /  DBili  x   /  AST  74<H>  /  ALT  13  /  AlkPhos  236<H>  10-10    LIVER FUNCTIONS - ( 10 Oct 2022 03:41 )  Alb: 1.6 g/dL / Pro: 5.7 g/dL / ALK PHOS: 236 U/L / ALT: 13 U/L DA / AST: 74 U/L / GGT: x             Interval Diagnostic Studies: see sunrise for full report
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
## 2022-10-10 NOTE — PROGRESS NOTE ADULT - SUBJECTIVE AND OBJECTIVE BOX
INTERVAL HPI/OVERNIGHT EVENTS: No acute events overnight. patient sitting in chair out of bed. Did not require any sedation overnight     PRESSORS: [ ] YES [X ] NO  WHICH:    ANTIBIOTICS:                  DATE STARTED:    Antimicrobial:  cefTRIAXone   IVPB 1000 milliGRAM(s) IV Intermittent every 24 hours  rifAXIMin 550 milliGRAM(s) Oral two times a day    Cardiovascular:    Pulmonary:    Hematalogic:  heparin   Injectable 5000 Unit(s) SubCutaneous every 8 hours    Other:  chlorhexidine 2% Cloths 1 Application(s) Topical <User Schedule>  dextrose 5%. 1000 milliLiter(s) IV Continuous <Continuous>  dextrose 5%. 1000 milliLiter(s) IV Continuous <Continuous>  dextrose 50% Injectable 25 Gram(s) IV Push once  dextrose 50% Injectable 12.5 Gram(s) IV Push once  dextrose 50% Injectable 25 Gram(s) IV Push once  dextrose Oral Gel 15 Gram(s) Oral once PRN  folic acid 1 milliGRAM(s) Oral daily  glucagon  Injectable 1 milliGRAM(s) IntraMuscular once  insulin lispro (ADMELOG) corrective regimen sliding scale   SubCutaneous Before meals and at bedtime  lactulose Syrup 10 Gram(s) Oral every 8 hours  multivitamin 1 Tablet(s) Oral daily  mupirocin 2% Ointment 1 Application(s) Topical three times a day  prednisoLONE    3 mG/mL Solution (ORAPRED) 40 milliGRAM(s) Oral daily  thiamine 100 milliGRAM(s) Oral daily    cefTRIAXone   IVPB 1000 milliGRAM(s) IV Intermittent every 24 hours  chlorhexidine 2% Cloths 1 Application(s) Topical <User Schedule>  dextrose 5%. 1000 milliLiter(s) IV Continuous <Continuous>  dextrose 5%. 1000 milliLiter(s) IV Continuous <Continuous>  dextrose 50% Injectable 25 Gram(s) IV Push once  dextrose 50% Injectable 12.5 Gram(s) IV Push once  dextrose 50% Injectable 25 Gram(s) IV Push once  dextrose Oral Gel 15 Gram(s) Oral once PRN  folic acid 1 milliGRAM(s) Oral daily  glucagon  Injectable 1 milliGRAM(s) IntraMuscular once  heparin   Injectable 5000 Unit(s) SubCutaneous every 8 hours  insulin lispro (ADMELOG) corrective regimen sliding scale   SubCutaneous Before meals and at bedtime  lactulose Syrup 10 Gram(s) Oral every 8 hours  multivitamin 1 Tablet(s) Oral daily  mupirocin 2% Ointment 1 Application(s) Topical three times a day  prednisoLONE    3 mG/mL Solution (ORAPRED) 40 milliGRAM(s) Oral daily  rifAXIMin 550 milliGRAM(s) Oral two times a day  thiamine 100 milliGRAM(s) Oral daily    Drug Dosing Weight    Weight (kg): 78.9 (02 Oct 2022 21:05)    CENTRAL LINE: [ ] YES [X ] NO  LOCATION:         [place]: [ ] YES [X ] NO          A-LINE:  [ ] YES [X ] NO  LOCATION:         ICU Vital Signs Last 24 Hrs  T(C): 36.7 (10 Oct 2022 08:00), Max: 36.7 (10 Oct 2022 08:00)  T(F): 98 (10 Oct 2022 08:00), Max: 98 (10 Oct 2022 08:00)  HR: 101 (10 Oct 2022 10:00) (92 - 103)  BP: 139/82 (10 Oct 2022 09:00) (117/99 - 139/82)  BP(mean): 91 (10 Oct 2022 09:00) (86 - 104)  ABP: --  ABP(mean): --  RR: 29 (10 Oct 2022 10:00) (15 - 34)  SpO2: 95% (10 Oct 2022 10:00) (91% - 100%)    O2 Parameters below as of 10 Oct 2022 08:00  Patient On (Oxygen Delivery Method): room air                  10-09 @ 07:01  -  10-10 @ 07:00  --------------------------------------------------------  IN: 250 mL / OUT: 402 mL / NET: -152 mL              PHYSICAL EXAM:    GENERAL: altered , jaundiced.   HEAD: Nomocephalic, Atraumatic  EYES: sclera icterus   NECK: Supple, No JVD; Normal thyroid; Trachea midline  NERVOUS SYSTEM:  Alert & Oriented X1,  Motor Strength 5/5 B/L upper and lower extremities;   CHEST/LUNG: wheezing   HEART: Regular rate and rhythm; No murmurs,   ABDOMEN: distended   EXTREMITIES:  2+ Peripheral Pulses, No clubbing, cyanosis, or edema  SKIN: No lesions    LABS:  CBC Full  -  ( 10 Oct 2022 06:00 )  WBC Count : 23.59 K/uL  RBC Count : 3.12 M/uL  Hemoglobin : 9.2 g/dL  Hematocrit : 29.3 %  Platelet Count - Automated : 431 K/uL  Mean Cell Volume : 93.9 fl  Mean Cell Hemoglobin : 29.5 pg  Mean Cell Hemoglobin Concentration : 31.4 gm/dL  Auto Neutrophil # : 18.55 K/uL  Auto Lymphocyte # : 2.27 K/uL  Auto Monocyte # : 2.28 K/uL  Auto Eosinophil # : 0.06 K/uL  Auto Basophil # : 0.09 K/uL  Auto Neutrophil % : 78.6 %  Auto Lymphocyte % : 9.6 %  Auto Monocyte % : 9.7 %  Auto Eosinophil % : 0.3 %  Auto Basophil % : 0.4 %    10-10    140  |  110<H>  |  5<L>  ----------------------------<  83  3.4<L>   |  21<L>  |  0.36<L>    Ca    8.3<L>      10 Oct 2022 03:41  Phos  3.8     10-10  Mg     2.0     10-10    TPro  5.7<L>  /  Alb  1.6<L>  /  TBili  13.7<H>  /  DBili  x   /  AST  74<H>  /  ALT  13  /  AlkPhos  236<H>  10-10    PT/INR - ( 10 Oct 2022 03:41 )   PT: 20.6 sec;   INR: 1.72 ratio                 RADIOLOGY & ADDITIONAL STUDIES REVIEWED:  ***    [ ]GOALS OF CARE DISCUSSION WITH PATIENT/FAMILY/PROXY:    CRITICAL CARE TIME SPENT: 35 minutes

## 2022-10-10 NOTE — PROGRESS NOTE ADULT - ASSESSMENT
25yo Occitan speaking Male s/p Left temporal parietal craniotomy and alcohol abuse (reported current drinking) presented to [facility] ED on 10/2/22 with 1 day Hx of AMS, after reported witnessed seizure episodes and 1 week Hx of jaundice and was admitted to MICU with suspected EtOH withdrawal seizure, electrolyte abnormalities (hyponatremia with Na 125->129, hypokalemia with K 2.7->3.1, hypomagnesemia with Mg 1.0->1.9, hypophosphatemia with P 1.1->1.5), lactic acidosis (with lactate 6.4->3.0, bicarb 19->20), leukocytosis with neutrophilia (WBC 20.35->16.02, roderick 90%) with low grade T (100.1F), anemia without evidence of overt bleeding (Hb 6.9->8.3 s/p 1 U PRBC) and abnormal liver tests including coagulopathy (INR 2.13), hyperbilirubinemia (Se bi 9.3->10.8, direct 8.0), hyperammonemia (131), elevated liver enzymes in AST> ALT pattern (->212, ALT 22) and elevated ->600 and CT a/p w// iv suggesting diffuse hepatic steatosis, hepatomegaly with possible GB sludge and GB wall thickening, but normal bile ducts.      Hepatomegaly and diffuse hepatic steatosis likely due to Alcoholic hepatitis  Jaundice, mostly direct hyperbilirubinemia - Bili 10.2--->14.4->13.7  Coagulopathy - improving, but got vit K and FFP   Elevated transaminases in AST>ALT pattern - improving (-->74)  Elevated ALP - improving (669->236)  Positive UA - UCx neg  Hyponatremia - resolved  - MELD 28 - >27->23->22  - MDF > 32 (68.8->48.3)  - Given AST>ALT pattern, EtOH use, recent jaundice, alcoholic hepatitis is high in differential, but needed to rule out infection, sepsis, as he was tachycardic with low grade T, leukocytosis with neutrophilia. BCx neg. UCx neg. CXR neg. Been on SBP ppx b/o GI bleed since 10/2. Only mild ascites thus Dx paracentesis was not feasible. Was rechecked 10/9.  Noted + rhinovirus.  Procalcitonin 0.32.  - He was started on prednisolone 40 mg on 10/8/22, with NAC. Please, do continue NAC as well (3rd dose). Assess response to prednisolone at day #7, Lille score. Close monitoring for signs of infection or bleeding.   - C/w antibiotics per ID.   - Liver workup so far:  Acute hepatitis viral panel, HIV, COVID-19, Hep BcAb, HBsAb all are neg. EBV past infection. Leptospirosis neg. Ceruloplasmin 25. Please, also send / follow up Hep E IgM, and CMV PCRs/serologies,  Autoimmune panel (send also SMA, LKM, AMA, Ig panel), Peth and Fungitell  - C/w supportive measures per ICU, correction of electrolytes, iv hydration. Monitor lactate.   - Review images for splanchnic veins patency. Pending official US abd.   - Abdomen appears more distended, consider repeat abdominal imaging.   - Dietary consult b/o protein calorie malnutrition      Acute encephalopathy, s/p seizures, but also elevated ammonia, liver disease, alcohol use and Hx of craniotomy and on CIWA. - Improved, but still mostly oriented to se;f.   - Per d/w primary team, patient was functional prior to admission, returned to work after his craniotomy / cerebral hemorrhage (that was likely due to trauma) and rehab  - Aspiration, seizure, fall precautions  - C/w lactulose to titrate to have 2-3 BM/s day. Record BMs. C/w rifaximin.  Ammonia only 33.   - Currently maintaining airways.  - C/w supportive measures per  ICU and antibiotics per ID.   - CT head noted. Consider neurology consult.     Alcohol use (BAL 34 on admission)  - Aspiration, seizure, fall precautions  - C/w folic, thiamine  - CIWA per ICU team. Consider avoiding long acting BZD. It appears that Librium was d/c 10/8/22.   - SW involvement, rehab once acute issues resolved    Anemia, s/p  3U PRBCs (10/2, 10/4 and 10/5)  S/p EGD 10/5/22 - mild portal gastropathy  S/p sigmoidoscopy 10/5/22 - Dieulafoy lesion s/p clip  Coagulopathy - improved, s/p 5 mg vit K 3 days, s/p 1U FFP 10/5, but remains stable since then  - Monitor H/H closely, keep T/S active. Hb remains stable.  - Keep Hb > 7, PLT>50 and fibrinogen > 120. PLT  - F/u GI recommendations  - C/w antibiotics per ID     Thank you for consult   Will continue to follow.  Was d/w ICU team

## 2022-10-11 DIAGNOSIS — K72.00 ACUTE AND SUBACUTE HEPATIC FAILURE WITHOUT COMA: ICD-10-CM

## 2022-10-11 DIAGNOSIS — E87.8 OTHER DISORDERS OF ELECTROLYTE AND FLUID BALANCE, NOT ELSEWHERE CLASSIFIED: ICD-10-CM

## 2022-10-11 DIAGNOSIS — F10.939 ALCOHOL USE, UNSPECIFIED WITH WITHDRAWAL, UNSPECIFIED: ICD-10-CM

## 2022-10-11 DIAGNOSIS — Z02.9 ENCOUNTER FOR ADMINISTRATIVE EXAMINATIONS, UNSPECIFIED: ICD-10-CM

## 2022-10-11 DIAGNOSIS — D72.829 ELEVATED WHITE BLOOD CELL COUNT, UNSPECIFIED: ICD-10-CM

## 2022-10-11 DIAGNOSIS — K65.2 SPONTANEOUS BACTERIAL PERITONITIS: ICD-10-CM

## 2022-10-11 DIAGNOSIS — D63.8 ANEMIA IN OTHER CHRONIC DISEASES CLASSIFIED ELSEWHERE: ICD-10-CM

## 2022-10-11 DIAGNOSIS — K83.09 OTHER CHOLANGITIS: ICD-10-CM

## 2022-10-11 DIAGNOSIS — G93.40 ENCEPHALOPATHY, UNSPECIFIED: ICD-10-CM

## 2022-10-11 LAB
ALBUMIN SERPL ELPH-MCNC: 1.5 G/DL — LOW (ref 3.5–5)
ALP SERPL-CCNC: 231 U/L — HIGH (ref 40–120)
ALT FLD-CCNC: 17 U/L DA — SIGNIFICANT CHANGE UP (ref 10–60)
ANA TITR SER: NEGATIVE — SIGNIFICANT CHANGE UP
ANION GAP SERPL CALC-SCNC: 11 MMOL/L — SIGNIFICANT CHANGE UP (ref 5–17)
AST SERPL-CCNC: 90 U/L — HIGH (ref 10–40)
BILIRUB SERPL-MCNC: 13.1 MG/DL — HIGH (ref 0.2–1.2)
BUN SERPL-MCNC: 5 MG/DL — LOW (ref 7–18)
CALCIUM SERPL-MCNC: 8.3 MG/DL — LOW (ref 8.4–10.5)
CHLORIDE SERPL-SCNC: 108 MMOL/L — SIGNIFICANT CHANGE UP (ref 96–108)
CO2 SERPL-SCNC: 21 MMOL/L — LOW (ref 22–31)
CREAT SERPL-MCNC: 0.45 MG/DL — LOW (ref 0.5–1.3)
EGFR: 150 ML/MIN/1.73M2 — SIGNIFICANT CHANGE UP
FUNGITELL: 35 PG/ML — SIGNIFICANT CHANGE UP
GLUCOSE BLDC GLUCOMTR-MCNC: 113 MG/DL — HIGH (ref 70–99)
GLUCOSE BLDC GLUCOMTR-MCNC: 121 MG/DL — HIGH (ref 70–99)
GLUCOSE BLDC GLUCOMTR-MCNC: 91 MG/DL — SIGNIFICANT CHANGE UP (ref 70–99)
GLUCOSE BLDC GLUCOMTR-MCNC: 93 MG/DL — SIGNIFICANT CHANGE UP (ref 70–99)
GLUCOSE SERPL-MCNC: 82 MG/DL — SIGNIFICANT CHANGE UP (ref 70–99)
HCT VFR BLD CALC: 29.9 % — LOW (ref 39–50)
HGB BLD-MCNC: 9.3 G/DL — LOW (ref 13–17)
INR BLD: 1.96 RATIO — HIGH (ref 0.88–1.16)
MAGNESIUM SERPL-MCNC: 1.8 MG/DL — SIGNIFICANT CHANGE UP (ref 1.6–2.6)
MCHC RBC-ENTMCNC: 29.8 PG — SIGNIFICANT CHANGE UP (ref 27–34)
MCHC RBC-ENTMCNC: 31.1 GM/DL — LOW (ref 32–36)
MCV RBC AUTO: 95.8 FL — SIGNIFICANT CHANGE UP (ref 80–100)
NRBC # BLD: 0 /100 WBCS — SIGNIFICANT CHANGE UP (ref 0–0)
PHOSPHATE SERPL-MCNC: 3.9 MG/DL — SIGNIFICANT CHANGE UP (ref 2.5–4.5)
PLATELET # BLD AUTO: 400 K/UL — SIGNIFICANT CHANGE UP (ref 150–400)
POTASSIUM SERPL-MCNC: 3.2 MMOL/L — LOW (ref 3.5–5.3)
POTASSIUM SERPL-SCNC: 3.2 MMOL/L — LOW (ref 3.5–5.3)
PROT SERPL-MCNC: 5.5 G/DL — LOW (ref 6–8.3)
PROTHROM AB SERPL-ACNC: 23.5 SEC — HIGH (ref 10.5–13.4)
RBC # BLD: 3.12 M/UL — LOW (ref 4.2–5.8)
RBC # FLD: 21.8 % — HIGH (ref 10.3–14.5)
SODIUM SERPL-SCNC: 140 MMOL/L — SIGNIFICANT CHANGE UP (ref 135–145)
WBC # BLD: 28.46 K/UL — HIGH (ref 3.8–10.5)
WBC # FLD AUTO: 28.46 K/UL — HIGH (ref 3.8–10.5)

## 2022-10-11 PROCEDURE — 99233 SBSQ HOSP IP/OBS HIGH 50: CPT

## 2022-10-11 RX ORDER — IBUPROFEN 200 MG
400 TABLET ORAL ONCE
Refills: 0 | Status: COMPLETED | OUTPATIENT
Start: 2022-10-11 | End: 2022-10-11

## 2022-10-11 RX ORDER — POTASSIUM CHLORIDE 20 MEQ
40 PACKET (EA) ORAL
Refills: 0 | Status: COMPLETED | OUTPATIENT
Start: 2022-10-11 | End: 2022-10-11

## 2022-10-11 RX ADMIN — Medication 1 MILLIGRAM(S): at 12:54

## 2022-10-11 RX ADMIN — HEPARIN SODIUM 5000 UNIT(S): 5000 INJECTION INTRAVENOUS; SUBCUTANEOUS at 15:31

## 2022-10-11 RX ADMIN — Medication 40 MILLIGRAM(S): at 05:30

## 2022-10-11 RX ADMIN — MUPIROCIN 1 APPLICATION(S): 20 OINTMENT TOPICAL at 00:35

## 2022-10-11 RX ADMIN — CHLORHEXIDINE GLUCONATE 1 APPLICATION(S): 213 SOLUTION TOPICAL at 05:31

## 2022-10-11 RX ADMIN — Medication 100 MILLIGRAM(S): at 12:54

## 2022-10-11 RX ADMIN — NYSTATIN CREAM 1 APPLICATION(S): 100000 CREAM TOPICAL at 05:31

## 2022-10-11 RX ADMIN — Medication 1 TABLET(S): at 12:54

## 2022-10-11 RX ADMIN — Medication 40 MILLIEQUIVALENT(S): at 18:32

## 2022-10-11 RX ADMIN — HEPARIN SODIUM 5000 UNIT(S): 5000 INJECTION INTRAVENOUS; SUBCUTANEOUS at 22:46

## 2022-10-11 RX ADMIN — CEFTRIAXONE 100 MILLIGRAM(S): 500 INJECTION, POWDER, FOR SOLUTION INTRAMUSCULAR; INTRAVENOUS at 12:55

## 2022-10-11 RX ADMIN — Medication 400 MILLIGRAM(S): at 22:46

## 2022-10-11 RX ADMIN — LACTULOSE 10 GRAM(S): 10 SOLUTION ORAL at 05:30

## 2022-10-11 RX ADMIN — PANTOPRAZOLE SODIUM 40 MILLIGRAM(S): 20 TABLET, DELAYED RELEASE ORAL at 05:30

## 2022-10-11 RX ADMIN — NYSTATIN CREAM 1 APPLICATION(S): 100000 CREAM TOPICAL at 18:32

## 2022-10-11 RX ADMIN — MUPIROCIN 1 APPLICATION(S): 20 OINTMENT TOPICAL at 05:32

## 2022-10-11 RX ADMIN — HEPARIN SODIUM 5000 UNIT(S): 5000 INJECTION INTRAVENOUS; SUBCUTANEOUS at 05:30

## 2022-10-11 RX ADMIN — Medication 40 MILLIEQUIVALENT(S): at 15:31

## 2022-10-11 NOTE — PROGRESS NOTE ADULT - SUBJECTIVE AND OBJECTIVE BOX
NP Note discussed with  Primary Attending    Patient is a 27y old  Male who presents with a chief complaint of Altered mental status (10 Oct 2022 12:07)      INTERVAL HPI/OVERNIGHT EVENTS: no new complaints    MEDICATIONS  (STANDING):  cefTRIAXone   IVPB 1000 milliGRAM(s) IV Intermittent every 24 hours  chlorhexidine 2% Cloths 1 Application(s) Topical <User Schedule>  dextrose 5%. 1000 milliLiter(s) (100 mL/Hr) IV Continuous <Continuous>  dextrose 5%. 1000 milliLiter(s) (50 mL/Hr) IV Continuous <Continuous>  dextrose 50% Injectable 25 Gram(s) IV Push once  dextrose 50% Injectable 12.5 Gram(s) IV Push once  dextrose 50% Injectable 25 Gram(s) IV Push once  folic acid 1 milliGRAM(s) Oral daily  glucagon  Injectable 1 milliGRAM(s) IntraMuscular once  heparin   Injectable 5000 Unit(s) SubCutaneous every 8 hours  insulin lispro (ADMELOG) corrective regimen sliding scale   SubCutaneous Before meals and at bedtime  lactulose Syrup 10 Gram(s) Oral every 8 hours  multivitamin 1 Tablet(s) Oral daily  mupirocin 2% Ointment 1 Application(s) Topical three times a day  nystatin Powder 1 Application(s) Topical two times a day  pantoprazole    Tablet 40 milliGRAM(s) Oral before breakfast  prednisoLONE    3 mG/mL Solution (ORAPRED) 40 milliGRAM(s) Oral daily  rifAXIMin 550 milliGRAM(s) Oral two times a day  thiamine 100 milliGRAM(s) Oral daily    MEDICATIONS  (PRN):  dextrose Oral Gel 15 Gram(s) Oral once PRN Blood Glucose LESS THAN 70 milliGRAM(s)/deciliter  LORazepam   Injectable 2 milliGRAM(s) IV Push every 4 hours PRN Agitation      __________________________________________________  REVIEW OF SYSTEMS:    CONSTITUTIONAL: No fever,   EYES: no acute visual disturbances  NECK: No pain or stiffness  RESPIRATORY: No cough; No shortness of breath  CARDIOVASCULAR: No chest pain, no palpitations  GASTROINTESTINAL: No pain. No nausea or vomiting; No diarrhea   NEUROLOGICAL: No headache or numbness, no tremors  MUSCULOSKELETAL: No joint pain, no muscle pain  GENITOURINARY: no dysuria, no frequency, no hesitancy  PSYCHIATRY: no depression , no anxiety  ALL OTHER  ROS negative        Vital Signs Last 24 Hrs  T(C): 36.9 (11 Oct 2022 12:00), Max: 37.3 (11 Oct 2022 05:03)  T(F): 98.4 (11 Oct 2022 12:00), Max: 99.2 (11 Oct 2022 05:03)  HR: 106 (11 Oct 2022 12:00) (88 - 106)  BP: 127/71 (11 Oct 2022 12:00) (119/74 - 145/92)  BP(mean): 101 (10 Oct 2022 21:00) (83 - 104)  RR: 18 (11 Oct 2022 12:00) (18 - 292)  SpO2: 97% (11 Oct 2022 12:00) (93% - 100%)    Parameters below as of 11 Oct 2022 12:00  Patient On (Oxygen Delivery Method): room air        ________________________________________________  PHYSICAL EXAM:  GENERAL: NAD  HEENT: Normocephalic;  conjunctivae and sclerae clear; moist mucous membranes;   NECK : supple  CHEST/LUNG: Clear to auscultation bilaterally with good air entry   HEART: S1 S2  regular; no murmurs, gallops or rubs  ABDOMEN: Soft, Nontender, Nondistended; Bowel sounds present  EXTREMITIES: no cyanosis; no edema; no calf tenderness  SKIN: warm and dry; no rash  NERVOUS SYSTEM:  Awake and alert; Oriented  to place, person and time ; no new deficits    _________________________________________________  LABS:                        9.3    28.46 )-----------( 400      ( 11 Oct 2022 08:06 )             29.9     10-11    140  |  108  |  5<L>  ----------------------------<  82  3.2<L>   |  21<L>  |  0.45<L>    Ca    8.3<L>      11 Oct 2022 08:06  Phos  3.9     10-11  Mg     1.8     10-11    TPro  5.5<L>  /  Alb  1.5<L>  /  TBili  13.1<H>  /  DBili  x   /  AST  90<H>  /  ALT  17  /  AlkPhos  231<H>  10-11    PT/INR - ( 11 Oct 2022 08:06 )   PT: 23.5 sec;   INR: 1.96 ratio             CAPILLARY BLOOD GLUCOSE      POCT Blood Glucose.: 113 mg/dL (11 Oct 2022 11:37)  POCT Blood Glucose.: 91 mg/dL (11 Oct 2022 07:45)  POCT Blood Glucose.: 95 mg/dL (10 Oct 2022 22:40)  POCT Blood Glucose.: 113 mg/dL (10 Oct 2022 17:46)        RADIOLOGY & ADDITIONAL TESTS:    Imaging Personally Reviewed:  YES/NO    Consultant(s) Notes Reviewed:   YES/ No    Care Discussed with Consultants :     Plan of care was discussed with patient and /or primary care giver; all questions and concerns were addressed and care was aligned with patient's wishes.     NP Note discussed with  Primary Attending    Patient is a 27y old  Male who presents with a chief complaint of Altered mental status (10 Oct 2022 12:07)      INTERVAL HPI/OVERNIGHT EVENTS: no new complaints    MEDICATIONS  (STANDING):  cefTRIAXone   IVPB 1000 milliGRAM(s) IV Intermittent every 24 hours  chlorhexidine 2% Cloths 1 Application(s) Topical <User Schedule>  dextrose 5%. 1000 milliLiter(s) (100 mL/Hr) IV Continuous <Continuous>  dextrose 5%. 1000 milliLiter(s) (50 mL/Hr) IV Continuous <Continuous>  dextrose 50% Injectable 25 Gram(s) IV Push once  dextrose 50% Injectable 12.5 Gram(s) IV Push once  dextrose 50% Injectable 25 Gram(s) IV Push once  folic acid 1 milliGRAM(s) Oral daily  glucagon  Injectable 1 milliGRAM(s) IntraMuscular once  heparin   Injectable 5000 Unit(s) SubCutaneous every 8 hours  insulin lispro (ADMELOG) corrective regimen sliding scale   SubCutaneous Before meals and at bedtime  lactulose Syrup 10 Gram(s) Oral every 8 hours  multivitamin 1 Tablet(s) Oral daily  mupirocin 2% Ointment 1 Application(s) Topical three times a day  nystatin Powder 1 Application(s) Topical two times a day  pantoprazole    Tablet 40 milliGRAM(s) Oral before breakfast  prednisoLONE    3 mG/mL Solution (ORAPRED) 40 milliGRAM(s) Oral daily  rifAXIMin 550 milliGRAM(s) Oral two times a day  thiamine 100 milliGRAM(s) Oral daily    MEDICATIONS  (PRN):  dextrose Oral Gel 15 Gram(s) Oral once PRN Blood Glucose LESS THAN 70 milliGRAM(s)/deciliter  LORazepam   Injectable 2 milliGRAM(s) IV Push every 4 hours PRN Agitation      __________________________________________________  REVIEW OF SYSTEMS:    unable to obtain full ROS due to his mental status is AO x 1 himself     Vital Signs Last 24 Hrs  T(C): 36.9 (11 Oct 2022 12:00), Max: 37.3 (11 Oct 2022 05:03)  T(F): 98.4 (11 Oct 2022 12:00), Max: 99.2 (11 Oct 2022 05:03)  HR: 106 (11 Oct 2022 12:00) (88 - 106)  BP: 127/71 (11 Oct 2022 12:00) (119/74 - 145/92)  BP(mean): 101 (10 Oct 2022 21:00) (83 - 104)  RR: 18 (11 Oct 2022 12:00) (18 - 292)  SpO2: 97% (11 Oct 2022 12:00) (93% - 100%)    Parameters below as of 11 Oct 2022 12:00  Patient On (Oxygen Delivery Method): room air        ________________________________________________  PHYSICAL EXAM:  GENERAL: NAD, jaundice whole body   HEENT: Normocephalic;  conjunctivae and sclerae clear; moist mucous membranes;   NECK : supple  CHEST/LUNG: Clear to auscultation bilaterally with good air entry   HEART: S1 S2  regular; no murmurs, gallops or rubs  ABDOMEN: Soft, Nontender, distended + ascites Bowel sounds present  EXTREMITIES: no cyanosis; no edema; no calf tenderness  SKIN: warm and dry; no rash  NERVOUS SYSTEM:  Awake and alert; Oriented  to himself no new deficits    _________________________________________________  LABS:                        9.3    28.46 )-----------( 400      ( 11 Oct 2022 08:06 )             29.9     10-11    140  |  108  |  5<L>  ----------------------------<  82  3.2<L>   |  21<L>  |  0.45<L>    Ca    8.3<L>      11 Oct 2022 08:06  Phos  3.9     10-11  Mg     1.8     10-11    TPro  5.5<L>  /  Alb  1.5<L>  /  TBili  13.1<H>  /  DBili  x   /  AST  90<H>  /  ALT  17  /  AlkPhos  231<H>  10-11    PT/INR - ( 11 Oct 2022 08:06 )   PT: 23.5 sec;   INR: 1.96 ratio             CAPILLARY BLOOD GLUCOSE      POCT Blood Glucose.: 113 mg/dL (11 Oct 2022 11:37)  POCT Blood Glucose.: 91 mg/dL (11 Oct 2022 07:45)  POCT Blood Glucose.: 95 mg/dL (10 Oct 2022 22:40)  POCT Blood Glucose.: 113 mg/dL (10 Oct 2022 17:46)        RADIOLOGY & ADDITIONAL TESTS:  < from: US Abdomen Liver Followup (10.10.22 @ 17:08) >    ACC: [id number] EXAM:  US ABD LIVER FOLLOWUP                          PROCEDURE DATE:  10/10/2022          INTERPRETATION:  CLINICAL INFORMATION: Cirrhosis.    COMPARISON: CT dated 10/02/2022.    TECHNIQUE: Sonography of the right upper quadrant.    FINDINGS:    Liver: Increased echogenicity. No focal hepatic mass lesion is   demonstrated. The liver appears to be enlarged, however, no measurements   were obtained. Normal portal venous flow.  Bile ducts: Normal caliber. Common bile duct measures 5mm.  Gallbladder: Nonspecific mildly thickened wall measuring 4 mm.  Pancreas: Not visualized due to overlying bowel.  Right kidney: 12 cm. No hydronephrosis.  Ascites: None.  IVC: Visualized portions are within normal limits.    IMPRESSION:    Enlarged fatty liver.        --- End of Report ---            LOIDA PACK MD; Attending Radiologist  This document has been electronically signed. Oct 10 2022  5:21PM    < end of copied text >  < from: Xray Chest 1 View-PORTABLE IMMEDIATE (Xray Chest 1 View-PORTABLE IMMEDIATE .) (10.07.22 @ 13:06) >    ACC: [id number] EXAM:  XR CHEST PORTABLE IMMED 1V                        ACC: [id number] EXAM:  XR ABDOMEN PORTABLE IMMED 1V                          PROCEDURE DATE:  10/07/2022          INTERPRETATION:  Clinical data: Distended abdomen    Supine abdomen one view    FINDINGS: There is a nonspecific nonobstructive bowel gas pattern. There   is no gross free air, organomegaly or abdominal calcifications. The bony   structures are intact    AP chest 1 view    COMPARISON: 10/2/2022    FINDINGS: The heart size, mediastinum and hilum are within normal limits   for. Decreased inspiratory effort with grossly clear lungs. Trachea   midline. No fractures.    IMPRESSION:    Nonspecific nonobstructive bowel gas pattern.    Grossly clear lungs.    --- End of Report ---             RIVKA HERRERA DO; Attending Radiologist  This document has been electronically signed. Oct  7 2022  1:11PM    < end of copied text >  < from: Xray Abdomen 1 View Portable, IMMEDIATE (Xray Abdomen 1 View Portable, IMMEDIATE .) (10.07.22 @ 13:06) >  CC: [id number] EXAM:  XR CHEST PORTABLE IMMED 1V                        ACC: [id number] EXAM:  XR ABDOMEN PORTABLE IMMED 1V                          PROCEDURE DATE:  10/07/2022          INTERPRETATION:  Clinical data: Distended abdomen    Supine abdomen one view    FINDINGS: There is a nonspecific nonobstructive bowel gas pattern. There   is no gross free air, organomegaly or abdominal calcifications. The bony   structures are intact    AP chest 1 view    COMPARISON: 10/2/2022    FINDINGS: The heart size, mediastinum and hilum are within normal limits   for. Decreased inspiratory effort with grossly clear lungs. Trachea   midline. No fractures.    IMPRESSION:    Nonspecific nonobstructive bowel gas pattern.    Grossly clear lungs.    --- End of Report ---             RIVKA HERRERA DO; Attending Radiologist  This document has been electronically signed. Oct  7 2022  1:11PM    < end of copied text >    Imaging Personally Reviewed:  YES    Consultant(s) Notes Reviewed:   YES    Care Discussed with Consultants : hepatology/ GI/ ID     Plan of care was discussed with patient and /or primary care giver; all questions and concerns were addressed and care was aligned with patient's wishes.

## 2022-10-11 NOTE — PROGRESS NOTE ADULT - ASSESSMENT
Patient is a 25 yo Maltese speaking Male, with no known past medical history, s/p Left temporal parietal craniotomy, presenting to the ED with altered mental status since this morning s/p seizure in the setting of Alcohol abuse. Patient admitted to ICU for multiple metabolic derangements.    ASSESSMENT  -Acute encephalopathy 2/2 Hepatic encephalopathy vs Sepsis   -Obstructive jaundice r/out cholangitis  -Alcohol withdrawal seizures  -Anemia  -Lactic acidosis  -Hyponatremia  -s/p Left temporal parietal craniotomy    Neuro:  #Acute encephalopathy due to Hepatic encephalopathy vs Sepsis  s/p 1 dose of lactulose enema for probable hepatic encephalopathy  -PO lactulose resumed now patient is able to tolerate diet - 2 BM reported so far today  - Start rifaximin  -As per family, patient had head trauma 6 months ago which required craniotomy. He went to rehab and returned home. He was able to work after the accident.   -patient is more alert and oriented although waxes and wanes throughout the day.     #Alcohol withdrawal seizures  As per EMS charts, pt drinks alcohol everyday, last drink 2 days ago  No prior hx of seizures  Started on CIWA protocol - CIWA score 2  C/w thiamine, folic acid and MVT   D/C librium taper   D/C 2mg IV ativan PRN     #S/p Left temporal parietal craniotomy  Head trauma 6 months ago. Family unsure which hospital he was at.     Cardiovascular:  #Sinus tachycardia  in the setting of sepsis/acute illness  monitor for now, c/w IVF  resolved     Pulmonary:   No acute issues    Infectious Diseases:  #SBP prophylaxis  Continue Ceftriaxone 1 gram daily  Continue Rifaximin 550 mg twice daily  p/w transaminitis, improved mental status, afebrile ,  elevated WBC trending up 19.4k, direct bili 8.0  Dr. Puentes ID consulted, switched zosyn to ceftriaxone for SBP px  started on Rifaximin 550mg twice daily  c/w ceftriaxone 1g daily for 7 days total for SBP px    #UTI  UA w/WBC 6-10, trace leukocyte esterase, unable to obtain presence of symptoms  c/w ceftriaxone   f/u urine cultures. NGTD     Gastrointestinal:  #Obstructive jaundice in setting of Acute Liver Failure   p/w transaminitis, altered mental status, low grade temp 100.1, elevated WBC 20k, direct bili 8.0  severe hepatomegaly w/mild ascites on CT abdomen w/gall bladder sludge  lipase level normal, low suspicion for pancreatic involvement  C/w ceftriaxone for suspected cholangitis  F/u blood cultures NGTD   hepatitis panel neg   hepatic USG for characterization of gall bladder attempted however patient was very agitated. Will reattempt.   GI consulted, recommending possible MRCP   EGD on 10/5 with GI Dr. Johnson showed Large rectal Dieulafoy lesion. Hemostasis achieved with over-the-scope clip  Hb stable, no longer requiring CBC 18h   PPI  F/U RUQ US      #Transaminitis  in the setting of Alcohol abuse and obstructive jaundice, BAL 34  Utox negative for salicylate and acetaminophen levels  hep panel neg   trend LFTs daily  Maddrey score for Alcoholic hepatitis 74, pt would benefit from glucocorticoid therapy  Dr. Galan hepatology consulted. started on NAC  started on Steroids - prednisone      #Elevated INR and hypoalbuminemia in the setting of liver dysfunction  monitor daily, c/w venodynes and Heparin SQ  for DVT ppx  -started on vitamin K for 3 days  INR improving      #Lactic acidosis   Decreased clearance w/ liver dysfunction  Likely due to post-ictal state from seizure this AM vs sepsis, downtrending from 6.4>IV 1NS bolus> 2.6  resolved     Renal:  #Hyponatremia  on admission, Na 125> 1L NS bolus > 129  resolved    serum osm, urine osm, urine sodium noted     #Hypokalemia  - resolved,  today (10/9) 3.7  C/w monitoring    #Hypophophatemia  - resolved,  today (10/9) 3.6  - c/w monitoring      Endo:  TSH 7.22, likely inaccurate in current state. Will re check after patient more stable.   Diet: Soft and miced.   FS ACHS as no longer NPO    Heme/onc:   #Leukocytosis-  low grade temp 100.1 on admission, r/out sepsis  WBC trending up 19.4k, afebrile on ABT  Avoid Tylenol w/elevated LFTs    #Anemia  P/w Hb 6.9, MCV 87 normocytic  s/p 1 unit PRBC  Hb 9.6 this AM s/p 3 units PRBC total       Skin/ catheter: Yost removed 10/4 , peripheral lines    Prophylaxis: change Heparin Sub Q to Every 8HRS, SCD boots    Goals of Care: Full code,    Dispo: Consider downgrade to medicine    Patient is a 26 yo Yi speaking Male, with no known past medical history, s/p Left temporal parietal craniotomy noted in imaging, presenting to the ED with altered mental status. Pt was not able to provide history and pt's brother's contact information not in chart as family left ED prior to phone number being collected. As per EMS charts, pt was found to be 'asleep' when EMS arrive. As per family, pt has a seizure that lasted about 2-4 minutes. Family states pt has a seizure earlier in the day as well. Pt family denies hx of seizures. They stated pt drinks alcohol on a daily basis and hasn't drank any alcohol in the past 2 days. Family denies any recent trauma related injuries to pt.' Pt's brother told ED attending that the jaundice developed acutely over the last 1 week. In the ED, pt's vitals were Temp 100.1, , /67, RR 18 on room air O2 sat 96% s/p IV Vanc + Zosyn, NS 1 L bolus, Keppra 2 gm x 1, Ativan, Mg, Kcl riders, Thiamine. Patient was brought up to the ICU for severe metabolic derangements. He was started on CIWA protocol with PRN ativan. Patient was started on PO lactulose and IVF. Lactate trended down to normal levels. GI and Hepatology was consulted for transaminitis and hyperbilirubinemia. Patient was started on zosyn for possible cholangitis. CT abd showed hepatomegaly with biliary sludge and mild ascites. They recommended MRCP once patient is more stable. Per chart, patient has a history of left temporal parietal craniotomy. Patient had episode of melena overnight on October 4th. Hb was 7.3. Patient was made NPO, started on protonix drip and octreotide drip. GI on board recommending EGD which did not show any pathology. Flexible sigmoidoscopy showed rectal varices which was cauterized. Patient received one unit pRBC and one unit FFP. Patients Hb was stable around 9 after 3 CBC draws. Patient was switched to ceftriaxone for SBP prophylaxis per ID. He was started on NAC per hepatology which was discontinued He was started on Vit K for 3 days for elevated INR. He was started on prednisone 40mg daily for 28 days for alcoholic hepatitis. Patients diet was advanced and was able to tolerate clear liquid diet. Patient is stable for downgrade to medicine floors.   Seen and examined pt at bedside, assigned PCA speaks same language as pt, he only alert and oriented to his and birthday. continue with enhanced supervision due to his mental status.

## 2022-10-11 NOTE — PROGRESS NOTE ADULT - PROBLEM SELECTOR PLAN 1
s/p 1 dose of lactulose enema for probable hepatic encephalopathy  PO lactulose resumed now patient is able to tolerate diet - Goal is BM 2- 3 times daily. He is moving bowel, no obstruction.   continue rifaximin per encephalopathy - confirmed with Dr. Gonzalez   As per family, patient had head trauma 6 months ago which required craniotomy. He went to rehab and returned home. He was able to work after the accident.

## 2022-10-11 NOTE — PROGRESS NOTE ADULT - PROBLEM SELECTOR PLAN 4
Continue Ceftriaxone 1 gram daily  Continue Rifaximin 550 mg twice daily  p/w transaminitis, improved mental status, afebrile ,  elevated WBC trending up 19.4k, direct bili 8.0  Dr. Puentes ID consulted, switched zosyn to ceftriaxone for SBP px  started on Rifaximin 550mg twice daily  c/w ceftriaxone 1g daily for 7 days total for SBP px - completing on 10/14

## 2022-10-11 NOTE — PATIENT PROFILE ADULT - FALL HARM RISK - HARM RISK INTERVENTIONS
Assistance with ambulation/Assistance OOB with selected safe patient handling equipment/Communicate Risk of Fall with Harm to all staff/Monitor for mental status changes/Move patient closer to nurses' station/Reinforce activity limits and safety measures with patient and family/Reorient to person, place and time as needed/Tailored Fall Risk Interventions/Toileting schedule using arm’s reach rule for commode and bathroom/Use of alarms - bed, chair and/or voice tab/Visual Cue: Yellow wristband and red socks/Bed in lowest position, wheels locked, appropriate side rails in place/Call bell, personal items and telephone in reach/Instruct patient to call for assistance before getting out of bed or chair/Non-slip footwear when patient is out of bed/[place] to call system/Physically safe environment - no spills, clutter or unnecessary equipment/Purposeful Proactive Rounding/Room/bathroom lighting operational, light cord in reach/Unable to comprehend

## 2022-10-11 NOTE — PROGRESS NOTE ADULT - SUBJECTIVE AND OBJECTIVE BOX
Chief Complaint:  Patient is a 27y old  Male who presents with a chief complaint of Altered mental status (11 Oct 2022 12:42)      Reason for consult:    Interval Events:     Hospital Medications:  cefTRIAXone   IVPB 1000 milliGRAM(s) IV Intermittent every 24 hours  dextrose 5%. 1000 milliLiter(s) IV Continuous <Continuous>  dextrose 5%. 1000 milliLiter(s) IV Continuous <Continuous>  dextrose 50% Injectable 25 Gram(s) IV Push once  dextrose 50% Injectable 12.5 Gram(s) IV Push once  dextrose 50% Injectable 25 Gram(s) IV Push once  dextrose Oral Gel 15 Gram(s) Oral once PRN  folic acid 1 milliGRAM(s) Oral daily  glucagon  Injectable 1 milliGRAM(s) IntraMuscular once  heparin   Injectable 5000 Unit(s) SubCutaneous every 8 hours  insulin lispro (ADMELOG) corrective regimen sliding scale   SubCutaneous Before meals and at bedtime  lactulose Syrup 10 Gram(s) Oral every 8 hours  LORazepam   Injectable 2 milliGRAM(s) IV Push every 4 hours PRN  multivitamin 1 Tablet(s) Oral daily  nystatin Powder 1 Application(s) Topical two times a day  pantoprazole    Tablet 40 milliGRAM(s) Oral before breakfast  prednisoLONE    3 mG/mL Solution (ORAPRED) 40 milliGRAM(s) Oral daily  rifAXIMin 550 milliGRAM(s) Oral two times a day  thiamine 100 milliGRAM(s) Oral daily      ROS:   General:  No  fevers, chills, night sweats, fatigue  Eyes:  Good vision, no reported pain  ENT:  No sore throat, pain, runny nose  CV:  No pain, palpitations  Pulm:  No dyspnea, cough  GI:  See HPI, otherwise negative  :  No  incontinence, nocturia  Muscle:  No pain, weakness  Neuro:  No memory problems  Psych:  No insomnia, mood problems, depression  Endocrine:  No polyuria, polydipsia, cold/heat intolerance  Heme:  No petechiae, ecchymosis, easy bruisability  Skin:  No rash    PHYSICAL EXAM:   Vital Signs:  Vital Signs Last 24 Hrs  T(C): 36.9 (11 Oct 2022 12:00), Max: 37.3 (11 Oct 2022 05:03)  T(F): 98.4 (11 Oct 2022 12:00), Max: 99.2 (11 Oct 2022 05:03)  HR: 106 (11 Oct 2022 12:00) (88 - 106)  BP: 127/71 (11 Oct 2022 12:00) (119/74 - 145/92)  BP(mean): 101 (10 Oct 2022 21:00) (83 - 104)  RR: 18 (11 Oct 2022 12:00) (18 - 30)  SpO2: 97% (11 Oct 2022 12:00) (95% - 100%)    Parameters below as of 11 Oct 2022 12:00  Patient On (Oxygen Delivery Method): room air      Daily     Daily     GENERAL: no acute distress  NEURO: alert, no asterixis  HEENT: anicteric sclera, no conjunctival pallor appreciated  CHEST: no respiratory distress, no accessory muscle use  CARDIAC: regular rate, rhythm  ABDOMEN: soft, non-tender, non-distended, no rebound or guarding  EXTREMITIES: warm, well perfused, no edema  SKIN: no lesions noted    LABS: reviewed                        9.3    28.46 )-----------( 400      ( 11 Oct 2022 08:06 )             29.9     10-11    140  |  108  |  5<L>  ----------------------------<  82  3.2<L>   |  21<L>  |  0.45<L>    Ca    8.3<L>      11 Oct 2022 08:06  Phos  3.9     10-11  Mg     1.8     10-11    TPro  5.5<L>  /  Alb  1.5<L>  /  TBili  13.1<H>  /  DBili  x   /  AST  90<H>  /  ALT  17  /  AlkPhos  231<H>  10-11    LIVER FUNCTIONS - ( 11 Oct 2022 08:06 )  Alb: 1.5 g/dL / Pro: 5.5 g/dL / ALK PHOS: 231 U/L / ALT: 17 U/L DA / AST: 90 U/L / GGT: x             Interval Diagnostic Studies: see sunrise for full report   Chief Complaint:  Patient is a 27y old  Male who presents with a chief complaint of Altered mental status (11 Oct 2022 12:42)      Reason for consult: Acute on chronic alcoholic liver disease    Interval Events: Patient was seen and examined at bedside with Luxembourgish speaking RN. He is awake, alert, oriented to self, place, not to time, answers some questions adequately, but most of the time confused. Hb remains stable. Bilirubin with slight improvement 13.1, on day #4 steroid. No new complaints. Abdomen moderately distended but no ascites. Reportedly moving bowels, witnessed by medical team.     Hospital Medications:  cefTRIAXone   IVPB 1000 milliGRAM(s) IV Intermittent every 24 hours  dextrose 5%. 1000 milliLiter(s) IV Continuous <Continuous>  dextrose 5%. 1000 milliLiter(s) IV Continuous <Continuous>  dextrose 50% Injectable 25 Gram(s) IV Push once  dextrose 50% Injectable 12.5 Gram(s) IV Push once  dextrose 50% Injectable 25 Gram(s) IV Push once  dextrose Oral Gel 15 Gram(s) Oral once PRN  folic acid 1 milliGRAM(s) Oral daily  glucagon  Injectable 1 milliGRAM(s) IntraMuscular once  heparin   Injectable 5000 Unit(s) SubCutaneous every 8 hours  insulin lispro (ADMELOG) corrective regimen sliding scale   SubCutaneous Before meals and at bedtime  lactulose Syrup 10 Gram(s) Oral every 8 hours  LORazepam   Injectable 2 milliGRAM(s) IV Push every 4 hours PRN  multivitamin 1 Tablet(s) Oral daily  nystatin Powder 1 Application(s) Topical two times a day  pantoprazole    Tablet 40 milliGRAM(s) Oral before breakfast  prednisoLONE    3 mG/mL Solution (ORAPRED) 40 milliGRAM(s) Oral daily  rifAXIMin 550 milliGRAM(s) Oral two times a day  thiamine 100 milliGRAM(s) Oral daily      ROS:   Still unable to obtain detailed ROS, but reports abdominal discomfort and fatigue.     PHYSICAL EXAM:   Vital Signs:  Vital Signs Last 24 Hrs  T(C): 36.9 (11 Oct 2022 12:00), Max: 37.3 (11 Oct 2022 05:03)  T(F): 98.4 (11 Oct 2022 12:00), Max: 99.2 (11 Oct 2022 05:03)  HR: 106 (11 Oct 2022 12:00) (88 - 106)  BP: 127/71 (11 Oct 2022 12:00) (119/74 - 145/92)  BP(mean): 101 (10 Oct 2022 21:00) (83 - 104)  RR: 18 (11 Oct 2022 12:00) (18 - 30)  SpO2: 97% (11 Oct 2022 12:00) (95% - 100%)    Parameters below as of 11 Oct 2022 12:00  Patient On (Oxygen Delivery Method): room air      Daily     Daily     GENERAL: no acute distress, ill appearing  NEURO: awake, alert, oriented to self, place, not in time, no asterixis  HEENT: icteric sclera  CHEST: no respiratory distress, no accessory muscle use  CARDIAC: regular rate, rhythm  ABDOMEN: soft, non-tender, moderately distended, but no fluid wave, no rebound or guarding, BS+  EXTREMITIES: warm, well perfused, no edema  SKIN: jaundice    LABS: reviewed                        9.3    28.46 )-----------( 400      ( 11 Oct 2022 08:06 )             29.9     10-11    140  |  108  |  5<L>  ----------------------------<  82  3.2<L>   |  21<L>  |  0.45<L>    Ca    8.3<L>      11 Oct 2022 08:06  Phos  3.9     10-11  Mg     1.8     10-11    TPro  5.5<L>  /  Alb  1.5<L>  /  TBili  13.1<H>  /  DBili  x   /  AST  90<H>  /  ALT  17  /  AlkPhos  231<H>  10-11    LIVER FUNCTIONS - ( 11 Oct 2022 08:06 )  Alb: 1.5 g/dL / Pro: 5.5 g/dL / ALK PHOS: 231 U/L / ALT: 17 U/L DA / AST: 90 U/L / GGT: x             Interval Diagnostic Studies: see sunrise for full report

## 2022-10-11 NOTE — PROGRESS NOTE ADULT - NS ATTEND AMEND GEN_ALL_CORE FT
patient aox1, jaundiced with ascites. consults appreciated- c/w steroids for acute alcoholic hepatitis.   will c.w CTx and complete total of 7 days. c/w Rifaximin   d/w Dr. Gonzalez  long term prognosis guarded

## 2022-10-11 NOTE — PROGRESS NOTE ADULT - PROBLEM SELECTOR PLAN 2
p/w transaminitis, altered mental status, low grade temp 100.1, elevated WBC 20k, direct bili 8.0  severe hepatomegaly w/mild ascites on CT abdomen w/gall bladder sludge  lipase level normal, low suspicion for pancreatic involvement  C/w ceftriaxone for suspected cholangitis / SPB ppx per ID Dr. Puentes  F/u blood cultures NGTD   hepatitis panel neg   hepatic USG for characterization of gall bladder attempted however patient was very agitated. Will reattempt.   GI consulted, recommending possible MRCP    follow up with GI for MRCP   (check biliary tree due to transaminitis, hyperbilirubinemia)  EGD on 10/5 with GI Dr. Johnson showed Large rectal Dieulafoy lesion. Hemostasis achieved with over-the-scope clip  Hb stable  PPI  F/U RUQ US without ascites, larged fatty liver noted on 10/10

## 2022-10-11 NOTE — PROGRESS NOTE ADULT - PROBLEM SELECTOR PLAN 3
in the setting of Alcohol abuse and obstructive jaundice, BAL 34  Utox negative for salicylate and acetaminophen levels  hep panel neg   trend LFTs daily  yifan - slowly trending down   Maddrey score for Alcoholic hepatitis 74, pt would benefit from glucocorticoid therapy  Dr. Galan hepatology consulted.  started on Steroids - prednisone  40mg QD x 28 days ( 10/8/2022~) - reveval on D7 of prednisone - L[place] score ( INR/ Yifan score needed)   Elevated INR and hypoalbuminemia in the setting of liver dysfunction  continue Heparin SQ  for DVT ppx  s/p vitamin K for 3 days  INR improving

## 2022-10-11 NOTE — CHART NOTE - NSCHARTNOTEFT_GEN_A_CORE
EVENT:     HPI:    SUBJECTIVE:    OBJECTIVE:  Vital Signs Last 24 Hrs  T(C): 38.3 (11 Oct 2022 21:27), Max: 38.3 (11 Oct 2022 21:27)  T(F): 101 (11 Oct 2022 21:27), Max: 101 (11 Oct 2022 21:27)  HR: 114 (11 Oct 2022 21:27) (91 - 114)  BP: 118/75 (11 Oct 2022 21:27) (117/77 - 130/88)  BP(mean): --  RR: 20 (11 Oct 2022 21:27) (18 - 21)  SpO2: 96% (11 Oct 2022 21:27) (95% - 97%)    Parameters below as of 11 Oct 2022 21:27  Patient On (Oxygen Delivery Method): room air        PHYSICAL EXAM:  Neuro: Awake and alert, oriented to person, place, and time  Cardiovascular: + S1, S2, no murmurs, rubs, or bruits  Respiratory: clear to auscultation bilaterally with good air entry   GI: Abdomen soft, non-tender, bowel sounds present   : Non distended;   Skin: warm and dry; no rash      LABS:                        9.3    28.46 )-----------( 400      ( 11 Oct 2022 08:06 )             29.9     10-11    140  |  108  |  5<L>  ----------------------------<  82  3.2<L>   |  21<L>  |  0.45<L>    Ca    8.3<L>      11 Oct 2022 08:06  Phos  3.9     10-11  Mg     1.8     10-11    TPro  5.5<L>  /  Alb  1.5<L>  /  TBili  13.1<H>  /  DBili  x   /  AST  90<H>  /  ALT  17  /  AlkPhos  231<H>  10-11        EKG:   IMAGING:    ASSESSMENT/PROBLEM:     PLAN:     FOLLOW UP / RESULT: EVENT: Notified by RN, pt with temp, T max 101 rectally, and tachy to 114.     HPI:  28 yo Occitan speaking Male, with no known past medical history, s/p Left temporal parietal craniotomy noted in imaging, presenting to the ED with altered mental status. Pt was not able to provide history and pt's brother's contact information not in chart as family left ED prior to phone number being collected. As per EMS charts, pt was found to be 'asleep' when EMS arrive. As per family, pt has a seizure that lasted about 2-4 minutes. Family states pt has a seizure earlier in the day as well. Pt family denies hx of seizures. They stated pt drinks alcohol on a daily basis and hasn't drank any alcohol in the past 2 days. Family denies any recent trauma related injuries to pt.' Pt's brother told ED attending that the jaundice developed acutely over the last 1 week. In the ED, pt's vitals were Temp 100.1, , /67, RR 18 on room air O2 sat 96% s/p IV Vanc + Zosyn, NS 1 L bolus, Keppra 2 gm x 1, Ativan, Mg, Kcl riders, Thiamine. Patient was brought up to the ICU for severe metabolic derangements. He was started on CIWA protocol with PRN ativan. Patient was started on PO lactulose and IVF. Lactate trended down to normal levels. GI and Hepatology was consulted for transaminitis and hyperbilirubinemia. Patient was started on zosyn for possible cholangitis. CT abd showed hepatomegaly with biliary sludge and mild ascites. They recommended MRCP once patient is more stable. Per chart, patient has a history of left temporal parietal craniotomy. Patient had episode of melena overnight on October 4th. Hb was 7.3. Patient was made NPO, started on protonix drip and octreotide drip. GI on board recommending EGD which did not show any pathology. Flexible sigmoidoscopy showed rectal varices which was cauterized. Patient received one unit pRBC and one unit FFP. Patients Hb was stable around 9 after 3 CBC draws. Patient was switched to ceftriaxone for SBP prophylaxis per ID. He was started on NAC per hepatology which was discontinued He was started on Vit K for 3 days for elevated INR. He was started on prednisone 40mg daily for 28 days for alcoholic hepatitis. Patients diet was advanced and was able to tolerate clear liquid diet. Patient was downgraded to medicine floors.     SUBJECTIVE: Pt seen and evaluated at bedside, is asleep. He appears in NAD. Sitter at bedside--states pt has also been coughing. Labs reviewed, noted with worsening leukocytosis to 28.4     OBJECTIVE:  Vital Signs Last 24 Hrs  T(C): 38.3 (11 Oct 2022 21:27), Max: 38.3 (11 Oct 2022 21:27)  T(F): 101 (11 Oct 2022 21:27), Max: 101 (11 Oct 2022 21:27)  HR: 114 (11 Oct 2022 21:27) (91 - 114)  BP: 118/75 (11 Oct 2022 21:27) (117/77 - 130/88)  BP(mean): --  RR: 20 (11 Oct 2022 21:27) (18 - 21)  SpO2: 96% (11 Oct 2022 21:27) (95% - 97%)    Parameters below as of 11 Oct 2022 21:27  Patient On (Oxygen Delivery Method): room air        PHYSICAL EXAM:  Neuro: Awake and alert, oriented to person, place   Cardiovascular: tachycardic   Respiratory: clear to auscultation bilaterally with good air entry   GI: Abdomen soft, non-tender, bowel sounds present   : Non distended;   Skin: warm and dry; no rash      LABS:                        9.3    28.46 )-----------( 400      ( 11 Oct 2022 08:06 )             29.9     10-11    140  |  108  |  5<L>  ----------------------------<  82  3.2<L>   |  21<L>  |  0.45<L>    Ca    8.3<L>      11 Oct 2022 08:06  Phos  3.9     10-11  Mg     1.8     10-11    TPro  5.5<L>  /  Alb  1.5<L>  /  TBili  13.1<H>  /  DBili  x   /  AST  90<H>  /  ALT  17  /  AlkPhos  231<H>  10-11        EKG:   IMAGING:     < from: Xray Chest 1 View-PORTABLE IMMEDIATE (Xray Chest 1 View-PORTABLE IMMEDIATE .) (10.07.22 @ 13:06) >      IMPRESSION:    Nonspecific nonobstructive bowel gas pattern.    Grossly clear lungs.          ASSESSMENT/PROBLEM: Sepsis possibly due to acute cholangitis vs. possible SBP? (already on Ceftraixone prophylactically)      PLAN:     -Ibuprofen 400 mg PO x 1 dose  -C/w Ceftriaxone 1G q 24 hours for acute cholangitis and SBP ppx   -Blood cx from 10/2, negative. Consider repeat.   -Last covid PCR negative 10/5. F/U covid PCR in AM.   -Continue current/supportive measures     FOLLOW UP / RESULT:    -Monitor effectiveness of above intervention   -Reassess temp per hospital policy   -F/U Covid in PCR in AM EVENT: Notified by RN, pt with temp, T max 101 rectally, and tachy to 114.     HPI:  26 yo Hungarian speaking Male, with no known past medical history, s/p Left temporal parietal craniotomy noted in imaging, presenting to the ED with altered mental status. Pt was not able to provide history and pt's brother's contact information not in chart as family left ED prior to phone number being collected. As per EMS charts, pt was found to be 'asleep' when EMS arrive. As per family, pt has a seizure that lasted about 2-4 minutes. Family states pt has a seizure earlier in the day as well. Pt family denies hx of seizures. They stated pt drinks alcohol on a daily basis and hasn't drank any alcohol in the past 2 days. Family denies any recent trauma related injuries to pt.' Pt's brother told ED attending that the jaundice developed acutely over the last 1 week. In the ED, pt's vitals were Temp 100.1, , /67, RR 18 on room air O2 sat 96% s/p IV Vanc + Zosyn, NS 1 L bolus, Keppra 2 gm x 1, Ativan, Mg, Kcl riders, Thiamine. Patient was brought up to the ICU for severe metabolic derangements. He was started on CIWA protocol with PRN ativan. Patient was started on PO lactulose and IVF. Lactate trended down to normal levels. GI and Hepatology was consulted for transaminitis and hyperbilirubinemia. Patient was started on zosyn for possible cholangitis. CT abd showed hepatomegaly with biliary sludge and mild ascites. They recommended MRCP once patient is more stable. Per chart, patient has a history of left temporal parietal craniotomy. Patient had episode of melena overnight on October 4th. Hb was 7.3. Patient was made NPO, started on protonix drip and octreotide drip. GI on board recommending EGD which did not show any pathology. Flexible sigmoidoscopy showed rectal varices which was cauterized. Patient received one unit pRBC and one unit FFP. Patients Hb was stable around 9 after 3 CBC draws. Patient was switched to ceftriaxone for SBP prophylaxis per ID. He was started on NAC per hepatology which was discontinued He was started on Vit K for 3 days for elevated INR. He was started on prednisone 40mg daily for 28 days for alcoholic hepatitis. Patients diet was advanced and was able to tolerate clear liquid diet. Patient was downgraded to medicine floors.     SUBJECTIVE: Pt seen and evaluated at bedside, is asleep. He appears in NAD. Sitter at bedside--states pt has also been coughing. Labs reviewed, noted with worsening leukocytosis to 28.4     OBJECTIVE:  Vital Signs Last 24 Hrs  T(C): 38.3 (11 Oct 2022 21:27), Max: 38.3 (11 Oct 2022 21:27)  T(F): 101 (11 Oct 2022 21:27), Max: 101 (11 Oct 2022 21:27)  HR: 114 (11 Oct 2022 21:27) (91 - 114)  BP: 118/75 (11 Oct 2022 21:27) (117/77 - 130/88)  BP(mean): --  RR: 20 (11 Oct 2022 21:27) (18 - 21)  SpO2: 96% (11 Oct 2022 21:27) (95% - 97%)    Parameters below as of 11 Oct 2022 21:27  Patient On (Oxygen Delivery Method): room air        PHYSICAL EXAM:  Neuro: Awake and alert, oriented to person, place   Cardiovascular: tachycardic   Respiratory: clear to auscultation bilaterally with good air entry   GI: Abdomen soft, non-tender, bowel sounds present   : Non distended;   Skin: warm and dry; no rash      LABS:                        9.3    28.46 )-----------( 400      ( 11 Oct 2022 08:06 )             29.9     10-11    140  |  108  |  5<L>  ----------------------------<  82  3.2<L>   |  21<L>  |  0.45<L>    Ca    8.3<L>      11 Oct 2022 08:06  Phos  3.9     10-11  Mg     1.8     10-11    TPro  5.5<L>  /  Alb  1.5<L>  /  TBili  13.1<H>  /  DBili  x   /  AST  90<H>  /  ALT  17  /  AlkPhos  231<H>  10-11        EKG:   IMAGING:     < from: Xray Chest 1 View-PORTABLE IMMEDIATE (Xray Chest 1 View-PORTABLE IMMEDIATE .) (10.07.22 @ 13:06) >      IMPRESSION:    Nonspecific nonobstructive bowel gas pattern.    Grossly clear lungs.          ASSESSMENT/PROBLEM: Sepsis possibly due to acute cholangitis vs. possible SBP?     PLAN:     -Ibuprofen 400 mg PO x 1 dose  -C/w Ceftriaxone 1G q 24 hours for acute cholangitis and SBP ppx   -Blood cx from 10/2, negative. Consider repeat.   -Last covid PCR negative 10/5. F/U covid PCR in AM.   -Continue current/supportive measures     FOLLOW UP / RESULT:    -Monitor effectiveness of above intervention   -Reassess temp per hospital policy   -F/U Covid in PCR in AM EVENT: Notified by RN, pt with temp, T max 101 rectally, and tachy to 114.     HPI:  28 yo Yi speaking Male, with no known past medical history, s/p Left temporal parietal craniotomy noted in imaging, presenting to the ED with altered mental status. Pt was not able to provide history and pt's brother's contact information not in chart as family left ED prior to phone number being collected. As per EMS charts, pt was found to be 'asleep' when EMS arrive. As per family, pt has a seizure that lasted about 2-4 minutes. Family states pt has a seizure earlier in the day as well. Pt family denies hx of seizures. They stated pt drinks alcohol on a daily basis and hasn't drank any alcohol in the past 2 days. Family denies any recent trauma related injuries to pt.' Pt's brother told ED attending that the jaundice developed acutely over the last 1 week. In the ED, pt's vitals were Temp 100.1, , /67, RR 18 on room air O2 sat 96% s/p IV Vanc + Zosyn, NS 1 L bolus, Keppra 2 gm x 1, Ativan, Mg, Kcl riders, Thiamine. Patient was brought up to the ICU for severe metabolic derangements. He was started on CIWA protocol with PRN ativan. Patient was started on PO lactulose and IVF. Lactate trended down to normal levels. GI and Hepatology was consulted for transaminitis and hyperbilirubinemia. Patient was started on zosyn for possible cholangitis. CT abd showed hepatomegaly with biliary sludge and mild ascites. They recommended MRCP once patient is more stable. Per chart, patient has a history of left temporal parietal craniotomy. Patient had episode of melena overnight on October 4th. Hb was 7.3. Patient was made NPO, started on protonix drip and octreotide drip. GI on board recommending EGD which did not show any pathology. Flexible sigmoidoscopy showed rectal varices which was cauterized. Patient received one unit pRBC and one unit FFP. Patients Hb was stable around 9 after 3 CBC draws. Patient was switched to ceftriaxone for SBP prophylaxis per ID. He was started on NAC per hepatology which was discontinued He was started on Vit K for 3 days for elevated INR. He was started on prednisone 40mg daily for 28 days for alcoholic hepatitis. Patients diet was advanced and was able to tolerate clear liquid diet. Patient was downgraded to medicine floors.     SUBJECTIVE: Pt seen and evaluated at bedside, is asleep. He appears in NAD. Sitter at bedside--states pt has also been coughing. Labs reviewed, noted with worsening leukocytosis to 28.4 (pt also on steroids).     OBJECTIVE:  Vital Signs Last 24 Hrs  T(C): 38.3 (11 Oct 2022 21:27), Max: 38.3 (11 Oct 2022 21:27)  T(F): 101 (11 Oct 2022 21:27), Max: 101 (11 Oct 2022 21:27)  HR: 114 (11 Oct 2022 21:27) (91 - 114)  BP: 118/75 (11 Oct 2022 21:27) (117/77 - 130/88)  BP(mean): --  RR: 20 (11 Oct 2022 21:27) (18 - 21)  SpO2: 96% (11 Oct 2022 21:27) (95% - 97%)    Parameters below as of 11 Oct 2022 21:27  Patient On (Oxygen Delivery Method): room air        PHYSICAL EXAM:  Neuro: Awake and alert, oriented to person, place   Cardiovascular: tachycardic   Respiratory: clear to auscultation bilaterally with good air entry   GI: Abdomen soft, non-tender, bowel sounds present   : Non distended;   Skin: warm and dry; no rash      LABS:                        9.3    28.46 )-----------( 400      ( 11 Oct 2022 08:06 )             29.9     10-11    140  |  108  |  5<L>  ----------------------------<  82  3.2<L>   |  21<L>  |  0.45<L>    Ca    8.3<L>      11 Oct 2022 08:06  Phos  3.9     10-11  Mg     1.8     10-11    TPro  5.5<L>  /  Alb  1.5<L>  /  TBili  13.1<H>  /  DBili  x   /  AST  90<H>  /  ALT  17  /  AlkPhos  231<H>  10-11        EKG:   IMAGING:     < from: Xray Chest 1 View-PORTABLE IMMEDIATE (Xray Chest 1 View-PORTABLE IMMEDIATE .) (10.07.22 @ 13:06) >      IMPRESSION:    Nonspecific nonobstructive bowel gas pattern.    Grossly clear lungs.          ASSESSMENT/PROBLEM: Sepsis possibly due to acute cholangitis vs. possible SBP?     PLAN:     -Ibuprofen 400 mg PO x 1 dose  -C/w Ceftriaxone 1G q 24 hours for acute cholangitis and SBP ppx   -Blood cx from 10/2, negative. Consider repeat.   -Last covid PCR negative 10/5. F/U covid PCR in AM.   -Continue current/supportive measures     FOLLOW UP / RESULT:    -Monitor effectiveness of above intervention   -Reassess temp per hospital policy   -F/U Covid in PCR in AM

## 2022-10-11 NOTE — PROGRESS NOTE ADULT - ASSESSMENT
25yo Tajik speaking Male s/p Left temporal parietal craniotomy and alcohol abuse (reported current drinking) presented to [facility] on 10/2/22 with 1 day Hx of AMS, after reported witnessed seizure episodes and 1 week Hx of jaundice and was admitted to MICU with suspected EtOH withdrawal seizure, electrolyte abnormalities (hyponatremia with Na 125->129, hypokalemia with K 2.7->3.1, hypomagnesemia with Mg 1.0->1.9, hypophosphatemia with P 1.1->1.5), lactic acidosis (with lactate 6.4->3.0, bicarb 19->20), leukocytosis with neutrophilia (WBC 20.35->16.02, roderick 90%) with low grade T (100.1F), anemia without evidence of overt bleeding (Hb 6.9->8.3 s/p 1 U PRBC) and abnormal liver tests including coagulopathy (INR 2.13), hyperbilirubinemia (Se bi 9.3->10.8, direct 8.0), hyperammonemia (131), elevated liver enzymes in AST> ALT pattern (->212, ALT 22) and elevated ->600 and CT a/p w// iv suggesting diffuse hepatic steatosis, hepatomegaly with possible GB sludge and GB wall thickening, but normal bile ducts.  He was started on Alcoholic hepatitis management after infection was rule out, and UGIB was ruled out.       Hepatomegaly and diffuse hepatic steatosis likely due to Alcoholic hepatitis  Jaundice, mostly direct hyperbilirubinemia - Bili 10.2--->14.4->13.7->13.1  Coagulopathy - improving, but got vit K and FFP   Elevated transaminases in AST>ALT pattern - overall improved (-->60-->90)  Elevated ALP - improving (669->236)  Positive UA - UCx neg  Hyponatremia - resolved  - MELD 28 - >27->23->22  - MDF > 32 (68.8->48.3)  - Given AST>ALT pattern, EtOH use, recent jaundice, alcoholic hepatitis is high in differential, but needed to rule out infection, sepsis, as he was tachycardic with low grade T, leukocytosis with neutrophilia. BCx neg. UCx neg. CXR neg. Been on SBP ppx b/o GI bleed since 10/2. Only mild ascites thus Dx paracentesis was not feasible. Was rechecked 10/9.  Noted + rhinovirus.  Procalcitonin 0.32.  - He was started on prednisolone 40 mg on 10/8/22, with NAC. Still can consider to continue NAC as well (3rd dose) for one more day. Assess response to prednisolone at day #7, Lille score. Close monitoring for signs of infection or bleeding.   - Liver workup so far:  Acute hepatitis viral panel, HIV, COVID-19, Hep BcAb, HBsAb all are neg. EBV past infection. Leptospirosis neg. Ceruloplasmin 25. Please, also send / follow up Hep E IgM, and CMV PCRs/serologies,  Autoimmune panel (send also SMA, LKM, AMA, Ig panel), Peth and Fungitell  - Review images for splanchnic vein patency.  - Abdomen appears more distended, but no ascites, had AXR with non specific, non obstructive gas pattern (10/7). Lactulose might contribute. If worsening, consider repeat AXR.    - Nutritional optimization      Acute encephalopathy, s/p seizures, but also elevated ammonia, liver disease, alcohol use and Hx of craniotomy and on CIWA. - Improved, but still mostly oriented to se;f.   - Per d/w primary team, patient was functional prior to admission, returned to work after his craniotomy / cerebral hemorrhage (that was likely due to trauma) and rehab  - Aspiration, seizure, fall precautions  - C/w lactulose to titrate to have 2-3 BM/s day. Record BMs. C/w rifaximin.  Ammonia only 33.   - Currently maintaining airways.  - C/w supportive measures per  primary team.  - CT head noted. Consider neurology consult.     Alcohol use (BAL 34 on admission)  - Aspiration, seizure, fall precautions  - C/w folic, thiamine  - CIWA per ICU team. Consider avoiding long acting BZD. It appears that Librium was d/c 10/8/22.   - SW involvement, rehab once acute issues resolved    Anemia, s/p  3U PRBCs (10/2, 10/4 and 10/5)  S/p EGD 10/5/22 - mild portal gastropathy  S/p sigmoidoscopy 10/5/22 - Dieulafoy lesion s/p clip  Coagulopathy - improved, s/p 5 mg vit K 3 days, s/p 1U FFP 10/5, but remains stable since then  - Monitor H/H closely, keep T/S active. Hb remains stable.  - Keep Hb > 7, PLT>50 and fibrinogen > 120. PLT  - F/u GI recommendations  - Been on ceftriaxone for SBP ppx, completed 7 days, ID following    Thank you for consult   Will continue to follow.  Was d/w ICU team

## 2022-10-11 NOTE — CHART NOTE - NSCHARTNOTEFT_GEN_A_CORE
Re-assessment:   Nutrition note/follow up: Patient is a 27y old  Male who presents with a chief complaint of Altered mental status (10 Oct 2022 12:07).  Chart reviewed, Patient from home, admitted for convulsions in the setting of alcohol abuse. Pt was downgraded from ICU 10/10, to medicine floor.  Visited pt awake & alert. Pt speaks Nigerien, noted with distended abdomen. As per RN ate 50% of his meal today.     Factors impacting intake: [ ] none [ ] nausea  [ ] vomiting [ ] diarrhea [ ] constipation  [ ]chewing problems [ ] swallowing issues  [x ] other: transaminitis, GI bleed, jaundice,     Diet Prescription: Minced and moist  Intake: 50%    Daily     Daily Weight in k.9 (09 Oct 2022 08:00)  Weight in k.9 (03 Oct 2022 08:00)    % Weight Change    Pertinent Medications: MEDICATIONS  (STANDING):  cefTRIAXone   IVPB 1000 milliGRAM(s) IV Intermittent every 24 hours  chlorhexidine 2% Cloths 1 Application(s) Topical <User Schedule>  dextrose 5%. 1000 milliLiter(s) (100 mL/Hr) IV Continuous <Continuous>  dextrose 5%. 1000 milliLiter(s) (50 mL/Hr) IV Continuous <Continuous>  dextrose 50% Injectable 25 Gram(s) IV Push once  dextrose 50% Injectable 12.5 Gram(s) IV Push once  dextrose 50% Injectable 25 Gram(s) IV Push once  folic acid 1 milliGRAM(s) Oral daily  glucagon  Injectable 1 milliGRAM(s) IntraMuscular once  heparin   Injectable 5000 Unit(s) SubCutaneous every 8 hours  insulin lispro (ADMELOG) corrective regimen sliding scale   SubCutaneous Before meals and at bedtime  lactulose Syrup 10 Gram(s) Oral every 8 hours  multivitamin 1 Tablet(s) Oral daily  mupirocin 2% Ointment 1 Application(s) Topical three times a day  nystatin Powder 1 Application(s) Topical two times a day  pantoprazole    Tablet 40 milliGRAM(s) Oral before breakfast  prednisoLONE    3 mG/mL Solution (ORAPRED) 40 milliGRAM(s) Oral daily  rifAXIMin 550 milliGRAM(s) Oral two times a day  thiamine 100 milliGRAM(s) Oral daily    MEDICATIONS  (PRN):  dextrose Oral Gel 15 Gram(s) Oral once PRN Blood Glucose LESS THAN 70 milliGRAM(s)/deciliter  LORazepam   Injectable 2 milliGRAM(s) IV Push every 4 hours PRN Agitation    Pertinent Labs: 10-11 Na140 mmol/L Glu 82 mg/dL K+ 3.2 mmol/L<L> Cr  0.45 mg/dL<L> BUN 5 mg/dL<L> 10-11 Phos 3.9 mg/dL 10-11 Alb 1.5 g/dL<L> 10-02 Chol 363 mg/dL<H> LDL --    HDL 14 mg/dL<L> Trig 319 mg/dL<H>     CAPILLARY BLOOD GLUCOSE      POCT Blood Glucose.: 91 mg/dL (11 Oct 2022 07:45)  POCT Blood Glucose.: 95 mg/dL (10 Oct 2022 22:40)  POCT Blood Glucose.: 113 mg/dL (10 Oct 2022 17:46)  POCT Blood Glucose.: 107 mg/dL (10 Oct 2022 11:09)    Skin: Intact    Estimated Needs:   [ x] no change since previous assessment  [ ] recalculated:       Previous Nutrition Diagnosis:   [ ] Altered GI function  [x ]Inadequate Oral Intake [ ] Swallowing Difficulty   [ ] Altered nutrition related labs [ ] Increased Nutrient Needs [ ] Overweight/Obesity   [ ] Unintended Weight Loss [ ] Food & Nutrition Related Knowledge Deficit [ ] Malnutrition   [ ] Other:     Nutrition Diagnosis is [ x] ongoing  [ ] resolved [ ] not applicable     New Nutrition Diagnosis: [ ] not applicable       Interventions: Nursing to continue with set up and encouragement.   Recommend  [ ] Change Diet To:  [ ] Nutrition Supplement  [ ] Nutrition Support  [ ] Other:     Monitoring and Evaluation:   [ ] PO intake [ x ] Tolerance to diet prescription [ x ] weights [ x ] labs[ x ] follow up per protocol  [ ] other:

## 2022-10-11 NOTE — PROGRESS NOTE ADULT - PROBLEM SELECTOR PLAN 6
due to poor P.O intake and acute liver failure  replaced with supplement for hyponatremia, hypokalemia

## 2022-10-11 NOTE — PROGRESS NOTE ADULT - PROBLEM SELECTOR PLAN 5
As per EMS charts, pt drinks alcohol everyday, last drink 2 days prior to admission  No prior hx of seizures  C/w thiamine, folic acid and MVT   D/C librium taper   D/C 2mg IV ativan PRN  continue CIWA   enhanced supervision for agitation

## 2022-10-12 LAB
ALBUMIN SERPL ELPH-MCNC: 1.6 G/DL — LOW (ref 3.5–5)
ALP SERPL-CCNC: 215 U/L — HIGH (ref 40–120)
ALT FLD-CCNC: 17 U/L DA — SIGNIFICANT CHANGE UP (ref 10–60)
ANION GAP SERPL CALC-SCNC: 9 MMOL/L — SIGNIFICANT CHANGE UP (ref 5–17)
APTT BLD: 44.9 SEC — HIGH (ref 27.5–35.5)
AST SERPL-CCNC: 81 U/L — HIGH (ref 10–40)
BILIRUB SERPL-MCNC: 12.6 MG/DL — HIGH (ref 0.2–1.2)
BUN SERPL-MCNC: 6 MG/DL — LOW (ref 7–18)
CALCIUM SERPL-MCNC: 8.4 MG/DL — SIGNIFICANT CHANGE UP (ref 8.4–10.5)
CHLORIDE SERPL-SCNC: 109 MMOL/L — HIGH (ref 96–108)
CO2 SERPL-SCNC: 21 MMOL/L — LOW (ref 22–31)
CREAT SERPL-MCNC: 0.55 MG/DL — SIGNIFICANT CHANGE UP (ref 0.5–1.3)
EGFR: 141 ML/MIN/1.73M2 — SIGNIFICANT CHANGE UP
GLUCOSE BLDC GLUCOMTR-MCNC: 107 MG/DL — HIGH (ref 70–99)
GLUCOSE BLDC GLUCOMTR-MCNC: 150 MG/DL — HIGH (ref 70–99)
GLUCOSE BLDC GLUCOMTR-MCNC: 92 MG/DL — SIGNIFICANT CHANGE UP (ref 70–99)
GLUCOSE BLDC GLUCOMTR-MCNC: 96 MG/DL — SIGNIFICANT CHANGE UP (ref 70–99)
GLUCOSE SERPL-MCNC: 72 MG/DL — SIGNIFICANT CHANGE UP (ref 70–99)
HCT VFR BLD CALC: 28.8 % — LOW (ref 39–50)
HGB BLD-MCNC: 8.8 G/DL — LOW (ref 13–17)
INR BLD: 1.95 RATIO — HIGH (ref 0.88–1.16)
MCHC RBC-ENTMCNC: 29.2 PG — SIGNIFICANT CHANGE UP (ref 27–34)
MCHC RBC-ENTMCNC: 30.6 GM/DL — LOW (ref 32–36)
MCV RBC AUTO: 95.7 FL — SIGNIFICANT CHANGE UP (ref 80–100)
NRBC # BLD: 0 /100 WBCS — SIGNIFICANT CHANGE UP (ref 0–0)
PLATELET # BLD AUTO: 353 K/UL — SIGNIFICANT CHANGE UP (ref 150–400)
POTASSIUM SERPL-MCNC: 3.3 MMOL/L — LOW (ref 3.5–5.3)
POTASSIUM SERPL-SCNC: 3.3 MMOL/L — LOW (ref 3.5–5.3)
PROT SERPL-MCNC: 5.3 G/DL — LOW (ref 6–8.3)
PROTHROM AB SERPL-ACNC: 23.4 SEC — HIGH (ref 10.5–13.4)
RBC # BLD: 3.01 M/UL — LOW (ref 4.2–5.8)
RBC # FLD: 21.2 % — HIGH (ref 10.3–14.5)
SARS-COV-2 RNA SPEC QL NAA+PROBE: SIGNIFICANT CHANGE UP
SODIUM SERPL-SCNC: 139 MMOL/L — SIGNIFICANT CHANGE UP (ref 135–145)
WBC # BLD: 26.28 K/UL — HIGH (ref 3.8–10.5)
WBC # FLD AUTO: 26.28 K/UL — HIGH (ref 3.8–10.5)

## 2022-10-12 PROCEDURE — 99233 SBSQ HOSP IP/OBS HIGH 50: CPT

## 2022-10-12 PROCEDURE — 99232 SBSQ HOSP IP/OBS MODERATE 35: CPT

## 2022-10-12 PROCEDURE — 71045 X-RAY EXAM CHEST 1 VIEW: CPT | Mod: 26

## 2022-10-12 RX ORDER — TRAMADOL HYDROCHLORIDE 50 MG/1
25 TABLET ORAL ONCE
Refills: 0 | Status: DISCONTINUED | OUTPATIENT
Start: 2022-10-12 | End: 2022-10-12

## 2022-10-12 RX ORDER — POTASSIUM CHLORIDE 20 MEQ
40 PACKET (EA) ORAL ONCE
Refills: 0 | Status: COMPLETED | OUTPATIENT
Start: 2022-10-12 | End: 2022-10-12

## 2022-10-12 RX ADMIN — Medication 1 TABLET(S): at 12:04

## 2022-10-12 RX ADMIN — Medication 40 MILLIGRAM(S): at 06:03

## 2022-10-12 RX ADMIN — NYSTATIN CREAM 1 APPLICATION(S): 100000 CREAM TOPICAL at 17:10

## 2022-10-12 RX ADMIN — TRAMADOL HYDROCHLORIDE 25 MILLIGRAM(S): 50 TABLET ORAL at 12:03

## 2022-10-12 RX ADMIN — Medication 400 MILLIGRAM(S): at 00:37

## 2022-10-12 RX ADMIN — HEPARIN SODIUM 5000 UNIT(S): 5000 INJECTION INTRAVENOUS; SUBCUTANEOUS at 13:17

## 2022-10-12 RX ADMIN — Medication 40 MILLIEQUIVALENT(S): at 10:09

## 2022-10-12 RX ADMIN — Medication 100 MILLIGRAM(S): at 12:04

## 2022-10-12 RX ADMIN — PANTOPRAZOLE SODIUM 40 MILLIGRAM(S): 20 TABLET, DELAYED RELEASE ORAL at 06:05

## 2022-10-12 RX ADMIN — TRAMADOL HYDROCHLORIDE 25 MILLIGRAM(S): 50 TABLET ORAL at 13:00

## 2022-10-12 RX ADMIN — Medication 1 MILLIGRAM(S): at 12:04

## 2022-10-12 RX ADMIN — NYSTATIN CREAM 1 APPLICATION(S): 100000 CREAM TOPICAL at 06:04

## 2022-10-12 RX ADMIN — HEPARIN SODIUM 5000 UNIT(S): 5000 INJECTION INTRAVENOUS; SUBCUTANEOUS at 05:08

## 2022-10-12 RX ADMIN — HEPARIN SODIUM 5000 UNIT(S): 5000 INJECTION INTRAVENOUS; SUBCUTANEOUS at 21:19

## 2022-10-12 NOTE — PROGRESS NOTE ADULT - PROBLEM SELECTOR PLAN 8
likely due to steroid  afebrile   Avoid Tylenol w/elevated LFTs likely due to steroid  afebrile   Avoid Tylenol w/elevated LFTs  Consider stopping Steroids if continue to spike fevers.

## 2022-10-12 NOTE — PROGRESS NOTE ADULT - ASSESSMENT
27yo Malay speaking Male s/p Left temporal parietal craniotomy and alcohol abuse (reported current drinking) presented to [facility] ED on 10/2/22 with 1 day Hx of AMS, after reported witnessed seizure episodes and 1 week Hx of jaundice and was admitted to MICU with suspected EtOH withdrawal seizure, electrolyte abnormalities (hyponatremia with Na 125->129, hypokalemia with K 2.7->3.1, hypomagnesemia with Mg 1.0->1.9, hypophosphatemia with P 1.1->1.5), lactic acidosis (with lactate 6.4->3.0, bicarb 19->20), leukocytosis with neutrophilia (WBC 20.35->16.02, roderick 90%) with low grade T (100.1F), anemia without evidence of overt bleeding (Hb 6.9->8.3 s/p 1 U PRBC) and abnormal liver tests including coagulopathy (INR 2.13), hyperbilirubinemia (Se bi 9.3->10.8, direct 8.0), hyperammonemia (131), elevated liver enzymes in AST> ALT pattern (->212, ALT 22) and elevated ->600 and CT a/p w// iv suggesting diffuse hepatic steatosis, hepatomegaly with possible GB sludge and GB wall thickening, but normal bile ducts.  He was started on Alcoholic hepatitis management after infection was rule out, and UGIB was ruled out.       Hepatomegaly and diffuse hepatic steatosis likely due to Alcoholic hepatitis  Jaundice, mostly direct hyperbilirubinemia - Bili 10.2--->14.4->13.7->13.1->12.6  Coagulopathy - improving, but got vit K and FFP   Elevated transaminases in AST>ALT pattern - overall improved (-->60-->81)  Elevated ALP - improving (669->215)  Positive UA - UCx neg  Hyponatremia - resolved  - MELD 28 - >27->23->22  - MDF > 32 (68.8->48.3)  - Given AST>ALT pattern, EtOH use, recent jaundice, alcoholic hepatitis is high in differential, but needed to rule out infection, sepsis, as he was tachycardic with low grade T, leukocytosis with neutrophilia. BCx neg. UCx neg. CXR neg. Been on SBP ppx b/o GI bleed since 10/2. Only mild ascites thus Dx paracentesis was not feasible. Was rechecked 10/9.  Noted + rhinovirus.  Procalcitonin 0.32.  - He was started on prednisolone 40 mg on 10/8/22, with NAC. Still can consider to continue NAC as well (3rd dose) for one more day. Plan was to assess response to prednisolone at day #7, Lille score. However, last night noted one episode low grade T (18.3), w/o signs of obvious infection. ID following, pending repeat septic workup, please, also send Fungitell and fungal culture as well. If signs of infection or recurrent fever episodes, will need to consider holding steroid.    - Liver workup so far:  Acute hepatitis viral panel, HIV, COVID-19, Hep BcAb, HBsAb all are neg. EBV past infection. Leptospirosis neg. Ceruloplasmin 25. Please, also send / follow up Hep E IgM, and CMV PCRs/serologies,  Autoimmune panel (send also SMA, LKM, AMA, Ig panel), Peth and Fungitell  - Review images for splanchnic vein patency.  - Abdomen appears more distended, but no ascites, had AXR with non specific, non obstructive gas pattern (10/7). Lactulose might contribute. If worsening, consider repeat AXR.    - Nutritional optimization      Acute encephalopathy, s/p seizures, but also elevated ammonia, liver disease, alcohol use and Hx of craniotomy and on CIWA. - Improved, but still mostly oriented to se;f.   - Per d/w primary team, patient was functional prior to admission, returned to work after his craniotomy / cerebral hemorrhage (that was likely due to trauma) and rehab  - Aspiration, seizure, fall precautions  - C/w lactulose to titrate to have 2-3 BM/s day. Record BMs. C/w rifaximin.  Ammonia only 33.   - Currently maintaining airways.  - C/w supportive measures per  primary team.  - CT head noted. Consider neurology consult.     Alcohol use (BAL 34 on admission)  - Aspiration, seizure, fall precautions  - C/w folic, thiamine  - CIWA per ICU team. Consider avoiding long acting BZD. It appears that Librium was d/c 10/8/22.   - SW involvement, rehab once acute issues resolved    Anemia, s/p  3U PRBCs (10/2, 10/4 and 10/5)  S/p EGD 10/5/22 - mild portal gastropathy  S/p sigmoidoscopy 10/5/22 - Dieulafoy lesion s/p clip  Coagulopathy - improved, s/p 5 mg vit K 3 days, s/p 1U FFP 10/5, but remains stable since then  - Monitor H/H closely, keep T/S active. Hb remains stable.  - Keep Hb > 7, PLT>50 and fibrinogen > 120. PLT  - F/u GI recommendations  - Been on ceftriaxone for SBP ppx, completed 7 days, ID following    Thank you for consult   Will continue to follow.  Was d/w primary  team

## 2022-10-12 NOTE — PROGRESS NOTE ADULT - SUBJECTIVE AND OBJECTIVE BOX
Chief Complaint:  Patient is a 27y old  Male who presents with a chief complaint of Altered mental status (11 Oct 2022 14:34)      Reason for consult:    Interval Events: Patient was seen and examined at bedside.  ID [id number]. Noted one episode low grade T. ID follow up. Please, send full septic workup, BCx, UA/UCx, CXR, also send Fungitell and fungal culture. There was no ascites on recent US. While bilirubin appears to improve on steroid, and clinically appears to better (has not reached day#7 yet), if signs of infection, or recurrent fever, would need to consider holding steroid.     Hospital Medications:  dextrose 5%. 1000 milliLiter(s) IV Continuous <Continuous>  dextrose 5%. 1000 milliLiter(s) IV Continuous <Continuous>  dextrose 50% Injectable 25 Gram(s) IV Push once  dextrose 50% Injectable 12.5 Gram(s) IV Push once  dextrose 50% Injectable 25 Gram(s) IV Push once  dextrose Oral Gel 15 Gram(s) Oral once PRN  folic acid 1 milliGRAM(s) Oral daily  glucagon  Injectable 1 milliGRAM(s) IntraMuscular once  heparin   Injectable 5000 Unit(s) SubCutaneous every 8 hours  insulin lispro (ADMELOG) corrective regimen sliding scale   SubCutaneous Before meals and at bedtime  lactulose Syrup 10 Gram(s) Oral every 8 hours  LORazepam   Injectable 2 milliGRAM(s) IV Push every 4 hours PRN  multivitamin 1 Tablet(s) Oral daily  nystatin Powder 1 Application(s) Topical two times a day  pantoprazole    Tablet 40 milliGRAM(s) Oral before breakfast  prednisoLONE    3 mG/mL Solution (ORAPRED) 40 milliGRAM(s) Oral daily  rifAXIMin 550 milliGRAM(s) Oral two times a day  thiamine 100 milliGRAM(s) Oral daily      ROS:   General:  No  fevers, chills, night sweats, fatigue  Eyes:  Good vision, no reported pain  ENT:  No sore throat, pain, runny nose  CV:  No pain, palpitations  Pulm:  No dyspnea, cough  GI:  See HPI, otherwise negative  :  No  incontinence, nocturia  Muscle:  No pain, weakness  Neuro:  No memory problems  Psych:  No insomnia, mood problems, depression  Endocrine:  No polyuria, polydipsia, cold/heat intolerance  Heme:  No petechiae, ecchymosis, easy bruisability  Skin:  No rash    PHYSICAL EXAM:   Vital Signs:  Vital Signs Last 24 Hrs  T(C): 36.4 (12 Oct 2022 12:36), Max: 38.3 (11 Oct 2022 21:27)  T(F): 97.6 (12 Oct 2022 12:36), Max: 101 (11 Oct 2022 21:27)  HR: 104 (12 Oct 2022 12:36) (99 - 114)  BP: 113/71 (12 Oct 2022 12:36) (113/71 - 119/66)  BP(mean): --  RR: 18 (12 Oct 2022 12:36) (18 - 20)  SpO2: 95% (12 Oct 2022 12:36) (95% - 96%)    Parameters below as of 12 Oct 2022 12:36  Patient On (Oxygen Delivery Method): room air      Daily     Daily     GENERAL: no acute distress  NEURO: alert, no asterixis  HEENT: anicteric sclera, no conjunctival pallor appreciated  CHEST: no respiratory distress, no accessory muscle use  CARDIAC: regular rate, rhythm  ABDOMEN: soft, non-tender, non-distended, no rebound or guarding  EXTREMITIES: warm, well perfused, no edema  SKIN: no lesions noted    LABS: reviewed                        8.8    26.28 )-----------( 353      ( 12 Oct 2022 06:24 )             28.8     10-12    139  |  109<H>  |  6<L>  ----------------------------<  72  3.3<L>   |  21<L>  |  0.55    Ca    8.4      12 Oct 2022 06:24  Phos  3.9     10-11  Mg     1.8     10-11    TPro  5.3<L>  /  Alb  1.6<L>  /  TBili  12.6<H>  /  DBili  x   /  AST  81<H>  /  ALT  17  /  AlkPhos  215<H>  10-12    LIVER FUNCTIONS - ( 12 Oct 2022 06:24 )  Alb: 1.6 g/dL / Pro: 5.3 g/dL / ALK PHOS: 215 U/L / ALT: 17 U/L DA / AST: 81 U/L / GGT: x             Interval Diagnostic Studies: see sunrise for full report   Chief Complaint:  Patient is a 27y old  Male who presents with a chief complaint of Altered mental status (11 Oct 2022 14:34)      Reason for consult: Acute on chronic alcoholic liver disease    Interval Events: Patient was seen and examined at bedside.  ID [id number]. Noted one episode low grade T. ID follow up. Please, send full septic workup, BCx, UA/UCx, CXR, also send Fungitell and fungal culture. There was no ascites on recent US. While bilirubin appears to improve on steroid, and clinically appears to better (has not reached day#7 yet), if signs of infection, or recurrent fever, would need to consider holding steroid.     Hospital Medications:  dextrose 5%. 1000 milliLiter(s) IV Continuous <Continuous>  dextrose 5%. 1000 milliLiter(s) IV Continuous <Continuous>  dextrose 50% Injectable 25 Gram(s) IV Push once  dextrose 50% Injectable 12.5 Gram(s) IV Push once  dextrose 50% Injectable 25 Gram(s) IV Push once  dextrose Oral Gel 15 Gram(s) Oral once PRN  folic acid 1 milliGRAM(s) Oral daily  glucagon  Injectable 1 milliGRAM(s) IntraMuscular once  heparin   Injectable 5000 Unit(s) SubCutaneous every 8 hours  insulin lispro (ADMELOG) corrective regimen sliding scale   SubCutaneous Before meals and at bedtime  lactulose Syrup 10 Gram(s) Oral every 8 hours  LORazepam   Injectable 2 milliGRAM(s) IV Push every 4 hours PRN  multivitamin 1 Tablet(s) Oral daily  nystatin Powder 1 Application(s) Topical two times a day  pantoprazole    Tablet 40 milliGRAM(s) Oral before breakfast  prednisoLONE    3 mG/mL Solution (ORAPRED) 40 milliGRAM(s) Oral daily  rifAXIMin 550 milliGRAM(s) Oral two times a day  thiamine 100 milliGRAM(s) Oral daily      ROS:   Limited b/o confusion. Reports some abdominal discomfort, reports large bowel movement. No N/V. Denies cough.     PHYSICAL EXAM:   Vital Signs:  Vital Signs Last 24 Hrs  T(C): 36.4 (12 Oct 2022 12:36), Max: 38.3 (11 Oct 2022 21:27)  T(F): 97.6 (12 Oct 2022 12:36), Max: 101 (11 Oct 2022 21:27)  HR: 104 (12 Oct 2022 12:36) (99 - 114)  BP: 113/71 (12 Oct 2022 12:36) (113/71 - 119/66)  BP(mean): --  RR: 18 (12 Oct 2022 12:36) (18 - 20)  SpO2: 95% (12 Oct 2022 12:36) (95% - 96%)    Parameters below as of 12 Oct 2022 12:36  Patient On (Oxygen Delivery Method): room air      Daily     Daily     GENERAL: no acute distress, ill appearing  NEURO: awake, alert, oriented to self, place, not in time, no asterixis  HEENT: icteric sclera  CHEST: no respiratory distress, no accessory muscle use  CARDIAC: regular rate, rhythm  ABDOMEN: soft, non-tender, moderately distended, but no fluid wave, no rebound or guarding, BS+  EXTREMITIES: warm, well perfused, no edema  SKIN: jaundice    LABS: reviewed                        8.8    26.28 )-----------( 353      ( 12 Oct 2022 06:24 )             28.8     10-12    139  |  109<H>  |  6<L>  ----------------------------<  72  3.3<L>   |  21<L>  |  0.55    Ca    8.4      12 Oct 2022 06:24  Phos  3.9     10-11  Mg     1.8     10-11    TPro  5.3<L>  /  Alb  1.6<L>  /  TBili  12.6<H>  /  DBili  x   /  AST  81<H>  /  ALT  17  /  AlkPhos  215<H>  10-12    LIVER FUNCTIONS - ( 12 Oct 2022 06:24 )  Alb: 1.6 g/dL / Pro: 5.3 g/dL / ALK PHOS: 215 U/L / ALT: 17 U/L DA / AST: 81 U/L / GGT: x             Interval Diagnostic Studies: see sunrise for full report

## 2022-10-12 NOTE — PROGRESS NOTE ADULT - SUBJECTIVE AND OBJECTIVE BOX
NP Note discussed with  Primary Attending    Patient is a 27y old  Male who presents with a chief complaint of Altered mental status (10 Oct 2022 12:07)      INTERVAL HPI/OVERNIGHT EVENTS: T 101.0 and tachy overnight.     MEDICATIONS  (STANDING):  cefTRIAXone   IVPB 1000 milliGRAM(s) IV Intermittent every 24 hours  chlorhexidine 2% Cloths 1 Application(s) Topical <User Schedule>  dextrose 5%. 1000 milliLiter(s) (100 mL/Hr) IV Continuous <Continuous>  dextrose 5%. 1000 milliLiter(s) (50 mL/Hr) IV Continuous <Continuous>  dextrose 50% Injectable 25 Gram(s) IV Push once  dextrose 50% Injectable 12.5 Gram(s) IV Push once  dextrose 50% Injectable 25 Gram(s) IV Push once  folic acid 1 milliGRAM(s) Oral daily  glucagon  Injectable 1 milliGRAM(s) IntraMuscular once  heparin   Injectable 5000 Unit(s) SubCutaneous every 8 hours  insulin lispro (ADMELOG) corrective regimen sliding scale   SubCutaneous Before meals and at bedtime  lactulose Syrup 10 Gram(s) Oral every 8 hours  multivitamin 1 Tablet(s) Oral daily  mupirocin 2% Ointment 1 Application(s) Topical three times a day  nystatin Powder 1 Application(s) Topical two times a day  pantoprazole    Tablet 40 milliGRAM(s) Oral before breakfast  prednisoLONE    3 mG/mL Solution (ORAPRED) 40 milliGRAM(s) Oral daily  rifAXIMin 550 milliGRAM(s) Oral two times a day  thiamine 100 milliGRAM(s) Oral daily    MEDICATIONS  (PRN):  dextrose Oral Gel 15 Gram(s) Oral once PRN Blood Glucose LESS THAN 70 milliGRAM(s)/deciliter  LORazepam   Injectable 2 milliGRAM(s) IV Push every 4 hours PRN Agitation      __________________________________________________  REVIEW OF SYSTEMS:    unable to obtain full ROS due to his mental status is AO x 1 himself     Vital Signs Last 24 Hrs  T(C): 36.9 (11 Oct 2022 12:00), Max: 37.3 (11 Oct 2022 05:03)  T(F): 98.4 (11 Oct 2022 12:00), Max: 99.2 (11 Oct 2022 05:03)  HR: 106 (11 Oct 2022 12:00) (88 - 106)  BP: 127/71 (11 Oct 2022 12:00) (119/74 - 145/92)  BP(mean): 101 (10 Oct 2022 21:00) (83 - 104)  RR: 18 (11 Oct 2022 12:00) (18 - 292)  SpO2: 97% (11 Oct 2022 12:00) (93% - 100%)    Parameters below as of 11 Oct 2022 12:00  Patient On (Oxygen Delivery Method): room air    ________________________________________________  PHYSICAL EXAM:  GENERAL: NAD, jaundice.    HEENT: Normocephalic;  conjunctivae and sclerae clear; moist mucous membranes;   NECK : supple  CHEST/LUNG: Diminished RLL. On RA.   HEART: S1 S2  regular; no murmurs, gallops or rubs  ABDOMEN: Soft, Nontender, distended + ascites Bowel sounds present  EXTREMITIES: no cyanosis; no edema; no calf tenderness  SKIN: warm and dry; no rash  NERVOUS SYSTEM:  Awake and alert; Oriented  to himself no new deficits. Speech incoherent.     _________________________________________________  LABS:  POCT Blood Glucose (10.12.22 @ 16:43)   POCT Blood Glucose.: 107 mg/dL POCT Blood Glucose (10.12.22 @ 11:34)   POCT Blood Glucose.: 150 mg/dL     POCT Blood Glucose (10.12.22 @ 08:21)   POCT Blood Glucose.: 92 mg/dL Activated Partial Thromboplastin Time in AM (10.12.22 @ 06:24)   Activated Partial Thromboplastin Time: 44.9: The recommended therapeutic heparin range (full dose) is 58-99 seconds. Prothrombin Time and INR, Plasma in AM (10.12.22 @ 06:24)   Prothrombin Time, Plasma: 23.4 sec   INR: 1.95: Recommended targets/ranges for therapeutic INR: Comprehensive Metabolic Panel in AM (10.12.22 @ 06:24)   Sodium, Serum: 139 mmol/L   Potassium, Serum: 3.3 mmol/L   Chloride, Serum: 109 mmol/L   Carbon Dioxide, Serum: 21 mmol/L   Anion Gap, Serum: 9 mmol/L   Blood Urea Nitrogen, Serum: 6 mg/dL   Creatinine, Serum: 0.55 mg/dL   Glucose, Serum: 72 mg/dL   Calcium, Total Serum: 8.4 mg/dL   Protein Total, Serum: 5.3 g/dL   Albumin, Serum: 1.6 g/dL   Bilirubin Total, Serum: 12.6 mg/dL   Alkaline Phosphatase, Serum: 215 U/L   Aspartate Aminotransferase (AST/SGOT): 81 U/L   Alanine Aminotransferase (ALT/SGPT): 17 U/L DA   eGFR: 141: The estimated glomerular filtration rate (eGFR) is calculated using the   2021 CKD-EPI creatinine equation, which does not have a coefficient for   race and is validated in individuals 18 years of age and older (N Engl J   Med 2021; 385:9326-9292). Creatinine-based eGFR may be inaccurate in   various situations including but not limited to extremes of muscle mass,   altered dietary protein intake, or medications that affect renal tubular   creatinine secretion. mL/min/1.73m2 Complete Blood Count in AM (10.12.22 @ 06:24)   Nucleated RBC: 0 /100 WBCs   WBC Count: 26.28 K/uL   RBC Count: 3.01 M/uL   Hemoglobin: 8.8 g/dL   Hematocrit: 28.8 %   Mean Cell Volume: 95.7 fl   Mean Cell Hemoglobin: 29.2 pg   Mean Cell Hemoglobin Conc: 30.6 gm/dL   Red Cell Distrib Width: 21.2 %   Platelet Count - Automated: 353 K/uL COVID-19 PCR . (10.12.22 @ 05:35)   COVID-19 PCR: NotDetec: You can help in the fight against COVID-19. Health system may contact    RADIOLOGY & ADDITIONAL TESTS:  < from: US Abdomen Liver Followup (10.10.22 @ 17:08) >    ACC: [id number] EXAM:  US ABD LIVER FOLLOWUP                          PROCEDURE DATE:  10/10/2022          INTERPRETATION:  CLINICAL INFORMATION: Cirrhosis.    COMPARISON: CT dated 10/02/2022.    TECHNIQUE: Sonography of the right upper quadrant.    FINDINGS:    Liver: Increased echogenicity. No focal hepatic mass lesion is   demonstrated. The liver appears to be enlarged, however, no measurements   were obtained. Normal portal venous flow.  Bile ducts: Normal caliber. Common bile duct measures 5mm.  Gallbladder: Nonspecific mildly thickened wall measuring 4 mm.  Pancreas: Not visualized due to overlying bowel.  Right kidney: 12 cm. No hydronephrosis.  Ascites: None.  IVC: Visualized portions are within normal limits.    IMPRESSION:    Enlarged fatty liver.        --- End of Report ---        LOIDA PACK MD; Attending Radiologist  This document has been electronically signed. Oct 10 2022  5:21PM    < end of copied text >  < from: Xray Chest 1 View-PORTABLE IMMEDIATE (Xray Chest 1 View-PORTABLE IMMEDIATE .) (10.07.22 @ 13:06) >    ACC: [id number] EXAM:  XR CHEST PORTABLE IMMED 1V                        ACC: [id number] EXAM:  XR ABDOMEN PORTABLE IMMED 1V                          PROCEDURE DATE:  10/07/2022          INTERPRETATION:  Clinical data: Distended abdomen    Supine abdomen one view    FINDINGS: There is a nonspecific nonobstructive bowel gas pattern. There   is no gross free air, organomegaly or abdominal calcifications. The bony   structures are intact    AP chest 1 view    COMPARISON: 10/2/2022    FINDINGS: The heart size, mediastinum and hilum are within normal limits   for. Decreased inspiratory effort with grossly clear lungs. Trachea   midline. No fractures.    IMPRESSION:    Nonspecific nonobstructive bowel gas pattern.    Grossly clear lungs.    --- End of Report ---             RIVKA HERRERA DO; Attending Radiologist  This document has been electronically signed. Oct  7 2022  1:11PM    < end of copied text >  < from: Xray Abdomen 1 View Portable, IMMEDIATE (Xray Abdomen 1 View Portable, IMMEDIATE .) (10.07.22 @ 13:06) >  CC: [id number] EXAM:  XR CHEST PORTABLE IMMED 1V                        ACC: [id number] EXAM:  XR ABDOMEN PORTABLE IMMED 1V                          PROCEDURE DATE:  10/07/2022          INTERPRETATION:  Clinical data: Distended abdomen    Supine abdomen one view    FINDINGS: There is a nonspecific nonobstructive bowel gas pattern. There   is no gross free air, organomegaly or abdominal calcifications. The bony   structures are intact    AP chest 1 view    COMPARISON: 10/2/2022    FINDINGS: The heart size, mediastinum and hilum are within normal limits   for. Decreased inspiratory effort with grossly clear lungs. Trachea   midline. No fractures.    IMPRESSION:    Nonspecific nonobstructive bowel gas pattern.    Grossly clear lungs.    --- End of Report ---         RIVKA HERRERA DO; Attending Radiologist  This document has been electronically signed. Oct  7 2022  1:11PM    < end of copied text >    Imaging Personally Reviewed:  YES    Consultant(s) Notes Reviewed:   YES    Care Discussed with Consultants : hepatology/ GI/ ID     Plan of care was discussed with patient and /or primary care giver; all questions and concerns were addressed and care was aligned with patient's wishes.
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
## 2022-10-12 NOTE — PROGRESS NOTE ADULT - PROBLEM SELECTOR PLAN 4
Continue Ceftriaxone 1 gram daily  Continue Rifaximin 550 mg twice daily  p/w transaminitis, improved mental status, afebrile ,  elevated WBC trending up 19.4k, direct bili 8.0  Dr. Puentes ID consulted, switched zosyn to ceftriaxone for SBP px  started on Rifaximin 550mg twice daily  c/w ceftriaxone 1g daily for 7 days total for SBP px - completing on 10/14 Continue Ceftriaxone 1 gram daily  Continue Rifaximin 550 mg twice daily  p/w transaminitis, improved mental status, afebrile ,  elevated WBC trending up 19.4k, direct bili 8.0  Dr. Puentes ID consulted, switched zosyn to ceftriaxone for SBP px  started on Rifaximin 550mg twice daily  c/w ceftriaxone 1g daily for 7 days total for SBP px - completing on 10/14.   If signs of infection or recurrent fever episodes, will need to consider holding steroid.

## 2022-10-12 NOTE — PROGRESS NOTE ADULT - PROBLEM SELECTOR PLAN 2
p/w transaminitis, altered mental status, low grade temp 100.1, elevated WBC 20k, direct bili 8.0  severe hepatomegaly w/mild ascites on CT abdomen w/gall bladder sludge  lipase level normal, low suspicion for pancreatic involvement  C/w ceftriaxone for suspected cholangitis / SPB ppx per ID Dr. Puentes  F/u blood cultures NGTD   hepatitis panel neg   hepatic USG for characterization of gall bladder attempted however patient was very agitated. Will reattempt.   GI consulted, recommending possible MRCP    follow up with GI for MRCP   (check biliary tree due to transaminitis, hyperbilirubinemia)  EGD on 10/5 with GI Dr. Johnson showed Large rectal Dieulafoy lesion. Hemostasis achieved with over-the-scope clip  Hb stable  PPI  F/U RUQ US without ascites, larged fatty liver noted on 10/10 p/w transaminitis, altered mental status, low grade temp 100.1, elevated WBC 20k, direct bili 8.0  severe hepatomegaly w/mild ascites on CT abdomen w/gall bladder sludge  lipase level normal, low suspicion for pancreatic involvement  C/w ceftriaxone for suspected cholangitis / SPB ppx per ID Dr. Puentes  F/u blood cultures NGTD   hepatitis panel neg   hepatic USG for characterization of gall bladder attempted however patient was very agitated. Will reattempt.   GI consulted, recommending possible MRCP    follow up with GI for MRCP   (check biliary tree due to transaminitis, hyperbilirubinemia)  EGD on 10/5 with GI Dr. Johnson showed Large rectal Dieulafoy lesion. Hemostasis achieved with over-the-scope clip  Hb stable  PPI  Noted RUQ US without ascites, larged fatty liver noted on 10/10
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
## 2022-10-12 NOTE — PROGRESS NOTE ADULT - PROBLEM SELECTOR PLAN 3
in the setting of Alcohol abuse and obstructive jaundice, BAL 34  Utox negative for salicylate and acetaminophen levels  hep panel neg   trend LFTs daily  yifan - slowly trending down   Maddrey score for Alcoholic hepatitis 74, pt would benefit from glucocorticoid therapy  Dr. Galan hepatology consulted.  started on Steroids - prednisone  40mg QD x 28 days ( 10/8/2022~) - reveval on D7 of prednisone - Lille score ( INR/ Yifan score needed)   Elevated INR and hypoalbuminemia in the setting of liver dysfunction  continue Heparin SQ  for DVT ppx  s/p vitamin K for 3 days  INR improving in the setting of Alcohol abuse and obstructive jaundice, BAL 34  Utox negative for salicylate and acetaminophen levels  hep panel neg   trend LFTs daily  yifan - slowly trending down   Maddrey score for Alcoholic hepatitis 74, pt would benefit from glucocorticoid therapy  Dr. Galan hepatology following   started on Steroids - prednisone  40mg QD x 28 days ( 10/8/2022~) - reveval on D7 of prednisone - Lille score ( INR/ Yifan score needed)   Elevated INR and hypoalbuminemia in the setting of liver dysfunction  continue Heparin SQ  for DVT ppx  s/p vitamin K for 3 days  INR improving   follow up Hep E IgM, and CMV PCRs/serologies,  Autoimmune panel (send also SMA, LKM, AMA, Ig panel), Peth and Fungitell

## 2022-10-12 NOTE — PROGRESS NOTE ADULT - NS ATTEND AMEND GEN_ALL_CORE FT
patient still confused, mental status AOX1, bilirubin decreased today. Patient spiked temperature -one episode. dw ID will hold off on atbx  dw/ hepatology- c.w steroids  prognosis guarded

## 2022-10-12 NOTE — PROGRESS NOTE ADULT - PROBLEM SELECTOR PLAN 9
pending improving on mental status   IV ceftriaxone will 10/14   pending GI w/u - MRCP pending improving on mental status   IV ceftriaxone will 10/14   pending GI w/u - MRCP  Pending Hepatology and sepsis work up.

## 2022-10-13 LAB
ALBUMIN SERPL ELPH-MCNC: 1.6 G/DL — LOW (ref 3.5–5)
ALP SERPL-CCNC: 226 U/L — HIGH (ref 40–120)
ALT FLD-CCNC: 17 U/L DA — SIGNIFICANT CHANGE UP (ref 10–60)
ANION GAP SERPL CALC-SCNC: 13 MMOL/L — SIGNIFICANT CHANGE UP (ref 5–17)
ANISOCYTOSIS BLD QL: SLIGHT — SIGNIFICANT CHANGE UP
APPEARANCE UR: CLEAR — SIGNIFICANT CHANGE UP
AST SERPL-CCNC: 81 U/L — HIGH (ref 10–40)
BACTERIA # UR AUTO: ABNORMAL /HPF
BASOPHILS # BLD AUTO: 0 K/UL — SIGNIFICANT CHANGE UP (ref 0–0.2)
BASOPHILS NFR BLD AUTO: 0 % — SIGNIFICANT CHANGE UP (ref 0–2)
BILIRUB SERPL-MCNC: 13.6 MG/DL — HIGH (ref 0.2–1.2)
BILIRUB UR-MCNC: ABNORMAL
BUN SERPL-MCNC: 6 MG/DL — LOW (ref 7–18)
CALCIUM SERPL-MCNC: 8.5 MG/DL — SIGNIFICANT CHANGE UP (ref 8.4–10.5)
CHLORIDE SERPL-SCNC: 105 MMOL/L — SIGNIFICANT CHANGE UP (ref 96–108)
CMV IGG FLD QL: 5.6 U/ML — HIGH
CMV IGG SERPL-IMP: POSITIVE
CMV IGM FLD-ACNC: <8 AU/ML — SIGNIFICANT CHANGE UP
CMV IGM SERPL QL: NEGATIVE — SIGNIFICANT CHANGE UP
CO2 SERPL-SCNC: 21 MMOL/L — LOW (ref 22–31)
COLOR SPEC: YELLOW — SIGNIFICANT CHANGE UP
CREAT SERPL-MCNC: 0.46 MG/DL — LOW (ref 0.5–1.3)
DIFF PNL FLD: NEGATIVE — SIGNIFICANT CHANGE UP
EGFR: 149 ML/MIN/1.73M2 — SIGNIFICANT CHANGE UP
EOSINOPHIL # BLD AUTO: 0.28 K/UL — SIGNIFICANT CHANGE UP (ref 0–0.5)
EOSINOPHIL NFR BLD AUTO: 1 % — SIGNIFICANT CHANGE UP (ref 0–6)
EPI CELLS # UR: ABNORMAL /HPF
GLUCOSE BLDC GLUCOMTR-MCNC: 105 MG/DL — HIGH (ref 70–99)
GLUCOSE BLDC GLUCOMTR-MCNC: 113 MG/DL — HIGH (ref 70–99)
GLUCOSE BLDC GLUCOMTR-MCNC: 127 MG/DL — HIGH (ref 70–99)
GLUCOSE BLDC GLUCOMTR-MCNC: 97 MG/DL — SIGNIFICANT CHANGE UP (ref 70–99)
GLUCOSE SERPL-MCNC: 80 MG/DL — SIGNIFICANT CHANGE UP (ref 70–99)
GLUCOSE UR QL: NEGATIVE — SIGNIFICANT CHANGE UP
HCT VFR BLD CALC: 29.5 % — LOW (ref 39–50)
HGB BLD-MCNC: 9.1 G/DL — LOW (ref 13–17)
HYPOCHROMIA BLD QL: SLIGHT — SIGNIFICANT CHANGE UP
IGA FLD-MCNC: 538 MG/DL — HIGH (ref 84–499)
IGG FLD-MCNC: 1258 MG/DL — SIGNIFICANT CHANGE UP (ref 610–1660)
IGM SERPL-MCNC: 192 MG/DL — SIGNIFICANT CHANGE UP (ref 35–242)
KAPPA LC SER QL IFE: 3.53 MG/DL — HIGH (ref 0.33–1.94)
KAPPA/LAMBDA FREE LIGHT CHAIN RATIO, SERUM: 0.71 RATIO — SIGNIFICANT CHANGE UP (ref 0.26–1.65)
KETONES UR-MCNC: ABNORMAL
LAMBDA LC SER QL IFE: 4.95 MG/DL — HIGH (ref 0.57–2.63)
LEUKOCYTE ESTERASE UR-ACNC: ABNORMAL
LYMPHOCYTES # BLD AUTO: 1.14 K/UL — SIGNIFICANT CHANGE UP (ref 1–3.3)
LYMPHOCYTES # BLD AUTO: 4 % — LOW (ref 13–44)
MANUAL SMEAR VERIFICATION: SIGNIFICANT CHANGE UP
MCHC RBC-ENTMCNC: 29.3 PG — SIGNIFICANT CHANGE UP (ref 27–34)
MCHC RBC-ENTMCNC: 30.8 GM/DL — LOW (ref 32–36)
MCV RBC AUTO: 94.9 FL — SIGNIFICANT CHANGE UP (ref 80–100)
MONOCYTES # BLD AUTO: 1.99 K/UL — HIGH (ref 0–0.9)
MONOCYTES NFR BLD AUTO: 7 % — SIGNIFICANT CHANGE UP (ref 2–14)
MYELOCYTES NFR BLD: 2 % — HIGH (ref 0–0)
NEUTROPHILS # BLD AUTO: 24.43 K/UL — HIGH (ref 1.8–7.4)
NEUTROPHILS NFR BLD AUTO: 79 % — HIGH (ref 43–77)
NEUTS BAND # BLD: 7 % — SIGNIFICANT CHANGE UP (ref 0–8)
NITRITE UR-MCNC: NEGATIVE — SIGNIFICANT CHANGE UP
NRBC # BLD: 0 /100 — SIGNIFICANT CHANGE UP (ref 0–0)
PH UR: 7 — SIGNIFICANT CHANGE UP (ref 5–8)
PLAT MORPH BLD: NORMAL — SIGNIFICANT CHANGE UP
PLATELET # BLD AUTO: 397 K/UL — SIGNIFICANT CHANGE UP (ref 150–400)
PLATELET COUNT - ESTIMATE: NORMAL — SIGNIFICANT CHANGE UP
POIKILOCYTOSIS BLD QL AUTO: SLIGHT — SIGNIFICANT CHANGE UP
POLYCHROMASIA BLD QL SMEAR: SLIGHT — SIGNIFICANT CHANGE UP
POTASSIUM SERPL-MCNC: 3.3 MMOL/L — LOW (ref 3.5–5.3)
POTASSIUM SERPL-SCNC: 3.3 MMOL/L — LOW (ref 3.5–5.3)
PROT SERPL-MCNC: 5.7 G/DL — LOW (ref 6–8.3)
PROT UR-MCNC: 15
RBC # BLD: 3.11 M/UL — LOW (ref 4.2–5.8)
RBC # FLD: 21.2 % — HIGH (ref 10.3–14.5)
RBC BLD AUTO: ABNORMAL
RBC CASTS # UR COMP ASSIST: SIGNIFICANT CHANGE UP /HPF (ref 0–2)
SODIUM SERPL-SCNC: 139 MMOL/L — SIGNIFICANT CHANGE UP (ref 135–145)
SP GR SPEC: 1 — LOW (ref 1.01–1.02)
STOMATOCYTES BLD QL SMEAR: SLIGHT — SIGNIFICANT CHANGE UP
TARGETS BLD QL SMEAR: SLIGHT — SIGNIFICANT CHANGE UP
UROBILINOGEN FLD QL: 8
WBC # BLD: 28.41 K/UL — HIGH (ref 3.8–10.5)
WBC # FLD AUTO: 28.41 K/UL — HIGH (ref 3.8–10.5)
WBC UR QL: SIGNIFICANT CHANGE UP /HPF (ref 0–5)

## 2022-10-13 PROCEDURE — 99233 SBSQ HOSP IP/OBS HIGH 50: CPT

## 2022-10-13 RX ORDER — TRAMADOL HYDROCHLORIDE 50 MG/1
25 TABLET ORAL EVERY 8 HOURS
Refills: 0 | Status: DISCONTINUED | OUTPATIENT
Start: 2022-10-13 | End: 2022-10-15

## 2022-10-13 RX ORDER — POTASSIUM CHLORIDE 20 MEQ
40 PACKET (EA) ORAL EVERY 4 HOURS
Refills: 0 | Status: COMPLETED | OUTPATIENT
Start: 2022-10-13 | End: 2022-10-13

## 2022-10-13 RX ADMIN — Medication 40 MILLIGRAM(S): at 06:28

## 2022-10-13 RX ADMIN — Medication 100 MILLIGRAM(S): at 11:59

## 2022-10-13 RX ADMIN — LACTULOSE 10 GRAM(S): 10 SOLUTION ORAL at 21:53

## 2022-10-13 RX ADMIN — TRAMADOL HYDROCHLORIDE 25 MILLIGRAM(S): 50 TABLET ORAL at 12:19

## 2022-10-13 RX ADMIN — HEPARIN SODIUM 5000 UNIT(S): 5000 INJECTION INTRAVENOUS; SUBCUTANEOUS at 21:53

## 2022-10-13 RX ADMIN — HEPARIN SODIUM 5000 UNIT(S): 5000 INJECTION INTRAVENOUS; SUBCUTANEOUS at 13:17

## 2022-10-13 RX ADMIN — Medication 1 TABLET(S): at 11:59

## 2022-10-13 RX ADMIN — NYSTATIN CREAM 1 APPLICATION(S): 100000 CREAM TOPICAL at 17:24

## 2022-10-13 RX ADMIN — Medication 1 MILLIGRAM(S): at 11:58

## 2022-10-13 RX ADMIN — HEPARIN SODIUM 5000 UNIT(S): 5000 INJECTION INTRAVENOUS; SUBCUTANEOUS at 06:13

## 2022-10-13 RX ADMIN — Medication 40 MILLIEQUIVALENT(S): at 15:06

## 2022-10-13 RX ADMIN — PANTOPRAZOLE SODIUM 40 MILLIGRAM(S): 20 TABLET, DELAYED RELEASE ORAL at 06:12

## 2022-10-13 RX ADMIN — NYSTATIN CREAM 1 APPLICATION(S): 100000 CREAM TOPICAL at 06:16

## 2022-10-13 RX ADMIN — TRAMADOL HYDROCHLORIDE 25 MILLIGRAM(S): 50 TABLET ORAL at 13:19

## 2022-10-13 RX ADMIN — Medication 40 MILLIEQUIVALENT(S): at 10:44
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
## 2022-10-13 NOTE — PROGRESS NOTE ADULT - SUBJECTIVE AND OBJECTIVE BOX
Chief Complaint:  Patient is a 27y old  Male who presents with a chief complaint of Altered mental status (12 Oct 2022 17:21)      Reason for consult:    Interval Events:     Hospital Medications:  dextrose 5%. 1000 milliLiter(s) IV Continuous <Continuous>  dextrose 5%. 1000 milliLiter(s) IV Continuous <Continuous>  dextrose 50% Injectable 25 Gram(s) IV Push once  dextrose 50% Injectable 12.5 Gram(s) IV Push once  dextrose 50% Injectable 25 Gram(s) IV Push once  dextrose Oral Gel 15 Gram(s) Oral once PRN  folic acid 1 milliGRAM(s) Oral daily  glucagon  Injectable 1 milliGRAM(s) IntraMuscular once  heparin   Injectable 5000 Unit(s) SubCutaneous every 8 hours  insulin lispro (ADMELOG) corrective regimen sliding scale   SubCutaneous Before meals and at bedtime  lactulose Syrup 10 Gram(s) Oral every 8 hours  LORazepam   Injectable 2 milliGRAM(s) IV Push every 4 hours PRN  multivitamin 1 Tablet(s) Oral daily  nystatin Powder 1 Application(s) Topical two times a day  pantoprazole    Tablet 40 milliGRAM(s) Oral before breakfast  potassium chloride   Powder 40 milliEquivalent(s) Oral every 4 hours  prednisoLONE    3 mG/mL Solution (ORAPRED) 40 milliGRAM(s) Oral daily  rifAXIMin 550 milliGRAM(s) Oral two times a day  thiamine 100 milliGRAM(s) Oral daily  traMADol 25 milliGRAM(s) Oral every 8 hours PRN      ROS:   General:  No  fevers, chills, night sweats, fatigue  Eyes:  Good vision, no reported pain  ENT:  No sore throat, pain, runny nose  CV:  No pain, palpitations  Pulm:  No dyspnea, cough  GI:  See HPI, otherwise negative  :  No  incontinence, nocturia  Muscle:  No pain, weakness  Neuro:  No memory problems  Psych:  No insomnia, mood problems, depression  Endocrine:  No polyuria, polydipsia, cold/heat intolerance  Heme:  No petechiae, ecchymosis, easy bruisability  Skin:  No rash    PHYSICAL EXAM:   Vital Signs:  Vital Signs Last 24 Hrs  T(C): 36.8 (13 Oct 2022 12:02), Max: 36.9 (12 Oct 2022 20:48)  T(F): 98.3 (13 Oct 2022 12:02), Max: 98.5 (12 Oct 2022 20:48)  HR: 102 (13 Oct 2022 12:02) (102 - 114)  BP: 109/64 (13 Oct 2022 12:02) (109/64 - 131/80)  BP(mean): --  RR: 18 (13 Oct 2022 12:02) (18 - 20)  SpO2: 95% (13 Oct 2022 12:02) (94% - 100%)    Parameters below as of 13 Oct 2022 12:02  Patient On (Oxygen Delivery Method): room air      Daily     Daily     GENERAL: no acute distress  NEURO: alert, no asterixis  HEENT: anicteric sclera, no conjunctival pallor appreciated  CHEST: no respiratory distress, no accessory muscle use  CARDIAC: regular rate, rhythm  ABDOMEN: soft, non-tender, non-distended, no rebound or guarding  EXTREMITIES: warm, well perfused, no edema  SKIN: no lesions noted    LABS: reviewed                        9.1    28.41 )-----------( 397      ( 13 Oct 2022 08:02 )             29.5     10-13    139  |  105  |  6<L>  ----------------------------<  80  3.3<L>   |  21<L>  |  0.46<L>    Ca    8.5      13 Oct 2022 08:02    TPro  5.7<L>  /  Alb  1.6<L>  /  TBili  13.6<H>  /  DBili  x   /  AST  81<H>  /  ALT  17  /  AlkPhos  226<H>  10-13    LIVER FUNCTIONS - ( 13 Oct 2022 08:02 )  Alb: 1.6 g/dL / Pro: 5.7 g/dL / ALK PHOS: 226 U/L / ALT: 17 U/L DA / AST: 81 U/L / GGT: x             Interval Diagnostic Studies: see sunrise for full report   Chief Complaint:  Patient is a 27y old  Male who presents with a chief complaint of Altered mental status (12 Oct 2022 17:21)      Reason for consult: Acute decompensation of ALD    Interval Events: Patient was seen and examined at bedside. Noted sitting OOB to chair.    ID [id number]  Able to have more meaningful conversation with him, but still remains only oriented to self, place, not in time. However, able to tell his age, and birthday. Reports subjectively feeling better slightly but still very weak. C/o frequent BMs, and per chart review 6 reported yesterday and 4 today till afternoon. No blood or melena per d/w PCA at bedside. Per chart he has not received lactulose since 10/11, thus 6BM/day is off lactulose. Bilirubin without significant improvement.     Hospital Medications:  dextrose 5%. 1000 milliLiter(s) IV Continuous <Continuous>  dextrose 5%. 1000 milliLiter(s) IV Continuous <Continuous>  dextrose 50% Injectable 25 Gram(s) IV Push once  dextrose 50% Injectable 12.5 Gram(s) IV Push once  dextrose 50% Injectable 25 Gram(s) IV Push once  dextrose Oral Gel 15 Gram(s) Oral once PRN  folic acid 1 milliGRAM(s) Oral daily  glucagon  Injectable 1 milliGRAM(s) IntraMuscular once  heparin   Injectable 5000 Unit(s) SubCutaneous every 8 hours  insulin lispro (ADMELOG) corrective regimen sliding scale   SubCutaneous Before meals and at bedtime  lactulose Syrup 10 Gram(s) Oral every 8 hours  LORazepam   Injectable 2 milliGRAM(s) IV Push every 4 hours PRN  multivitamin 1 Tablet(s) Oral daily  nystatin Powder 1 Application(s) Topical two times a day  pantoprazole    Tablet 40 milliGRAM(s) Oral before breakfast  potassium chloride   Powder 40 milliEquivalent(s) Oral every 4 hours  prednisoLONE    3 mG/mL Solution (ORAPRED) 40 milliGRAM(s) Oral daily  rifAXIMin 550 milliGRAM(s) Oral two times a day  thiamine 100 milliGRAM(s) Oral daily  traMADol 25 milliGRAM(s) Oral every 8 hours PRN      ROS:   General:  No  fevers, chills  ENT:  No sore throat, pain, runny nose  CV:  No pain  Pulm:  No dyspnea, cough  GI:  Reports abdominal discomfort, no N/V, c/o multiple BMs (states "going in every 30 min"), no blood or melena  :  Unable to answer  Muscle:  Generalized  Neuro:  Somewhat improved, awake, alert, able to participate in conversation more, but still only oriented x2 and slow.    Skin:  Jaundice    PHYSICAL EXAM:   Vital Signs:  Vital Signs Last 24 Hrs  T(C): 36.8 (13 Oct 2022 12:02), Max: 36.9 (12 Oct 2022 20:48)  T(F): 98.3 (13 Oct 2022 12:02), Max: 98.5 (12 Oct 2022 20:48)  HR: 102 (13 Oct 2022 12:02) (102 - 114)  BP: 109/64 (13 Oct 2022 12:02) (109/64 - 131/80)  BP(mean): --  RR: 18 (13 Oct 2022 12:02) (18 - 20)  SpO2: 95% (13 Oct 2022 12:02) (94% - 100%)    Parameters below as of 13 Oct 2022 12:02  Patient On (Oxygen Delivery Method): room air      Daily     Daily     GENERAL: no acute distress but still ill appearing  NEURO: awake, alert, oriented x2, no clear asterixis  HEENT: icteric sclera  CHEST: no respiratory distress, no accessory muscle use  CARDIAC: regular rate, rhythm  ABDOMEN: soft, mildly distended, mild diffuse tenderness, no rebound or guarding, BS+  EXTREMITIES: warm, well perfused, no edema  SKIN: jaundice    LABS: reviewed                        9.1    28.41 )-----------( 397      ( 13 Oct 2022 08:02 )             29.5     10-13    139  |  105  |  6<L>  ----------------------------<  80  3.3<L>   |  21<L>  |  0.46<L>    Ca    8.5      13 Oct 2022 08:02    TPro  5.7<L>  /  Alb  1.6<L>  /  TBili  13.6<H>  /  DBili  x   /  AST  81<H>  /  ALT  17  /  AlkPhos  226<H>  10-13    LIVER FUNCTIONS - ( 13 Oct 2022 08:02 )  Alb: 1.6 g/dL / Pro: 5.7 g/dL / ALK PHOS: 226 U/L / ALT: 17 U/L DA / AST: 81 U/L / GGT: x             Interval Diagnostic Studies: see sunrise for full report

## 2022-10-13 NOTE — PROGRESS NOTE ADULT - SUBJECTIVE AND OBJECTIVE BOX
NP Note discussed with  Primary Attending    Patient is a 27y old  Male who presents with a chief complaint of Altered mental status (13 Oct 2022 13:56)      INTERVAL HPI/OVERNIGHT EVENTS: Patient seen and examined at bedside. Montenegrin speaking ID # [id number]    MEDICATIONS  (STANDING):  dextrose 5%. 1000 milliLiter(s) (50 mL/Hr) IV Continuous <Continuous>  dextrose 5%. 1000 milliLiter(s) (100 mL/Hr) IV Continuous <Continuous>  dextrose 50% Injectable 25 Gram(s) IV Push once  dextrose 50% Injectable 12.5 Gram(s) IV Push once  dextrose 50% Injectable 25 Gram(s) IV Push once  folic acid 1 milliGRAM(s) Oral daily  glucagon  Injectable 1 milliGRAM(s) IntraMuscular once  heparin   Injectable 5000 Unit(s) SubCutaneous every 8 hours  insulin lispro (ADMELOG) corrective regimen sliding scale   SubCutaneous Before meals and at bedtime  lactulose Syrup 10 Gram(s) Oral every 8 hours  multivitamin 1 Tablet(s) Oral daily  nystatin Powder 1 Application(s) Topical two times a day  pantoprazole    Tablet 40 milliGRAM(s) Oral before breakfast  prednisoLONE    3 mG/mL Solution (ORAPRED) 40 milliGRAM(s) Oral daily  rifAXIMin 550 milliGRAM(s) Oral two times a day  thiamine 100 milliGRAM(s) Oral daily    MEDICATIONS  (PRN):  dextrose Oral Gel 15 Gram(s) Oral once PRN Blood Glucose LESS THAN 70 milliGRAM(s)/deciliter  LORazepam   Injectable 2 milliGRAM(s) IV Push every 4 hours PRN Agitation  traMADol 25 milliGRAM(s) Oral every 8 hours PRN Moderate Pain (4 - 6)      __________________________________________________  REVIEW OF SYSTEMS:    CONSTITUTIONAL: No fever,   EYES: no acute visual disturbances  NECK: No pain or stiffness  RESPIRATORY: No cough; No shortness of breath  CARDIOVASCULAR: No chest pain, no palpitations  GASTROINTESTINAL: No pain. No nausea or vomiting; No diarrhea   NEUROLOGICAL: No headache or numbness, no tremors  MUSCULOSKELETAL: No joint pain, no muscle pain  GENITOURINARY: no dysuria, no frequency, no hesitancy  PSYCHIATRY: no depression , no anxiety  ALL OTHER  ROS negative        Vital Signs Last 24 Hrs  T(C): 36.8 (13 Oct 2022 12:02), Max: 36.9 (12 Oct 2022 20:48)  T(F): 98.3 (13 Oct 2022 12:02), Max: 98.5 (12 Oct 2022 20:48)  HR: 102 (13 Oct 2022 12:02) (102 - 114)  BP: 109/64 (13 Oct 2022 12:02) (109/64 - 131/80)  BP(mean): --  RR: 18 (13 Oct 2022 12:02) (18 - 20)  SpO2: 95% (13 Oct 2022 12:02) (94% - 100%)    Parameters below as of 13 Oct 2022 12:02  Patient On (Oxygen Delivery Method): room air        ________________________________________________  PHYSICAL EXAM:  GENERAL: NAD  HEENT: Normocephalic;  conjunctivae and sclerae clear; moist mucous membranes;   NECK : supple  CHEST/LUNG: Clear to auscultation bilaterally with good air entry   HEART: S1 S2  regular; no murmurs, gallops or rubs  ABDOMEN: Soft, Nontender, Nondistended; Bowel sounds present  EXTREMITIES: no cyanosis; no edema; no calf tenderness  SKIN: warm and dry; no rash  NERVOUS SYSTEM:  Awake and alert; Oriented  to place, person and time ; no new deficits    _________________________________________________  LABS:                        9.1    28.41 )-----------( 397      ( 13 Oct 2022 08:02 )             29.5     10-13    139  |  105  |  6<L>  ----------------------------<  80  3.3<L>   |  21<L>  |  0.46<L>    Ca    8.5      13 Oct 2022 08:02    TPro  5.7<L>  /  Alb  1.6<L>  /  TBili  13.6<H>  /  DBili  x   /  AST  81<H>  /  ALT  17  /  AlkPhos  226<H>  10-13    PT/INR - ( 12 Oct 2022 06:24 )   PT: 23.4 sec;   INR: 1.95 ratio         PTT - ( 12 Oct 2022 06:24 )  PTT:44.9 sec    CAPILLARY BLOOD GLUCOSE      POCT Blood Glucose.: 127 mg/dL (13 Oct 2022 11:20)  POCT Blood Glucose.: 97 mg/dL (13 Oct 2022 07:36)  POCT Blood Glucose.: 96 mg/dL (12 Oct 2022 21:36)  POCT Blood Glucose.: 107 mg/dL (12 Oct 2022 16:43)        RADIOLOGY & ADDITIONAL TESTS:    Imaging  Reviewed:  YES/NO    Consultant(s) Notes Reviewed:   YES/ No      Plan of care was discussed with patient and /or primary care giver; all questions and concerns were addressed  NP Note discussed with  Primary Attending    Patient is a 27y old  Male who presents with a chief complaint of Altered mental status (13 Oct 2022 13:56)      INTERVAL HPI/OVERNIGHT EVENTS: Patient seen and examined at bedside. Sudanese speaking ID # [id number]    MEDICATIONS  (STANDING):  dextrose 5%. 1000 milliLiter(s) (50 mL/Hr) IV Continuous <Continuous>  dextrose 5%. 1000 milliLiter(s) (100 mL/Hr) IV Continuous <Continuous>  dextrose 50% Injectable 25 Gram(s) IV Push once  dextrose 50% Injectable 12.5 Gram(s) IV Push once  dextrose 50% Injectable 25 Gram(s) IV Push once  folic acid 1 milliGRAM(s) Oral daily  glucagon  Injectable 1 milliGRAM(s) IntraMuscular once  heparin   Injectable 5000 Unit(s) SubCutaneous every 8 hours  insulin lispro (ADMELOG) corrective regimen sliding scale   SubCutaneous Before meals and at bedtime  lactulose Syrup 10 Gram(s) Oral every 8 hours  multivitamin 1 Tablet(s) Oral daily  nystatin Powder 1 Application(s) Topical two times a day  pantoprazole    Tablet 40 milliGRAM(s) Oral before breakfast  prednisoLONE    3 mG/mL Solution (ORAPRED) 40 milliGRAM(s) Oral daily  rifAXIMin 550 milliGRAM(s) Oral two times a day  thiamine 100 milliGRAM(s) Oral daily    MEDICATIONS  (PRN):  dextrose Oral Gel 15 Gram(s) Oral once PRN Blood Glucose LESS THAN 70 milliGRAM(s)/deciliter  LORazepam   Injectable 2 milliGRAM(s) IV Push every 4 hours PRN Agitation  traMADol 25 milliGRAM(s) Oral every 8 hours PRN Moderate Pain (4 - 6)      __________________________________________________  REVIEW OF SYSTEMS:    CONSTITUTIONAL: headache, low grade fever overnight  EYES: no acute visual disturbances  NECK: No pain or stiffness  RESPIRATORY: No cough; No shortness of breath  CARDIOVASCULAR: No chest pain, no palpitations  GASTROINTESTINAL: No pain. No nausea or vomiting; No diarrhea   NEUROLOGICAL: No headache or numbness, no tremors  MUSCULOSKELETAL: lower back pain  GENITOURINARY: no dysuria, no frequency, no hesitancy  PSYCHIATRY: no depression , no anxiety  ALL OTHER  ROS negative        Vital Signs Last 24 Hrs  T(C): 36.8 (13 Oct 2022 12:02), Max: 36.9 (12 Oct 2022 20:48)  T(F): 98.3 (13 Oct 2022 12:02), Max: 98.5 (12 Oct 2022 20:48)  HR: 102 (13 Oct 2022 12:02) (102 - 114)  BP: 109/64 (13 Oct 2022 12:02) (109/64 - 131/80)  BP(mean): --  RR: 18 (13 Oct 2022 12:02) (18 - 20)  SpO2: 95% (13 Oct 2022 12:02) (94% - 100%)    Parameters below as of 13 Oct 2022 12:02  Patient On (Oxygen Delivery Method): room air        ________________________________________________  PHYSICAL EXAM:  GENERAL: confused  HEENT: Normocephalic;  conjunctivae and sclerae clear; moist mucous membranes;   NECK : supple  CHEST/LUNG: Clear to auscultation bilaterally with good air entry   HEART: S1 S2  regular; no murmurs, gallops or rubs  ABDOMEN: Soft, Nontender, Nondistended; Bowel sounds present  EXTREMITIES: no cyanosis; no edema; no calf tenderness  SKIN: warm and dry; no rash  NERVOUS SYSTEM:  Awake and alert; Oriented  to person, illogical, confused    _________________________________________________  LABS:                        9.1    28.41 )-----------( 397      ( 13 Oct 2022 08:02 )             29.5     10-13    139  |  105  |  6<L>  ----------------------------<  80  3.3<L>   |  21<L>  |  0.46<L>    Ca    8.5      13 Oct 2022 08:02    TPro  5.7<L>  /  Alb  1.6<L>  /  TBili  13.6<H>  /  DBili  x   /  AST  81<H>  /  ALT  17  /  AlkPhos  226<H>  10-13    PT/INR - ( 12 Oct 2022 06:24 )   PT: 23.4 sec;   INR: 1.95 ratio         PTT - ( 12 Oct 2022 06:24 )  PTT:44.9 sec    CAPILLARY BLOOD GLUCOSE      POCT Blood Glucose.: 127 mg/dL (13 Oct 2022 11:20)  POCT Blood Glucose.: 97 mg/dL (13 Oct 2022 07:36)  POCT Blood Glucose.: 96 mg/dL (12 Oct 2022 21:36)  POCT Blood Glucose.: 107 mg/dL (12 Oct 2022 16:43)        RADIOLOGY & ADDITIONAL TESTS:  < from: Xray Chest 1 View- PORTABLE-Urgent (Xray Chest 1 View- PORTABLE-Urgent .) (10.12.22 @ 13:47) >  FINDINGS:  CATHETERS AND TUBES: None    PULMONARY: Mild bilateral perihilar diffuse airspace disease..   No pneumothorax.    HEART/VASCULAR: The heart and mediastinum size and configuration are   within normal limits.    BONES: Visualized osseous structures are intact.    IMPRESSION:   Mild bilateral perihilar diffuse airspace disease..    --- End of Report ---    < end of copied text >  < from: US Abdomen Liver Followup (10.10.22 @ 17:08) >    FINDINGS:    Liver: Increased echogenicity. No focal hepatic mass lesion is   demonstrated. The liver appears to be enlarged, however, no measurements   were obtained. Normal portal venous flow.  Bile ducts: Normal caliber. Common bile duct measures 5mm.  Gallbladder: Nonspecific mildly thickened wall measuring 4 mm.  Pancreas: Not visualized due to overlying bowel.  Right kidney: 12 cm. No hydronephrosis.  Ascites: None.  IVC: Visualized portions are within normal limits.    IMPRESSION:    Enlarged fatty liver.        --- End of Report ---    < end of copied text >  < from: Xray Chest 1 View-PORTABLE IMMEDIATE (Xray Chest 1 View-PORTABLE IMMEDIATE .) (10.07.22 @ 13:06) >  FINDINGS: There is a nonspecific nonobstructive bowel gas pattern. There   is no gross free air, organomegaly or abdominal calcifications. The bony   structures are intact    AP chest 1 view    COMPARISON: 10/2/2022    FINDINGS: The heart size, mediastinum and hilum are within normal limits   for. Decreased inspiratory effort with grossly clear lungs. Trachea   midline. No fractures.    IMPRESSION:    Nonspecific nonobstructive bowel gas pattern.    Grossly clear lungs.    --- End of Report ---    < end of copied text >  < from: Xray Abdomen 1 View Portable, IMMEDIATE (Xray Abdomen 1 View Portable, IMMEDIATE .) (10.07.22 @ 13:06) >  FINDINGS: There is a nonspecific nonobstructive bowel gas pattern. There   is no gross free air, organomegaly or abdominal calcifications. The bony   structures are intact    AP chest 1 view    COMPARISON: 10/2/2022    FINDINGS: The heart size, mediastinum and hilum are within normal limits   for. Decreased inspiratory effort with grossly clear lungs. Trachea   midline. No fractures.    IMPRESSION:    Nonspecific nonobstructive bowel gas pattern.    Grossly clear lungs.    --- End of Report ---    < end of copied text >  < from: Xray Chest 1 View- PORTABLE-Urgent (Xray Chest 1 View- PORTABLE-Urgent .) (10.02.22 @ 23:18) >  FINDINGS:  Heart/Vascular: The heart size, mediastinum, hilum and aorta are within   normal limits for projection.  Pulmonary: Midline trachea. There is no focal infiltrate, congestion or   effusion.    Bones: There is no fracture.  Lines and catheter: Overlying EKG leads and wires.    Impression:    No acute pulmonary disease.    --- End of Report ---    < end of copied text >  < from: CT Abdomen and Pelvis w/ IV Cont (10.02.22 @ 16:56) >  FINDINGS:  LOWER CHEST:Within normal limits.    LIVER: Severe hepatomegaly and diffuse steatosis.  BILE DUCTS: Normal caliber.  GALLBLADDER: Partially contracted with high attenuation related to   enhancement and/or sludge. Nonspecific wall thickening, likely   secondary/reactive.  SPLEEN: Within normal limits.  PANCREAS: Within normal limits.  ADRENALS: Within normal limits.  KIDNEYS/URETERS: Too small to characterize low attenuating focus in the   left kidney.    BLADDER: Within normal limits.  REPRODUCTIVE ORGANS: Within normal limits.    BOWEL: No bowel obstruction. Appendix is normal.  PERITONEUM: Mild ascites and infiltration of the central mesenteric fat.  VESSELS: Within normal limits.  RETROPERITONEUM/LYMPH NODES: No lymphadenopathy.  ABDOMINAL WALL: Tiny fat-containing inguinal hernias.  BONES: Bilateral L5-S1 pars defects. Old fractures of the 6th and 8th-9th   left ribs.    IMPRESSION:  Severe hepatomegaly and diffuse steatosis.  Mild ascites and infiltration of the central mesenteric fat.  Possiblegallbladder sludge and likely secondary/reactive wall thickening.      --- End of Report ---    < end of copied text >  < from: CT Head No Cont (10.02.22 @ 16:39) >  FINDINGS:  VENTRICLES AND SULCI:  Ex vacuo dilatation of the left lateral ventricle   to a mild degree.  INTRA-AXIAL:  Left temporal parietal encephalomalacia subjacent to the   craniotomy.  EXTRA-AXIAL:  No mass or collection is seen.  VISUALIZED SINUSES: Left maxillary mucosal thickening. Right maxillary   mucosal thickening  VISUALIZED MASTOIDS:  Clear.  CALVARIUM: Left temporal parietal craniotomy. Right parietalsoft tissue   swelling.  MISCELLANEOUS:  None.    IMPRESSION:  Left temporal parietal craniotomy. Encephalomalacia   subjacent to the craniotomy. Ex vacuo dilatation of the left lateral   ventricle. No intracranial hemorrhage. No mass lesion    --- End of Report ---    < end of copied text >    Imaging  Reviewed:  YES    Consultant(s) Notes Reviewed:   YES      Plan of care was discussed with patient and /or primary care giver; all questions and concerns were addressed

## 2022-10-13 NOTE — PROGRESS NOTE ADULT - PROBLEM SELECTOR PLAN 7
likely due to steroid  afebrile   Avoid Tylenol w/elevated LFTs  Consider stopping Steroids if continue to spike fevers.

## 2022-10-13 NOTE — PROGRESS NOTE ADULT - PROBLEM SELECTOR PLAN 4
no need for CIWA, already detox due to length of stay  No prior hx of seizures  C/w thiamine, folic acid and MVT   continue Lorazepam for agitation  fall precaution   aspiration precaution  seizure precaution

## 2022-10-13 NOTE — PROGRESS NOTE ADULT - PROBLEM SELECTOR PLAN 3
completed Ceftriaxone   Continue Rifaximin 550 mg twice daily  will hold septic work up, ID recommend if patient continue to develop fever 100.4<, then send septic work up, Hepatology recommending to include fungal culture and fungitell to sepsis work up.   ID Dr. Puentes

## 2022-10-13 NOTE — PROGRESS NOTE ADULT - PROBLEM SELECTOR PLAN 1
metabolic encephalopathy secondary to alcohol hepatitis   s/p 1 dose of lactulose enema for probable hepatic encephalopathy  PO lactulose resumed now patient is able to tolerate diet - Goal is BM 2- 3 times daily.   continue rifaximin per encephalopathy - confirmed with Dr. Gonzalez  transaminitis- unchanged  severe hepatomegaly w/mild ascites on CT abdomen w/gall bladder sludge  lipase level normal  completed antibiotic therapy  hepatitis panel neg   hepatic USG for characterization of gall bladder attempted however patient was very agitated  EGD on 10/5 with GI Dr. Johnson showed Large rectal Dieulafoy lesion. Hemostasis achieved with over-the-scope clip  RUQ US without ascites, larged fatty liver noted on 10/10

## 2022-10-13 NOTE — PROGRESS NOTE ADULT - PROBLEM SELECTOR PLAN 2
secondary Alcohol Hepatitis   Utox negative for salicylate and acetaminophen levels  hep panel neg   bili - elevated  continue prednisone  40mg QD x 28 days ( 10/8/2022~)   Elevated INR and hypoalbuminemia in the setting of liver dysfunction  s/p vitamin K for 3 days  INR 1.95  f/u ammonia  continue ammonia  Hepatology Dr. Gonzalez

## 2022-10-13 NOTE — PROGRESS NOTE ADULT - NS ATTEND AMEND GEN_ALL_CORE FT
patient still confused. mental status aox1, no more fevers. will monitor for now, d/w ID if spikes than will repeat blood cx and start Zosyn.  c/w rest of medical mx and supportive care  Poor prognosis

## 2022-10-13 NOTE — PROGRESS NOTE ADULT - ASSESSMENT
25yo Slovenian speaking Male s/p Left temporal parietal craniotomy and alcohol abuse (reported current drinking) presented to [facility] ED on 10/2/22 with 1 day Hx of AMS, after reported witnessed seizure episodes and 1 week Hx of jaundice and was admitted to MICU with suspected EtOH withdrawal seizure, electrolyte abnormalities (hyponatremia with Na 125->129, hypokalemia with K 2.7->3.1, hypomagnesemia with Mg 1.0->1.9, hypophosphatemia with P 1.1->1.5), lactic acidosis (with lactate 6.4->3.0, bicarb 19->20), leukocytosis with neutrophilia (WBC 20.35->16.02, roderick 90%) with low grade T (100.1F), anemia without evidence of overt bleeding (Hb 6.9->8.3 s/p 1 U PRBC) and abnormal liver tests including coagulopathy (INR 2.13), hyperbilirubinemia (Se bi 9.3->10.8, direct 8.0), hyperammonemia (131), elevated liver enzymes in AST> ALT pattern (->212, ALT 22) and elevated ->600 and CT a/p w// iv suggesting diffuse hepatic steatosis, hepatomegaly with possible GB sludge and GB wall thickening, but normal bile ducts.  He was started on Alcoholic hepatitis management after infection was rule out, and UGIB was ruled out.       Hepatomegaly and diffuse hepatic steatosis likely due to Alcoholic hepatitis  Jaundice, mostly direct hyperbilirubinemia - Bili 10.2--->14.4->13.7->13.1->12.6->13.6  Coagulopathy - improving, but got vit K and FFP   Elevated transaminases in AST>ALT pattern - overall improved (-->60-->81)  Elevated ALP - improving (669->226)  Positive UA - UCx neg  Hyponatremia - resolved  - MELD 28 -->23  - MDF > 32 (68.8->48.3)  - Given AST>ALT pattern, EtOH use, recent jaundice, alcoholic hepatitis is high in differential, but needed to rule out infection, sepsis upon admission, as he was tachycardic with low grade T, leukocytosis with neutrophilia. BCx neg (10/2). UCx neg (10/2). CXR neg (10/2). Been on SBP ppx b/o GI bleed since 10/2. Only mild ascites thus Dx paracentesis was not feasible. Was rechecked 10/9.  Noted + rhinovirus.  Procalcitonin was 0.32 10/4.  - He was started on prednisolone 40 mg on 10/8/22, with NAC. Got NAC for 3 days total. To assess response to prednisolone at day #7, Lille score. He was noted to have one low grade fever episode 10/11 night, not since then, pending repeat septic workup, UA neg, repeat BCx was cancelled. COVID remains neg. CXR mild perihilar opacities. No ascites on recent US. Fungitell neg. F/u ID recommendations, being monitored off antibiotics and remains on steroid. IF further fever, spike to signs of infection, will need to consider holding steroid. If persistent diarrhea of lactulose, consider stool studies.  - Liver workup so far:  Acute hepatitis viral panel, HIV, COVID-19, Hep BcAb, HBsAb all are neg. EBV past infection. Leptospirosis neg. Ceruloplasmin 25. Please, also follow up Hep E IgM, CMV PCRs/serologies, autoimmune panel and Peth   - Review images for splanchnic vein patency.  - Abdomen appears more distended, but no ascites, had AXR with non specific, non obstructive gas pattern (10/7). Lactulose might have contributed. If worsening, consider repeat AXR.    - Nutritional optimization      Acute encephalopathy, s/p seizures, but also elevated ammonia, liver disease, alcohol use and Hx of craniotomy and was on CIWA. - Improved, but still mostly oriented to self and place.   - Per d/w primary team, patient was functional prior to admission, returned to work after his craniotomy / cerebral hemorrhage (that was likely due to trauma) and rehab  - Aspiration, seizure, fall precautions  - Per chart he has 6 BM/day off lactulose (not getting since 10/11). If continues to have diarrhea off lactulose, consider stool studies. Assure 2-3 BM/day, resume lactulose if meets criteria. C/w rifaximin.  - CT head noted. Consider neurology consult.     Alcohol use (BAL 34 on admission)  - Aspiration, seizure, fall precautions  - C/w folic, thiamine  - CIWA per primary team.    - SW involvement, rehab once acute issues resolved.     Anemia, s/p  3U PRBCs (10/2, 10/4 and 10/5)  S/p EGD 10/5/22 - mild portal gastropathy  S/p sigmoidoscopy 10/5/22 - Dieulafoy lesion s/p clip  Coagulopathy - improved, s/p 5 mg vit K 3 days, s/p 1U FFP 10/5, but remains stable since then  - Monitor H/H closely, keep T/S active. Hb remains stable.  - Keep Hb > 7, PLT>50 and fibrinogen > 120.   - F/u GI recommendations  - S/p SBP ppx    Thank you for consult   Will continue to follow.  Was d/w primary  team

## 2022-10-14 LAB
ALBUMIN SERPL ELPH-MCNC: 1.5 G/DL — LOW (ref 3.5–5)
ALP SERPL-CCNC: 223 U/L — HIGH (ref 40–120)
ALT FLD-CCNC: 19 U/L DA — SIGNIFICANT CHANGE UP (ref 10–60)
ANION GAP SERPL CALC-SCNC: 13 MMOL/L — SIGNIFICANT CHANGE UP (ref 5–17)
APTT BLD: 34.9 SEC — SIGNIFICANT CHANGE UP (ref 27.5–35.5)
AST SERPL-CCNC: 79 U/L — HIGH (ref 10–40)
BILIRUB SERPL-MCNC: 13.2 MG/DL — HIGH (ref 0.2–1.2)
BUN SERPL-MCNC: 6 MG/DL — LOW (ref 7–18)
CALCIUM SERPL-MCNC: 8.4 MG/DL — SIGNIFICANT CHANGE UP (ref 8.4–10.5)
CHLORIDE SERPL-SCNC: 105 MMOL/L — SIGNIFICANT CHANGE UP (ref 96–108)
CO2 SERPL-SCNC: 20 MMOL/L — LOW (ref 22–31)
CREAT SERPL-MCNC: 0.48 MG/DL — LOW (ref 0.5–1.3)
E COLI SXT SPEC: DETECTED
EGFR: 147 ML/MIN/1.73M2 — SIGNIFICANT CHANGE UP
ERYTHROCYTE [SEDIMENTATION RATE] IN BLOOD: 58 MM/HR — HIGH (ref 0–15)
GI PCR PANEL: DETECTED
GLUCOSE BLDC GLUCOMTR-MCNC: 119 MG/DL — HIGH (ref 70–99)
GLUCOSE BLDC GLUCOMTR-MCNC: 134 MG/DL — HIGH (ref 70–99)
GLUCOSE BLDC GLUCOMTR-MCNC: 92 MG/DL — SIGNIFICANT CHANGE UP (ref 70–99)
GLUCOSE SERPL-MCNC: 94 MG/DL — SIGNIFICANT CHANGE UP (ref 70–99)
HCT VFR BLD CALC: 29.9 % — LOW (ref 39–50)
HEV IGM SER QL: SIGNIFICANT CHANGE UP
HGB BLD-MCNC: 9.5 G/DL — LOW (ref 13–17)
INR BLD: 1.99 RATIO — HIGH (ref 0.88–1.16)
LKM AB SER-ACNC: <20.1 UNITS — SIGNIFICANT CHANGE UP (ref 0–20)
MAGNESIUM SERPL-MCNC: 1.8 MG/DL — SIGNIFICANT CHANGE UP (ref 1.6–2.6)
MCHC RBC-ENTMCNC: 30.1 PG — SIGNIFICANT CHANGE UP (ref 27–34)
MCHC RBC-ENTMCNC: 31.8 GM/DL — LOW (ref 32–36)
MCV RBC AUTO: 94.6 FL — SIGNIFICANT CHANGE UP (ref 80–100)
MITOCHONDRIA AB SER-ACNC: SIGNIFICANT CHANGE UP
NRBC # BLD: 0 /100 WBCS — SIGNIFICANT CHANGE UP (ref 0–0)
PHOSPHATE SERPL-MCNC: 2.7 MG/DL — SIGNIFICANT CHANGE UP (ref 2.5–4.5)
PLATELET # BLD AUTO: 419 K/UL — HIGH (ref 150–400)
POTASSIUM SERPL-MCNC: 3.8 MMOL/L — SIGNIFICANT CHANGE UP (ref 3.5–5.3)
POTASSIUM SERPL-SCNC: 3.8 MMOL/L — SIGNIFICANT CHANGE UP (ref 3.5–5.3)
PROT SERPL-MCNC: 5.5 G/DL — LOW (ref 6–8.3)
PROTHROM AB SERPL-ACNC: 23.8 SEC — HIGH (ref 10.5–13.4)
RBC # BLD: 3.16 M/UL — LOW (ref 4.2–5.8)
RBC # FLD: 21 % — HIGH (ref 10.3–14.5)
SMOOTH MUSCLE AB SER-ACNC: SIGNIFICANT CHANGE UP
SODIUM SERPL-SCNC: 138 MMOL/L — SIGNIFICANT CHANGE UP (ref 135–145)
WBC # BLD: 30.28 K/UL — HIGH (ref 3.8–10.5)
WBC # FLD AUTO: 30.28 K/UL — HIGH (ref 3.8–10.5)

## 2022-10-14 PROCEDURE — 99232 SBSQ HOSP IP/OBS MODERATE 35: CPT

## 2022-10-14 PROCEDURE — 99233 SBSQ HOSP IP/OBS HIGH 50: CPT

## 2022-10-14 RX ORDER — PIPERACILLIN AND TAZOBACTAM 4; .5 G/20ML; G/20ML
3.38 INJECTION, POWDER, LYOPHILIZED, FOR SOLUTION INTRAVENOUS EVERY 8 HOURS
Refills: 0 | Status: DISCONTINUED | OUTPATIENT
Start: 2022-10-14 | End: 2022-10-16

## 2022-10-14 RX ADMIN — Medication 1 MILLIGRAM(S): at 12:35

## 2022-10-14 RX ADMIN — PANTOPRAZOLE SODIUM 40 MILLIGRAM(S): 20 TABLET, DELAYED RELEASE ORAL at 07:06

## 2022-10-14 RX ADMIN — Medication 40 MILLIGRAM(S): at 05:28

## 2022-10-14 RX ADMIN — Medication 1 TABLET(S): at 12:35

## 2022-10-14 RX ADMIN — HEPARIN SODIUM 5000 UNIT(S): 5000 INJECTION INTRAVENOUS; SUBCUTANEOUS at 13:39

## 2022-10-14 RX ADMIN — NYSTATIN CREAM 1 APPLICATION(S): 100000 CREAM TOPICAL at 17:06

## 2022-10-14 RX ADMIN — PIPERACILLIN AND TAZOBACTAM 25 GRAM(S): 4; .5 INJECTION, POWDER, LYOPHILIZED, FOR SOLUTION INTRAVENOUS at 21:50

## 2022-10-14 RX ADMIN — Medication 100 MILLIGRAM(S): at 12:36

## 2022-10-14 RX ADMIN — NYSTATIN CREAM 1 APPLICATION(S): 100000 CREAM TOPICAL at 05:29

## 2022-10-14 RX ADMIN — HEPARIN SODIUM 5000 UNIT(S): 5000 INJECTION INTRAVENOUS; SUBCUTANEOUS at 05:28

## 2022-10-14 RX ADMIN — HEPARIN SODIUM 5000 UNIT(S): 5000 INJECTION INTRAVENOUS; SUBCUTANEOUS at 21:49
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
## 2022-10-14 NOTE — PROGRESS NOTE ADULT - NS ATTEND AMEND GEN_ALL_CORE FT
patient mental status improved but still not at baseline. d/w ID and hepatology the case at length. given increasing wbc and low grade temp will check stool culture, blood cx, CT A/P w. contrast and start iv zosyn  check labs  hold all laxatives including lactulose given diarrhea
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
## 2022-10-14 NOTE — PROGRESS NOTE ADULT - PROBLEM SELECTOR PLAN 1
metabolic encephalopathy secondary to alcohol hepatitis   continue lactulose - Goal is BM 2- 3 times daily- held  continue rifaximin per encephalopathy - confirmed with Dr. Gonzalez  transaminitis- unchanged  severe hepatomegaly w/mild ascites on CT abdomen w/gall bladder sludge  lipase level normal  completed antibiotic therapy  hepatitis panel neg   hepatic USG for characterization of gall bladder attempted however patient was very agitated  EGD on 10/5 with GI Dr. Johnson showed Large rectal Dieulafoy lesion. Hemostasis achieved with over-the-scope clip  RUQ US without ascites, large fatty liver noted on 10/10

## 2022-10-14 NOTE — PROGRESS NOTE ADULT - PROBLEM SELECTOR PLAN 2
secondary Alcohol Hepatitis   Utox negative for salicylate and acetaminophen levels  hep panel neg   bili - elevated  continue prednisone  40mg QD x 28 days ( 10/8/2022~)   Elevated INR and hypoalbuminemia in the setting of liver dysfunction  s/p vitamin K for 3 days  INR 1.95  Hepatology Dr. Gonzalez

## 2022-10-14 NOTE — PROGRESS NOTE ADULT - SUBJECTIVE AND OBJECTIVE BOX
Chief Complaint:  Patient is a 27y old  Male who presents with a chief complaint of Altered mental status (14 Oct 2022 12:43)      Reason for consult:    Interval Events:     Hospital Medications:  dextrose 5%. 1000 milliLiter(s) IV Continuous <Continuous>  dextrose 5%. 1000 milliLiter(s) IV Continuous <Continuous>  dextrose 50% Injectable 25 Gram(s) IV Push once  dextrose 50% Injectable 12.5 Gram(s) IV Push once  dextrose 50% Injectable 25 Gram(s) IV Push once  dextrose Oral Gel 15 Gram(s) Oral once PRN  folic acid 1 milliGRAM(s) Oral daily  glucagon  Injectable 1 milliGRAM(s) IntraMuscular once  heparin   Injectable 5000 Unit(s) SubCutaneous every 8 hours  insulin lispro (ADMELOG) corrective regimen sliding scale   SubCutaneous Before meals and at bedtime  lactulose Syrup 10 Gram(s) Oral every 8 hours  LORazepam   Injectable 2 milliGRAM(s) IV Push every 4 hours PRN  multivitamin 1 Tablet(s) Oral daily  nystatin Powder 1 Application(s) Topical two times a day  pantoprazole    Tablet 40 milliGRAM(s) Oral before breakfast  prednisoLONE    3 mG/mL Solution (ORAPRED) 40 milliGRAM(s) Oral daily  rifAXIMin 550 milliGRAM(s) Oral two times a day  thiamine 100 milliGRAM(s) Oral daily  traMADol 25 milliGRAM(s) Oral every 8 hours PRN      ROS:   General:  No  fevers, chills, night sweats, fatigue  Eyes:  Good vision, no reported pain  ENT:  No sore throat, pain, runny nose  CV:  No pain, palpitations  Pulm:  No dyspnea, cough  GI:  See HPI, otherwise negative  :  No  incontinence, nocturia  Muscle:  No pain, weakness  Neuro:  No memory problems  Psych:  No insomnia, mood problems, depression  Endocrine:  No polyuria, polydipsia, cold/heat intolerance  Heme:  No petechiae, ecchymosis, easy bruisability  Skin:  No rash    PHYSICAL EXAM:   Vital Signs:  Vital Signs Last 24 Hrs  T(C): 37.8 (14 Oct 2022 09:32), Max: 37.8 (14 Oct 2022 09:32)  T(F): 100 (14 Oct 2022 09:32), Max: 100 (14 Oct 2022 09:32)  HR: 103 (14 Oct 2022 12:00) (92 - 114)  BP: 118/67 (14 Oct 2022 12:00) (118/67 - 131/75)  BP(mean): --  RR: 20 (14 Oct 2022 12:00) (20 - 20)  SpO2: 98% (14 Oct 2022 12:00) (93% - 98%)    Parameters below as of 14 Oct 2022 12:00  Patient On (Oxygen Delivery Method): room air      Daily     Daily     GENERAL: no acute distress  NEURO: alert, no asterixis  HEENT: anicteric sclera, no conjunctival pallor appreciated  CHEST: no respiratory distress, no accessory muscle use  CARDIAC: regular rate, rhythm  ABDOMEN: soft, non-tender, non-distended, no rebound or guarding  EXTREMITIES: warm, well perfused, no edema  SKIN: no lesions noted    LABS: reviewed                        9.5    30.28 )-----------( 419      ( 14 Oct 2022 08:06 )             29.9     10-14    138  |  105  |  6<L>  ----------------------------<  94  3.8   |  20<L>  |  0.48<L>    Ca    8.4      14 Oct 2022 08:06  Phos  2.7     10-14  Mg     1.8     10-14    TPro  5.5<L>  /  Alb  1.5<L>  /  TBili  13.2<H>  /  DBili  x   /  AST  79<H>  /  ALT  19  /  AlkPhos  223<H>  10-14    LIVER FUNCTIONS - ( 14 Oct 2022 08:06 )  Alb: 1.5 g/dL / Pro: 5.5 g/dL / ALK PHOS: 223 U/L / ALT: 19 U/L DA / AST: 79 U/L / GGT: x             Interval Diagnostic Studies: see sunrise for full report   Chief Complaint:  Patient is a 27y old  Male who presents with a chief complaint of Altered mental status (14 Oct 2022 12:43)      Reason for consult: Liver failure (acute / acute on chronic)    Interval Events: Patient was seen and examined at bedside. Patient was examined with native Syriac speaking nurse. Concerning that still has low grade T (100 F), leukocytosis continued to rise (although part of it could be steroid related), continued to have several BMs despite lactulose not being given physically since 10/11 morning, except one dose yesterday afternoon. Bilirubin without significant improvement, 13.2 on 10/7, steroid started 10/8, and now 10/14 13.2.    Hospital Medications:  dextrose 5%. 1000 milliLiter(s) IV Continuous <Continuous>  dextrose 5%. 1000 milliLiter(s) IV Continuous <Continuous>  dextrose 50% Injectable 25 Gram(s) IV Push once  dextrose 50% Injectable 12.5 Gram(s) IV Push once  dextrose 50% Injectable 25 Gram(s) IV Push once  dextrose Oral Gel 15 Gram(s) Oral once PRN  folic acid 1 milliGRAM(s) Oral daily  glucagon  Injectable 1 milliGRAM(s) IntraMuscular once  heparin   Injectable 5000 Unit(s) SubCutaneous every 8 hours  insulin lispro (ADMELOG) corrective regimen sliding scale   SubCutaneous Before meals and at bedtime  lactulose Syrup 10 Gram(s) Oral every 8 hours  LORazepam   Injectable 2 milliGRAM(s) IV Push every 4 hours PRN  multivitamin 1 Tablet(s) Oral daily  nystatin Powder 1 Application(s) Topical two times a day  pantoprazole    Tablet 40 milliGRAM(s) Oral before breakfast  prednisoLONE    3 mG/mL Solution (ORAPRED) 40 milliGRAM(s) Oral daily  rifAXIMin 550 milliGRAM(s) Oral two times a day  thiamine 100 milliGRAM(s) Oral daily  traMADol 25 milliGRAM(s) Oral every 8 hours PRN      ROS:   General:  Low grade T (100F)  ENT:  No sore throat, pain, runny nose  CV:  No pain, palpitations  Pulm:  No dyspnea, cough  GI:  While today denies pain, complaints of distention, and diarrhea ("reports every 30 min"), no bleeding or melena per d/w RN  :  Reports dysuria, but mostly points to his abdomen  Muscle:  Generalized weakness  Neuro: Day by day able for longer conversations, but still only oriented to self, place, not time. Able to tell his birth year.   Skin:  Jaundice    PHYSICAL EXAM:   Vital Signs:  Vital Signs Last 24 Hrs  T(C): 37.8 (14 Oct 2022 09:32), Max: 37.8 (14 Oct 2022 09:32)  T(F): 100 (14 Oct 2022 09:32), Max: 100 (14 Oct 2022 09:32)  HR: 103 (14 Oct 2022 12:00) (92 - 114)  BP: 118/67 (14 Oct 2022 12:00) (118/67 - 131/75)  BP(mean): --  RR: 20 (14 Oct 2022 12:00) (20 - 20)  SpO2: 98% (14 Oct 2022 12:00) (93% - 98%)    Parameters below as of 14 Oct 2022 12:00  Patient On (Oxygen Delivery Method): room air      Daily     Daily     GENERAL: no acute distress  NEURO: Day by day able for longer conversations, but still only oriented to self, place, not time. Able to tell his birth year. No asterixis.   HEENT: icteric sclera  CHEST: no respiratory distress, no accessory muscle use  CARDIAC: regular rate, rhythm  ABDOMEN: Abdomen even more distended today, non tender, not tense, soft, no rebound or guarding, BS+  EXTREMITIES: warm, well perfused, no edema  SKIN: icterus    LABS: reviewed                        9.5    30.28 )-----------( 419      ( 14 Oct 2022 08:06 )             29.9     10-14    138  |  105  |  6<L>  ----------------------------<  94  3.8   |  20<L>  |  0.48<L>    Ca    8.4      14 Oct 2022 08:06  Phos  2.7     10-14  Mg     1.8     10-14    TPro  5.5<L>  /  Alb  1.5<L>  /  TBili  13.2<H>  /  DBili  x   /  AST  79<H>  /  ALT  19  /  AlkPhos  223<H>  10-14    LIVER FUNCTIONS - ( 14 Oct 2022 08:06 )  Alb: 1.5 g/dL / Pro: 5.5 g/dL / ALK PHOS: 223 U/L / ALT: 19 U/L DA / AST: 79 U/L / GGT: x             Interval Diagnostic Studies: see sunrise for full report

## 2022-10-14 NOTE — PROGRESS NOTE ADULT - ASSESSMENT
27yo [place] speaking Male s/p Left temporal parietal craniotomy and alcohol abuse (reported current drinking) presented to [facility] ED on 10/2/22 with 1 day Hx of AMS, after reported witnessed seizure episodes and 1 week Hx of jaundice and was admitted to MICU with suspected EtOH withdrawal seizure, electrolyte abnormalities (hyponatremia with Na 125->129, hypokalemia with K 2.7->3.1, hypomagnesemia with Mg 1.0->1.9, hypophosphatemia with P 1.1->1.5), lactic acidosis (with lactate 6.4->3.0, bicarb 19->20), leukocytosis with neutrophilia (WBC 20.35->16.02, roderick 90%) with low grade T (100.1F), anemia without evidence of overt bleeding (Hb 6.9->8.3 s/p 1 U PRBC) and abnormal liver tests including coagulopathy (INR 2.13), hyperbilirubinemia (Se bi 9.3->10.8, direct 8.0), hyperammonemia (131), elevated liver enzymes in AST> ALT pattern (->212, ALT 22) and elevated ->600 and CT a/p w// iv suggesting diffuse hepatic steatosis, hepatomegaly with possible GB sludge and GB wall thickening, but normal bile ducts.  He was started on Alcoholic hepatitis management after infection was rule out, and UGIB was ruled out.       Massive hepatomegaly and diffuse hepatic steatosis likely due to Alcoholic hepatitis  Jaundice, mostly direct hyperbilirubinemia - Bili 10.2--->14.4---> 13.2  Coagulopathy - improved slightly, but got vit K and FFP (10/5-7 and 10/5)  Elevated transaminases in AST>ALT pattern - overall improved (-->79)  Elevated ALP - improving (669->223)  Positive UA - UCx neg  Hyponatremia - resolved  - MELD 28 -->23  - MDF > 32 (68.8->48.3)  - Given AST>ALT pattern, EtOH use, recent onset of jaundice, alcoholic hepatitis been high in differential, but needed to rule out infection, sepsis upon admission, as he was tachycardic with low grade T, leukocytosis with neutrophilia. Septic workup on admission: BCx neg (10/2). UCx neg (10/2). CXR neg (10/2). Been on SBP ppx b/o GI bleed since 10/2. Only mild ascites thus Dx paracentesis was not feasible. Was rechecked 10/9 -no ascites.  Noted + rhinovirus.  Procalcitonin was 0.32 10/4.  - He was started on prednisolone 40 mg on 10/8/22, with NAC. Got NAC for 3 days total. Day#7 would be 10/15 to assess Lille score. However, he was noted to have one low grade fever episode 10/11 night, and now again noted slightly elevated T (100F). Repeat UA neg, COVID remains neg. CXR mild perihilar opacities. No ascites on recent US (10/10). Fungitell neg. Pending repeat septic workup, including BCx, procalcitonin and pending stool studies. F/u ID recommendations, agree with empiric antibiotics and repeat abdominal imaging. (Abdomen even more distended today.) Please, hold steroid until infection treated or ruled out.   - Reviewed CT a/p from 10/2 with IR and hepatic and portal veins appeared to be patent.  - Liver workup so far:  Acute hepatitis viral panel, HIV, COVID-19, Hep BcAb, HBsAb, Hep E IgM all are neg. EBV past infection, CMV past infection. Leptospirosis neg. Ceruloplasmin 25. LUCIUS, SMA, LKM, AMA all neg, IgA elevated, IgG, IgM nl.  Please, follow up Peth.   - Agree with repeat CT a/p and can consider MRCP depending on results.  - Nutritional optimization      Acute encephalopathy, likely multifactorial - improving slowly  S/p seizures  PSE component (had elevated ammonia)  Alcohol use and was on CIWA  Hx of craniotomy for cerebral hemorrhage  - Per d/w primary team, patient was functional prior to admission, returned to work after his craniotomy / cerebral hemorrhage (that was likely due to trauma) and rehab  - CT head 10/2 noted.   - Aspiration, seizure, fall precautions  - Now off CIWA  - Ammonia only minimally elevated. Still > 6 BMs/day, off lactulose (was not getting since 10/11 morning except one dose 10/13 afternoon). Hold lactulose, resume once diarrhea resolves and meets criteria again. Stool studies as above. C/w rifaximin.   - On constant observation    Alcohol use (BAL 34 on admission, both family and now patient confirms recent EtOH use)  - Aspiration, seizure, fall precautions  - C/w folic, thiamine  - CIWA per primary team.  Has not required Ativan for several days.   - SW involvement, rehab once acute issues resolved.     Anemia, s/p  3U PRBCs (10/2, 10/4 and 10/5)  S/p EGD 10/5/22 - mild portal gastropathy  S/p sigmoidoscopy 10/5/22 - Dieulafoy lesion s/p clip  Coagulopathy - improved compared to admission, s/p 5 mg vit K 3 days 10/5-7, s/p 1U FFP 10/5  - Monitor H/H closely, keep T/S active. Hb remains stable.  - Keep Hb > 7, PLT>50 and fibrinogen > 120.   - S/p SBP ppx  - F/u GI recommendations, remained on PPI b/o steroid    Thank you for consult   Will continue to follow. Hepatology returns Monday. Please, consult GI on call if change in status, questions, concerns.   Was d/w primary  team and ID

## 2022-10-14 NOTE — PROGRESS NOTE ADULT - SUBJECTIVE AND OBJECTIVE BOX
NP Note discussed with  Primary Attending    Patient is a 27y old  Male who presents with a chief complaint of Altered mental status (13 Oct 2022 15:21)      INTERVAL HPI/OVERNIGHT EVENTS: no new complaints    MEDICATIONS  (STANDING):  dextrose 5%. 1000 milliLiter(s) (100 mL/Hr) IV Continuous <Continuous>  dextrose 5%. 1000 milliLiter(s) (50 mL/Hr) IV Continuous <Continuous>  dextrose 50% Injectable 25 Gram(s) IV Push once  dextrose 50% Injectable 12.5 Gram(s) IV Push once  dextrose 50% Injectable 25 Gram(s) IV Push once  folic acid 1 milliGRAM(s) Oral daily  glucagon  Injectable 1 milliGRAM(s) IntraMuscular once  heparin   Injectable 5000 Unit(s) SubCutaneous every 8 hours  insulin lispro (ADMELOG) corrective regimen sliding scale   SubCutaneous Before meals and at bedtime  lactulose Syrup 10 Gram(s) Oral every 8 hours  multivitamin 1 Tablet(s) Oral daily  nystatin Powder 1 Application(s) Topical two times a day  pantoprazole    Tablet 40 milliGRAM(s) Oral before breakfast  prednisoLONE    3 mG/mL Solution (ORAPRED) 40 milliGRAM(s) Oral daily  rifAXIMin 550 milliGRAM(s) Oral two times a day  thiamine 100 milliGRAM(s) Oral daily    MEDICATIONS  (PRN):  dextrose Oral Gel 15 Gram(s) Oral once PRN Blood Glucose LESS THAN 70 milliGRAM(s)/deciliter  LORazepam   Injectable 2 milliGRAM(s) IV Push every 4 hours PRN Agitation  traMADol 25 milliGRAM(s) Oral every 8 hours PRN Moderate Pain (4 - 6)      __________________________________________________  REVIEW OF SYSTEMS:    CONSTITUTIONAL: No fever,   EYES: no acute visual disturbances  NECK: No pain or stiffness  RESPIRATORY: No cough; No shortness of breath  CARDIOVASCULAR: No chest pain, no palpitations  GASTROINTESTINAL: No pain. No nausea or vomiting; No diarrhea   NEUROLOGICAL: No headache or numbness, no tremors  MUSCULOSKELETAL: No joint pain, no muscle pain  GENITOURINARY: no dysuria, no frequency, no hesitancy  PSYCHIATRY: no depression , no anxiety  ALL OTHER  ROS negative        Vital Signs Last 24 Hrs  T(C): 37.8 (14 Oct 2022 09:32), Max: 37.8 (14 Oct 2022 09:32)  T(F): 100 (14 Oct 2022 09:32), Max: 100 (14 Oct 2022 09:32)  HR: 103 (14 Oct 2022 12:00) (92 - 114)  BP: 118/67 (14 Oct 2022 12:00) (118/67 - 131/75)  BP(mean): --  RR: 20 (14 Oct 2022 12:00) (20 - 20)  SpO2: 98% (14 Oct 2022 12:00) (93% - 98%)    Parameters below as of 14 Oct 2022 12:00  Patient On (Oxygen Delivery Method): room air        ________________________________________________  PHYSICAL EXAM:  GENERAL: confused  HEENT: Normocephalic;  conjunctivae and sclerae clear; moist mucous membranes;   NECK : supple  CHEST/LUNG: Clear to auscultation bilaterally with good air entry   HEART: S1 S2  regular; no murmurs, gallops or rubs  ABDOMEN: diarrhea Soft, Nontender, Nondistended; Bowel sounds present  EXTREMITIES: no cyanosis; no edema; no calf tenderness  SKIN: jaundice, redness to buttocks, warm and dry; no rash  NERVOUS SYSTEM:  Awake and alert; Oriented  to person, confused  _________________________________________________  LABS:                        9.5    30.28 )-----------( 419      ( 14 Oct 2022 08:06 )             29.9     10-14    138  |  105  |  6<L>  ----------------------------<  94  3.8   |  20<L>  |  0.48<L>    Ca    8.4      14 Oct 2022 08:06  Phos  2.7     10-14  Mg     1.8     10-14    TPro  5.5<L>  /  Alb  1.5<L>  /  TBili  13.2<H>  /  DBili  x   /  AST  79<H>  /  ALT  19  /  AlkPhos  223<H>  10-14    PT/INR - ( 14 Oct 2022 08:06 )   PT: 23.8 sec;   INR: 1.99 ratio         PTT - ( 14 Oct 2022 08:06 )  PTT:34.9 sec  Urinalysis Basic - ( 13 Oct 2022 15:30 )    Color: Yellow / Appearance: Clear / S.005 / pH: x  Gluc: x / Ketone: Trace  / Bili: Large / Urobili: 8   Blood: x / Protein: 15 / Nitrite: Negative   Leuk Esterase: Trace / RBC: 0-2 /HPF / WBC 0-2 /HPF   Sq Epi: x / Non Sq Epi: Occasional /HPF / Bacteria: Trace /HPF      CAPILLARY BLOOD GLUCOSE      POCT Blood Glucose.: 134 mg/dL (14 Oct 2022 12:38)  POCT Blood Glucose.: 105 mg/dL (13 Oct 2022 21:13)  POCT Blood Glucose.: 113 mg/dL (13 Oct 2022 16:42)        RADIOLOGY & ADDITIONAL TESTS:  < from: Xray Chest 1 View- PORTABLE-Urgent (Xray Chest 1 View- PORTABLE-Urgent .) (10.12.22 @ 13:47) >  FINDINGS:  CATHETERS AND TUBES: None    PULMONARY: Mild bilateral perihilar diffuse airspace disease..   No pneumothorax.    HEART/VASCULAR: The heart and mediastinum size and configuration are   within normal limits.    BONES: Visualized osseous structures are intact.    IMPRESSION:   Mild bilateral perihilar diffuse airspace disease..    --- End of Report ---    < end of copied text >  < from: US Abdomen Liver Followup (10.10.22 @ 17:08) >    FINDINGS:    Liver: Increased echogenicity. No focal hepatic mass lesion is   demonstrated. The liver appears to be enlarged, however, no measurements   were obtained. Normal portal venous flow.  Bile ducts: Normal caliber. Common bile duct measures 5mm.  Gallbladder: Nonspecific mildly thickened wall measuring 4 mm.  Pancreas: Not visualized due to overlying bowel.  Right kidney: 12 cm. No hydronephrosis.  Ascites: None.  IVC: Visualized portions are within normal limits.    IMPRESSION:    Enlarged fatty liver.        --- End of Report ---    < end of copied text >  < from: Xray Chest 1 View-PORTABLE IMMEDIATE (Xray Chest 1 View-PORTABLE IMMEDIATE .) (10.07.22 @ 13:06) >  FINDINGS: There is a nonspecific nonobstructive bowel gas pattern. There   is no gross free air, organomegaly or abdominal calcifications. The bony   structures are intact    AP chest 1 view    COMPARISON: 10/2/2022    FINDINGS: The heart size, mediastinum and hilum are within normal limits   for. Decreased inspiratory effort with grossly clear lungs. Trachea   midline. No fractures.    IMPRESSION:    Nonspecific nonobstructive bowel gas pattern.    Grossly clear lungs.    --- End of Report ---    < end of copied text >  < from: Xray Abdomen 1 View Portable, IMMEDIATE (Xray Abdomen 1 View Portable, IMMEDIATE .) (10.07.22 @ 13:06) >  FINDINGS: There is a nonspecific nonobstructive bowel gas pattern. There   is no gross free air, organomegaly or abdominal calcifications. The bony   structures are intact    AP chest 1 view    COMPARISON: 10/2/2022    FINDINGS: The heart size, mediastinum and hilum are within normal limits   for. Decreased inspiratory effort with grossly clear lungs. Trachea   midline. No fractures.    IMPRESSION:    Nonspecific nonobstructive bowel gas pattern.    Grossly clear lungs.    --- End of Report ---    < end of copied text >  < from: Xray Chest 1 View- PORTABLE-Urgent (Xray Chest 1 View- PORTABLE-Urgent .) (10.02.22 @ 23:18) >  FINDINGS:  Heart/Vascular: The heart size, mediastinum, hilum and aorta are within   normal limits for projection.  Pulmonary: Midline trachea. There is no focal infiltrate, congestion or   effusion.    Bones: There is no fracture.  Lines and catheter: Overlying EKG leads and wires.    Impression:    No acute pulmonary disease.    --- End of Report ---    < end of copied text >  < from: CT Abdomen and Pelvis w/ IV Cont (10.02.22 @ 16:56) >  FINDINGS:  LOWER CHEST:Within normal limits.    LIVER: Severe hepatomegaly and diffuse steatosis.  BILE DUCTS: Normal caliber.  GALLBLADDER: Partially contracted with high attenuation related to   enhancement and/or sludge. Nonspecific wall thickening, likely   secondary/reactive.  SPLEEN: Within normal limits.  PANCREAS: Within normal limits.  ADRENALS: Within normal limits.  KIDNEYS/URETERS: Too small to characterize low attenuating focus in the   left kidney.    BLADDER: Within normal limits.  REPRODUCTIVE ORGANS: Within normal limits.    BOWEL: No bowel obstruction. Appendix is normal.  PERITONEUM: Mild ascites and infiltration of the central mesenteric fat.  VESSELS: Within normal limits.  RETROPERITONEUM/LYMPH NODES: No lymphadenopathy.  ABDOMINAL WALL: Tiny fat-containing inguinal hernias.  BONES: Bilateral L5-S1 pars defects. Old fractures of the 6th and 8th-9th   left ribs.    IMPRESSION:  Severe hepatomegaly and diffuse steatosis.  Mild ascites and infiltration of the central mesenteric fat.  Possiblegallbladder sludge and likely secondary/reactive wall thickening.      --- End of Report ---    < end of copied text >  < from: CT Head No Cont (10.02.22 @ 16:39) >  FINDINGS:  VENTRICLES AND SULCI:  Ex vacuo dilatation of the left lateral ventricle   to a mild degree.  INTRA-AXIAL:  Left temporal parietal encephalomalacia subjacent to the   craniotomy.  EXTRA-AXIAL:  No mass or collection is seen.  VISUALIZED SINUSES: Left maxillary mucosal thickening. Right maxillary   mucosal thickening  VISUALIZED MASTOIDS:  Clear.  CALVARIUM: Left temporal parietal craniotomy. Right parietalsoft tissue   swelling.  MISCELLANEOUS:  None.    IMPRESSION:  Left temporal parietal craniotomy. Encephalomalacia   subjacent to the craniotomy. Ex vacuo dilatation of the left lateral   ventricle. No intracranial hemorrhage. No mass lesion    --- End of Report ---    < end of copied text >    Imaging  Reviewed:  YES    Consultant(s) Notes Reviewed:   YES      Plan of care was discussed with patient and /or primary care giver; all questions and concerns were addressed

## 2022-10-15 LAB
ALBUMIN SERPL ELPH-MCNC: 1.6 G/DL — LOW (ref 3.5–5)
ALP SERPL-CCNC: 226 U/L — HIGH (ref 40–120)
ALT FLD-CCNC: 20 U/L DA — SIGNIFICANT CHANGE UP (ref 10–60)
ANION GAP SERPL CALC-SCNC: 10 MMOL/L — SIGNIFICANT CHANGE UP (ref 5–17)
AST SERPL-CCNC: 92 U/L — HIGH (ref 10–40)
BILIRUB SERPL-MCNC: 14.1 MG/DL — HIGH (ref 0.2–1.2)
BUN SERPL-MCNC: 7 MG/DL — SIGNIFICANT CHANGE UP (ref 7–18)
CALCIUM SERPL-MCNC: 8.1 MG/DL — LOW (ref 8.4–10.5)
CHLORIDE SERPL-SCNC: 106 MMOL/L — SIGNIFICANT CHANGE UP (ref 96–108)
CO2 SERPL-SCNC: 21 MMOL/L — LOW (ref 22–31)
CREAT SERPL-MCNC: 0.54 MG/DL — SIGNIFICANT CHANGE UP (ref 0.5–1.3)
CRP SERPL-MCNC: 73 MG/L — HIGH
EGFR: 142 ML/MIN/1.73M2 — SIGNIFICANT CHANGE UP
GLUCOSE BLDC GLUCOMTR-MCNC: 107 MG/DL — HIGH (ref 70–99)
GLUCOSE BLDC GLUCOMTR-MCNC: 108 MG/DL — HIGH (ref 70–99)
GLUCOSE BLDC GLUCOMTR-MCNC: 123 MG/DL — HIGH (ref 70–99)
GLUCOSE BLDC GLUCOMTR-MCNC: 141 MG/DL — HIGH (ref 70–99)
GLUCOSE SERPL-MCNC: 96 MG/DL — SIGNIFICANT CHANGE UP (ref 70–99)
HCT VFR BLD CALC: 28.9 % — LOW (ref 39–50)
HGB BLD-MCNC: 9.1 G/DL — LOW (ref 13–17)
MAGNESIUM SERPL-MCNC: 1.6 MG/DL — SIGNIFICANT CHANGE UP (ref 1.6–2.6)
MCHC RBC-ENTMCNC: 29.7 PG — SIGNIFICANT CHANGE UP (ref 27–34)
MCHC RBC-ENTMCNC: 31.5 GM/DL — LOW (ref 32–36)
MCV RBC AUTO: 94.4 FL — SIGNIFICANT CHANGE UP (ref 80–100)
NRBC # BLD: 0 /100 WBCS — SIGNIFICANT CHANGE UP (ref 0–0)
PHOSPHATE SERPL-MCNC: 2.7 MG/DL — SIGNIFICANT CHANGE UP (ref 2.5–4.5)
PLATELET # BLD AUTO: 381 K/UL — SIGNIFICANT CHANGE UP (ref 150–400)
POTASSIUM SERPL-MCNC: 3.2 MMOL/L — LOW (ref 3.5–5.3)
POTASSIUM SERPL-SCNC: 3.2 MMOL/L — LOW (ref 3.5–5.3)
PROT SERPL-MCNC: 5.8 G/DL — LOW (ref 6–8.3)
RBC # BLD: 3.06 M/UL — LOW (ref 4.2–5.8)
RBC # FLD: 21.1 % — HIGH (ref 10.3–14.5)
SODIUM SERPL-SCNC: 137 MMOL/L — SIGNIFICANT CHANGE UP (ref 135–145)
WBC # BLD: 36.87 K/UL — HIGH (ref 3.8–10.5)
WBC # FLD AUTO: 36.87 K/UL — HIGH (ref 3.8–10.5)

## 2022-10-15 PROCEDURE — 99233 SBSQ HOSP IP/OBS HIGH 50: CPT

## 2022-10-15 RX ORDER — POTASSIUM CHLORIDE 20 MEQ
20 PACKET (EA) ORAL ONCE
Refills: 0 | Status: COMPLETED | OUTPATIENT
Start: 2022-10-15 | End: 2022-10-16

## 2022-10-15 RX ORDER — POTASSIUM CHLORIDE 20 MEQ
40 PACKET (EA) ORAL ONCE
Refills: 0 | Status: COMPLETED | OUTPATIENT
Start: 2022-10-15 | End: 2022-10-15

## 2022-10-15 RX ORDER — MAGNESIUM SULFATE 500 MG/ML
1 VIAL (ML) INJECTION ONCE
Refills: 0 | Status: COMPLETED | OUTPATIENT
Start: 2022-10-15 | End: 2022-10-16

## 2022-10-15 RX ADMIN — Medication 40 MILLIEQUIVALENT(S): at 11:54

## 2022-10-15 RX ADMIN — Medication 1 MILLIGRAM(S): at 15:30

## 2022-10-15 RX ADMIN — Medication 1 TABLET(S): at 11:54

## 2022-10-15 RX ADMIN — HEPARIN SODIUM 5000 UNIT(S): 5000 INJECTION INTRAVENOUS; SUBCUTANEOUS at 21:19

## 2022-10-15 RX ADMIN — HEPARIN SODIUM 5000 UNIT(S): 5000 INJECTION INTRAVENOUS; SUBCUTANEOUS at 05:14

## 2022-10-15 RX ADMIN — Medication 100 MILLIGRAM(S): at 11:54

## 2022-10-15 RX ADMIN — PIPERACILLIN AND TAZOBACTAM 25 GRAM(S): 4; .5 INJECTION, POWDER, LYOPHILIZED, FOR SOLUTION INTRAVENOUS at 21:19

## 2022-10-15 RX ADMIN — TRAMADOL HYDROCHLORIDE 25 MILLIGRAM(S): 50 TABLET ORAL at 14:10

## 2022-10-15 RX ADMIN — PIPERACILLIN AND TAZOBACTAM 25 GRAM(S): 4; .5 INJECTION, POWDER, LYOPHILIZED, FOR SOLUTION INTRAVENOUS at 14:02

## 2022-10-15 RX ADMIN — NYSTATIN CREAM 1 APPLICATION(S): 100000 CREAM TOPICAL at 17:42

## 2022-10-15 RX ADMIN — HEPARIN SODIUM 5000 UNIT(S): 5000 INJECTION INTRAVENOUS; SUBCUTANEOUS at 14:03

## 2022-10-15 RX ADMIN — NYSTATIN CREAM 1 APPLICATION(S): 100000 CREAM TOPICAL at 06:42

## 2022-10-15 RX ADMIN — Medication 1 MILLIGRAM(S): at 11:54

## 2022-10-15 RX ADMIN — PIPERACILLIN AND TAZOBACTAM 25 GRAM(S): 4; .5 INJECTION, POWDER, LYOPHILIZED, FOR SOLUTION INTRAVENOUS at 05:13

## 2022-10-15 RX ADMIN — Medication 40 MILLIGRAM(S): at 06:42

## 2022-10-15 RX ADMIN — TRAMADOL HYDROCHLORIDE 25 MILLIGRAM(S): 50 TABLET ORAL at 15:01

## 2022-10-15 RX ADMIN — PANTOPRAZOLE SODIUM 40 MILLIGRAM(S): 20 TABLET, DELAYED RELEASE ORAL at 06:42

## 2022-10-15 NOTE — PROGRESS NOTE ADULT - ASSESSMENT
Patient is a 26 yo Citizen of [place] speaking Male, with no known past medical history, s/p Left temporal parietal craniotomy noted in imaging, As per family, patient had head trauma 6 months ago which required craniotomy. He went to rehab and returned home. He was able to work after the accident. presenting to the ED with altered mental status. Pt was not able to provide history and pt's brother's contact information not in chart as family left ED prior to phone number being collected. As per EMS charts, pt was found to be 'asleep' when EMS arrive. As per family, pt has a seizure that lasted about 2-4 minutes. Family states pt has a seizure earlier in the day as well. Pt family denies hx of seizures. They stated pt drinks alcohol on a daily basis and hasn't drank any alcohol in the past 2 days. Family denies any recent trauma related injuries to pt.' Pt's brother told ED attending that the jaundice developed acutely over the last 1 week. In the ED, pt's vitals were Temp 100.1, , /67, RR 18 on room air O2 sat 96% s/p IV Vanc + Zosyn, NS 1 L bolus, Keppra 2 gm x 1, Ativan, Mg, Kcl riders, Thiamine. Patient was brought up to the ICU for severe metabolic derangements, CIWA protocol with PRN ativan. Patient was started on PO lactulose and IVF. Lactate trended down to normal levels. GI and Hepatology was consulted for transaminitis and hyperbilirubinemia. Patient was started on zosyn for possible cholangitis. CT abd showed hepatomegaly with biliary sludge and mild ascites. They recommended MRCP once patient is more stable. Per chart, patient has a history of left temporal parietal craniotomy. Patient had episode of melena overnight on October 4th. Hb was 7.3. Patient was made NPO, started on protonix drip and octreotide drip. GI on board recommending EGD which did not show any pathology. Flexible sigmoidoscopy showed rectal varices which was cauterized. Patient received one unit pRBC and one unit FFP. Patients Hb was stable around 9 after 3 CBC draws. Patient was switched to ceftriaxone for SBP prophylaxis per ID. He was started on NAC per hepatology which was discontinued He was started on Vit K for 3 days for elevated INR. He was started on prednisone 40mg daily for 28 days for alcoholic hepatitis. Patients diet was advanced and was able to tolerate clear liquid diet. Patient is stable for downgrade to medicine floors.     Patient hospital course further complicated by fever, ID recommending. Initially held sending blood culture. However, patient develop leukocytosis, diarrhea, slight elevated temp- not fever. abdomen slightly more distended. pending blood culture, stool studies, and CT abdomen and pelvis

## 2022-10-15 NOTE — PROGRESS NOTE ADULT - SUBJECTIVE AND OBJECTIVE BOX
MEDICAL ATTENDING NOTE  Patient is a 27y old  Male who presents with a chief complaint of Altered mental status (14 Oct 2022 14:34)      HPI:  Patient is a 25 yo [place] speaking Male, with no known past medical history, s/p Left temporal parietal craniotomy noted in imaging, presenting to the ED with altered mental status since this morning. Pt moaning, not able to provide history and pt's brother's contact information not in chart as family left ED prior to phone number being collected. As per EMS charts, pt was found to be 'asleep' when EMS arrived..as per family, pt has a seizure that lasted about 2-4 minutes. Family states pt has a seizure earlier in the day as well. Pt family denies hx of seizures. They stated pt drinks alcohol on a daily basis and hasn't drank any alcohol in the past 2 days. Family denies any recent trauma related injuries to pt.' Pt's brother told ED attending that the jaundice developed acutely over the last 1 week.     In the ED, pt's vitals were  Temp 100.1, , /67, RR 18 on room air O2 sat 96%  s/p IV Vanc + Zosyn, NS 1 L bolus, Keppra 2 gm x 1, Ativan, Mg, Kcl riders, Thiamine (02 Oct 2022 20:13)      INTERVAL HPI/OVERNIGHT EVENTS: no new complaints    MEDICATIONS  (STANDING):  dextrose 5%. 1000 milliLiter(s) (100 mL/Hr) IV Continuous <Continuous>  dextrose 5%. 1000 milliLiter(s) (50 mL/Hr) IV Continuous <Continuous>  dextrose 50% Injectable 25 Gram(s) IV Push once  dextrose 50% Injectable 12.5 Gram(s) IV Push once  dextrose 50% Injectable 25 Gram(s) IV Push once  folic acid 1 milliGRAM(s) Oral daily  glucagon  Injectable 1 milliGRAM(s) IntraMuscular once  heparin   Injectable 5000 Unit(s) SubCutaneous every 8 hours  insulin lispro (ADMELOG) corrective regimen sliding scale   SubCutaneous Before meals and at bedtime  multivitamin 1 Tablet(s) Oral daily  nystatin Powder 1 Application(s) Topical two times a day  pantoprazole    Tablet 40 milliGRAM(s) Oral before breakfast  piperacillin/tazobactam IVPB.. 3.375 Gram(s) IV Intermittent every 8 hours  rifAXIMin 550 milliGRAM(s) Oral two times a day  thiamine 100 milliGRAM(s) Oral daily    MEDICATIONS  (PRN):  dextrose Oral Gel 15 Gram(s) Oral once PRN Blood Glucose LESS THAN 70 milliGRAM(s)/deciliter  LORazepam   Injectable 2 milliGRAM(s) IV Push every 4 hours PRN Agitation  traMADol 25 milliGRAM(s) Oral every 8 hours PRN Moderate Pain (4 - 6)      __________________________________________________  REVIEW OF SYSTEMS:    Unable to elicit      Vital Signs Last 24 Hrs  T(C): 36.4 (15 Oct 2022 12:29), Max: 37.5 (15 Oct 2022 05:42)  T(F): 97.6 (15 Oct 2022 12:29), Max: 99.5 (15 Oct 2022 05:42)  HR: 105 (15 Oct 2022 12:29) (99 - 112)  BP: 119/70 (15 Oct 2022 12:29) (119/70 - 127/76)  BP(mean): --  RR: 18 (15 Oct 2022 12:29) (18 - 19)  SpO2: 99% (15 Oct 2022 12:29) (94% - 99%)    Parameters below as of 15 Oct 2022 12:29  Patient On (Oxygen Delivery Method): room air        ________________________________________________  PHYSICAL EXAM:  GENERAL: Alert, anxious man in NAD.  Verbal, but not coherent, even in [place]  HEENT: SCLERAL ICTERUS  NECK : supple  CHEST/LUNG: Clear to auscultation bilaterally with good air entry   HEART: S1 S2  regular; no murmurs, gallops or rubs  ABDOMEN: Soft, Hepatomegaly, ascites, non-tender  EXTREMITIES: Dupuytren contraxture  SKIN: warm and dry; no rash  NERVOUS SYSTEM:  Awake and alert; verbal; asterixis    _________________________________________________  LABS:                        9.1    36.87 )-----------( 381      ( 15 Oct 2022 08:11 )             28.9     10-15    137  |  106  |  7   ----------------------------<  96  3.2<L>   |  21<L>  |  0.54    Ca    8.1<L>      15 Oct 2022 08:11  Phos  2.7     10-15  Mg     1.6     10-15    TPro  5.8<L>  /  Alb  1.6<L>  /  TBili  14.1<H>  /  DBili  x   /  AST  92<H>  /  ALT  20  /  AlkPhos  226<H>  10-15    PT/INR - ( 14 Oct 2022 08:06 )   PT: 23.8 sec;   INR: 1.99 ratio         PTT - ( 14 Oct 2022 08:06 )  PTT:34.9 sec    CAPILLARY BLOOD GLUCOSE      POCT Blood Glucose.: 123 mg/dL (15 Oct 2022 16:47)  POCT Blood Glucose.: 141 mg/dL (15 Oct 2022 12:01)  POCT Blood Glucose.: 107 mg/dL (15 Oct 2022 07:42)  POCT Blood Glucose.: 92 mg/dL (14 Oct 2022 21:33)

## 2022-10-15 NOTE — PROGRESS NOTE ADULT - NS ATTEND AMEND GEN_ALL_CORE FT
Patient remains anxious, alert, not coherent.   patient mental status improved but still not at baseline.    Vital Signs Last 24 Hrs  T(C): 36.4 (15 Oct 2022 12:29), Max: 37.5 (15 Oct 2022 05:42)  T(F): 97.6 (15 Oct 2022 12:29), Max: 99.5 (15 Oct 2022 05:42)  HR: 105 (15 Oct 2022 12:29) (99 - 112)  BP: 119/70 (15 Oct 2022 12:29) (119/70 - 127/76)  BP(mean): --  RR: 18 (15 Oct 2022 12:29) (18 - 19)  SpO2: 99% (15 Oct 2022 12:29) (94% - 99%)    Parameters below as of 15 Oct 2022 12:29  Patient On (Oxygen Delivery Method): room air    Lungs, clear  Cor, RRR  Abdomen, hepatomegaly, ascites   Neurological, alert, anxious, asterixis                          9.1    36.87 )-----------( 381      ( 15 Oct 2022 08:11 )             28.9   10-15    137  |  106  |  7   ----------------------------<  96  3.2<L>   |  21<L>  |  0.54    Ca    8.1<L>      15 Oct 2022 08:11  Phos  2.7     10-15  Mg     1.6     10-15    TPro  5.8<L>  /  Alb  1.6<L>  /  TBili  14.1<H>  /  DBili  x   /  AST  92<H>  /  ALT  20  /  AlkPhos  226<H>  10-15    < from: CT Head No Cont (10.02.22 @ 16:39) >    IMPRESSION:  Left temporal parietal craniotomy. Encephalomalacia   subjacent to the craniotomy. Ex vacuo dilatation of the left lateral   ventricle. No intracranial hemorrhage. No mass lesion    < end of copied text >      IMP: Alcoholic hepatitis         hypokalemia         hypomagnesemia         Leukocytosis, likely secondary to steroids, r/o new infection         encephalopathy, multifactorial, s/p craniotomy, hepatic encephalopathy, alcohol withdrawal  ID f/u, seen and appreciated  Plan: replete electrolytes          continue CIWA protocol          continue antibiotics.

## 2022-10-16 LAB
ALBUMIN SERPL ELPH-MCNC: 1.7 G/DL — LOW (ref 3.5–5)
ALP SERPL-CCNC: 228 U/L — HIGH (ref 40–120)
ALT FLD-CCNC: 22 U/L DA — SIGNIFICANT CHANGE UP (ref 10–60)
AMMONIA BLD-MCNC: 52 UMOL/L — HIGH (ref 11–32)
ANION GAP SERPL CALC-SCNC: 13 MMOL/L — SIGNIFICANT CHANGE UP (ref 5–17)
ANISOCYTOSIS BLD QL: SLIGHT — SIGNIFICANT CHANGE UP
AST SERPL-CCNC: 73 U/L — HIGH (ref 10–40)
BASOPHILS # BLD AUTO: 0 K/UL — SIGNIFICANT CHANGE UP (ref 0–0.2)
BASOPHILS NFR BLD AUTO: 0 % — SIGNIFICANT CHANGE UP (ref 0–2)
BILIRUB SERPL-MCNC: 14.6 MG/DL — HIGH (ref 0.2–1.2)
BUN SERPL-MCNC: 5 MG/DL — LOW (ref 7–18)
CALCIUM SERPL-MCNC: 8.5 MG/DL — SIGNIFICANT CHANGE UP (ref 8.4–10.5)
CHLORIDE SERPL-SCNC: 104 MMOL/L — SIGNIFICANT CHANGE UP (ref 96–108)
CO2 SERPL-SCNC: 20 MMOL/L — LOW (ref 22–31)
CREAT SERPL-MCNC: 0.55 MG/DL — SIGNIFICANT CHANGE UP (ref 0.5–1.3)
EGFR: 141 ML/MIN/1.73M2 — SIGNIFICANT CHANGE UP
EOSINOPHIL # BLD AUTO: 1.29 K/UL — HIGH (ref 0–0.5)
EOSINOPHIL NFR BLD AUTO: 3 % — SIGNIFICANT CHANGE UP (ref 0–6)
GLUCOSE BLDC GLUCOMTR-MCNC: 112 MG/DL — HIGH (ref 70–99)
GLUCOSE BLDC GLUCOMTR-MCNC: 120 MG/DL — HIGH (ref 70–99)
GLUCOSE BLDC GLUCOMTR-MCNC: 95 MG/DL — SIGNIFICANT CHANGE UP (ref 70–99)
GLUCOSE BLDC GLUCOMTR-MCNC: 99 MG/DL — SIGNIFICANT CHANGE UP (ref 70–99)
GLUCOSE SERPL-MCNC: 83 MG/DL — SIGNIFICANT CHANGE UP (ref 70–99)
HCT VFR BLD CALC: 29 % — LOW (ref 39–50)
HGB BLD-MCNC: 9.1 G/DL — LOW (ref 13–17)
HYPOCHROMIA BLD QL: SLIGHT — SIGNIFICANT CHANGE UP
LG PLATELETS BLD QL AUTO: SLIGHT — SIGNIFICANT CHANGE UP
LYMPHOCYTES # BLD AUTO: 2.57 K/UL — SIGNIFICANT CHANGE UP (ref 1–3.3)
LYMPHOCYTES # BLD AUTO: 6 % — LOW (ref 13–44)
MACROCYTES BLD QL: SLIGHT — SIGNIFICANT CHANGE UP
MAGNESIUM SERPL-MCNC: 2 MG/DL — SIGNIFICANT CHANGE UP (ref 1.6–2.6)
MANUAL SMEAR VERIFICATION: SIGNIFICANT CHANGE UP
MCHC RBC-ENTMCNC: 29.6 PG — SIGNIFICANT CHANGE UP (ref 27–34)
MCHC RBC-ENTMCNC: 31.4 GM/DL — LOW (ref 32–36)
MCV RBC AUTO: 94.5 FL — SIGNIFICANT CHANGE UP (ref 80–100)
METAMYELOCYTES # FLD: 1 % — HIGH (ref 0–0)
MONOCYTES # BLD AUTO: 3.43 K/UL — HIGH (ref 0–0.9)
MONOCYTES NFR BLD AUTO: 8 % — SIGNIFICANT CHANGE UP (ref 2–14)
NEUTROPHILS # BLD AUTO: 35.16 K/UL — HIGH (ref 1.8–7.4)
NEUTROPHILS NFR BLD AUTO: 82 % — HIGH (ref 43–77)
NRBC # BLD: 0 /100 — SIGNIFICANT CHANGE UP (ref 0–0)
PLAT MORPH BLD: NORMAL — SIGNIFICANT CHANGE UP
PLATELET # BLD AUTO: 383 K/UL — SIGNIFICANT CHANGE UP (ref 150–400)
PLATELET COUNT - ESTIMATE: NORMAL — SIGNIFICANT CHANGE UP
POLYCHROMASIA BLD QL SMEAR: SLIGHT — SIGNIFICANT CHANGE UP
POTASSIUM SERPL-MCNC: 3.2 MMOL/L — LOW (ref 3.5–5.3)
POTASSIUM SERPL-SCNC: 3.2 MMOL/L — LOW (ref 3.5–5.3)
PROT SERPL-MCNC: 6 G/DL — SIGNIFICANT CHANGE UP (ref 6–8.3)
RBC # BLD: 3.07 M/UL — LOW (ref 4.2–5.8)
RBC # FLD: 21.2 % — HIGH (ref 10.3–14.5)
RBC BLD AUTO: ABNORMAL
SODIUM SERPL-SCNC: 137 MMOL/L — SIGNIFICANT CHANGE UP (ref 135–145)
WBC # BLD: 42.88 K/UL — CRITICAL HIGH (ref 3.8–10.5)
WBC # FLD AUTO: 42.88 K/UL — CRITICAL HIGH (ref 3.8–10.5)

## 2022-10-16 PROCEDURE — 74176 CT ABD & PELVIS W/O CONTRAST: CPT | Mod: 26

## 2022-10-16 PROCEDURE — 99233 SBSQ HOSP IP/OBS HIGH 50: CPT

## 2022-10-16 RX ORDER — MEROPENEM 1 G/30ML
INJECTION INTRAVENOUS
Refills: 0 | Status: DISCONTINUED | OUTPATIENT
Start: 2022-10-16 | End: 2022-10-18

## 2022-10-16 RX ORDER — LACTULOSE 10 G/15ML
20 SOLUTION ORAL EVERY 8 HOURS
Refills: 0 | Status: DISCONTINUED | OUTPATIENT
Start: 2022-10-16 | End: 2022-10-18

## 2022-10-16 RX ORDER — MEROPENEM 1 G/30ML
1000 INJECTION INTRAVENOUS EVERY 8 HOURS
Refills: 0 | Status: DISCONTINUED | OUTPATIENT
Start: 2022-10-16 | End: 2022-10-18

## 2022-10-16 RX ORDER — POTASSIUM CHLORIDE 20 MEQ
40 PACKET (EA) ORAL ONCE
Refills: 0 | Status: COMPLETED | OUTPATIENT
Start: 2022-10-16 | End: 2022-10-16

## 2022-10-16 RX ORDER — POLYETHYLENE GLYCOL 3350 17 G/17G
17 POWDER, FOR SOLUTION ORAL DAILY
Refills: 0 | Status: DISCONTINUED | OUTPATIENT
Start: 2022-10-16 | End: 2022-10-18

## 2022-10-16 RX ORDER — MEROPENEM 1 G/30ML
1000 INJECTION INTRAVENOUS ONCE
Refills: 0 | Status: COMPLETED | OUTPATIENT
Start: 2022-10-16 | End: 2022-10-16

## 2022-10-16 RX ADMIN — Medication 20 MILLIEQUIVALENT(S): at 05:52

## 2022-10-16 RX ADMIN — Medication 1 MILLIGRAM(S): at 12:07

## 2022-10-16 RX ADMIN — Medication 2 MILLIGRAM(S): at 14:50

## 2022-10-16 RX ADMIN — MEROPENEM 100 MILLIGRAM(S): 1 INJECTION INTRAVENOUS at 22:10

## 2022-10-16 RX ADMIN — HEPARIN SODIUM 5000 UNIT(S): 5000 INJECTION INTRAVENOUS; SUBCUTANEOUS at 14:53

## 2022-10-16 RX ADMIN — POLYETHYLENE GLYCOL 3350 17 GRAM(S): 17 POWDER, FOR SOLUTION ORAL at 14:49

## 2022-10-16 RX ADMIN — LACTULOSE 20 GRAM(S): 10 SOLUTION ORAL at 14:58

## 2022-10-16 RX ADMIN — Medication 1 TABLET(S): at 12:07

## 2022-10-16 RX ADMIN — LACTULOSE 20 GRAM(S): 10 SOLUTION ORAL at 22:10

## 2022-10-16 RX ADMIN — MEROPENEM 100 MILLIGRAM(S): 1 INJECTION INTRAVENOUS at 14:58

## 2022-10-16 RX ADMIN — NYSTATIN CREAM 1 APPLICATION(S): 100000 CREAM TOPICAL at 18:10

## 2022-10-16 RX ADMIN — PIPERACILLIN AND TAZOBACTAM 25 GRAM(S): 4; .5 INJECTION, POWDER, LYOPHILIZED, FOR SOLUTION INTRAVENOUS at 05:52

## 2022-10-16 RX ADMIN — Medication 40 MILLIEQUIVALENT(S): at 14:58

## 2022-10-16 RX ADMIN — HEPARIN SODIUM 5000 UNIT(S): 5000 INJECTION INTRAVENOUS; SUBCUTANEOUS at 22:10

## 2022-10-16 RX ADMIN — Medication 2 MILLIGRAM(S): at 22:14

## 2022-10-16 RX ADMIN — Medication 100 MILLIGRAM(S): at 12:06

## 2022-10-16 RX ADMIN — Medication 100 GRAM(S): at 02:31

## 2022-10-16 RX ADMIN — NYSTATIN CREAM 1 APPLICATION(S): 100000 CREAM TOPICAL at 05:52

## 2022-10-16 RX ADMIN — HEPARIN SODIUM 5000 UNIT(S): 5000 INJECTION INTRAVENOUS; SUBCUTANEOUS at 05:53

## 2022-10-16 RX ADMIN — PANTOPRAZOLE SODIUM 40 MILLIGRAM(S): 20 TABLET, DELAYED RELEASE ORAL at 05:53

## 2022-10-16 NOTE — CONSULT NOTE ADULT - GASTROINTESTINAL
normal/soft/nontender/nondistended/normal active bowel sounds details… soft/nontender/nondistended/normal active bowel sounds/hepatomegaly
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
## 2022-10-16 NOTE — CONSULT NOTE ADULT - MENTAL STATUS
lethargy more awake and alert . ambulating around with still some confusion but able to answer most question properly Self
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
## 2022-10-16 NOTE — PROGRESS NOTE ADULT - NS ATTEND AMEND GEN_ALL_CORE FT
Patient remains anxious, alert, not coherent.   patient mental status improved but still not at baseline.  vs, as above    Lungs, clear  Cor, RRR  Abdomen, hepatomegaly, protuberant abdomen  Neurological, alert, anxious, asterixis    < from: CT Abdomen and Pelvis No Cont (10.16.22 @ 11:03) >    IMPRESSION:  1.  Marked hepatomegaly/steatosis, unchanged.  2.  Ascites/mesenteric edema and subcutaneous edema, minimally progressed.  3.  Equivocal ascending colonic mural prominence raises the possibility   of portal colopathy or inflammation  4.  Underdistended duodenum with increased fat stranding about distal   duodenal sweep, nonspecific (coronal 4/68). Clinical correlation with   regard to duodenal inflammation recommended.  5.  Undetermined rectosigmoid foreign body/medical hardware (2/54, CT    image)-(this finding discussed with Iliana Keys 10/16/2022 11:25   AM).    --- End of Report ---    TIFF ACE MD; Attending Radiologist  This document has been electronically signed. Oct 16 2022 11:28AM    < end of copied text >  < from: CT Head No Cont (10.02.22 @ 16:39) >    IMPRESSION:  Left temporal parietal craniotomy. Encephalomalacia   subjacent to the craniotomy. Ex vacuo dilatation of the left lateral   ventricle. No intracranial hemorrhage. No mass lesion    < end of copied text >      IMP: Alcoholic hepatitis         hypokalemia         hypomagnesemia         Leukocytosis, concern for infection and/or malignancy         Foreign body in rectum         encephalopathy, multifactorial, s/p craniotomy, hepatic encephalopathy, alcohol withdrawal  Plan: replete electrolytes          continue CIWA protocol          repeat blood cultures and expand antibiotic coverage          GI has been consulted to evaluate and remove FB in rectum.  NPO after midnight.  Repeat Xray in AM.

## 2022-10-16 NOTE — CHART NOTE - NSCHARTNOTEFT_GEN_A_CORE
Attempted to call family to obtain consent for IV contrast but got not reach out family members. Will attempted again at a later time.

## 2022-10-16 NOTE — CHART NOTE - NSCHARTNOTEFT_GEN_A_CORE
Called by primary team attending Dr. Pillai and NP Jia to request an evaluation for a paracentesis. Imaging reviewed with primary team, small perihepatic ascites noted on CT. Went to bedside and performed a POCUS, unable to locate any ascites pocket. Not able to perform a paracentesis at this time due to lack of pocket. Would recommending monitoring with serial physical exams or POCUS evaluations to see if a large enough pocket develops to safely tap. If a diagnostic paracentesis is needed, can also consider discussing with interventional radiology. Called by primary team attending Dr. Pillai and NP Jia to request an evaluation for a paracentesis. Imaging reviewed with primary team, small perihepatic ascites noted on CT. Went to bedside and performed a POCUS, unable to locate any ascites pocket. Not able to perform a paracentesis at this time due to lack of pocket. Would recommending monitoring with serial physical exams or POCUS evaluations to see if a large enough pocket develops to safely tap. Can also consider discussing with interventional radiology.
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
## 2022-10-17 LAB
ALBUMIN SERPL ELPH-MCNC: 1.7 G/DL — LOW (ref 3.5–5)
ALP SERPL-CCNC: 236 U/L — HIGH (ref 40–120)
ALT FLD-CCNC: 21 U/L DA — SIGNIFICANT CHANGE UP (ref 10–60)
ANION GAP SERPL CALC-SCNC: 11 MMOL/L — SIGNIFICANT CHANGE UP (ref 5–17)
APTT BLD: 40.3 SEC — HIGH (ref 27.5–35.5)
AST SERPL-CCNC: 93 U/L — HIGH (ref 10–40)
BILIRUB SERPL-MCNC: 17.2 MG/DL — HIGH (ref 0.2–1.2)
BUN SERPL-MCNC: 5 MG/DL — LOW (ref 7–18)
CALCIUM SERPL-MCNC: 8.2 MG/DL — LOW (ref 8.4–10.5)
CHLORIDE SERPL-SCNC: 105 MMOL/L — SIGNIFICANT CHANGE UP (ref 96–108)
CO2 SERPL-SCNC: 21 MMOL/L — LOW (ref 22–31)
CREAT SERPL-MCNC: 0.62 MG/DL — SIGNIFICANT CHANGE UP (ref 0.5–1.3)
CULTURE RESULTS: SIGNIFICANT CHANGE UP
CULTURE RESULTS: SIGNIFICANT CHANGE UP
EGFR: 136 ML/MIN/1.73M2 — SIGNIFICANT CHANGE UP
GLUCOSE BLDC GLUCOMTR-MCNC: 112 MG/DL — HIGH (ref 70–99)
GLUCOSE BLDC GLUCOMTR-MCNC: 122 MG/DL — HIGH (ref 70–99)
GLUCOSE BLDC GLUCOMTR-MCNC: 89 MG/DL — SIGNIFICANT CHANGE UP (ref 70–99)
GLUCOSE BLDC GLUCOMTR-MCNC: 97 MG/DL — SIGNIFICANT CHANGE UP (ref 70–99)
GLUCOSE SERPL-MCNC: 93 MG/DL — SIGNIFICANT CHANGE UP (ref 70–99)
HCT VFR BLD CALC: 28.4 % — LOW (ref 39–50)
HGB BLD-MCNC: 8.9 G/DL — LOW (ref 13–17)
INR BLD: 2.18 RATIO — HIGH (ref 0.88–1.16)
MCHC RBC-ENTMCNC: 30.1 PG — SIGNIFICANT CHANGE UP (ref 27–34)
MCHC RBC-ENTMCNC: 31.3 GM/DL — LOW (ref 32–36)
MCV RBC AUTO: 95.9 FL — SIGNIFICANT CHANGE UP (ref 80–100)
NRBC # BLD: 0 /100 WBCS — SIGNIFICANT CHANGE UP (ref 0–0)
PLATELET # BLD AUTO: 371 K/UL — SIGNIFICANT CHANGE UP (ref 150–400)
POTASSIUM SERPL-MCNC: 3.2 MMOL/L — LOW (ref 3.5–5.3)
POTASSIUM SERPL-SCNC: 3.2 MMOL/L — LOW (ref 3.5–5.3)
PROT SERPL-MCNC: 6 G/DL — SIGNIFICANT CHANGE UP (ref 6–8.3)
PROTHROM AB SERPL-ACNC: 26.2 SEC — HIGH (ref 10.5–13.4)
RBC # BLD: 2.96 M/UL — LOW (ref 4.2–5.8)
RBC # FLD: 21.7 % — HIGH (ref 10.3–14.5)
SODIUM SERPL-SCNC: 137 MMOL/L — SIGNIFICANT CHANGE UP (ref 135–145)
SPECIMEN SOURCE: SIGNIFICANT CHANGE UP
SPECIMEN SOURCE: SIGNIFICANT CHANGE UP
WBC # BLD: 44.76 K/UL — CRITICAL HIGH (ref 3.8–10.5)
WBC # FLD AUTO: 44.76 K/UL — CRITICAL HIGH (ref 3.8–10.5)

## 2022-10-17 PROCEDURE — 74018 RADEX ABDOMEN 1 VIEW: CPT | Mod: 26

## 2022-10-17 PROCEDURE — 99233 SBSQ HOSP IP/OBS HIGH 50: CPT

## 2022-10-17 RX ORDER — POTASSIUM CHLORIDE 20 MEQ
20 PACKET (EA) ORAL
Refills: 0 | Status: COMPLETED | OUTPATIENT
Start: 2022-10-17 | End: 2022-10-17

## 2022-10-17 RX ORDER — SODIUM CHLORIDE 9 MG/ML
1000 INJECTION, SOLUTION INTRAVENOUS
Refills: 0 | Status: DISCONTINUED | OUTPATIENT
Start: 2022-10-17 | End: 2022-10-23

## 2022-10-17 RX ADMIN — Medication 2 MILLIGRAM(S): at 12:34

## 2022-10-17 RX ADMIN — Medication 20 MILLIEQUIVALENT(S): at 17:19

## 2022-10-17 RX ADMIN — PANTOPRAZOLE SODIUM 40 MILLIGRAM(S): 20 TABLET, DELAYED RELEASE ORAL at 06:53

## 2022-10-17 RX ADMIN — LACTULOSE 20 GRAM(S): 10 SOLUTION ORAL at 06:51

## 2022-10-17 RX ADMIN — HEPARIN SODIUM 5000 UNIT(S): 5000 INJECTION INTRAVENOUS; SUBCUTANEOUS at 21:02

## 2022-10-17 RX ADMIN — LACTULOSE 20 GRAM(S): 10 SOLUTION ORAL at 21:01

## 2022-10-17 RX ADMIN — Medication 2 MILLIGRAM(S): at 02:32

## 2022-10-17 RX ADMIN — HEPARIN SODIUM 5000 UNIT(S): 5000 INJECTION INTRAVENOUS; SUBCUTANEOUS at 06:51

## 2022-10-17 RX ADMIN — NYSTATIN CREAM 1 APPLICATION(S): 100000 CREAM TOPICAL at 06:52

## 2022-10-17 RX ADMIN — MEROPENEM 100 MILLIGRAM(S): 1 INJECTION INTRAVENOUS at 07:07

## 2022-10-17 RX ADMIN — HEPARIN SODIUM 5000 UNIT(S): 5000 INJECTION INTRAVENOUS; SUBCUTANEOUS at 13:12

## 2022-10-17 RX ADMIN — Medication 2 MILLIGRAM(S): at 23:35

## 2022-10-17 RX ADMIN — MEROPENEM 100 MILLIGRAM(S): 1 INJECTION INTRAVENOUS at 13:12

## 2022-10-17 RX ADMIN — NYSTATIN CREAM 1 APPLICATION(S): 100000 CREAM TOPICAL at 17:17

## 2022-10-17 RX ADMIN — Medication 20 MILLIEQUIVALENT(S): at 20:58

## 2022-10-17 RX ADMIN — MEROPENEM 100 MILLIGRAM(S): 1 INJECTION INTRAVENOUS at 21:03

## 2022-10-17 RX ADMIN — Medication 2 MILLIGRAM(S): at 16:54

## 2022-10-17 NOTE — PROGRESS NOTE ADULT - SUBJECTIVE AND OBJECTIVE BOX
Chief Complaint:  Patient is a 27y old  Male who presents with a chief complaint of Altered mental status (16 Oct 2022 17:17)      Reason for consult:    Interval Events:     Hospital Medications:  dextrose 5% + sodium chloride 0.9%. 1000 milliLiter(s) IV Continuous <Continuous>  dextrose 5%. 1000 milliLiter(s) IV Continuous <Continuous>  dextrose 5%. 1000 milliLiter(s) IV Continuous <Continuous>  dextrose 50% Injectable 25 Gram(s) IV Push once  dextrose 50% Injectable 12.5 Gram(s) IV Push once  dextrose 50% Injectable 25 Gram(s) IV Push once  dextrose Oral Gel 15 Gram(s) Oral once PRN  folic acid 1 milliGRAM(s) Oral daily  glucagon  Injectable 1 milliGRAM(s) IntraMuscular once  heparin   Injectable 5000 Unit(s) SubCutaneous every 8 hours  insulin lispro (ADMELOG) corrective regimen sliding scale   SubCutaneous Before meals and at bedtime  lactulose Syrup 20 Gram(s) Oral every 8 hours  LORazepam   Injectable 2 milliGRAM(s) IV Push every 4 hours PRN  meropenem  IVPB      meropenem  IVPB 1000 milliGRAM(s) IV Intermittent every 8 hours  multivitamin 1 Tablet(s) Oral daily  nystatin Powder 1 Application(s) Topical two times a day  pantoprazole    Tablet 40 milliGRAM(s) Oral before breakfast  polyethylene glycol 3350 17 Gram(s) Oral daily  rifAXIMin 550 milliGRAM(s) Oral two times a day  thiamine 100 milliGRAM(s) Oral daily  traMADol 25 milliGRAM(s) Oral every 8 hours PRN      ROS:   General:  No  fevers, chills, night sweats, fatigue  Eyes:  Good vision, no reported pain  ENT:  No sore throat, pain, runny nose  CV:  No pain, palpitations  Pulm:  No dyspnea, cough  GI:  See HPI, otherwise negative  :  No  incontinence, nocturia  Muscle:  No pain, weakness  Neuro:  No memory problems  Psych:  No insomnia, mood problems, depression  Endocrine:  No polyuria, polydipsia, cold/heat intolerance  Heme:  No petechiae, ecchymosis, easy bruisability  Skin:  No rash    PHYSICAL EXAM:   Vital Signs:  Vital Signs Last 24 Hrs  T(C): 37.7 (17 Oct 2022 04:55), Max: 37.7 (17 Oct 2022 04:55)  T(F): 99.8 (17 Oct 2022 04:55), Max: 99.8 (17 Oct 2022 04:55)  HR: 108 (17 Oct 2022 04:55) (104 - 111)  BP: 124/68 (17 Oct 2022 04:55) (124/68 - 135/76)  BP(mean): --  RR: 20 (17 Oct 2022 04:55) (18 - 20)  SpO2: 98% (17 Oct 2022 04:55) (98% - 100%)    Parameters below as of 17 Oct 2022 04:55  Patient On (Oxygen Delivery Method): room air      Daily     Daily     GENERAL: no acute distress  NEURO: alert, no asterixis  HEENT: anicteric sclera, no conjunctival pallor appreciated  CHEST: no respiratory distress, no accessory muscle use  CARDIAC: regular rate, rhythm  ABDOMEN: soft, non-tender, non-distended, no rebound or guarding  EXTREMITIES: warm, well perfused, no edema  SKIN: no lesions noted    LABS: reviewed                        9.1    42.88 )-----------( 383      ( 16 Oct 2022 07:45 )             29.0     10-16    137  |  104  |  5<L>  ----------------------------<  83  3.2<L>   |  20<L>  |  0.55    Ca    8.5      16 Oct 2022 07:45  Mg     2.0     10-16    TPro  6.0  /  Alb  1.7<L>  /  TBili  14.6<H>  /  DBili  x   /  AST  73<H>  /  ALT  22  /  AlkPhos  228<H>  10-16    LIVER FUNCTIONS - ( 16 Oct 2022 07:45 )  Alb: 1.7 g/dL / Pro: 6.0 g/dL / ALK PHOS: 228 U/L / ALT: 22 U/L DA / AST: 73 U/L / GGT: x             Interval Diagnostic Studies: see sunrise for full report   Chief Complaint:  Patient is a 27y old  Male who presents with a chief complaint of Altered mental status (16 Oct 2022 17:17)      Reason for consult: Liver failure    Interval Events:    Patient was seen and examined at bedside. While able to answer questions, or have conversation, remains confused, and oriented x2, Continues to have low grade T (99.8) and tachycardic.   Abdomen still or more distended, and still reports multiple BMs, although lactulose was resumed per primary team on 10/16 as there was only 1BM reported, today so far 3+4? Bilirubin continued to rise since steroid was discontinued. INR also uptrending. Consider vit K trial, as might be nutritional component. GI PCR stool showed STEC, but culture neg. Repeat BCx is also neg. WBC continued to rise since 10/14. Repeat procalcitonin not reported.   CT a/p w/o contrast 10/16 showed again marked hepatomegaly (28cm), contracted GB with stone or sludge, borderline splenomegaly, increased fat stranding around duodenum,  undetermined rectosigmoid foreign body, mild pelvic, perihepatosplenic ascites, progressed, central mesenteric root edema, progressed.  He was started on Meropenem 10/16 per ID.      Hospital Medications:  dextrose 5% + sodium chloride 0.9%. 1000 milliLiter(s) IV Continuous <Continuous>  dextrose 5%. 1000 milliLiter(s) IV Continuous <Continuous>  dextrose 5%. 1000 milliLiter(s) IV Continuous <Continuous>  dextrose 50% Injectable 25 Gram(s) IV Push once  dextrose 50% Injectable 12.5 Gram(s) IV Push once  dextrose 50% Injectable 25 Gram(s) IV Push once  dextrose Oral Gel 15 Gram(s) Oral once PRN  folic acid 1 milliGRAM(s) Oral daily  glucagon  Injectable 1 milliGRAM(s) IntraMuscular once  heparin   Injectable 5000 Unit(s) SubCutaneous every 8 hours  insulin lispro (ADMELOG) corrective regimen sliding scale   SubCutaneous Before meals and at bedtime  lactulose Syrup 20 Gram(s) Oral every 8 hours  LORazepam   Injectable 2 milliGRAM(s) IV Push every 4 hours PRN  meropenem  IVPB      meropenem  IVPB 1000 milliGRAM(s) IV Intermittent every 8 hours  multivitamin 1 Tablet(s) Oral daily  nystatin Powder 1 Application(s) Topical two times a day  pantoprazole    Tablet 40 milliGRAM(s) Oral before breakfast  polyethylene glycol 3350 17 Gram(s) Oral daily  rifAXIMin 550 milliGRAM(s) Oral two times a day  thiamine 100 milliGRAM(s) Oral daily  traMADol 25 milliGRAM(s) Oral every 8 hours PRN      ROS:   General:  No  fevers, chills  CV:  No pain  Pulm:  No dyspnea  GI:  c/o abdominal pain, distention, still c/o several BMs (himself reported 3), now overt bleeding per PCA  :  Unable to answer  Muscle:  No pain, or focal weakness  Neuro:  No memory problems  Skin:  Jaundice    PHYSICAL EXAM:   Vital Signs:  Vital Signs Last 24 Hrs  T(C): 37.7 (17 Oct 2022 04:55), Max: 37.7 (17 Oct 2022 04:55)  T(F): 99.8 (17 Oct 2022 04:55), Max: 99.8 (17 Oct 2022 04:55)  HR: 108 (17 Oct 2022 04:55) (104 - 111)  BP: 124/68 (17 Oct 2022 04:55) (124/68 - 135/76)  BP(mean): --  RR: 20 (17 Oct 2022 04:55) (18 - 20)  SpO2: 98% (17 Oct 2022 04:55) (98% - 100%)    Parameters below as of 17 Oct 2022 04:55  Patient On (Oxygen Delivery Method): room air      Daily     Daily     GENERAL: no acute distress  NEURO: awake, alert, oriented to self, place, not in time, no asterixis  HEENT: icteric sclera  CHEST: no respiratory distress, no accessory muscle use  CARDIAC: regular rate, rhythm  ABDOMEN: soft, more distended, mild diffuse tenderness, no rebound or guarding, BS+  EXTREMITIES: warm, well perfused, no edema  SKIN jaundice    LABS: reviewed                        9.1    42.88 )-----------( 383      ( 16 Oct 2022 07:45 )             29.0     10-16    137  |  104  |  5<L>  ----------------------------<  83  3.2<L>   |  20<L>  |  0.55    Ca    8.5      16 Oct 2022 07:45  Mg     2.0     10-16    TPro  6.0  /  Alb  1.7<L>  /  TBili  14.6<H>  /  DBili  x   /  AST  73<H>  /  ALT  22  /  AlkPhos  228<H>  10-16    LIVER FUNCTIONS - ( 16 Oct 2022 07:45 )  Alb: 1.7 g/dL / Pro: 6.0 g/dL / ALK PHOS: 228 U/L / ALT: 22 U/L DA / AST: 73 U/L / GGT: x             Interval Diagnostic Studies: see sunrise for full report

## 2022-10-17 NOTE — PROGRESS NOTE ADULT - PROBLEM SELECTOR PLAN 7
-  likely due to steroid  -  WBC trending up 44.76 today  -  UA pending  -  Avoid Tylenol w/elevated LFTs -  likely due to steroid  -  WBC trending up 44.76 today  -  Meropenem started 10/16  -  Blood cultures pending from 10/16  -  UA pending  -  Avoid Tylenol w/elevated LFTs  -  ID:  Dr Puentes

## 2022-10-17 NOTE — PROGRESS NOTE ADULT - NS ATTEND AMEND GEN_ALL_CORE FT
pt still very confused otherwise stable. transaminase normalizing but bili still very high. H/H stable. Pt has persistent leukocytosis acute worsening . on iv antibiotics with meren and also On steriod since 10/93442 but on hold due to low grade fever/elevated WBC.  stool test and further wrokup on going. Likely reactive due to steroid+/- new infection. ID on case for followup.   MEDICATIONS  (STANDING):  dextrose 5% + sodium chloride 0.9%. 1000 milliLiter(s) (75 mL/Hr) IV Continuous <Continuous>  dextrose 5%. 1000 milliLiter(s) (100 mL/Hr) IV Continuous <Continuous>  dextrose 5%. 1000 milliLiter(s) (50 mL/Hr) IV Continuous <Continuous>  dextrose 50% Injectable 25 Gram(s) IV Push once  dextrose 50% Injectable 12.5 Gram(s) IV Push once  dextrose 50% Injectable 25 Gram(s) IV Push once  folic acid 1 milliGRAM(s) Oral daily  glucagon  Injectable 1 milliGRAM(s) IntraMuscular once  heparin   Injectable 5000 Unit(s) SubCutaneous every 8 hours  insulin lispro (ADMELOG) corrective regimen sliding scale   SubCutaneous Before meals and at bedtime  lactulose Syrup 20 Gram(s) Oral every 8 hours  meropenem  IVPB 1000 milliGRAM(s) IV Intermittent every 8 hours  meropenem  IVPB      multivitamin 1 Tablet(s) Oral daily  nystatin Powder 1 Application(s) Topical two times a day  pantoprazole    Tablet 40 milliGRAM(s) Oral before breakfast  polyethylene glycol 3350 17 Gram(s) Oral daily  potassium chloride   Powder 20 milliEquivalent(s) Oral every 2 hours  rifAXIMin 550 milliGRAM(s) Oral two times a day  thiamine 100 milliGRAM(s) Oral daily    MEDICATIONS  (PRN):  dextrose Oral Gel 15 Gram(s) Oral once PRN Blood Glucose LESS THAN 70 milliGRAM(s)/deciliter  LORazepam   Injectable 2 milliGRAM(s) IV Push every 4 hours PRN Agitation  traMADol 25 milliGRAM(s) Oral every 8 hours PRN Moderate Pain (4 - 6) pt still very confused otherwise stable. transaminase normalizing but bili still very high. H/H stable. Pt has persistent leukocytosis acute worsening . on iv antibiotics with meren and also On steriod since 10/08027 but on hold due to low grade fever/elevated WBC.  stool test and further wrokup on going. Likely reactive due to steroid+/- new infection. ID on case for followup. will check tumor markers giving he might has long standing liver cirrhosis   MEDICATIONS  (STANDING):  dextrose 5% + sodium chloride 0.9%. 1000 milliLiter(s) (75 mL/Hr) IV Continuous <Continuous>  dextrose 5%. 1000 milliLiter(s) (100 mL/Hr) IV Continuous <Continuous>  dextrose 5%. 1000 milliLiter(s) (50 mL/Hr) IV Continuous <Continuous>  dextrose 50% Injectable 25 Gram(s) IV Push once  dextrose 50% Injectable 12.5 Gram(s) IV Push once  dextrose 50% Injectable 25 Gram(s) IV Push once  folic acid 1 milliGRAM(s) Oral daily  glucagon  Injectable 1 milliGRAM(s) IntraMuscular once  heparin   Injectable 5000 Unit(s) SubCutaneous every 8 hours  insulin lispro (ADMELOG) corrective regimen sliding scale   SubCutaneous Before meals and at bedtime  lactulose Syrup 20 Gram(s) Oral every 8 hours  meropenem  IVPB 1000 milliGRAM(s) IV Intermittent every 8 hours  meropenem  IVPB      multivitamin 1 Tablet(s) Oral daily  nystatin Powder 1 Application(s) Topical two times a day  pantoprazole    Tablet 40 milliGRAM(s) Oral before breakfast  polyethylene glycol 3350 17 Gram(s) Oral daily  potassium chloride   Powder 20 milliEquivalent(s) Oral every 2 hours  rifAXIMin 550 milliGRAM(s) Oral two times a day  thiamine 100 milliGRAM(s) Oral daily    MEDICATIONS  (PRN):  dextrose Oral Gel 15 Gram(s) Oral once PRN Blood Glucose LESS THAN 70 milliGRAM(s)/deciliter  LORazepam   Injectable 2 milliGRAM(s) IV Push every 4 hours PRN Agitation  traMADol 25 milliGRAM(s) Oral every 8 hours PRN Moderate Pain (4 - 6)

## 2022-10-17 NOTE — PROGRESS NOTE ADULT - PROBLEM SELECTOR PLAN 5
-  Most likely due to poor P.O intake and acute liver failure  -  Replaced with supplement for  hypokalemia -  Most likely due to poor P.O intake and acute liver failure  -  Replaced with supplement for  hypokalemia  -  Potassium 3.2 today  -  supplementation ordered  -  Repeat chemistry in AM
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
## 2022-10-17 NOTE — PROGRESS NOTE ADULT - PROBLEM SELECTOR PLAN 1
-  Metabolic encephalopathy secondary to alcohol hepatitis   -  Continue lactulose - Goal is BM 2- 3 times daily- held  -  Continue rifaximin per encephalopathy - confirmed with Dr. Gonzalez  -  Transaminitis- unchanged  -  Severe hepatomegaly w/mild ascites on CT abdomen w/gall bladder sludge  -  Lipase level normal  -  Hepatitis panel negative   -  Hepatic USG for characterization of gall bladder attempted however patient was very agitated  -  EGD on 10/5 with GI Dr. Johnson showed Large rectal Dieulafoy lesion. Hemostasis achieved with over-the-scope clip  -  RUQ US without ascites, large fatty liver noted on 10/10 -  Metabolic encephalopathy secondary to alcohol hepatitis   -  Continue lactulose - Goal is BM 2- 3 times daily- held  -  Continue rifaximin per encephalopathy - confirmed with Dr. Gonzalez  -  Transaminitis- unchanged  -  Severe hepatomegaly w/mild ascites on CT abdomen w/gall bladder sludge  -  Lipase level normal  -  Hepatitis panel negative   -  Hepatic USG for characterization of gall bladder attempted however patient was very agitated  -  EGD on 10/5 with GI Dr. Johnson showed Large rectal Dieulafoy lesion. Hemostasis achieved with over-the-scope clip  -  Foreign body noted in abdominal xray -  clip placed by GI   -  RUQ US without ascites, large fatty liver noted on 10/10

## 2022-10-17 NOTE — PROGRESS NOTE ADULT - PROBLEM SELECTOR PLAN 2
-  Secondary Alcohol Hepatitis   -  Utox negative for salicylate and acetaminophen levels  -  Hepatitis panel negative  -  bili - elevated  -  Course of  prednisone completed  40mg QD x 28 days ( 10/8/2022~)   -  Elevated INR and hypoalbuminemia in the setting of liver dysfunction  -  s/p vitamin K for 3 days  -  INR pending  -  Hepatology Dr. Gonzalez

## 2022-10-17 NOTE — PROGRESS NOTE ADULT - PROBLEM SELECTOR PLAN 4
-  No need for CIWA, already detox due to length of stay  -  No prior hx of seizures  -  Continue with  thiamine, folic acid and MVI   -  Continue Lorazepam PRN for agitation  -  Maintain fall precaution   -  Maintain aspiration precaution  -  Maintain seizure precaution
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
## 2022-10-17 NOTE — PROGRESS NOTE ADULT - NS ATTEND AMEND GEN_ALL_CORE FT
Patient remains anxious, alert, not coherent.   patient mental status improved but still not at baseline.  vs, as above    Lungs, clear  Cor, RRR  Abdomen, hepatomegaly, protuberant abdomen  Neurological, alert, anxious, asterixis    < from: CT Abdomen and Pelvis No Cont (10.16.22 @ 11:03) >    IMPRESSION:  1.  Marked hepatomegaly/steatosis, unchanged.  2.  Ascites/mesenteric edema and subcutaneous edema, minimally progressed.  3.  Equivocal ascending colonic mural prominence raises the possibility   of portal colopathy or inflammation  4.  Underdistended duodenum with increased fat stranding about distal   duodenal sweep, nonspecific (coronal 4/68). Clinical correlation with   regard to duodenal inflammation recommended.  5.  Undetermined rectosigmoid foreign body/medical hardware (2/54, CT    image)-(this finding discussed with Iliana Keys 10/16/2022 11:25   AM).    --- End of Report ---    TIFF ACE MD; Attending Radiologist  This document has been electronically signed. Oct 16 2022 11:28AM    < end of copied text >  < from: CT Head No Cont (10.02.22 @ 16:39) >    IMPRESSION:  Left temporal parietal craniotomy. Encephalomalacia   subjacent to the craniotomy. Ex vacuo dilatation of the left lateral   ventricle. No intracranial hemorrhage. No mass lesion    < end of copied text >    IMP: Alcoholic hepatitis         hypokalemia         hypomagnesemia         Leukocytosis, concern for infection and/or malignancy; possibly the residual effect of prednisolone, slowly metabolized in his liver, which was        discontinued on 10/15/2022         Foreign body in rectum is a clamp which was placed in a Dieulafoy ulcer at the time of a hemorrhoidal bleed.          encephalopathy, multifactorial:  s/p craniotomy for bleed, TBI, hepatic encephalopathy, alcohol withdrawal         Social issue  Plan: replete electrolytes          continue CIWA protocol          repeat blood cultures are pending.  We have expanded coverage to meropenem.           GI f/u, appreciated. Patient remains anxious, alert, not coherent.   patient mental status improved but still not at baseline.  vs, as above    Lungs, clear  Cor, RRR  Abdomen, hepatomegaly, protuberant abdomen  Neurological, alert, anxious, asterixis    < from: CT Abdomen and Pelvis No Cont (10.16.22 @ 11:03) >    IMPRESSION:  1.  Marked hepatomegaly/steatosis, unchanged.  2.  Ascites/mesenteric edema and subcutaneous edema, minimally progressed.  3.  Equivocal ascending colonic mural prominence raises the possibility   of portal colopathy or inflammation  4.  Underdistended duodenum with increased fat stranding about distal   duodenal sweep, nonspecific (coronal 4/68). Clinical correlation with   regard to duodenal inflammation recommended.  5.  Undetermined rectosigmoid foreign body/medical hardware (2/54, CT    image)-(this finding discussed with Iliana Keys 10/16/2022 11:25   AM).    --- End of Report ---    TIFF ACE MD; Attending Radiologist  This document has been electronically signed. Oct 16 2022 11:28AM    < end of copied text >  < from: CT Head No Cont (10.02.22 @ 16:39) >    IMPRESSION:  Left temporal parietal craniotomy. Encephalomalacia   subjacent to the craniotomy. Ex vacuo dilatation of the left lateral   ventricle. No intracranial hemorrhage. No mass lesion    < end of copied text >    IMP: Alcoholic hepatitis         hypokalemia         hypomagnesemia         Leukocytosis, concern for infection and/or malignancy; possibly the residual effect of prednisolone, slowly metabolized in his liver, which was        discontinued on 10/15/2022         Foreign body in rectum is a clamp which was placed in a Dieulafoy ulcer at the time of a hemorrhoidal bleed.          encephalopathy, multifactorial:  s/p craniotomy for bleed, TBI, hepatic encephalopathy, alcohol withdrawal         Social issue  Plan: replete electrolytes          continue CIWA protocol          repeat blood cultures are pending.  We have expanded coverage to meropenem.                 GI f/u, appreciated.

## 2022-10-17 NOTE — PROGRESS NOTE ADULT - SUBJECTIVE AND OBJECTIVE BOX
Patient is a 27y old  Male who presents with a chief complaint of Altered mental status       SUBJECTIVE / OVERNIGHT EVENTS:      MEDICATIONS  (STANDING):  dextrose 5% + sodium chloride 0.9%. 1000 milliLiter(s) (75 mL/Hr) IV Continuous <Continuous>  dextrose 5%. 1000 milliLiter(s) (100 mL/Hr) IV Continuous <Continuous>  dextrose 5%. 1000 milliLiter(s) (50 mL/Hr) IV Continuous <Continuous>  dextrose 50% Injectable 25 Gram(s) IV Push once  dextrose 50% Injectable 12.5 Gram(s) IV Push once  dextrose 50% Injectable 25 Gram(s) IV Push once  folic acid 1 milliGRAM(s) Oral daily  glucagon  Injectable 1 milliGRAM(s) IntraMuscular once  heparin   Injectable 5000 Unit(s) SubCutaneous every 8 hours  insulin lispro (ADMELOG) corrective regimen sliding scale   SubCutaneous Before meals and at bedtime  lactulose Syrup 20 Gram(s) Oral every 8 hours  meropenem  IVPB 1000 milliGRAM(s) IV Intermittent every 8 hours  meropenem  IVPB      multivitamin 1 Tablet(s) Oral daily  nystatin Powder 1 Application(s) Topical two times a day  pantoprazole    Tablet 40 milliGRAM(s) Oral before breakfast  polyethylene glycol 3350 17 Gram(s) Oral daily  rifAXIMin 550 milliGRAM(s) Oral two times a day  thiamine 100 milliGRAM(s) Oral daily    MEDICATIONS  (PRN):  dextrose Oral Gel 15 Gram(s) Oral once PRN Blood Glucose LESS THAN 70 milliGRAM(s)/deciliter  LORazepam   Injectable 2 milliGRAM(s) IV Push every 4 hours PRN Agitation  traMADol 25 milliGRAM(s) Oral every 8 hours PRN Moderate Pain (4 - 6)    CAPILLARY BLOOD GLUCOSE      POCT Blood Glucose.: 97 mg/dL (17 Oct 2022 11:30)  POCT Blood Glucose.: 89 mg/dL (17 Oct 2022 07:44)  POCT Blood Glucose.: 95 mg/dL (16 Oct 2022 21:18)  POCT Blood Glucose.: 120 mg/dL (16 Oct 2022 16:36)    I&O's Summary      PHYSICAL EXAM:  Vital Signs Last 24 Hrs  T(C): 36.7 (17 Oct 2022 12:18), Max: 37.7 (17 Oct 2022 04:55)  T(F): 98.1 (17 Oct 2022 12:18), Max: 99.8 (17 Oct 2022 04:55)  HR: 101 (17 Oct 2022 12:18) (101 - 111)  BP: 108/61 (17 Oct 2022 12:18) (108/61 - 135/76)  BP(mean): --  RR: 18 (17 Oct 2022 12:18) (18 - 20)  SpO2: 98% (17 Oct 2022 12:18) (98% - 100%)    Parameters below as of 17 Oct 2022 12:18  Patient On (Oxygen Delivery Method): room air          LABS:                        8.9    44.76 )-----------( 371      ( 17 Oct 2022 12:34 )             28.4     10-17    137  |  105  |  5<L>  ----------------------------<  93  3.2<L>   |  21<L>  |  0.62    Ca    8.2<L>      17 Oct 2022 12:34  Mg     2.0     10-16    TPro  6.0  /  Alb  1.7<L>  /  TBili  17.2<H>  /  DBili  x   /  AST  93<H>  /  ALT  21  /  AlkPhos  236<H>  10-17    PTT - ( 17 Oct 2022 12:34 )  PTT:40.3 sec          Culture - Stool (collected 14 Oct 2022 16:50)  Source: .Stool Feces  Preliminary Report (15 Oct 2022 14:40):    No enteric pathogens to date: Final culture pending    Culture - Blood (collected 14 Oct 2022 15:46)  Source: .Blood Blood-Peripheral  Preliminary Report (15 Oct 2022 22:02):    No growth to date.    Culture - Blood (collected 14 Oct 2022 15:35)  Source: .Blood Blood-Peripheral  Preliminary Report (15 Oct 2022 22:02):    No growth to date.      COVID-19 PCR: NotDetec (12 Oct 2022 05:35)  COVID-19 PCR: NotDetec (05 Oct 2022 11:00)  SARS-CoV-2: NotDetec (02 Oct 2022 16:00)         Patient is a 27y old  Male who presents with a chief complaint of Altered mental status     SUBJECTIVE / OVERNIGHT EVENTS: events noted. Pt sleeping and awoken easily and then got out of bed and sat in a chair and made himself some tea. Using #[id number], pt is oriented to person and place, but not time. Most of conversation is nonsense and unable to obtain ROS.       MEDICATIONS  (STANDING):  dextrose 5% + sodium chloride 0.9%. 1000 milliLiter(s) (75 mL/Hr) IV Continuous <Continuous>  dextrose 5%. 1000 milliLiter(s) (100 mL/Hr) IV Continuous <Continuous>  dextrose 5%. 1000 milliLiter(s) (50 mL/Hr) IV Continuous <Continuous>  dextrose 50% Injectable 25 Gram(s) IV Push once  dextrose 50% Injectable 12.5 Gram(s) IV Push once  dextrose 50% Injectable 25 Gram(s) IV Push once  folic acid 1 milliGRAM(s) Oral daily  glucagon  Injectable 1 milliGRAM(s) IntraMuscular once  heparin   Injectable 5000 Unit(s) SubCutaneous every 8 hours  insulin lispro (ADMELOG) corrective regimen sliding scale   SubCutaneous Before meals and at bedtime  lactulose Syrup 20 Gram(s) Oral every 8 hours  meropenem  IVPB 1000 milliGRAM(s) IV Intermittent every 8 hours  meropenem  IVPB      multivitamin 1 Tablet(s) Oral daily  nystatin Powder 1 Application(s) Topical two times a day  pantoprazole    Tablet 40 milliGRAM(s) Oral before breakfast  polyethylene glycol 3350 17 Gram(s) Oral daily  rifAXIMin 550 milliGRAM(s) Oral two times a day  thiamine 100 milliGRAM(s) Oral daily    MEDICATIONS  (PRN):  dextrose Oral Gel 15 Gram(s) Oral once PRN Blood Glucose LESS THAN 70 milliGRAM(s)/deciliter  LORazepam   Injectable 2 milliGRAM(s) IV Push every 4 hours PRN Agitation  traMADol 25 milliGRAM(s) Oral every 8 hours PRN Moderate Pain (4 - 6)    CAPILLARY BLOOD GLUCOSE      POCT Blood Glucose.: 97 mg/dL (17 Oct 2022 11:30)  POCT Blood Glucose.: 89 mg/dL (17 Oct 2022 07:44)  POCT Blood Glucose.: 95 mg/dL (16 Oct 2022 21:18)  POCT Blood Glucose.: 120 mg/dL (16 Oct 2022 16:36)    I&O's Summary      PHYSICAL EXAM:  Vital Signs Last 24 Hrs  T(C): 36.7 (17 Oct 2022 12:18), Max: 37.7 (17 Oct 2022 04:55)  T(F): 98.1 (17 Oct 2022 12:18), Max: 99.8 (17 Oct 2022 04:55)  HR: 101 (17 Oct 2022 12:18) (101 - 111)  BP: 108/61 (17 Oct 2022 12:18) (108/61 - 135/76)  BP(mean): --  RR: 18 (17 Oct 2022 12:18) (18 - 20)  SpO2: 98% (17 Oct 2022 12:18) (98% - 100%)    Parameters below as of 17 Oct 2022 12:18  Patient On (Oxygen Delivery Method): room air      GEN: NAD; A and O x 2, scleral icterus  LUNGS: CTA B/L  HEART: S1 S2  ABDOMEN: soft, non-tender, distended, + BS  EXTREMITIES: B/L LE edema, finger contractures        LABS:                        8.9    44.76 )-----------( 371      ( 17 Oct 2022 12:34 )             28.4     10-17    137  |  105  |  5<L>  ----------------------------<  93  3.2<L>   |  21<L>  |  0.62    Ca    8.2<L>      17 Oct 2022 12:34  Mg     2.0     10-16    TPro  6.0  /  Alb  1.7<L>  /  TBili  17.2<H>  /  DBili  x   /  AST  93<H>  /  ALT  21  /  AlkPhos  236<H>  10-17    PTT - ( 17 Oct 2022 12:34 )  PTT:40.3 sec          Culture - Stool (collected 14 Oct 2022 16:50)  Source: .Stool Feces  Preliminary Report (15 Oct 2022 14:40):    No enteric pathogens to date: Final culture pending    Culture - Blood (collected 14 Oct 2022 15:46)  Source: .Blood Blood-Peripheral  Preliminary Report (15 Oct 2022 22:02):    No growth to date.    Culture - Blood (collected 14 Oct 2022 15:35)  Source: .Blood Blood-Peripheral  Preliminary Report (15 Oct 2022 22:02):    No growth to date.      COVID-19 PCR: NotDetec (12 Oct 2022 05:35)  COVID-19 PCR: NotDetec (05 Oct 2022 11:00)  SARS-CoV-2: NotDetec (02 Oct 2022 16:00)
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
## 2022-10-17 NOTE — PROGRESS NOTE ADULT - SUBJECTIVE AND OBJECTIVE BOX
NP Note discussed with  Primary Attending       INTERVAL HPI  /OVERNIGHT EVENTS: no new complaints      MEDICATIONS  (STANDING):  dextrose 5% + sodium chloride 0.9%. 1000 milliLiter(s) (75 mL/Hr) IV Continuous <Continuous>  dextrose 5%. 1000 milliLiter(s) (100 mL/Hr) IV Continuous <Continuous>  dextrose 5%. 1000 milliLiter(s) (50 mL/Hr) IV Continuous <Continuous>  dextrose 50% Injectable 25 Gram(s) IV Push once  dextrose 50% Injectable 12.5 Gram(s) IV Push once  dextrose 50% Injectable 25 Gram(s) IV Push once  folic acid 1 milliGRAM(s) Oral daily  glucagon  Injectable 1 milliGRAM(s) IntraMuscular once  heparin   Injectable 5000 Unit(s) SubCutaneous every 8 hours  insulin lispro (ADMELOG) corrective regimen sliding scale   SubCutaneous Before meals and at bedtime  lactulose Syrup 20 Gram(s) Oral every 8 hours  meropenem  IVPB      meropenem  IVPB 1000 milliGRAM(s) IV Intermittent every 8 hours  multivitamin 1 Tablet(s) Oral daily  nystatin Powder 1 Application(s) Topical two times a day  pantoprazole    Tablet 40 milliGRAM(s) Oral before breakfast  polyethylene glycol 3350 17 Gram(s) Oral daily  rifAXIMin 550 milliGRAM(s) Oral two times a day  thiamine 100 milliGRAM(s) Oral daily    MEDICATIONS  (PRN):  dextrose Oral Gel 15 Gram(s) Oral once PRN Blood Glucose LESS THAN 70 milliGRAM(s)/deciliter  LORazepam   Injectable 2 milliGRAM(s) IV Push every 4 hours PRN Agitation  traMADol 25 milliGRAM(s) Oral every 8 hours PRN Moderate Pain (4 - 6)  Patient is a 27y old  Male who presents with a chief complaint of Altered mental status (13 Oct 2022 15:21)              __________________________________________________  REVIEW OF SYSTEMS:    CONSTITUTIONAL: No fever,   EYES: no acute visual disturbances  NECK: No pain or stiffness  RESPIRATORY: No cough; No shortness of breath  CARDIOVASCULAR: No chest pain, no palpitations  GASTROINTESTINAL: No pain. No nausea or vomiting; No diarrhea   NEUROLOGICAL: No headache or numbness, no tremors  MUSCULOSKELETAL: No joint pain, no muscle pain  GENITOURINARY: no dysuria, no frequency, no hesitancy  PSYCHIATRY: no depression , no anxiety  ALL OTHER  ROS negative        Vital Signs Last 24 Hrs  T(C): 36.7 (17 Oct 2022 12:18), Max: 37.7 (17 Oct 2022 04:55)  T(F): 98.1 (17 Oct 2022 12:18), Max: 99.8 (17 Oct 2022 04:55)  HR: 101 (17 Oct 2022 12:18) (101 - 111)  BP: 108/61 (17 Oct 2022 12:18) (108/61 - 135/76)  BP(mean): --  RR: 18 (17 Oct 2022 12:18) (18 - 20)  SpO2: 98% (17 Oct 2022 12:18) (98% - 100%)    Parameters below as of 17 Oct 2022 12:18  Patient On (Oxygen Delivery Method): room air          ________________________________________________  PHYSICAL EXAM:  GENERAL: confused  HEENT: Normocephalic;  conjunctivae and sclerae clear; moist mucous membranes;   NECK : supple  CHEST/LUNG: Clear to auscultation bilaterally with good air entry   HEART: S1 S2  regular; no murmurs, gallops or rubs  ABDOMEN: diarrhea Soft, Nontender, Nondistended; Bowel sounds present  EXTREMITIES: no cyanosis; no edema; no calf tenderness  SKIN: jaundice, redness to buttocks, warm and dry; no rash  NERVOUS SYSTEM:  Awake and alert; Oriented  to person, confused  _________________________________________________                            8.9    44.76 )-----------( 371      ( 17 Oct 2022 12:34 )             28.4       10    137  |  105  |  5<L>  ----------------------------<  93  3.2<L>   |  21<L>  |  0.62    Ca    8.2<L>      17 Oct 2022 12:34  Mg     2.0     10-16    TPro  6.0  /  Alb  1.7<L>  /  TBili  17.2<H>  /  DBili  x   /  AST  93<H>  /  ALT  21  /  AlkPhos  236<H>  10-17      Color: Yellow / Appearance: Clear / S.005 / pH: x  Gluc: x / Ketone: Trace  / Bili: Large / Urobili: 8   Blood: x / Protein: 15 / Nitrite: Negative   Leuk Esterase: Trace / RBC: 0-2 /HPF / WBC 0-2 /HPF   Sq Epi: x / Non Sq Epi: Occasional /HPF / Bacteria: Trace /HPF    PTT - ( 17 Oct 2022 12:34 )  PTT:40.3 sec      CAPILLARY BLOOD GLUCOSE  POCT Blood Glucose.: 97 mg/dL (17 Oct 2022 11:30)  POCT Blood Glucose.: 89 mg/dL (17 Oct 2022 07:44)  POCT Blood Glucose.: 95 mg/dL (16 Oct 2022 21:18)  POCT Blood Glucose.: 120 mg/dL (16 Oct 2022 16:36)          RADIOLOGY & ADDITIONAL TESTS:  < from: Xray Chest 1 View- PORTABLE-Urgent (Xray Chest 1 View- PORTABLE-Urgent .) (10.12.22 @ 13:47) >  FINDINGS:  CATHETERS AND TUBES: None    PULMONARY: Mild bilateral perihilar diffuse airspace disease..   No pneumothorax.    HEART/VASCULAR: The heart and mediastinum size and configuration are   within normal limits.    BONES: Visualized osseous structures are intact.    IMPRESSION:   Mild bilateral perihilar diffuse airspace disease..    --- End of Report ---    < end of copied text >  < from: US Abdomen Liver Followup (10.10.22 @ 17:08) >    FINDINGS:    Liver: Increased echogenicity. No focal hepatic mass lesion is   demonstrated. The liver appears to be enlarged, however, no measurements   were obtained. Normal portal venous flow.  Bile ducts: Normal caliber. Common bile duct measures 5mm.  Gallbladder: Nonspecific mildly thickened wall measuring 4 mm.  Pancreas: Not visualized due to overlying bowel.  Right kidney: 12 cm. No hydronephrosis.  Ascites: None.  IVC: Visualized portions are within normal limits.    IMPRESSION:    Enlarged fatty liver.        --- End of Report ---    < end of copied text >  < from: Xray Chest 1 View-PORTABLE IMMEDIATE (Xray Chest 1 View-PORTABLE IMMEDIATE .) (10.07.22 @ 13:06) >  FINDINGS: There is a nonspecific nonobstructive bowel gas pattern. There   is no gross free air, organomegaly or abdominal calcifications. The bony   structures are intact    AP chest 1 view    COMPARISON: 10/2/2022    FINDINGS: The heart size, mediastinum and hilum are within normal limits   for. Decreased inspiratory effort with grossly clear lungs. Trachea   midline. No fractures.    IMPRESSION:    Nonspecific nonobstructive bowel gas pattern.    Grossly clear lungs.    --- End of Report ---    < end of copied text >  < from: Xray Abdomen 1 View Portable, IMMEDIATE (Xray Abdomen 1 View Portable, IMMEDIATE .) (10.07.22 @ 13:06) >  FINDINGS: There is a nonspecific nonobstructive bowel gas pattern. There   is no gross free air, organomegaly or abdominal calcifications. The bony   structures are intact    AP chest 1 view    COMPARISON: 10/2/2022    FINDINGS: The heart size, mediastinum and hilum are within normal limits   for. Decreased inspiratory effort with grossly clear lungs. Trachea   midline. No fractures.    IMPRESSION:    Nonspecific nonobstructive bowel gas pattern.    Grossly clear lungs.    --- End of Report ---    < end of copied text >  < from: Xray Chest 1 View- PORTABLE-Urgent (Xray Chest 1 View- PORTABLE-Urgent .) (10.02.22 @ 23:18) >  FINDINGS:  Heart/Vascular: The heart size, mediastinum, hilum and aorta are within   normal limits for projection.  Pulmonary: Midline trachea. There is no focal infiltrate, congestion or   effusion.    Bones: There is no fracture.  Lines and catheter: Overlying EKG leads and wires.    Impression:    No acute pulmonary disease.    --- End of Report ---    < end of copied text >  < from: CT Abdomen and Pelvis w/ IV Cont (10.02.22 @ 16:56) >  FINDINGS:  LOWER CHEST:Within normal limits.    LIVER: Severe hepatomegaly and diffuse steatosis.  BILE DUCTS: Normal caliber.  GALLBLADDER: Partially contracted with high attenuation related to   enhancement and/or sludge. Nonspecific wall thickening, likely   secondary/reactive.  SPLEEN: Within normal limits.  PANCREAS: Within normal limits.  ADRENALS: Within normal limits.  KIDNEYS/URETERS: Too small to characterize low attenuating focus in the   left kidney.    BLADDER: Within normal limits.  REPRODUCTIVE ORGANS: Within normal limits.    BOWEL: No bowel obstruction. Appendix is normal.  PERITONEUM: Mild ascites and infiltration of the central mesenteric fat.  VESSELS: Within normal limits.  RETROPERITONEUM/LYMPH NODES: No lymphadenopathy.  ABDOMINAL WALL: Tiny fat-containing inguinal hernias.  BONES: Bilateral L5-S1 pars defects. Old fractures of the 6th and 8th-9th   left ribs.    IMPRESSION:  Severe hepatomegaly and diffuse steatosis.  Mild ascites and infiltration of the central mesenteric fat.  Possiblegallbladder sludge and likely secondary/reactive wall thickening.      --- End of Report ---    < end of copied text >  < from: CT Head No Cont (10.02.22 @ 16:39) >  FINDINGS:  VENTRICLES AND SULCI:  Ex vacuo dilatation of the left lateral ventricle   to a mild degree.  INTRA-AXIAL:  Left temporal parietal encephalomalacia subjacent to the   craniotomy.  EXTRA-AXIAL:  No mass or collection is seen.  VISUALIZED SINUSES: Left maxillary mucosal thickening. Right maxillary   mucosal thickening  VISUALIZED MASTOIDS:  Clear.  CALVARIUM: Left temporal parietal craniotomy. Right parietalsoft tissue   swelling.  MISCELLANEOUS:  None.    IMPRESSION:  Left temporal parietal craniotomy. Encephalomalacia   subjacent to the craniotomy. Ex vacuo dilatation of the left lateral   ventricle. No intracranial hemorrhage. No mass lesion    --- End of Report ---    < end of copied text >    Imaging  Reviewed:  YES    Consultant(s) Notes Reviewed:   YES      Plan of care was discussed with patient and /or primary care giver; all questions and concerns were addressed  NP Note discussed with  Primary Attending       INTERVAL HPI  /OVERNIGHT EVENTS: no new complaints      MEDICATIONS  (STANDING):  dextrose 5% + sodium chloride 0.9%. 1000 milliLiter(s) (75 mL/Hr) IV Continuous <Continuous>  dextrose 5%. 1000 milliLiter(s) (100 mL/Hr) IV Continuous <Continuous>  dextrose 5%. 1000 milliLiter(s) (50 mL/Hr) IV Continuous <Continuous>  dextrose 50% Injectable 25 Gram(s) IV Push once  dextrose 50% Injectable 12.5 Gram(s) IV Push once  dextrose 50% Injectable 25 Gram(s) IV Push once  folic acid 1 milliGRAM(s) Oral daily  glucagon  Injectable 1 milliGRAM(s) IntraMuscular once  heparin   Injectable 5000 Unit(s) SubCutaneous every 8 hours  insulin lispro (ADMELOG) corrective regimen sliding scale   SubCutaneous Before meals and at bedtime  lactulose Syrup 20 Gram(s) Oral every 8 hours  meropenem  IVPB      meropenem  IVPB 1000 milliGRAM(s) IV Intermittent every 8 hours  multivitamin 1 Tablet(s) Oral daily  nystatin Powder 1 Application(s) Topical two times a day  pantoprazole    Tablet 40 milliGRAM(s) Oral before breakfast  polyethylene glycol 3350 17 Gram(s) Oral daily  rifAXIMin 550 milliGRAM(s) Oral two times a day  thiamine 100 milliGRAM(s) Oral daily    MEDICATIONS  (PRN):  dextrose Oral Gel 15 Gram(s) Oral once PRN Blood Glucose LESS THAN 70 milliGRAM(s)/deciliter  LORazepam   Injectable 2 milliGRAM(s) IV Push every 4 hours PRN Agitation  traMADol 25 milliGRAM(s) Oral every 8 hours PRN Moderate Pain (4 - 6)  Patient is a 27y old  Male who presents with a chief complaint of Altered mental status (13 Oct 2022 15:21)        __________________________________________________  REVIEW OF SYSTEMS:        CONSTITUTIONAL: No fever,   EYES: no acute visual disturbances  NECK: No pain or stiffness  RESPIRATORY: No cough; No shortness of breath  CARDIOVASCULAR: No chest pain, no palpitations  GASTROINTESTINAL: No pain. No nausea or vomiting; No diarrhea   NEUROLOGICAL: No headache or numbness, no tremors  MUSCULOSKELETAL: No joint pain, no muscle pain  GENITOURINARY: no dysuria, no frequency, no hesitancy  PSYCHIATRY: no depression , no anxiety  ALL OTHER  ROS negative        Vital Signs Last 24 Hrs  T(C): 36.7 (17 Oct 2022 12:18), Max: 37.7 (17 Oct 2022 04:55)  T(F): 98.1 (17 Oct 2022 12:18), Max: 99.8 (17 Oct 2022 04:55)  HR: 101 (17 Oct 2022 12:18) (101 - 111)  BP: 108/61 (17 Oct 2022 12:18) (108/61 - 135/76)  BP(mean): --  RR: 18 (17 Oct 2022 12:18) (18 - 20)  SpO2: 98% (17 Oct 2022 12:18) (98% - 100%)    Parameters below as of 17 Oct 2022 12:18  Patient On (Oxygen Delivery Method): room air          ________________________________________________  PHYSICAL EXAM:  GENERAL: confused  HEENT: Normocephalic;  conjunctivae and sclerae clear; moist mucous membranes;   NECK : supple  CHEST/LUNG: Clear to auscultation bilaterally with good air entry   HEART: S1 S2  regular; no murmurs, gallops or rubs  ABDOMEN: diarrhea Soft, Nontender, Nondistended; Bowel sounds present  EXTREMITIES: no cyanosis; no edema; no calf tenderness  SKIN: jaundice, redness to buttocks, warm and dry; no rash  NERVOUS SYSTEM:  Awake and alert; Oriented  to person, confused  _________________________________________________                            8.9    44.76 )-----------( 371      ( 17 Oct 2022 12:34 )             28.4       10    137  |  105  |  5<L>  ----------------------------<  93  3.2<L>   |  21<L>  |  0.62    Ca    8.2<L>      17 Oct 2022 12:34  Mg     2.0     10-16    TPro  6.0  /  Alb  1.7<L>  /  TBili  17.2<H>  /  DBili  x   /  AST  93<H>  /  ALT  21  /  AlkPhos  236<H>  10-17      Color: Yellow / Appearance: Clear / S.005 / pH: x  Gluc: x / Ketone: Trace  / Bili: Large / Urobili: 8   Blood: x / Protein: 15 / Nitrite: Negative   Leuk Esterase: Trace / RBC: 0-2 /HPF / WBC 0-2 /HPF   Sq Epi: x / Non Sq Epi: Occasional /HPF / Bacteria: Trace /HPF    PTT - ( 17 Oct 2022 12:34 )  PTT:40.3 sec      CAPILLARY BLOOD GLUCOSE  POCT Blood Glucose.: 97 mg/dL (17 Oct 2022 11:30)  POCT Blood Glucose.: 89 mg/dL (17 Oct 2022 07:44)  POCT Blood Glucose.: 95 mg/dL (16 Oct 2022 21:18)  POCT Blood Glucose.: 120 mg/dL (16 Oct 2022 16:36)          RADIOLOGY & ADDITIONAL TESTS:  < from: Xray Chest 1 View- PORTABLE-Urgent (Xray Chest 1 View- PORTABLE-Urgent .) (10.12.22 @ 13:47) >  FINDINGS:  CATHETERS AND TUBES: None    PULMONARY: Mild bilateral perihilar diffuse airspace disease..   No pneumothorax.    HEART/VASCULAR: The heart and mediastinum size and configuration are   within normal limits.    BONES: Visualized osseous structures are intact.    IMPRESSION:   Mild bilateral perihilar diffuse airspace disease..    --- End of Report ---    < end of copied text >  < from: US Abdomen Liver Followup (10.10.22 @ 17:08) >    FINDINGS:    Liver: Increased echogenicity. No focal hepatic mass lesion is   demonstrated. The liver appears to be enlarged, however, no measurements   were obtained. Normal portal venous flow.  Bile ducts: Normal caliber. Common bile duct measures 5mm.  Gallbladder: Nonspecific mildly thickened wall measuring 4 mm.  Pancreas: Not visualized due to overlying bowel.  Right kidney: 12 cm. No hydronephrosis.  Ascites: None.  IVC: Visualized portions are within normal limits.    IMPRESSION:    Enlarged fatty liver.        --- End of Report ---    < end of copied text >  < from: Xray Chest 1 View-PORTABLE IMMEDIATE (Xray Chest 1 View-PORTABLE IMMEDIATE .) (10.07.22 @ 13:06) >  FINDINGS: There is a nonspecific nonobstructive bowel gas pattern. There   is no gross free air, organomegaly or abdominal calcifications. The bony   structures are intact    AP chest 1 view    COMPARISON: 10/2/2022    FINDINGS: The heart size, mediastinum and hilum are within normal limits   for. Decreased inspiratory effort with grossly clear lungs. Trachea   midline. No fractures.    IMPRESSION:    Nonspecific nonobstructive bowel gas pattern.    Grossly clear lungs.    --- End of Report ---    < end of copied text >  < from: Xray Abdomen 1 View Portable, IMMEDIATE (Xray Abdomen 1 View Portable, IMMEDIATE .) (10.07.22 @ 13:06) >  FINDINGS: There is a nonspecific nonobstructive bowel gas pattern. There   is no gross free air, organomegaly or abdominal calcifications. The bony   structures are intact    AP chest 1 view    COMPARISON: 10/2/2022    FINDINGS: The heart size, mediastinum and hilum are within normal limits   for. Decreased inspiratory effort with grossly clear lungs. Trachea   midline. No fractures.    IMPRESSION:    Nonspecific nonobstructive bowel gas pattern.    Grossly clear lungs.    --- End of Report ---    < end of copied text >  < from: Xray Chest 1 View- PORTABLE-Urgent (Xray Chest 1 View- PORTABLE-Urgent .) (10.02.22 @ 23:18) >  FINDINGS:  Heart/Vascular: The heart size, mediastinum, hilum and aorta are within   normal limits for projection.  Pulmonary: Midline trachea. There is no focal infiltrate, congestion or   effusion.    Bones: There is no fracture.  Lines and catheter: Overlying EKG leads and wires.    Impression:    No acute pulmonary disease.    --- End of Report ---    < end of copied text >  < from: CT Abdomen and Pelvis w/ IV Cont (10.02.22 @ 16:56) >  FINDINGS:  LOWER CHEST:Within normal limits.    LIVER: Severe hepatomegaly and diffuse steatosis.  BILE DUCTS: Normal caliber.  GALLBLADDER: Partially contracted with high attenuation related to   enhancement and/or sludge. Nonspecific wall thickening, likely   secondary/reactive.  SPLEEN: Within normal limits.  PANCREAS: Within normal limits.  ADRENALS: Within normal limits.  KIDNEYS/URETERS: Too small to characterize low attenuating focus in the   left kidney.    BLADDER: Within normal limits.  REPRODUCTIVE ORGANS: Within normal limits.    BOWEL: No bowel obstruction. Appendix is normal.  PERITONEUM: Mild ascites and infiltration of the central mesenteric fat.  VESSELS: Within normal limits.  RETROPERITONEUM/LYMPH NODES: No lymphadenopathy.  ABDOMINAL WALL: Tiny fat-containing inguinal hernias.  BONES: Bilateral L5-S1 pars defects. Old fractures of the 6th and 8th-9th   left ribs.    IMPRESSION:  Severe hepatomegaly and diffuse steatosis.  Mild ascites and infiltration of the central mesenteric fat.  Possiblegallbladder sludge and likely secondary/reactive wall thickening.      --- End of Report ---    < end of copied text >  < from: CT Head No Cont (10.02.22 @ 16:39) >  FINDINGS:  VENTRICLES AND SULCI:  Ex vacuo dilatation of the left lateral ventricle   to a mild degree.  INTRA-AXIAL:  Left temporal parietal encephalomalacia subjacent to the   craniotomy.  EXTRA-AXIAL:  No mass or collection is seen.  VISUALIZED SINUSES: Left maxillary mucosal thickening. Right maxillary   mucosal thickening  VISUALIZED MASTOIDS:  Clear.  CALVARIUM: Left temporal parietal craniotomy. Right parietalsoft tissue   swelling.  MISCELLANEOUS:  None.    IMPRESSION:  Left temporal parietal craniotomy. Encephalomalacia   subjacent to the craniotomy. Ex vacuo dilatation of the left lateral   ventricle. No intracranial hemorrhage. No mass lesion    --- End of Report ---    < end of copied text >    Imaging  Reviewed:  YES    Consultant(s) Notes Reviewed:   YES      Plan of care was discussed with patient and /or primary care giver; all questions and concerns were addressed  NP Note discussed with  Primary Attending       INTERVAL HPI  /OVERNIGHT EVENTS: no new complaints      MEDICATIONS  (STANDING):  dextrose 5% + sodium chloride 0.9%. 1000 milliLiter(s) (75 mL/Hr) IV Continuous <Continuous>  dextrose 5%. 1000 milliLiter(s) (100 mL/Hr) IV Continuous <Continuous>  dextrose 5%. 1000 milliLiter(s) (50 mL/Hr) IV Continuous <Continuous>  dextrose 50% Injectable 25 Gram(s) IV Push once  dextrose 50% Injectable 12.5 Gram(s) IV Push once  dextrose 50% Injectable 25 Gram(s) IV Push once  folic acid 1 milliGRAM(s) Oral daily  glucagon  Injectable 1 milliGRAM(s) IntraMuscular once  heparin   Injectable 5000 Unit(s) SubCutaneous every 8 hours  insulin lispro (ADMELOG) corrective regimen sliding scale   SubCutaneous Before meals and at bedtime  lactulose Syrup 20 Gram(s) Oral every 8 hours  meropenem  IVPB      meropenem  IVPB 1000 milliGRAM(s) IV Intermittent every 8 hours  multivitamin 1 Tablet(s) Oral daily  nystatin Powder 1 Application(s) Topical two times a day  pantoprazole    Tablet 40 milliGRAM(s) Oral before breakfast  polyethylene glycol 3350 17 Gram(s) Oral daily  rifAXIMin 550 milliGRAM(s) Oral two times a day  thiamine 100 milliGRAM(s) Oral daily    MEDICATIONS  (PRN):  dextrose Oral Gel 15 Gram(s) Oral once PRN Blood Glucose LESS THAN 70 milliGRAM(s)/deciliter  LORazepam   Injectable 2 milliGRAM(s) IV Push every 4 hours PRN Agitation  traMADol 25 milliGRAM(s) Oral every 8 hours PRN Moderate Pain (4 - 6)  Patient is a 27y old  Male who presents with a chief complaint of Altered mental status (13 Oct 2022 15:21)        __________________________________________________  REVIEW OF SYSTEMS:        CONSTITUTIONAL: No fever,   EYES: no acute visual disturbances  NECK: No pain or stiffness  RESPIRATORY: No cough; No shortness of breath  CARDIOVASCULAR: No chest pain, no palpitations  GASTROINTESTINAL: No pain. No nausea or vomiting; No diarrhea   NEUROLOGICAL: No headache or numbness, no tremors  MUSCULOSKELETAL: No joint pain, no muscle pain  GENITOURINARY: no dysuria, no frequency, no hesitancy  PSYCHIATRY: no depression , no anxiety  ALL OTHER  ROS negative        Vital Signs Last 24 Hrs  T(C): 36.7 (17 Oct 2022 12:18), Max: 37.7 (17 Oct 2022 04:55)  T(F): 98.1 (17 Oct 2022 12:18), Max: 99.8 (17 Oct 2022 04:55)  HR: 101 (17 Oct 2022 12:18) (101 - 111)  BP: 108/61 (17 Oct 2022 12:18) (108/61 - 135/76)  BP(mean): --  RR: 18 (17 Oct 2022 12:18) (18 - 20)  SpO2: 98% (17 Oct 2022 12:18) (98% - 100%)    Parameters below as of 17 Oct 2022 12:18  Patient On (Oxygen Delivery Method): room air          ________________________________________________  PHYSICAL EXAM:  GENERAL: confused,  No acute distress  HEENT: Normocephalic;  conjunctivae and sclerae clear; moist mucous membranes;   NECK : supple  CHEST/LUNG: Clear to auscultation bilaterally with good air entry   HEART: S1 S2  regular; no murmurs, gallops or rubs  ABDOMEN: diarrhea Soft, Nontender, Nondistended; Bowel sounds present  EXTREMITIES: no cyanosis; no edema; no calf tenderness  SKIN: jaundice, redness to buttocks, warm and dry; no rash  NERVOUS SYSTEM:  Awake and alert; Oriented  to person, confused  _________________________________________________                            8.9    44.76 )-----------( 371      ( 17 Oct 2022 12:34 )             28.4       10-    137  |  105  |  5<L>  ----------------------------<  93  3.2<L>   |  21<L>  |  0.62    Ca    8.2<L>      17 Oct 2022 12:34  Mg     2.0     10-16    TPro  6.0  /  Alb  1.7<L>  /  TBili  17.2<H>  /  DBili  x   /  AST  93<H>  /  ALT  21  /  AlkPhos  236<H>  10-17      Color: Yellow / Appearance: Clear / S.005 / pH: x  Gluc: x / Ketone: Trace  / Bili: Large / Urobili: 8   Blood: x / Protein: 15 / Nitrite: Negative   Leuk Esterase: Trace / RBC: 0-2 /HPF / WBC 0-2 /HPF   Sq Epi: x / Non Sq Epi: Occasional /HPF / Bacteria: Trace /HPF    PTT - ( 17 Oct 2022 12:34 )  PTT:40.3 sec      CAPILLARY BLOOD GLUCOSE  POCT Blood Glucose.: 97 mg/dL (17 Oct 2022 11:30)  POCT Blood Glucose.: 89 mg/dL (17 Oct 2022 07:44)  POCT Blood Glucose.: 95 mg/dL (16 Oct 2022 21:18)  POCT Blood Glucose.: 120 mg/dL (16 Oct 2022 16:36)          RADIOLOGY & ADDITIONAL TESTS:  < from: Xray Chest 1 View- PORTABLE-Urgent (Xray Chest 1 View- PORTABLE-Urgent .) (10.12.22 @ 13:47) >  FINDINGS:  CATHETERS AND TUBES: None    PULMONARY: Mild bilateral perihilar diffuse airspace disease..   No pneumothorax.    HEART/VASCULAR: The heart and mediastinum size and configuration are   within normal limits.    BONES: Visualized osseous structures are intact.    IMPRESSION:   Mild bilateral perihilar diffuse airspace disease..    --- End of Report ---    < end of copied text >  < from: US Abdomen Liver Followup (10.10.22 @ 17:08) >    FINDINGS:    Liver: Increased echogenicity. No focal hepatic mass lesion is   demonstrated. The liver appears to be enlarged, however, no measurements   were obtained. Normal portal venous flow.  Bile ducts: Normal caliber. Common bile duct measures 5mm.  Gallbladder: Nonspecific mildly thickened wall measuring 4 mm.  Pancreas: Not visualized due to overlying bowel.  Right kidney: 12 cm. No hydronephrosis.  Ascites: None.  IVC: Visualized portions are within normal limits.    IMPRESSION:    Enlarged fatty liver.        --- End of Report ---    < end of copied text >  < from: Xray Chest 1 View-PORTABLE IMMEDIATE (Xray Chest 1 View-PORTABLE IMMEDIATE .) (10.07.22 @ 13:06) >  FINDINGS: There is a nonspecific nonobstructive bowel gas pattern. There   is no gross free air, organomegaly or abdominal calcifications. The bony   structures are intact    AP chest 1 view    COMPARISON: 10/2/2022    FINDINGS: The heart size, mediastinum and hilum are within normal limits   for. Decreased inspiratory effort with grossly clear lungs. Trachea   midline. No fractures.    IMPRESSION:    Nonspecific nonobstructive bowel gas pattern.    Grossly clear lungs.    --- End of Report ---    < end of copied text >  < from: Xray Abdomen 1 View Portable, IMMEDIATE (Xray Abdomen 1 View Portable, IMMEDIATE .) (10.07.22 @ 13:06) >  FINDINGS: There is a nonspecific nonobstructive bowel gas pattern. There   is no gross free air, organomegaly or abdominal calcifications. The bony   structures are intact    AP chest 1 view    COMPARISON: 10/2/2022    FINDINGS: The heart size, mediastinum and hilum are within normal limits   for. Decreased inspiratory effort with grossly clear lungs. Trachea   midline. No fractures.    IMPRESSION:    Nonspecific nonobstructive bowel gas pattern.    Grossly clear lungs.    --- End of Report ---    < end of copied text >  < from: Xray Chest 1 View- PORTABLE-Urgent (Xray Chest 1 View- PORTABLE-Urgent .) (10.02.22 @ 23:18) >  FINDINGS:  Heart/Vascular: The heart size, mediastinum, hilum and aorta are within   normal limits for projection.  Pulmonary: Midline trachea. There is no focal infiltrate, congestion or   effusion.    Bones: There is no fracture.  Lines and catheter: Overlying EKG leads and wires.    Impression:    No acute pulmonary disease.    --- End of Report ---    < end of copied text >  < from: CT Abdomen and Pelvis w/ IV Cont (10.02.22 @ 16:56) >  FINDINGS:  LOWER CHEST:Within normal limits.    LIVER: Severe hepatomegaly and diffuse steatosis.  BILE DUCTS: Normal caliber.  GALLBLADDER: Partially contracted with high attenuation related to   enhancement and/or sludge. Nonspecific wall thickening, likely   secondary/reactive.  SPLEEN: Within normal limits.  PANCREAS: Within normal limits.  ADRENALS: Within normal limits.  KIDNEYS/URETERS: Too small to characterize low attenuating focus in the   left kidney.    BLADDER: Within normal limits.  REPRODUCTIVE ORGANS: Within normal limits.    BOWEL: No bowel obstruction. Appendix is normal.  PERITONEUM: Mild ascites and infiltration of the central mesenteric fat.  VESSELS: Within normal limits.  RETROPERITONEUM/LYMPH NODES: No lymphadenopathy.  ABDOMINAL WALL: Tiny fat-containing inguinal hernias.  BONES: Bilateral L5-S1 pars defects. Old fractures of the 6th and 8th-9th   left ribs.    IMPRESSION:  Severe hepatomegaly and diffuse steatosis.  Mild ascites and infiltration of the central mesenteric fat.  Possiblegallbladder sludge and likely secondary/reactive wall thickening.      --- End of Report ---    < end of copied text >  < from: CT Head No Cont (10.02.22 @ 16:39) >  FINDINGS:  VENTRICLES AND SULCI:  Ex vacuo dilatation of the left lateral ventricle   to a mild degree.  INTRA-AXIAL:  Left temporal parietal encephalomalacia subjacent to the   craniotomy.  EXTRA-AXIAL:  No mass or collection is seen.  VISUALIZED SINUSES: Left maxillary mucosal thickening. Right maxillary   mucosal thickening  VISUALIZED MASTOIDS:  Clear.  CALVARIUM: Left temporal parietal craniotomy. Right parietalsoft tissue   swelling.  MISCELLANEOUS:  None.    IMPRESSION:  Left temporal parietal craniotomy. Encephalomalacia   subjacent to the craniotomy. Ex vacuo dilatation of the left lateral   ventricle. No intracranial hemorrhage. No mass lesion    --- End of Report ---    < end of copied text >    Imaging  Reviewed:  YES    Consultant(s) Notes Reviewed:   YES      Plan of care was discussed with patient and /or primary care giver; all questions and concerns were addressed

## 2022-10-17 NOTE — PROGRESS NOTE ADULT - PROBLEM SELECTOR PLAN 8
-  Plan to discharge when medically stable  -  Patient remains active with confusion and worsening Leukocytosis -  Plan to discharge when medically stable  -  Patient remains active with confusion and worsening Leukocytosis  -  Back on IV antibiotics and sepsis work up in progress

## 2022-10-17 NOTE — PROGRESS NOTE ADULT - PROBLEM SELECTOR PLAN 3
-  Completed Ceftriaxone,  Meropenem started on   -  Continue Rifaximin 550 mg twice daily  -  septic work up pending from 10/16  -  Hepatology recommending to include fungal culture and fungitell to sepsis work up.   -  ID Dr. Puentes

## 2022-10-18 LAB
ALBUMIN SERPL ELPH-MCNC: 1.5 G/DL — LOW (ref 3.5–5)
ALP SERPL-CCNC: 252 U/L — HIGH (ref 40–120)
ALT FLD-CCNC: 17 U/L DA — SIGNIFICANT CHANGE UP (ref 10–60)
ANION GAP SERPL CALC-SCNC: 10 MMOL/L — SIGNIFICANT CHANGE UP (ref 5–17)
AST SERPL-CCNC: 73 U/L — HIGH (ref 10–40)
BILIRUB SERPL-MCNC: 16.3 MG/DL — HIGH (ref 0.2–1.2)
BUN SERPL-MCNC: 4 MG/DL — LOW (ref 7–18)
CALCIUM SERPL-MCNC: 8 MG/DL — LOW (ref 8.4–10.5)
CEA SERPL-MCNC: 1.9 NG/ML — SIGNIFICANT CHANGE UP (ref 0–3.8)
CHLORIDE SERPL-SCNC: 108 MMOL/L — SIGNIFICANT CHANGE UP (ref 96–108)
CO2 SERPL-SCNC: 18 MMOL/L — LOW (ref 22–31)
CREAT SERPL-MCNC: 0.46 MG/DL — LOW (ref 0.5–1.3)
EGFR: 149 ML/MIN/1.73M2 — SIGNIFICANT CHANGE UP
GLUCOSE BLDC GLUCOMTR-MCNC: 112 MG/DL — HIGH (ref 70–99)
GLUCOSE BLDC GLUCOMTR-MCNC: 91 MG/DL — SIGNIFICANT CHANGE UP (ref 70–99)
GLUCOSE BLDC GLUCOMTR-MCNC: 94 MG/DL — SIGNIFICANT CHANGE UP (ref 70–99)
GLUCOSE BLDC GLUCOMTR-MCNC: 95 MG/DL — SIGNIFICANT CHANGE UP (ref 70–99)
GLUCOSE SERPL-MCNC: 80 MG/DL — SIGNIFICANT CHANGE UP (ref 70–99)
HCT VFR BLD CALC: 26.2 % — LOW (ref 39–50)
HEV IGM SER QL: SIGNIFICANT CHANGE UP
HGB BLD-MCNC: 8.3 G/DL — LOW (ref 13–17)
MCHC RBC-ENTMCNC: 29.9 PG — SIGNIFICANT CHANGE UP (ref 27–34)
MCHC RBC-ENTMCNC: 31.7 GM/DL — LOW (ref 32–36)
MCV RBC AUTO: 94.2 FL — SIGNIFICANT CHANGE UP (ref 80–100)
NRBC # BLD: 0 /100 WBCS — SIGNIFICANT CHANGE UP (ref 0–0)
PHOSPHATIDYLETHANOL (PETH) - RESULT: 399 NG/ML — HIGH
PLATELET # BLD AUTO: 363 K/UL — SIGNIFICANT CHANGE UP (ref 150–400)
POTASSIUM SERPL-MCNC: 3.3 MMOL/L — LOW (ref 3.5–5.3)
POTASSIUM SERPL-SCNC: 3.3 MMOL/L — LOW (ref 3.5–5.3)
PROT SERPL-MCNC: 5.5 G/DL — LOW (ref 6–8.3)
RBC # BLD: 2.78 M/UL — LOW (ref 4.2–5.8)
RBC # FLD: 22 % — HIGH (ref 10.3–14.5)
SODIUM SERPL-SCNC: 136 MMOL/L — SIGNIFICANT CHANGE UP (ref 135–145)
WBC # BLD: 46.41 K/UL — CRITICAL HIGH (ref 3.8–10.5)
WBC # FLD AUTO: 46.41 K/UL — CRITICAL HIGH (ref 3.8–10.5)

## 2022-10-18 PROCEDURE — 99233 SBSQ HOSP IP/OBS HIGH 50: CPT

## 2022-10-18 PROCEDURE — 88189 FLOWCYTOMETRY/READ 16 & >: CPT

## 2022-10-18 RX ORDER — LACTULOSE 10 G/15ML
20 SOLUTION ORAL EVERY 8 HOURS
Refills: 0 | Status: DISCONTINUED | OUTPATIENT
Start: 2022-10-18 | End: 2022-10-18

## 2022-10-18 RX ORDER — TIGECYCLINE 50 MG/5ML
50 INJECTION, POWDER, LYOPHILIZED, FOR SOLUTION INTRAVENOUS EVERY 12 HOURS
Refills: 0 | Status: DISCONTINUED | OUTPATIENT
Start: 2022-10-18 | End: 2022-10-20

## 2022-10-18 RX ORDER — POTASSIUM CHLORIDE 20 MEQ
40 PACKET (EA) ORAL EVERY 4 HOURS
Refills: 0 | Status: COMPLETED | OUTPATIENT
Start: 2022-10-18 | End: 2022-10-18

## 2022-10-18 RX ORDER — POTASSIUM CHLORIDE 20 MEQ
40 PACKET (EA) ORAL EVERY 4 HOURS
Refills: 0 | Status: DISCONTINUED | OUTPATIENT
Start: 2022-10-18 | End: 2022-10-18

## 2022-10-18 RX ORDER — FOLIC ACID 0.8 MG
1 TABLET ORAL
Refills: 0 | Status: DISCONTINUED | OUTPATIENT
Start: 2022-10-18 | End: 2022-11-04

## 2022-10-18 RX ORDER — THIAMINE MONONITRATE (VIT B1) 100 MG
100 TABLET ORAL DAILY
Refills: 0 | Status: DISCONTINUED | OUTPATIENT
Start: 2022-10-18 | End: 2022-10-30

## 2022-10-18 RX ADMIN — HEPARIN SODIUM 5000 UNIT(S): 5000 INJECTION INTRAVENOUS; SUBCUTANEOUS at 14:03

## 2022-10-18 RX ADMIN — Medication 100 MILLIGRAM(S): at 12:31

## 2022-10-18 RX ADMIN — TIGECYCLINE 105 MILLIGRAM(S): 50 INJECTION, POWDER, LYOPHILIZED, FOR SOLUTION INTRAVENOUS at 17:12

## 2022-10-18 RX ADMIN — MEROPENEM 100 MILLIGRAM(S): 1 INJECTION INTRAVENOUS at 05:15

## 2022-10-18 RX ADMIN — Medication 1 TABLET(S): at 12:29

## 2022-10-18 RX ADMIN — Medication 100 MILLIGRAM(S): at 16:12

## 2022-10-18 RX ADMIN — SODIUM CHLORIDE 75 MILLILITER(S): 9 INJECTION, SOLUTION INTRAVENOUS at 22:37

## 2022-10-18 RX ADMIN — Medication 1 MILLIGRAM(S): at 12:31

## 2022-10-18 RX ADMIN — NYSTATIN CREAM 1 APPLICATION(S): 100000 CREAM TOPICAL at 05:15

## 2022-10-18 RX ADMIN — HEPARIN SODIUM 5000 UNIT(S): 5000 INJECTION INTRAVENOUS; SUBCUTANEOUS at 22:34

## 2022-10-18 RX ADMIN — Medication 40 MILLIEQUIVALENT(S): at 14:03

## 2022-10-18 RX ADMIN — Medication 40 MILLIEQUIVALENT(S): at 17:12

## 2022-10-18 RX ADMIN — Medication 1 MILLIGRAM(S): at 08:14

## 2022-10-18 RX ADMIN — PANTOPRAZOLE SODIUM 40 MILLIGRAM(S): 20 TABLET, DELAYED RELEASE ORAL at 05:16

## 2022-10-18 RX ADMIN — HEPARIN SODIUM 5000 UNIT(S): 5000 INJECTION INTRAVENOUS; SUBCUTANEOUS at 05:15

## 2022-10-18 RX ADMIN — Medication 1 MILLIGRAM(S): at 17:10

## 2022-10-18 RX ADMIN — NYSTATIN CREAM 1 APPLICATION(S): 100000 CREAM TOPICAL at 17:11

## 2022-10-18 RX ADMIN — Medication 1 MILLIGRAM(S): at 18:42

## 2022-10-18 NOTE — PROGRESS NOTE ADULT - NS PANP COMMENT GEN_ALL_CORE FT
pt seen and examined. Pt still very confused and juandice. now WBC up to 46K. hepatology and ID consult followup appreciated. liver function test improved slightly and ID change Iv antibiotics. C. diff is in process. flowcytometry sent and AFP pending. CEA normal. Pt may need liver biopsy after infection control to further define his liver disease and he also need to be followed by liver transplant team since he is so young and his liver failure is very significant

## 2022-10-18 NOTE — CHART NOTE - NSCHARTNOTEFT_GEN_A_CORE
MEDICINE NP    Notified by RN patient S/p unwitnessed fall\ patient found on floor after loud noise is heard. Seen and examined patient at bedside. Patient is alert, NAD and confused. Patient is Bulgarian speaking. With use of  patient unable to answer questions coherently. Proceeding with imaging to r/o injuries. Patient unable to communicate any pain.    VITAL SIGNS:  T(C): 37.4 (10-18-22 @ 21:16), Max: 37.4 (10-18-22 @ 21:16)  HR: 112 (10-18-22 @ 21:16) (108 - 112)  BP: 116/66 (10-18-22 @ 21:16) (116/66 - 125/75)  RR: 19 (10-18-22 @ 21:16) (18 - 19)  SpO2: 100% (10-18-22 @ 21:16) (98% - 100%)  Wt(kg): --      LABORATORY:                          8.3    46.41 )-----------( 363      ( 18 Oct 2022 07:13 )             26.2       10-18    136  |  108  |  4<L>  ----------------------------<  80  3.3<L>   |  18<L>  |  0.46<L>    Ca    8.0<L>      18 Oct 2022 07:13    TPro  5.5<L>  /  Alb  1.5<L>  /  TBili  16.3<H>  /  DBili  x   /  AST  73<H>  /  ALT  17  /  AlkPhos  252<H>  10-18          MICROBIOLOGY:           RADIOLOGY:          PHYSICAL EXAM:    Constitutional: AOx3. NAD.    Respiratory: clear lungs bilaterally. No wheezing, rhonchi, or crackles.    Cardiovascular: S1 S2. No murmurs.    Gastrointestinal: BS X4 active. soft. nontender.    Extremities/Vascular: +2 pulses bilaterally. No BLE edema.      ASSESSMENT/PLAN:   HPI:  Patient is a 25 yo Sami speaking Male, with no known past medical history, s/p Left temporal parietal craniotomy noted in imaging, presenting to the ED with altered mental status since this morning. Pt moaning, not able to provide history and pt's brother's contact information not in chart as family left ED prior to phone number being collected. As per EMS charts, pt was found to be 'asleep' when EMS arrived..as per family, pt has a seizure that lasted about 2-4 minutes. Family states pt has a seizure earlier in the day as well. Pt family denies hx of seizures. They stated pt drinks alcohol on a daily basis and hasn't drank any alcohol in the past 2 days. Family denies any recent trauma related injuries to pt.' Pt's brother told ED attending that the jaundice developed acutely over the last 1 week. Now, patient found on the floor by s/p unwitnessed fall    In the ED, pt's vitals were  Temp 100.1, , /67, RR 18 on room air O2 sat 96%  s/p IV Vanc + Zosyn, NS 1 L bolus, Keppra 2 gm x 1, Ativan, Mg, Kcl riders, Thiamine (02 Oct 2022 20:13)        1) S/p unwitnessed fall  -Patient assessed with    -xray of bony prominences   - Constant observation for safety  -F/U primary team in AM MEDICINE NP    Notified by RN patient S/p unwitnessed fall\ patient found on floor after loud noise is heard. Seen and examined patient at bedside. Patient is alert, NAD and confused. Patient is Taiwanese speaking. With use of  patient unable to answer questions coherently. Proceeding with imaging to r/o injuries. Patient unable to communicate any pain.    VITAL SIGNS:  T(C): 37.4 (10-18-22 @ 21:16), Max: 37.4 (10-18-22 @ 21:16)  HR: 112 (10-18-22 @ 21:16) (108 - 112)  BP: 116/66 (10-18-22 @ 21:16) (116/66 - 125/75)  RR: 19 (10-18-22 @ 21:16) (18 - 19)  SpO2: 100% (10-18-22 @ 21:16) (98% - 100%)  Wt(kg): --      LABORATORY:                          8.3    46.41 )-----------( 363      ( 18 Oct 2022 07:13 )             26.2       10-18    136  |  108  |  4<L>  ----------------------------<  80  3.3<L>   |  18<L>  |  0.46<L>    Ca    8.0<L>      18 Oct 2022 07:13    TPro  5.5<L>  /  Alb  1.5<L>  /  TBili  16.3<H>  /  DBili  x   /  AST  73<H>  /  ALT  17  /  AlkPhos  252<H>  10-18          MICROBIOLOGY:           RADIOLOGY:          PHYSICAL EXAM:    Constitutional: AOx1. NAD.    Respiratory: clear lungs bilaterally. No wheezing, rhonchi, or crackles.    Cardiovascular: S1 S2. No murmurs.    Gastrointestinal: BS X4 active. soft. nontender and disented.    Extremities/Vascular: +2 pulses bilaterally. No BLE edema.      ASSESSMENT/PLAN:   HPI:  Patient is a 27 yo Yoruba speaking Male, with no known past medical history, s/p Left temporal parietal craniotomy noted in imaging, presenting to the ED with altered mental status since this morning. Pt moaning, not able to provide history and pt's brother's contact information not in chart as family left ED prior to phone number being collected. As per EMS charts, pt was found to be 'asleep' when EMS arrived..as per family, pt has a seizure that lasted about 2-4 minutes. Family states pt has a seizure earlier in the day as well. Pt family denies hx of seizures. They stated pt drinks alcohol on a daily basis and hasn't drank any alcohol in the past 2 days. Family denies any recent trauma related injuries to pt.' Pt's brother told ED attending that the jaundice developed acutely over the last 1 week. Now, patient found on the floor by s/p unwitnessed fall    In the ED, pt's vitals were  Temp 100.1, , /67, RR 18 on room air O2 sat 96%  s/p IV Vanc + Zosyn, NS 1 L bolus, Keppra 2 gm x 1, Ativan, Mg, Kcl riders, Thiamine (02 Oct 2022 20:13)        1) S/p unwitnessed fall  -Patient assessed with    -xray of bony prominences   - Constant observation for safety  -F/U primary team in AM MEDICINE NP    Notified by RN patient S/p fall\ landing on his side. Seen and examined patient at bedside. Patient is alert, NAD and confused. Patient is Nigerien speaking. With use of  patient unable to answer questions coherently. Proceeding with imaging to r/o injuries. Patient unable to communicate any pain.    VITAL SIGNS:  T(C): 37.4 (10-18-22 @ 21:16), Max: 37.4 (10-18-22 @ 21:16)  HR: 112 (10-18-22 @ 21:16) (108 - 112)  BP: 116/66 (10-18-22 @ 21:16) (116/66 - 125/75)  RR: 19 (10-18-22 @ 21:16) (18 - 19)  SpO2: 100% (10-18-22 @ 21:16) (98% - 100%)  Wt(kg): --      LABORATORY:                          8.3    46.41 )-----------( 363      ( 18 Oct 2022 07:13 )             26.2       10-18    136  |  108  |  4<L>  ----------------------------<  80  3.3<L>   |  18<L>  |  0.46<L>    Ca    8.0<L>      18 Oct 2022 07:13    TPro  5.5<L>  /  Alb  1.5<L>  /  TBili  16.3<H>  /  DBili  x   /  AST  73<H>  /  ALT  17  /  AlkPhos  252<H>  10-18          MICROBIOLOGY:           RADIOLOGY:          PHYSICAL EXAM:    Constitutional: AOx1. NAD.    Respiratory: clear lungs bilaterally. No wheezing, rhonchi, or crackles.    Cardiovascular: S1 S2. No murmurs.    Gastrointestinal: BS X4 active. soft. nontender and disented.    Extremities/Vascular: +2 pulses bilaterally. No BLE edema.      ASSESSMENT/PLAN:   HPI:  Patient is a 27 yo French speaking Male, with no known past medical history, s/p Left temporal parietal craniotomy noted in imaging, presenting to the ED with altered mental status since this morning. Pt moaning, not able to provide history and pt's brother's contact information not in chart as family left ED prior to phone number being collected. As per EMS charts, pt was found to be 'asleep' when EMS arrived..as per family, pt has a seizure that lasted about 2-4 minutes. Family states pt has a seizure earlier in the day as well. Pt family denies hx of seizures. They stated pt drinks alcohol on a daily basis and hasn't drank any alcohol in the past 2 days. Family denies any recent trauma related injuries to pt.' Pt's brother told ED attending that the jaundice developed acutely over the last 1 week. Now, patient found on the floor by s/p fall    In the ED, pt's vitals were  Temp 100.1, , /67, RR 18 on room air O2 sat 96%  s/p IV Vanc + Zosyn, NS 1 L bolus, Keppra 2 gm x 1, Ativan, Mg, Kcl riders, Thiamine (02 Oct 2022 20:13)        1) S/p fall  -Patient assessed with    -xray of bony prominences   - Constant observation for safety  -F/U primary team in AM

## 2022-10-18 NOTE — CHART NOTE - NSCHARTNOTEFT_GEN_A_CORE
EVENT: Nurse reporting  liquid yellow stool ++++    OBJECTIVE:  Vital Signs Last 24 Hrs  T(C): 36.4 (18 Oct 2022 05:42), Max: 37.1 (17 Oct 2022 21:10)  T(F): 97.6 (18 Oct 2022 05:42), Max: 98.8 (17 Oct 2022 21:10)  HR: 108 (18 Oct 2022 05:42) (101 - 108)  BP: 124/64 (18 Oct 2022 05:42) (108/61 - 137/72)  BP(mean): --  RR: 18 (18 Oct 2022 05:42) (18 - 18)  SpO2: 98% (18 Oct 2022 05:42) (98% - 98%)    Parameters below as of 18 Oct 2022 05:42  Patient On (Oxygen Delivery Method): room air    FOCUSED PHYSICAL EXAM:    LABS:                        8.9    44.76 )-----------( 371      ( 17 Oct 2022 12:34 )             28.4     10-17    137  |  105  |  5<L>  ----------------------------<  93  3.2<L>   |  21<L>  |  0.62    Ca    8.2<L>      17 Oct 2022 12:34  Mg     2.0     10-16    TPro  6.0  /  Alb  1.7<L>  /  TBili  17.2<H>  /  DBili  x   /  AST  93<H>  /  ALT  21  /  AlkPhos  236<H>  10-17    Ammonia, Serum: 52 umol/L (10.16.22 @ 11:04)      ASSESSMENT:  HPI:  Patient is a 27 yo Kazakh speaking Male, with no known past medical history, s/p Left temporal parietal craniotomy noted in imaging, presenting to the ED with altered mental status since this morning. Pt moaning, not able to provide history and pt's brother's contact information not in chart as family left ED prior to phone number being collected. As per EMS charts, pt was found to be 'asleep' when EMS arrived..as per family, pt has a seizure that lasted about 2-4 minutes. Family states pt has a seizure earlier in the day as well. Pt family denies hx of seizures. They stated pt drinks alcohol on a daily basis and hasn't drank any alcohol in the past 2 days. Family denies any recent trauma related injuries to pt.' Pt's brother told ED attending that the jaundice developed acutely over the last 1 week.     In the ED, pt's vitals were  Temp 100.1, , /67, RR 18 on room air O2 sat 96%  s/p IV Vanc + Zosyn, NS 1 L bolus, Keppra 2 gm x 1, Ativan, Mg, Kcl riders, Thiamine (02 Oct 2022 20:13)      PLAN:     FOLLOW UP / RESULT: EVENT: Nurse reporting  more than 5 liquid yellow stool    BRIEF HPI: 26 yo Bangladeshi speaking Male, with no known past medical history, s/p Left temporal parietal craniotomy noted in imaging, As per family, patient had head trauma 6 months ago which required craniotomy. He went to rehab and returned home. He was able to work after the accident. presenting to the ED with altered mental status. Pt was not able to provide history and pt's brother's contact information not in chart as family left ED prior to phone number being collected. As per EMS charts, pt was found to be 'asleep' when EMS arrive. As per family, pt has a seizure that lasted about 2-4 minutes. Hospital course further complicated by fever, ID recommending. Initially held sending blood culture. However, patient develop leukocytosis, diarrhea, slight elevated temp- not fever,  abdomen slightly more distended,  pending blood culture, stool studies, and CT abdomen and pelvis    OBJECTIVE:  Vital Signs Last 24 Hrs  T(C): 36.4 (18 Oct 2022 05:42), Max: 37.1 (17 Oct 2022 21:10)  T(F): 97.6 (18 Oct 2022 05:42), Max: 98.8 (17 Oct 2022 21:10)  HR: 108 (18 Oct 2022 05:42) (101 - 108)  BP: 124/64 (18 Oct 2022 05:42) (108/61 - 137/72)  BP(mean): --  RR: 18 (18 Oct 2022 05:42) (18 - 18)  SpO2: 98% (18 Oct 2022 05:42) (98% - 98%)    Parameters below as of 18 Oct 2022 05:42  Patient On (Oxygen Delivery Method): room air    FOCUSED PHYSICAL EXAM:  NEURO: Confused, unsteady gait  ABD: Distended, hyperactive BS    LABS:                        8.9    44.76 )-----------( 371      ( 17 Oct 2022 12:34 )             28.4     10-17    137  |  105  |  5<L>  ----------------------------<  93  3.2<L>   |  21<L>  |  0.62    Ca    8.2<L>      17 Oct 2022 12:34  Mg     2.0     10-16    TPro  6.0  /  Alb  1.7<L>  /  TBili  17.2<H>  /  DBili  x   /  AST  93<H>  /  ALT  21  /  AlkPhos  236<H>  10-17    Ammonia, Serum: 52 umol/L (10.16.22 @ 11:04)    PLAN:   1. Continue lactulose to hold for more than 3 bms  2. Miralax d/c    FOLLOW UP / RESULT: monitor bowel hygiene and mental status, AM labs    Discussed with Dr Gonzalez
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
## 2022-10-18 NOTE — PROGRESS NOTE ADULT - PROBLEM SELECTOR PLAN 3
-  Completed Ceftriaxone  -  Continue Rifaximin 550 mg twice daily  -  septic work up pending from 10/16  -   BC - NGTD  -  Hepatology recommending to include fungal culture and fungitell to sepsis work up.   -  ID Dr. Puentes

## 2022-10-18 NOTE — PROGRESS NOTE ADULT - ASSESSMENT
Patient is a 26 yo French speaking Male, with no known past medical history, s/p Left temporal parietal craniotomy noted in imaging, As per family, patient had head trauma 6 months ago which required craniotomy. He went to rehab and returned home. He was able to work after the accident. presenting to the ED with altered mental status. Pt was not able to provide history and pt's brother's contact information not in chart as family left ED prior to phone number being collected. As per EMS charts, pt was found to be 'asleep' when EMS arrive. As per family, pt has a seizure that lasted about 2-4 minutes. Family states pt has a seizure earlier in the day as well. Pt family denies hx of seizures. They stated pt drinks alcohol on a daily basis and hasn't drank any alcohol in the past 2 days. Family denies any recent trauma related injuries to pt.' Pt's brother told ED attending that the jaundice developed acutely over the last 1 week. In the ED, pt's vitals were Temp 100.1, , /67, RR 18 on room air O2 sat 96% s/p IV Vanc + Zosyn, NS 1 L bolus, Keppra 2 gm x 1, Ativan, Mg, Kcl riders, Thiamine. Patient was brought up to the ICU for severe metabolic derangements, CIWA protocol with PRN ativan. Patient was started on PO lactulose and IVF. Lactate trended down to normal levels. GI and Hepatology was consulted for transaminitis and hyperbilirubinemia. Patient was started on zosyn for possible cholangitis. CT abd showed hepatomegaly with biliary sludge and mild ascites. They recommended MRCP once patient is more stable. Per chart, patient has a history of left temporal parietal craniotomy. Patient had episode of melena overnight on October 4th. Hb was 7.3. Patient was made NPO, started on protonix drip and octreotide drip. GI on board recommending EGD which did not show any pathology. Flexible sigmoidoscopy showed rectal varices which was cauterized. Patient received one unit pRBC and one unit FFP. Patients Hb was stable around 9 after 3 CBC draws. Patient was switched to ceftriaxone for SBP prophylaxis per ID. He was started on NAC per hepatology which was discontinued He was started on Vit K for 3 days for elevated INR. He was started on prednisone 40mg daily for 28 days for alcoholic hepatitis. Patients diet was advanced and was able to tolerate clear liquid diet. Patient is stable for downgrade to medicine floors.     Patient hospital course further complicated by fever, ID recommending. Initially held sending blood culture. However, patient develop leukocytosis, diarrhea, slight elevated temp- not fever,  abdomen slightly more distended,  pending blood culture, stool studies, and CT abdomen and pelvis    Leukocytosis worsening  WBC 44.76

## 2022-10-18 NOTE — PROGRESS NOTE ADULT - SUBJECTIVE AND OBJECTIVE BOX
NP Note discussed with  Primary Attending       INTERVAL HPI  /OVERNIGHT EVENTS: no new complaints      MEDICATIONS  (STANDING):  dextrose 5% + sodium chloride 0.9%. 1000 milliLiter(s) (75 mL/Hr) IV Continuous <Continuous>  dextrose 5%. 1000 milliLiter(s) (100 mL/Hr) IV Continuous <Continuous>  dextrose 5%. 1000 milliLiter(s) (50 mL/Hr) IV Continuous <Continuous>  dextrose 50% Injectable 25 Gram(s) IV Push once  dextrose 50% Injectable 12.5 Gram(s) IV Push once  dextrose 50% Injectable 25 Gram(s) IV Push once  folic acid 1 milliGRAM(s) Oral daily  glucagon  Injectable 1 milliGRAM(s) IntraMuscular once  heparin   Injectable 5000 Unit(s) SubCutaneous every 8 hours  insulin lispro (ADMELOG) corrective regimen sliding scale   SubCutaneous Before meals and at bedtime  lactulose Syrup 20 Gram(s) Oral every 8 hours  meropenem  IVPB      meropenem  IVPB 1000 milliGRAM(s) IV Intermittent every 8 hours  multivitamin 1 Tablet(s) Oral daily  nystatin Powder 1 Application(s) Topical two times a day  pantoprazole    Tablet 40 milliGRAM(s) Oral before breakfast  polyethylene glycol 3350 17 Gram(s) Oral daily  rifAXIMin 550 milliGRAM(s) Oral two times a day  thiamine 100 milliGRAM(s) Oral daily    MEDICATIONS  (PRN):  dextrose Oral Gel 15 Gram(s) Oral once PRN Blood Glucose LESS THAN 70 milliGRAM(s)/deciliter  LORazepam   Injectable 2 milliGRAM(s) IV Push every 4 hours PRN Agitation  traMADol 25 milliGRAM(s) Oral every 8 hours PRN Moderate Pain (4 - 6)  Patient is a 27y old  Male who presents with a chief complaint of Altered mental status (13 Oct 2022 15:21)        __________________________________________________  REVIEW OF SYSTEMS:        CONSTITUTIONAL: No fever,   EYES: no acute visual disturbances  NECK: No pain or stiffness  RESPIRATORY: No cough; No shortness of breath  CARDIOVASCULAR: No chest pain, no palpitations  GASTROINTESTINAL: No pain. No nausea or vomiting; No diarrhea   NEUROLOGICAL: No headache or numbness, no tremors  MUSCULOSKELETAL: No joint pain, no muscle pain  GENITOURINARY: no dysuria, no frequency, no hesitancy  PSYCHIATRY: no depression , no anxiety  ALL OTHER  ROS negative        Vital Signs Last 24 Hrs  T(C): 36.7 (17 Oct 2022 12:18), Max: 37.7 (17 Oct 2022 04:55)  T(F): 98.1 (17 Oct 2022 12:18), Max: 99.8 (17 Oct 2022 04:55)  HR: 101 (17 Oct 2022 12:18) (101 - 111)  BP: 108/61 (17 Oct 2022 12:18) (108/61 - 135/76)  BP(mean): --  RR: 18 (17 Oct 2022 12:18) (18 - 20)  SpO2: 98% (17 Oct 2022 12:18) (98% - 100%)    Parameters below as of 17 Oct 2022 12:18  Patient On (Oxygen Delivery Method): room air          ________________________________________________  PHYSICAL EXAM:  GENERAL: confused,  No acute distress  HEENT: Normocephalic;  conjunctivae and sclerae clear; moist mucous membranes;   NECK : supple  CHEST/LUNG: Clear to auscultation bilaterally with good air entry   HEART: S1 S2  regular; no murmurs, gallops or rubs  ABDOMEN: diarrhea Soft, Nontender, + distention,  Bowel sounds present  EXTREMITIES: no cyanosis; no edema; no calf tenderness  SKIN: jaundice, redness to buttocks, warm and dry; no rash  NERVOUS SYSTEM:  Awake and alert; Oriented  to person, confused  _________________________________________________                              8.3    46.41 )-----------( 363      ( 18 Oct 2022 07:13 )             26.2       10-18    136  |  108  |  4<L>  ----------------------------<  80  3.3<L>   |  18<L>  |  0.46<L>    Ca    8.0<L>      18 Oct 2022 07:13    TPro  5.5<L>  /  Alb  1.5<L>  /  TBili  16.3<H>  /  DBili  x   /  AST  73<H>  /  ALT  17  /  AlkPhos  252<H>  10-18      PT/INR - ( 17 Oct 2022 12:34 )   PT: 26.2 sec;   INR: 2.18 ratio    PTT - ( 17 Oct 2022 12:34 )  PTT:40.3 sec                      Color: Yellow / Appearance: Clear / S.005 / pH: x  Gluc: x / Ketone: Trace  / Bili: Large / Urobili: 8   Blood: x / Protein: 15 / Nitrite: Negative   Leuk Esterase: Trace / RBC: 0-2 /HPF / WBC 0-2 /HPF   Sq Epi: x / Non Sq Epi: Occasional /HPF / Bacteria: Trace /HPF        CAPILLARY BLOOD GLUCOSE  POCT Blood Glucose.: 112 mg/dL (18 Oct 2022 11:21)  POCT Blood Glucose.: 94 mg/dL (18 Oct 2022 07:47)  POCT Blood Glucose.: 112 mg/dL (17 Oct 2022 21:39)  POCT Blood Glucose.: 122 mg/dL (17 Oct 2022 17:00)      RADIOLOGY & ADDITIONAL TESTS:  < from: Xray Chest 1 View- PORTABLE-Urgent (Xray Chest 1 View- PORTABLE-Urgent .) (10.12.22 @ 13:47) >  FINDINGS:  CATHETERS AND TUBES: None    PULMONARY: Mild bilateral perihilar diffuse airspace disease..   No pneumothorax.    HEART/VASCULAR: The heart and mediastinum size and configuration are   within normal limits.    BONES: Visualized osseous structures are intact.    IMPRESSION:   Mild bilateral perihilar diffuse airspace disease..    --- End of Report ---    < end of copied text >  < from: US Abdomen Liver Followup (10.10.22 @ 17:08) >    FINDINGS:    Liver: Increased echogenicity. No focal hepatic mass lesion is   demonstrated. The liver appears to be enlarged, however, no measurements   were obtained. Normal portal venous flow.  Bile ducts: Normal caliber. Common bile duct measures 5mm.  Gallbladder: Nonspecific mildly thickened wall measuring 4 mm.  Pancreas: Not visualized due to overlying bowel.  Right kidney: 12 cm. No hydronephrosis.  Ascites: None.  IVC: Visualized portions are within normal limits.    IMPRESSION:    Enlarged fatty liver.        --- End of Report ---    < end of copied text >  < from: Xray Chest 1 View-PORTABLE IMMEDIATE (Xray Chest 1 View-PORTABLE IMMEDIATE .) (10.07.22 @ 13:06) >  FINDINGS: There is a nonspecific nonobstructive bowel gas pattern. There   is no gross free air, organomegaly or abdominal calcifications. The bony   structures are intact    AP chest 1 view    COMPARISON: 10/2/2022    FINDINGS: The heart size, mediastinum and hilum are within normal limits   for. Decreased inspiratory effort with grossly clear lungs. Trachea   midline. No fractures.    IMPRESSION:    Nonspecific nonobstructive bowel gas pattern.    Grossly clear lungs.    --- End of Report ---    < end of copied text >  < from: Xray Abdomen 1 View Portable, IMMEDIATE (Xray Abdomen 1 View Portable, IMMEDIATE .) (10.07.22 @ 13:06) >  FINDINGS: There is a nonspecific nonobstructive bowel gas pattern. There   is no gross free air, organomegaly or abdominal calcifications. The bony   structures are intact    AP chest 1 view    COMPARISON: 10/2/2022    FINDINGS: The heart size, mediastinum and hilum are within normal limits   for. Decreased inspiratory effort with grossly clear lungs. Trachea   midline. No fractures.    IMPRESSION:    Nonspecific nonobstructive bowel gas pattern.    Grossly clear lungs.    --- End of Report ---    < end of copied text >  < from: Xray Chest 1 View- PORTABLE-Urgent (Xray Chest 1 View- PORTABLE-Urgent .) (10.02.22 @ 23:18) >  FINDINGS:  Heart/Vascular: The heart size, mediastinum, hilum and aorta are within   normal limits for projection.  Pulmonary: Midline trachea. There is no focal infiltrate, congestion or   effusion.    Bones: There is no fracture.  Lines and catheter: Overlying EKG leads and wires.    Impression:    No acute pulmonary disease.    --- End of Report ---    < end of copied text >  < from: CT Abdomen and Pelvis w/ IV Cont (10.02.22 @ 16:56) >  FINDINGS:  LOWER CHEST:Within normal limits.    LIVER: Severe hepatomegaly and diffuse steatosis.  BILE DUCTS: Normal caliber.  GALLBLADDER: Partially contracted with high attenuation related to   enhancement and/or sludge. Nonspecific wall thickening, likely   secondary/reactive.  SPLEEN: Within normal limits.  PANCREAS: Within normal limits.  ADRENALS: Within normal limits.  KIDNEYS/URETERS: Too small to characterize low attenuating focus in the   left kidney.    BLADDER: Within normal limits.  REPRODUCTIVE ORGANS: Within normal limits.    BOWEL: No bowel obstruction. Appendix is normal.  PERITONEUM: Mild ascites and infiltration of the central mesenteric fat.  VESSELS: Within normal limits.  RETROPERITONEUM/LYMPH NODES: No lymphadenopathy.  ABDOMINAL WALL: Tiny fat-containing inguinal hernias.  BONES: Bilateral L5-S1 pars defects. Old fractures of the 6th and 8th-9th   left ribs.    IMPRESSION:  Severe hepatomegaly and diffuse steatosis.  Mild ascites and infiltration of the central mesenteric fat.  Possiblegallbladder sludge and likely secondary/reactive wall thickening.      --- End of Report ---    < end of copied text >  < from: CT Head No Cont (10.02.22 @ 16:39) >  FINDINGS:  VENTRICLES AND SULCI:  Ex vacuo dilatation of the left lateral ventricle   to a mild degree.  INTRA-AXIAL:  Left temporal parietal encephalomalacia subjacent to the   craniotomy.  EXTRA-AXIAL:  No mass or collection is seen.  VISUALIZED SINUSES: Left maxillary mucosal thickening. Right maxillary   mucosal thickening  VISUALIZED MASTOIDS:  Clear.  CALVARIUM: Left temporal parietal craniotomy. Right parietalsoft tissue   swelling.  MISCELLANEOUS:  None.    IMPRESSION:  Left temporal parietal craniotomy. Encephalomalacia   subjacent to the craniotomy. Ex vacuo dilatation of the left lateral   ventricle. No intracranial hemorrhage. No mass lesion    --- End of Report ---    < end of copied text >    Imaging  Reviewed:  YES    Consultant(s) Notes Reviewed:   YES      Plan of care was discussed with patient and /or primary care giver; all questions and concerns were addressed  NP Note discussed with  Primary Attending       INTERVAL HPI  /OVERNIGHT EVENTS: no new complaints      MEDICATIONS  (STANDING):  dextrose 5% + sodium chloride 0.9%. 1000 milliLiter(s) (75 mL/Hr) IV Continuous <Continuous>  dextrose 5%. 1000 milliLiter(s) (100 mL/Hr) IV Continuous <Continuous>  dextrose 5%. 1000 milliLiter(s) (50 mL/Hr) IV Continuous <Continuous>  dextrose 50% Injectable 25 Gram(s) IV Push once  dextrose 50% Injectable 12.5 Gram(s) IV Push once  dextrose 50% Injectable 25 Gram(s) IV Push once  folic acid 1 milliGRAM(s) Oral daily  glucagon  Injectable 1 milliGRAM(s) IntraMuscular once  heparin   Injectable 5000 Unit(s) SubCutaneous every 8 hours  insulin lispro (ADMELOG) corrective regimen sliding scale   SubCutaneous Before meals and at bedtime  lactulose Syrup 20 Gram(s) Oral every 8 hours  meropenem  IVPB      meropenem  IVPB 1000 milliGRAM(s) IV Intermittent every 8 hours  multivitamin 1 Tablet(s) Oral daily  nystatin Powder 1 Application(s) Topical two times a day  pantoprazole    Tablet 40 milliGRAM(s) Oral before breakfast  polyethylene glycol 3350 17 Gram(s) Oral daily  rifAXIMin 550 milliGRAM(s) Oral two times a day  thiamine 100 milliGRAM(s) Oral daily    MEDICATIONS  (PRN):  dextrose Oral Gel 15 Gram(s) Oral once PRN Blood Glucose LESS THAN 70 milliGRAM(s)/deciliter  LORazepam   Injectable 2 milliGRAM(s) IV Push every 4 hours PRN Agitation  traMADol 25 milliGRAM(s) Oral every 8 hours PRN Moderate Pain (4 - 6)  Patient is a 27y old  Male who presents with a chief complaint of Altered mental status (13 Oct 2022 15:21)        __________________________________________________  REVIEW OF SYSTEMS:  Unable do full ROS due to mental status      Vital Signs Last 24 Hrs  T(C): 36.4 (18 Oct 2022 05:42), Max: 37.1 (17 Oct 2022 21:10)  T(F): 97.6 (18 Oct 2022 05:42), Max: 98.8 (17 Oct 2022 21:10)  HR: 108 (18 Oct 2022 05:42) (106 - 108)  BP: 124/64 (18 Oct 2022 05:42) (124/64 - 137/72)  BP(mean): --  RR: 18 (18 Oct 2022 05:42) (18 - 18)  SpO2: 98% (18 Oct 2022 05:42) (98% - 98%)    Parameters below as of 18 Oct 2022 05:42  Patient On (Oxygen Delivery Method): room air            ________________________________________________  PHYSICAL EXAM:  GENERAL: confused,  No acute distress  HEENT: Normocephalic;  conjunctivae and sclerae clear; moist mucous membranes;   NECK : supple  CHEST/LUNG: Clear to auscultation bilaterally with good air entry   HEART: S1 S2  regular; no murmurs, gallops or rubs  ABDOMEN: diarrhea Soft, Nontender, + distention,  Bowel sounds present  EXTREMITIES: no cyanosis; no edema; no calf tenderness  SKIN: jaundice, redness to buttocks, warm and dry; no rash  NERVOUS SYSTEM:  Awake and alert; Oriented  to person, confused  _________________________________________________                              8.3    46.41 )-----------( 363      ( 18 Oct 2022 07:13 )             26.2       10-18    136  |  108  |  4<L>  ----------------------------<  80  3.3<L>   |  18<L>  |  0.46<L>    Ca    8.0<L>      18 Oct 2022 07:13    TPro  5.5<L>  /  Alb  1.5<L>  /  TBili  16.3<H>  /  DBili  x   /  AST  73<H>  /  ALT  17  /  AlkPhos  252<H>  10-18      PT/INR - ( 17 Oct 2022 12:34 )   PT: 26.2 sec;   INR: 2.18 ratio    PTT - ( 17 Oct 2022 12:34 )  PTT:40.3 sec                      Color: Yellow / Appearance: Clear / S.005 / pH: x  Gluc: x / Ketone: Trace  / Bili: Large / Urobili: 8   Blood: x / Protein: 15 / Nitrite: Negative   Leuk Esterase: Trace / RBC: 0-2 /HPF / WBC 0-2 /HPF   Sq Epi: x / Non Sq Epi: Occasional /HPF / Bacteria: Trace /HPF        CAPILLARY BLOOD GLUCOSE  POCT Blood Glucose.: 112 mg/dL (18 Oct 2022 11:21)  POCT Blood Glucose.: 94 mg/dL (18 Oct 2022 07:47)  POCT Blood Glucose.: 112 mg/dL (17 Oct 2022 21:39)  POCT Blood Glucose.: 122 mg/dL (17 Oct 2022 17:00)      RADIOLOGY & ADDITIONAL TESTS:  < from: Xray Chest 1 View- PORTABLE-Urgent (Xray Chest 1 View- PORTABLE-Urgent .) (10.12.22 @ 13:47) >  FINDINGS:  CATHETERS AND TUBES: None    PULMONARY: Mild bilateral perihilar diffuse airspace disease..   No pneumothorax.    HEART/VASCULAR: The heart and mediastinum size and configuration are   within normal limits.    BONES: Visualized osseous structures are intact.    IMPRESSION:   Mild bilateral perihilar diffuse airspace disease..    --- End of Report ---    < end of copied text >  < from: US Abdomen Liver Followup (10.10.22 @ 17:08) >    FINDINGS:    Liver: Increased echogenicity. No focal hepatic mass lesion is   demonstrated. The liver appears to be enlarged, however, no measurements   were obtained. Normal portal venous flow.  Bile ducts: Normal caliber. Common bile duct measures 5mm.  Gallbladder: Nonspecific mildly thickened wall measuring 4 mm.  Pancreas: Not visualized due to overlying bowel.  Right kidney: 12 cm. No hydronephrosis.  Ascites: None.  IVC: Visualized portions are within normal limits.    IMPRESSION:    Enlarged fatty liver.        --- End of Report ---    < end of copied text >  < from: Xray Chest 1 View-PORTABLE IMMEDIATE (Xray Chest 1 View-PORTABLE IMMEDIATE .) (10.07.22 @ 13:06) >  FINDINGS: There is a nonspecific nonobstructive bowel gas pattern. There   is no gross free air, organomegaly or abdominal calcifications. The bony   structures are intact    AP chest 1 view    COMPARISON: 10/2/2022    FINDINGS: The heart size, mediastinum and hilum are within normal limits   for. Decreased inspiratory effort with grossly clear lungs. Trachea   midline. No fractures.    IMPRESSION:    Nonspecific nonobstructive bowel gas pattern.    Grossly clear lungs.    --- End of Report ---    < end of copied text >  < from: Xray Abdomen 1 View Portable, IMMEDIATE (Xray Abdomen 1 View Portable, IMMEDIATE .) (10.07.22 @ 13:06) >  FINDINGS: There is a nonspecific nonobstructive bowel gas pattern. There   is no gross free air, organomegaly or abdominal calcifications. The bony   structures are intact    AP chest 1 view    COMPARISON: 10/2/2022    FINDINGS: The heart size, mediastinum and hilum are within normal limits   for. Decreased inspiratory effort with grossly clear lungs. Trachea   midline. No fractures.    IMPRESSION:    Nonspecific nonobstructive bowel gas pattern.    Grossly clear lungs.    --- End of Report ---    < end of copied text >  < from: Xray Chest 1 View- PORTABLE-Urgent (Xray Chest 1 View- PORTABLE-Urgent .) (10.02.22 @ 23:18) >  FINDINGS:  Heart/Vascular: The heart size, mediastinum, hilum and aorta are within   normal limits for projection.  Pulmonary: Midline trachea. There is no focal infiltrate, congestion or   effusion.    Bones: There is no fracture.  Lines and catheter: Overlying EKG leads and wires.    Impression:    No acute pulmonary disease.    --- End of Report ---    < end of copied text >  < from: CT Abdomen and Pelvis w/ IV Cont (10.02.22 @ 16:56) >  FINDINGS:  LOWER CHEST:Within normal limits.    LIVER: Severe hepatomegaly and diffuse steatosis.  BILE DUCTS: Normal caliber.  GALLBLADDER: Partially contracted with high attenuation related to   enhancement and/or sludge. Nonspecific wall thickening, likely   secondary/reactive.  SPLEEN: Within normal limits.  PANCREAS: Within normal limits.  ADRENALS: Within normal limits.  KIDNEYS/URETERS: Too small to characterize low attenuating focus in the   left kidney.    BLADDER: Within normal limits.  REPRODUCTIVE ORGANS: Within normal limits.    BOWEL: No bowel obstruction. Appendix is normal.  PERITONEUM: Mild ascites and infiltration of the central mesenteric fat.  VESSELS: Within normal limits.  RETROPERITONEUM/LYMPH NODES: No lymphadenopathy.  ABDOMINAL WALL: Tiny fat-containing inguinal hernias.  BONES: Bilateral L5-S1 pars defects. Old fractures of the 6th and 8th-9th   left ribs.    IMPRESSION:  Severe hepatomegaly and diffuse steatosis.  Mild ascites and infiltration of the central mesenteric fat.  Possiblegallbladder sludge and likely secondary/reactive wall thickening.      --- End of Report ---    < end of copied text >  < from: CT Head No Cont (10.02.22 @ 16:39) >  FINDINGS:  VENTRICLES AND SULCI:  Ex vacuo dilatation of the left lateral ventricle   to a mild degree.  INTRA-AXIAL:  Left temporal parietal encephalomalacia subjacent to the   craniotomy.  EXTRA-AXIAL:  No mass or collection is seen.  VISUALIZED SINUSES: Left maxillary mucosal thickening. Right maxillary   mucosal thickening  VISUALIZED MASTOIDS:  Clear.  CALVARIUM: Left temporal parietal craniotomy. Right parietalsoft tissue   swelling.  MISCELLANEOUS:  None.    IMPRESSION:  Left temporal parietal craniotomy. Encephalomalacia   subjacent to the craniotomy. Ex vacuo dilatation of the left lateral   ventricle. No intracranial hemorrhage. No mass lesion    --- End of Report ---    < end of copied text >    Imaging  Reviewed:  YES    Consultant(s) Notes Reviewed:   YES      Plan of care was discussed with patient and /or primary care giver; all questions and concerns were addressed

## 2022-10-18 NOTE — PROGRESS NOTE ADULT - PROBLEM SELECTOR PLAN 1
-  Metabolic encephalopathy secondary to alcohol hepatitis   -  discontinue lactulose - Patient having diarrhea  -  Continue with rifaximin   -  Transaminitis- unchanged  -  Severe hepatomegaly w/mild ascites on CT abdomen w/gall bladder sludge  -  Lipase level normal  -  Hepatitis panel negative   -  Hepatic USG for characterization of gall bladder attempted however patient was very agitated  -  EGD on 10/5 with GI Dr. Johnson showed Large rectal Dieulafoy lesion. Hemostasis achieved with over-the-scope clip  -  Foreign body noted in abdominal xray -  clip placed by GI   -  RUQ US without ascites, large fatty liver noted on 10/10 -  Metabolic encephalopathy secondary to alcohol hepatitis   -  discontinue lactulose - Patient numerous loose BM having diarrhea  -  Continue with rifaximin   -  Transaminitis- unchanged  -  Severe hepatomegaly w/mild ascites on CT abdomen w/gall bladder sludge  -  Lipase level normal  -  Hepatitis panel negative   -  Hepatic USG for characterization of gall bladder attempted however patient was very agitated  -  EGD on 10/5 with GI Dr. Johnson showed Large rectal Dieulafoy lesion. Hemostasis achieved with over-the-scope clip  -  Foreign body noted in abdominal xray -  clip placed by GI   -  RUQ US without ascites, large fatty liver noted on 10/10

## 2022-10-18 NOTE — PROGRESS NOTE ADULT - PROBLEM SELECTOR PLAN 7
-  likely due to steroid  -  WBC trending up 46.41 today  -  Meropenem started 10/16  -   Discontinued today  -  Tigecycline 50mg IV ordered Q12 hours as per Dr Puentes  -  Blood cultures from 10/16  -  NGTD  -  UA pending  -  Avoid Tylenol w/elevated LFTs  -  ID:  Dr Puentes -  likely due to steroid  -  WBC trending up 46.41 today  -  Meropenem started 10/16  -   Discontinued today  -  Tigecycline 50mg IV ordered Q12 hours as per Dr Puentes  -  Blood cultures from 10/16  -  NGTD  -  UA pending  -  Unable to send C-diff due to use of Lactulose and Miralax within the past few days  -  Avoid Tylenol w/elevated LFTs  -  ID:  Dr Puentes

## 2022-10-18 NOTE — CHART NOTE - NSCHARTNOTEFT_GEN_A_CORE
Assessment:   27yMalePatient is a 27y old  Male who presents with a chief complaint of Altered mental status/Alcoholic hepatitis/leukocytosis (18 Oct 2022 11:53) Pt visited. OOb to chair. Pt is A & O x2. [place] Speaking. Pt is on 1 : 1 Observation. Pt  DG From ICU to 4 N On 10/10. D/W Nsg. Po intake < 50 % of meal. Per Nsg Pt dislikes Minced and Moist consistency. Will suggest SLP eval for Possible Diet Upgrade.  Pt with Acute Liver failure, SBP. Labs noted.  BUN / CREAT 4/0.46.       Factors impacting intake: [ ] none [ ] nausea  [ ] vomiting [ ] diarrhea [ ] constipation  [ ]chewing problems [ ] swallowing issues  [ ] other:     Diet Prescription:  Minced and Moist , Ensure clear TID   Intake: < 50 %     Current Weight:   % Weight Change    Pertinent Medications: MEDICATIONS  (STANDING):  dextrose 5% + sodium chloride 0.9%. 1000 milliLiter(s) (75 mL/Hr) IV Continuous <Continuous>  dextrose 5%. 1000 milliLiter(s) (50 mL/Hr) IV Continuous <Continuous>  dextrose 5%. 1000 milliLiter(s) (100 mL/Hr) IV Continuous <Continuous>  dextrose 50% Injectable 12.5 Gram(s) IV Push once  dextrose 50% Injectable 25 Gram(s) IV Push once  dextrose 50% Injectable 25 Gram(s) IV Push once  folic acid 1 milliGRAM(s) Oral daily  glucagon  Injectable 1 milliGRAM(s) IntraMuscular once  heparin   Injectable 5000 Unit(s) SubCutaneous every 8 hours  insulin lispro (ADMELOG) corrective regimen sliding scale   SubCutaneous Before meals and at bedtime  meropenem  IVPB 1000 milliGRAM(s) IV Intermittent every 8 hours  meropenem  IVPB      multivitamin 1 Tablet(s) Oral daily  nystatin Powder 1 Application(s) Topical two times a day  pantoprazole    Tablet 40 milliGRAM(s) Oral before breakfast  potassium chloride    Tablet ER 40 milliEquivalent(s) Oral every 4 hours  potassium chloride   Powder 40 milliEquivalent(s) Oral every 4 hours  rifAXIMin 550 milliGRAM(s) Oral two times a day  thiamine 100 milliGRAM(s) Oral daily    MEDICATIONS  (PRN):  dextrose Oral Gel 15 Gram(s) Oral once PRN Blood Glucose LESS THAN 70 milliGRAM(s)/deciliter  traMADol 25 milliGRAM(s) Oral every 8 hours PRN Moderate Pain (4 - 6)    Pertinent Labs: 10-18 Na136 mmol/L Glu 80 mg/dL K+ 3.3 mmol/L<L> Cr  0.46 mg/dL<L> BUN 4 mg/dL<L> 10-15 Phos 2.7 mg/dL 10-18 Alb 1.5 g/dL<L> 10-02 Chol 363 mg/dL<H> LDL --    HDL 14 mg/dL<L> Trig 319 mg/dL<H>     CAPILLARY BLOOD GLUCOSE      POCT Blood Glucose.: 112 mg/dL (18 Oct 2022 11:21)  POCT Blood Glucose.: 94 mg/dL (18 Oct 2022 07:47)  POCT Blood Glucose.: 112 mg/dL (17 Oct 2022 21:39)  POCT Blood Glucose.: 122 mg/dL (17 Oct 2022 17:00)    Skin: Intact     Estimated Needs:   [ ] no change since previous assessment  [ ] recalculated:     Previous Nutrition Diagnosis:   [ ] Inadequate Energy Intake [ x]Inadequate Oral Intake [ ] Excessive Energy Intake   [ ] Underweight [ ] Increased Nutrient Needs [ ] Overweight/Obesity   [ ] Altered GI Function [ ] Unintended Weight Loss [ ] Food & Nutrition Related Knowledge Deficit [ ] Malnutrition     Nutrition Diagnosis is [x ] ongoing  [ ] resolved [ ] not applicable     New Nutrition Diagnosis: [ ] not applicable       Interventions:   Recommend  [ ] Change Diet To:  [x ] Nutrition Supplement  Continue with Ensure clear TID   [ ] Nutrition Support  [x ] Other:  SLP eval for Possible Diet Upgrade ?    Monitoring and Evaluation:   [ ] PO intake [ x ] Tolerance to diet prescription [ x ] weights [ x ] labs[ x ] follow up per protocol  [ ] other:
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
## 2022-10-18 NOTE — PROGRESS NOTE ADULT - PROBLEM SELECTOR PLAN 8
-  Plan to discharge when medically stable  -  Patient remains active with confusion and worsening Leukocytosis  -  Back on IV antibiotics and sepsis work up in progress

## 2022-10-18 NOTE — PROGRESS NOTE ADULT - PROBLEM SELECTOR PLAN 2
-  Secondary Alcohol Hepatitis   -  Utox negative for salicylate and acetaminophen levels  -  Hepatitis panel negative  -  bili - elevated  -  Course of  prednisone completed  40mg QD x 28 days ( 10/8/2022~)   -  Elevated INR and hypoalbuminemia in the setting of liver dysfunction  -  s/p vitamin K for 3 days  -  INR 2.18 today  -  Hepatology Dr. Gonzalez -  Secondary Alcohol Hepatitis   -  U-tox negative for salicylate and acetaminophen levels  -  Hepatitis panel negative  -  bili - elevated  -  Course of  prednisone completed  40mg QD x 28 days ( 10/8/2022~)   -  Elevated INR and hypoalbuminemia in the setting of liver dysfunction  -  s/p vitamin K for 3 days  -  INR 2.18 today  -  Hepatology Dr. Gonzalez

## 2022-10-18 NOTE — PROGRESS NOTE ADULT - ASSESSMENT
27yo Chinese speaking Male s/p Left temporal parietal craniotomy and alcohol abuse (reported current drinking by family, Peth 399) presented to [facility] ED on 10/2/22 with 1 day Hx of AMS, after reported witnessed seizure episodes and 1 week Hx of jaundice and was admitted to MICU with suspected EtOH withdrawal seizure, electrolyte abnormalities (hyponatremia with Na 125->129, hypokalemia with K 2.7->3.1, hypomagnesemia with Mg 1.0->1.9, hypophosphatemia with P 1.1->1.5), lactic acidosis (with lactate 6.4->3.0, bicarb 19->20), leukocytosis with neutrophilia (WBC 20.35->16.02, roderick 90%) with low grade T (100.1F), anemia without evidence of overt bleeding (Hb 6.9->8.3 s/p 1 U PRBC) and abnormal liver tests including coagulopathy (INR 2.13), hyperbilirubinemia (Se bi 9.3->10.8, direct 8.0), hyperammonemia (131), elevated liver enzymes in AST> ALT pattern (->212, ALT 22) and elevated ->600 and CT a/p w/ iv suggesting diffuse hepatic steatosis, hepatomegaly with possible GB sludge and GB wall thickening, but normal bile ducts.  He was started on Alcoholic hepatitis management after infection was ruled out on admission (except that rhinovirus was pos, w/o URI symptoms) and UGIB was ruled out. Completed SBP ppx b/i GIB. His bilirubin remained unchanged after 7 days on steroid, and developed low grade T, worsening leukocytosis up till 30-36k (prior to steroid 17k), worsening abdominal pain and distention and diarrhea despite being off lactulose, thus steroid was held 10/15 and repeat septic workup was sent. GI PCR panel showed STEC, culture was negative. He was also started on Zosyn on 10/14, and switched to Meropenem on 10/16 and now on Tigacycline per ID since today.    Hematology been following since 10/16 b/o leukocytosis.       AMS in a TBI patient, with EtOH use, been on CIWA on admission, also s/p seizures, and with elevated ammonia thus could not r/u HE either  Alcoholic hepatitis, s/p 7 days prednisolone 10/8-10/15, and s/p total 3 days NAC, bilirubin 14.1-13.2-14.1  Elevated transaminases in AST>ALT pattern - overall improved (-->73)  Elevated ALP - improving (669->252)  Coagulopathy - improved slightly, but got vit K and FFP (10/5-7 and 10/5), now uptrending  MDF> 32, MELD 28-23-26  Low grade T (T max 100.4 F 10/12 night and 100F 10/14), diarrhea off lactulose  Worsening leukocytosis from 17k to 46k  - Initial septic workup was negative and labs suggested alcoholic hepatitis, was started on prednisolone 40mg, and got total 3 days of NAC. Lille score was not calculated because steroid was d/c due to low grade T, diarrhea (off lactulose) and worsening abdominal distention. Was on Meropenem,  now on Tigacylcine; c/w antibiotics per ID, f/u repeat infectious workup. Agree with C. diff. testing. Now monitored off steroid since 8/15 until ID workup completed. BCx neg 10/16. Stool GI PCR STEC pos. ID f/u appreciated. Hem f/u appreciated. Can consider Strongyloides serology, although O/Px1 neg.  - Today first time mention throat ache and noted cough. Workup per ID.   - PLT WNL, only borderline splenomegaly, no and now trace ascites, rather suggests alcoholic hepatitis, and no significant portal hypertension.   - No further overt bleeding since admission, s/p sigmoidoscopy showing 2 rectal Dieulafoy lesions, s/p treatment  - HCC status - no focal hepatic lesion  - EV status: none  - AMS - monitor mental status, overall improved, possibly multifactorial. C/w constant observation. Aspiration, seizure, fall precautions. Folic, thiamine. Lactulose was held b/o diarrhea and was resumed with parameters per primary team on 10/16. Since 10/17 again > 6 BM - patient still received 2 doses of lactulose yesterday despite of parameters, thus agree for d/c. No need to trend ammonia. Lactulose can worsen his abdominal bloating, thus if in future need BM regimen (not now) can consider Miralax as alternative. Can consider neurology.  - Transplant candidacy: only emergency medicaid per d/w SW, recent EtOH     Thank you for consult  Will continue to monitor  D/w primary team       27yo Azeri speaking Male s/p Left temporal parietal craniotomy and alcohol abuse (reported current drinking by family, Peth 399) presented to [facility] ED on 10/2/22 with 1 day Hx of AMS, after reported witnessed seizure episodes and 1 week Hx of jaundice and was admitted to MICU with suspected EtOH withdrawal seizure, electrolyte abnormalities (hyponatremia with Na 125->129, hypokalemia with K 2.7->3.1, hypomagnesemia with Mg 1.0->1.9, hypophosphatemia with P 1.1->1.5), lactic acidosis (with lactate 6.4->3.0, bicarb 19->20), leukocytosis with neutrophilia (WBC 20.35->16.02, roderick 90%) with low grade T (100.1F), anemia without evidence of overt bleeding (Hb 6.9->8.3 s/p 1 U PRBC) and abnormal liver tests including coagulopathy (INR 2.13), hyperbilirubinemia (Se bi 9.3->10.8, direct 8.0), hyperammonemia (131), elevated liver enzymes in AST> ALT pattern (->212, ALT 22) and elevated ->600 and CT a/p w/ iv suggesting diffuse hepatic steatosis, hepatomegaly with possible GB sludge and GB wall thickening, but normal bile ducts.  He was started on Alcoholic hepatitis management after infection was ruled out on admission (except that rhinovirus was pos, w/o URI symptoms) and UGIB was ruled out. Completed SBP ppx b/o GIB. His bilirubin remained unchanged after 7 days on steroid, and developed low grade T, worsening leukocytosis up till 30-36k (prior to steroid 17k), worsening abdominal pain and distention and diarrhea despite being off lactulose, thus steroid was held 10/15 and repeat septic workup was sent. GI PCR panel showed STEC, culture was negative. He was also started on Zosyn on 10/14, and switched to Meropenem on 10/16 and now on Tigacycline per ID since today.  Had repeat CT abd / pelvis 10/16, had only mild ascites (pelvic, perihepatosplenic), prior it was none.  Hematology been following since 10/16 b/o leukocytosis.       AMS in a TBI patient, with EtOH use, been on CIWA on admission, also s/p seizures, and with elevated ammonia thus could not r/u HE either  Alcoholic hepatitis, s/p 7 days prednisolone 10/8-10/15, and s/p total 3 days NAC, bilirubin 14.1-13.2-14.1  Elevated transaminases in AST>ALT pattern - overall improved (-->73)  Elevated ALP - improving (669->252)  Coagulopathy - improved slightly, but got vit K and FFP (10/5-7 and 10/5), now uptrending  MDF> 32, MELD 28-23-26  Low grade T (T max 100.4 F 10/12 night and 100F 10/14), diarrhea off lactulose  Worsening leukocytosis from 17k to 46k  - Initial septic workup was negative and labs suggested alcoholic hepatitis, was started on prednisolone 40mg, and got total 3 days of NAC. Lille score was not calculated because steroid was d/c due to low grade T, diarrhea (off lactulose) and worsening abdominal distention. Was on Meropenem,  now on Tigacylcine; c/w antibiotics per ID, f/u repeat infectious workup. Agree with C. diff. testing. Now monitored off steroid since 8/15 until ID workup completed. BCx neg 10/16. Stool GI PCR STEC pos. ID f/u appreciated. Hem f/u appreciated. Can consider Strongyloides serology, although O/Px1 neg.  - Today first time mention throat ache and noted cough. Workup per ID.   - PLT WNL, only borderline splenomegaly, on admission no and now trace ascites, rather suggests alcoholic hepatitis, and no significant portal hypertension.   - CT was reviewed with IR, hepatic vasculature appeared patent.  - Viral, AI workup, ceruloplasmin unremarkable - see prior notes.  - Was no biliary obstruction on CT abd, US abd. MRCP was considered on admission, has not had it yet.   - HCC status - no focal hepatic lesion  - EV status: none. No further overt bleeding since admission, s/p sigmoidoscopy showing 2 rectal Dieulafoy lesions, s/p treatment  - AMS - monitor mental status, overall improved, possibly multifactorial. C/w constant observation. Aspiration, seizure, fall precautions. Folic, thiamine. Lactulose was held b/o diarrhea and was resumed with parameters per primary team on 10/16. Since 10/17 again > 6 BM - patient still received 2 doses of lactulose yesterday despite of parameters, thus agree for d/c. No need to trend ammonia. Lactulose can worsen his abdominal bloating, thus if in future need BM regimen (not now) can consider Miralax as alternative. Can consider neurology.  - Transplant candidacy: only emergency medicaid per d/w SW, recent EtOH     Thank you for consult  Will continue to monitor  D/w primary team

## 2022-10-18 NOTE — PROGRESS NOTE ADULT - PROBLEM SELECTOR PLAN 4
-  No need for CIWA at this time, patient has  already detox due to length of stay  -  No prior hx of seizures  -  Continue with  thiamine, folic acid and MVI   -  Continue Lorazepam PRN for agitation  -  Maintain fall precaution   -  Maintain aspiration precaution  -  Maintain seizure precaution

## 2022-10-18 NOTE — PROGRESS NOTE ADULT - PROBLEM SELECTOR PLAN 5
-  Most likely due to poor P.O intake and acute liver failure  -  Replaced with supplement for  hypokalemia  -  Potassium 3.3 today  -  supplementation ordered  -  Repeat chemistry in AM

## 2022-10-18 NOTE — PROGRESS NOTE ADULT - SUBJECTIVE AND OBJECTIVE BOX
Chief Complaint:  Patient is a 27y old  Male who presents with a chief complaint of Altered mental status (18 Oct 2022 13:52)      Reason for consult:    Interval Events: Patient remains awake, alert, but not oriented to time. He remains now afebrile and hemodynamically stable. Abdomen even more distended, and still c/o pain and reportedly had >6 BM/day, although his lactulose was resumed on 10/6 with parameters per primary team.  WBC further increased to 46k WBC, ID following, and recommended to switch Meropenem to Tigacyline. Been off steroid since 10/15, follow up with ID for further infectious workup as needed and can consider trial once no active infection and rule out with certainty. No overt GI bleeding per PCA.  Per dietitian patient refused to eat pureed food.     Hospital Medications:  dextrose 5% + sodium chloride 0.9%. 1000 milliLiter(s) IV Continuous <Continuous>  dextrose 5%. 1000 milliLiter(s) IV Continuous <Continuous>  dextrose 5%. 1000 milliLiter(s) IV Continuous <Continuous>  dextrose 50% Injectable 12.5 Gram(s) IV Push once  dextrose 50% Injectable 25 Gram(s) IV Push once  dextrose 50% Injectable 25 Gram(s) IV Push once  dextrose Oral Gel 15 Gram(s) Oral once PRN  folic acid 1 milliGRAM(s) Oral <User Schedule>  glucagon  Injectable 1 milliGRAM(s) IntraMuscular once  heparin   Injectable 5000 Unit(s) SubCutaneous every 8 hours  insulin lispro (ADMELOG) corrective regimen sliding scale   SubCutaneous Before meals and at bedtime  multivitamin 1 Tablet(s) Oral daily  nystatin Powder 1 Application(s) Topical two times a day  pantoprazole    Tablet 40 milliGRAM(s) Oral before breakfast  potassium chloride   Powder 40 milliEquivalent(s) Oral every 4 hours  rifAXIMin 550 milliGRAM(s) Oral two times a day  thiamine Injectable 100 milliGRAM(s) IntraMuscular daily  tigecycline IVPB 50 milliGRAM(s) IV Intermittent every 12 hours  traMADol 25 milliGRAM(s) Oral every 8 hours PRN      ROS:   General:  No  fevers, chills but c/o malaise  Eyes:  Good vision, no reported pain. Icteric.   ENT:  No sore throat, pain, runny nose  CV:  No pain, palpitations  Pulm:  No dyspnea, cough  GI:  Abdominal distention and pain. No N/V. > 6 liquid BMs today. No reported bleeding or melena per d/w primary team.  :  No dysuria  Muscle:  Generalized weakness  Neuro:  Awake, alert, following commands, oriented to self, place, not time  Skin:  Jaundice    PHYSICAL EXAM:   Vital Signs:  Vital Signs Last 24 Hrs  T(C): 36.9 (18 Oct 2022 13:34), Max: 37.1 (17 Oct 2022 21:10)  T(F): 98.5 (18 Oct 2022 13:34), Max: 98.8 (17 Oct 2022 21:10)  HR: 110 (18 Oct 2022 13:34) (106 - 110)  BP: 125/75 (18 Oct 2022 13:34) (124/64 - 137/72)  BP(mean): --  RR: 18 (18 Oct 2022 13:34) (18 - 18)  SpO2: 99% (18 Oct 2022 13:34) (98% - 99%)    Parameters below as of 18 Oct 2022 13:34  Patient On (Oxygen Delivery Method): room air      Daily     Daily     GENERAL: no acute distress but ill appearing  NEURO: AAOx3, no asterixis  HEENT: icteric sclera  CHEST: no respiratory distress, no accessory muscle use  CARDIAC: regular rate, rhythm  ABDOMEN: soft, but distended, mild diffuse tenderness, no rebound or guarding, BS+  EXTREMITIES: warm, well perfused, no edema  SKIN: jaundice    LABS: reviewed                        8.3    46.41 )-----------( 363      ( 18 Oct 2022 07:13 )             26.2     10-18    136  |  108  |  4<L>  ----------------------------<  80  3.3<L>   |  18<L>  |  0.46<L>    Ca    8.0<L>      18 Oct 2022 07:13    TPro  5.5<L>  /  Alb  1.5<L>  /  TBili  16.3<H>  /  DBili  x   /  AST  73<H>  /  ALT  17  /  AlkPhos  252<H>  10-18    LIVER FUNCTIONS - ( 18 Oct 2022 07:13 )  Alb: 1.5 g/dL / Pro: 5.5 g/dL / ALK PHOS: 252 U/L / ALT: 17 U/L DA / AST: 73 U/L / GGT: x             Interval Diagnostic Studies: see sunrise for full report   Chief Complaint:  Patient is a 27y old  Male who presents with a chief complaint of Altered mental status (18 Oct 2022 13:52)      Reason for consult: Acute on chronic liver failure     ID [id number]    Interval Events: Patient remains awake, alert, oriented to self, place but not oriented to time, however day by day able to have more conversation. He remains now afebrile and hemodynamically stable. Abdomen on other hand even more distended, and still c/o pain and reportedly had >6 BM/day, although his lactulose was resumed on 10/6 with parameters per primary team and was given despite having > 4 BMs.  WBC further increased to 46k WBC, ID following, and recommended to switch Meropenem to Tigacyline. Been off steroid since 10/15, follow up with ID for further infectious workup as needed and can consider trial once no active infection and rule out with certainty. No overt GI bleeding per PCA.  Per dietitian patient refused to eat pureed food.     Hospital Medications:  dextrose 5% + sodium chloride 0.9%. 1000 milliLiter(s) IV Continuous <Continuous>  dextrose 5%. 1000 milliLiter(s) IV Continuous <Continuous>  dextrose 5%. 1000 milliLiter(s) IV Continuous <Continuous>  dextrose 50% Injectable 12.5 Gram(s) IV Push once  dextrose 50% Injectable 25 Gram(s) IV Push once  dextrose 50% Injectable 25 Gram(s) IV Push once  dextrose Oral Gel 15 Gram(s) Oral once PRN  folic acid 1 milliGRAM(s) Oral <User Schedule>  glucagon  Injectable 1 milliGRAM(s) IntraMuscular once  heparin   Injectable 5000 Unit(s) SubCutaneous every 8 hours  insulin lispro (ADMELOG) corrective regimen sliding scale   SubCutaneous Before meals and at bedtime  multivitamin 1 Tablet(s) Oral daily  nystatin Powder 1 Application(s) Topical two times a day  pantoprazole    Tablet 40 milliGRAM(s) Oral before breakfast  potassium chloride   Powder 40 milliEquivalent(s) Oral every 4 hours  rifAXIMin 550 milliGRAM(s) Oral two times a day  thiamine Injectable 100 milliGRAM(s) IntraMuscular daily  tigecycline IVPB 50 milliGRAM(s) IV Intermittent every 12 hours  traMADol 25 milliGRAM(s) Oral every 8 hours PRN      ROS:   General:  No  fevers, chills but c/o malaise  Eyes:  Good vision, no reported pain. Icteric.   ENT:  reports throatache  CV:  No pain, palpitations  Pulm:  No dyspnea, noted cough  GI:  Abdominal distention and pain. No N/V. > 6 liquid BMs today. No reported bleeding or melena per d/w primary team.  :  No dysuria  Muscle:  Generalized weakness  Neuro:  Awake, alert, following commands, oriented to self, place, not time  Skin:  Jaundice    PHYSICAL EXAM:   Vital Signs:  Vital Signs Last 24 Hrs  T(C): 36.9 (18 Oct 2022 13:34), Max: 37.1 (17 Oct 2022 21:10)  T(F): 98.5 (18 Oct 2022 13:34), Max: 98.8 (17 Oct 2022 21:10)  HR: 110 (18 Oct 2022 13:34) (106 - 110)  BP: 125/75 (18 Oct 2022 13:34) (124/64 - 137/72)  BP(mean): --  RR: 18 (18 Oct 2022 13:34) (18 - 18)  SpO2: 99% (18 Oct 2022 13:34) (98% - 99%)    Parameters below as of 18 Oct 2022 13:34  Patient On (Oxygen Delivery Method): room air      Daily     Daily     GENERAL: no acute distress but ill appearing  NEURO: AAOx3, no asterixis  HEENT: icteric sclera  CHEST: no respiratory distress, no accessory muscle use  CARDIAC: regular rate, rhythm  ABDOMEN: soft, but distended, mild diffuse tenderness, no rebound or guarding, BS+  EXTREMITIES: warm, well perfused, no edema  SKIN: jaundice    LABS: reviewed                        8.3    46.41 )-----------( 363      ( 18 Oct 2022 07:13 )             26.2     10-18    136  |  108  |  4<L>  ----------------------------<  80  3.3<L>   |  18<L>  |  0.46<L>    Ca    8.0<L>      18 Oct 2022 07:13    TPro  5.5<L>  /  Alb  1.5<L>  /  TBili  16.3<H>  /  DBili  x   /  AST  73<H>  /  ALT  17  /  AlkPhos  252<H>  10-18    LIVER FUNCTIONS - ( 18 Oct 2022 07:13 )  Alb: 1.5 g/dL / Pro: 5.5 g/dL / ALK PHOS: 252 U/L / ALT: 17 U/L DA / AST: 73 U/L / GGT: x             Interval Diagnostic Studies: see sunrise for full report   Chief Complaint:  Patient is a 27y old  Male who presents with a chief complaint of Altered mental status (18 Oct 2022 13:52)      Reason for consult: Acute on chronic liver failure     ID [id number]    Interval Events: Patient remains awake, alert, oriented to self, place but not oriented to time, however day by day able to have more conversation. He remains now afebrile and hemodynamically stable. Abdomen on other hand even more distended, and still c/o pain and reportedly had >6 BM/day, although his lactulose was resumed on 10/6 with parameters per primary team and was given despite having > 4 BMs.  WBC further increased to 46k WBC, ID following, and recommended to switch Meropenem to Tigacyline. Been off steroid since 10/15, follow up with ID for further infectious workup as needed and can consider trial once / if no active infection and ruled out with certainty. No overt GI bleeding per PCA.  Per dietitian patient refused to eat pureed food.     Hospital Medications:  dextrose 5% + sodium chloride 0.9%. 1000 milliLiter(s) IV Continuous <Continuous>  dextrose 5%. 1000 milliLiter(s) IV Continuous <Continuous>  dextrose 5%. 1000 milliLiter(s) IV Continuous <Continuous>  dextrose 50% Injectable 12.5 Gram(s) IV Push once  dextrose 50% Injectable 25 Gram(s) IV Push once  dextrose 50% Injectable 25 Gram(s) IV Push once  dextrose Oral Gel 15 Gram(s) Oral once PRN  folic acid 1 milliGRAM(s) Oral <User Schedule>  glucagon  Injectable 1 milliGRAM(s) IntraMuscular once  heparin   Injectable 5000 Unit(s) SubCutaneous every 8 hours  insulin lispro (ADMELOG) corrective regimen sliding scale   SubCutaneous Before meals and at bedtime  multivitamin 1 Tablet(s) Oral daily  nystatin Powder 1 Application(s) Topical two times a day  pantoprazole    Tablet 40 milliGRAM(s) Oral before breakfast  potassium chloride   Powder 40 milliEquivalent(s) Oral every 4 hours  rifAXIMin 550 milliGRAM(s) Oral two times a day  thiamine Injectable 100 milliGRAM(s) IntraMuscular daily  tigecycline IVPB 50 milliGRAM(s) IV Intermittent every 12 hours  traMADol 25 milliGRAM(s) Oral every 8 hours PRN      ROS:   General:  No  fevers, chills but c/o malaise  Eyes:  Good vision, no reported pain. Icteric.   ENT:  reports throatache  CV:  No pain, palpitations  Pulm:  No dyspnea, noted cough  GI:  Abdominal distention and pain. No N/V. > 6 liquid BMs today. No reported bleeding or melena per d/w primary team.  :  No dysuria  Muscle:  Generalized weakness  Neuro:  Awake, alert, following commands, oriented to self, place, not time  Skin:  Jaundice    PHYSICAL EXAM:   Vital Signs:  Vital Signs Last 24 Hrs  T(C): 36.9 (18 Oct 2022 13:34), Max: 37.1 (17 Oct 2022 21:10)  T(F): 98.5 (18 Oct 2022 13:34), Max: 98.8 (17 Oct 2022 21:10)  HR: 110 (18 Oct 2022 13:34) (106 - 110)  BP: 125/75 (18 Oct 2022 13:34) (124/64 - 137/72)  BP(mean): --  RR: 18 (18 Oct 2022 13:34) (18 - 18)  SpO2: 99% (18 Oct 2022 13:34) (98% - 99%)    Parameters below as of 18 Oct 2022 13:34  Patient On (Oxygen Delivery Method): room air      Daily     Daily     GENERAL: no acute distress but ill appearing  NEURO: AAOx3, no asterixis  HEENT: icteric sclera  CHEST: no respiratory distress, no accessory muscle use  CARDIAC: regular rate, rhythm  ABDOMEN: soft, but distended, mild diffuse tenderness, no rebound or guarding, BS+  EXTREMITIES: warm, well perfused, no edema  SKIN: jaundice    LABS: reviewed                        8.3    46.41 )-----------( 363      ( 18 Oct 2022 07:13 )             26.2     10-18    136  |  108  |  4<L>  ----------------------------<  80  3.3<L>   |  18<L>  |  0.46<L>    Ca    8.0<L>      18 Oct 2022 07:13    TPro  5.5<L>  /  Alb  1.5<L>  /  TBili  16.3<H>  /  DBili  x   /  AST  73<H>  /  ALT  17  /  AlkPhos  252<H>  10-18    LIVER FUNCTIONS - ( 18 Oct 2022 07:13 )  Alb: 1.5 g/dL / Pro: 5.5 g/dL / ALK PHOS: 252 U/L / ALT: 17 U/L DA / AST: 73 U/L / GGT: x             Interval Diagnostic Studies: see sunrise for full report

## 2022-10-18 NOTE — PROGRESS NOTE ADULT - SUBJECTIVE AND OBJECTIVE BOX
Patient is a 27y old  Male who presents with a chief complaint of Altered mental status      SUBJECTIVE / OVERNIGHT EVENTS:        MEDICATIONS  (STANDING):  dextrose 5% + sodium chloride 0.9%. 1000 milliLiter(s) (75 mL/Hr) IV Continuous <Continuous>  dextrose 5%. 1000 milliLiter(s) (50 mL/Hr) IV Continuous <Continuous>  dextrose 5%. 1000 milliLiter(s) (100 mL/Hr) IV Continuous <Continuous>  dextrose 50% Injectable 12.5 Gram(s) IV Push once  dextrose 50% Injectable 25 Gram(s) IV Push once  dextrose 50% Injectable 25 Gram(s) IV Push once  folic acid 1 milliGRAM(s) Oral <User Schedule>  glucagon  Injectable 1 milliGRAM(s) IntraMuscular once  heparin   Injectable 5000 Unit(s) SubCutaneous every 8 hours  insulin lispro (ADMELOG) corrective regimen sliding scale   SubCutaneous Before meals and at bedtime  multivitamin 1 Tablet(s) Oral daily  nystatin Powder 1 Application(s) Topical two times a day  pantoprazole    Tablet 40 milliGRAM(s) Oral before breakfast  potassium chloride   Powder 40 milliEquivalent(s) Oral every 4 hours  rifAXIMin 550 milliGRAM(s) Oral two times a day  thiamine Injectable 100 milliGRAM(s) IntraMuscular daily  tigecycline IVPB 50 milliGRAM(s) IV Intermittent every 12 hours    MEDICATIONS  (PRN):  dextrose Oral Gel 15 Gram(s) Oral once PRN Blood Glucose LESS THAN 70 milliGRAM(s)/deciliter  traMADol 25 milliGRAM(s) Oral every 8 hours PRN Moderate Pain (4 - 6)    CAPILLARY BLOOD GLUCOSE      POCT Blood Glucose.: 112 mg/dL (18 Oct 2022 11:21)  POCT Blood Glucose.: 94 mg/dL (18 Oct 2022 07:47)  POCT Blood Glucose.: 112 mg/dL (17 Oct 2022 21:39)  POCT Blood Glucose.: 122 mg/dL (17 Oct 2022 17:00)    I&O's Summary      PHYSICAL EXAM:  Vital Signs Last 24 Hrs  T(C): 36.9 (18 Oct 2022 13:34), Max: 37.1 (17 Oct 2022 21:10)  T(F): 98.5 (18 Oct 2022 13:34), Max: 98.8 (17 Oct 2022 21:10)  HR: 110 (18 Oct 2022 13:34) (106 - 110)  BP: 125/75 (18 Oct 2022 13:34) (124/64 - 137/72)  BP(mean): --  RR: 18 (18 Oct 2022 13:34) (18 - 18)  SpO2: 99% (18 Oct 2022 13:34) (98% - 99%)    Parameters below as of 18 Oct 2022 13:34  Patient On (Oxygen Delivery Method): room air          LABS:                        8.3    46.41 )-----------( 363      ( 18 Oct 2022 07:13 )             26.2     10-18    136  |  108  |  4<L>  ----------------------------<  80  3.3<L>   |  18<L>  |  0.46<L>    Ca    8.0<L>      18 Oct 2022 07:13    TPro  5.5<L>  /  Alb  1.5<L>  /  TBili  16.3<H>  /  DBili  x   /  AST  73<H>  /  ALT  17  /  AlkPhos  252<H>  10-18    PT/INR - ( 17 Oct 2022 12:34 )   PT: 26.2 sec;   INR: 2.18 ratio         PTT - ( 17 Oct 2022 12:34 )  PTT:40.3 sec          Culture - Blood (collected 16 Oct 2022 15:05)  Source: .Blood Blood  Preliminary Report (17 Oct 2022 20:01):    No growth to date.    Culture - Blood (collected 16 Oct 2022 15:00)  Source: .Blood Blood  Preliminary Report (17 Oct 2022 20:01):    No growth to date.      COVID-19 PCR: NotDetec (12 Oct 2022 05:35)  COVID-19 PCR: NotDetec (05 Oct 2022 11:00)  SARS-CoV-2: NotDetec (02 Oct 2022 16:00)         Patient is a 27y old  Male who presents with a chief complaint of Altered mental status      SUBJECTIVE / OVERNIGHT EVENTS: events noted. Pt can follow commands, but still nonsensical with speech  #[id number]        MEDICATIONS  (STANDING):  dextrose 5% + sodium chloride 0.9%. 1000 milliLiter(s) (75 mL/Hr) IV Continuous <Continuous>  dextrose 5%. 1000 milliLiter(s) (50 mL/Hr) IV Continuous <Continuous>  dextrose 5%. 1000 milliLiter(s) (100 mL/Hr) IV Continuous <Continuous>  dextrose 50% Injectable 12.5 Gram(s) IV Push once  dextrose 50% Injectable 25 Gram(s) IV Push once  dextrose 50% Injectable 25 Gram(s) IV Push once  folic acid 1 milliGRAM(s) Oral <User Schedule>  glucagon  Injectable 1 milliGRAM(s) IntraMuscular once  heparin   Injectable 5000 Unit(s) SubCutaneous every 8 hours  insulin lispro (ADMELOG) corrective regimen sliding scale   SubCutaneous Before meals and at bedtime  multivitamin 1 Tablet(s) Oral daily  nystatin Powder 1 Application(s) Topical two times a day  pantoprazole    Tablet 40 milliGRAM(s) Oral before breakfast  potassium chloride   Powder 40 milliEquivalent(s) Oral every 4 hours  rifAXIMin 550 milliGRAM(s) Oral two times a day  thiamine Injectable 100 milliGRAM(s) IntraMuscular daily  tigecycline IVPB 50 milliGRAM(s) IV Intermittent every 12 hours    MEDICATIONS  (PRN):  dextrose Oral Gel 15 Gram(s) Oral once PRN Blood Glucose LESS THAN 70 milliGRAM(s)/deciliter  traMADol 25 milliGRAM(s) Oral every 8 hours PRN Moderate Pain (4 - 6)    CAPILLARY BLOOD GLUCOSE      POCT Blood Glucose.: 112 mg/dL (18 Oct 2022 11:21)  POCT Blood Glucose.: 94 mg/dL (18 Oct 2022 07:47)  POCT Blood Glucose.: 112 mg/dL (17 Oct 2022 21:39)  POCT Blood Glucose.: 122 mg/dL (17 Oct 2022 17:00)    I&O's Summary      PHYSICAL EXAM:  Vital Signs Last 24 Hrs  T(C): 36.9 (18 Oct 2022 13:34), Max: 37.1 (17 Oct 2022 21:10)  T(F): 98.5 (18 Oct 2022 13:34), Max: 98.8 (17 Oct 2022 21:10)  HR: 110 (18 Oct 2022 13:34) (106 - 110)  BP: 125/75 (18 Oct 2022 13:34) (124/64 - 137/72)  BP(mean): --  RR: 18 (18 Oct 2022 13:34) (18 - 18)  SpO2: 99% (18 Oct 2022 13:34) (98% - 99%)    Parameters below as of 18 Oct 2022 13:34  Patient On (Oxygen Delivery Method): room air      GEN: NAD; A and O x 2, scleral icterus  LUNGS: CTA B/L  HEART: S1 S2  ABDOMEN: soft, non-tender, distended, + BS  EXTREMITIES: B/L LE edema, finger contractures    LABS:                        8.3    46.41 )-----------( 363      ( 18 Oct 2022 07:13 )             26.2     10-18    136  |  108  |  4<L>  ----------------------------<  80  3.3<L>   |  18<L>  |  0.46<L>    Ca    8.0<L>      18 Oct 2022 07:13    TPro  5.5<L>  /  Alb  1.5<L>  /  TBili  16.3<H>  /  DBili  x   /  AST  73<H>  /  ALT  17  /  AlkPhos  252<H>  10-18    PT/INR - ( 17 Oct 2022 12:34 )   PT: 26.2 sec;   INR: 2.18 ratio         PTT - ( 17 Oct 2022 12:34 )  PTT:40.3 sec          Culture - Blood (collected 16 Oct 2022 15:05)  Source: .Blood Blood  Preliminary Report (17 Oct 2022 20:01):    No growth to date.    Culture - Blood (collected 16 Oct 2022 15:00)  Source: .Blood Blood  Preliminary Report (17 Oct 2022 20:01):    No growth to date.      COVID-19 PCR: NotDetec (12 Oct 2022 05:35)  COVID-19 PCR: NotDetec (05 Oct 2022 11:00)  SARS-CoV-2: NotDetec (02 Oct 2022 16:00)

## 2022-10-19 DIAGNOSIS — A41.9 SEPSIS, UNSPECIFIED ORGANISM: ICD-10-CM

## 2022-10-19 LAB
ALBUMIN SERPL ELPH-MCNC: 1.4 G/DL — LOW (ref 3.5–5)
ALP SERPL-CCNC: 229 U/L — HIGH (ref 40–120)
ALT FLD-CCNC: 18 U/L DA — SIGNIFICANT CHANGE UP (ref 10–60)
AMMONIA BLD-MCNC: 109 UMOL/L — HIGH (ref 11–32)
ANION GAP SERPL CALC-SCNC: 12 MMOL/L — SIGNIFICANT CHANGE UP (ref 5–17)
AST SERPL-CCNC: 79 U/L — HIGH (ref 10–40)
BASOPHILS # BLD AUTO: 0.06 K/UL — SIGNIFICANT CHANGE UP (ref 0–0.2)
BASOPHILS NFR BLD AUTO: 0.1 % — SIGNIFICANT CHANGE UP (ref 0–2)
BILIRUB DIRECT SERPL-MCNC: 13.5 MG/DL — HIGH (ref 0–0.3)
BILIRUB SERPL-MCNC: 17 MG/DL — HIGH (ref 0.2–1.2)
BUN SERPL-MCNC: 3 MG/DL — LOW (ref 7–18)
C DIFF BY PCR RESULT: SIGNIFICANT CHANGE UP
C DIFF TOX GENS STL QL NAA+PROBE: SIGNIFICANT CHANGE UP
CALCIUM SERPL-MCNC: 8.1 MG/DL — LOW (ref 8.4–10.5)
CHLORIDE SERPL-SCNC: 106 MMOL/L — SIGNIFICANT CHANGE UP (ref 96–108)
CO2 SERPL-SCNC: 17 MMOL/L — LOW (ref 22–31)
CREAT SERPL-MCNC: 0.48 MG/DL — LOW (ref 0.5–1.3)
CULTURE RESULTS: SIGNIFICANT CHANGE UP
CULTURE RESULTS: SIGNIFICANT CHANGE UP
EGFR: 147 ML/MIN/1.73M2 — SIGNIFICANT CHANGE UP
EOSINOPHIL # BLD AUTO: 0.25 K/UL — SIGNIFICANT CHANGE UP (ref 0–0.5)
EOSINOPHIL NFR BLD AUTO: 0.5 % — SIGNIFICANT CHANGE UP (ref 0–6)
GLUCOSE BLDC GLUCOMTR-MCNC: 104 MG/DL — HIGH (ref 70–99)
GLUCOSE BLDC GLUCOMTR-MCNC: 109 MG/DL — HIGH (ref 70–99)
GLUCOSE BLDC GLUCOMTR-MCNC: 113 MG/DL — HIGH (ref 70–99)
GLUCOSE BLDC GLUCOMTR-MCNC: 99 MG/DL — SIGNIFICANT CHANGE UP (ref 70–99)
GLUCOSE SERPL-MCNC: 111 MG/DL — HIGH (ref 70–99)
HCT VFR BLD CALC: 25.3 % — LOW (ref 39–50)
HGB BLD-MCNC: 8.3 G/DL — LOW (ref 13–17)
IMM GRANULOCYTES NFR BLD AUTO: 3.1 % — HIGH (ref 0–0.9)
LYMPHOCYTES # BLD AUTO: 1.99 K/UL — SIGNIFICANT CHANGE UP (ref 1–3.3)
LYMPHOCYTES # BLD AUTO: 3.8 % — LOW (ref 13–44)
MANUAL DIF COMMENT BLD-IMP: SIGNIFICANT CHANGE UP
MANUAL SMEAR VERIFICATION: SIGNIFICANT CHANGE UP
MCHC RBC-ENTMCNC: 30.6 PG — SIGNIFICANT CHANGE UP (ref 27–34)
MCHC RBC-ENTMCNC: 32.8 GM/DL — SIGNIFICANT CHANGE UP (ref 32–36)
MCV RBC AUTO: 93.4 FL — SIGNIFICANT CHANGE UP (ref 80–100)
MONOCYTES # BLD AUTO: 2.41 K/UL — HIGH (ref 0–0.9)
MONOCYTES NFR BLD AUTO: 4.6 % — SIGNIFICANT CHANGE UP (ref 2–14)
NEUTROPHILS # BLD AUTO: 46.1 K/UL — HIGH (ref 1.8–7.4)
NEUTROPHILS NFR BLD AUTO: 87.9 % — HIGH (ref 43–77)
NRBC # BLD: 0 /100 WBCS — SIGNIFICANT CHANGE UP (ref 0–0)
PLAT MORPH BLD: NORMAL — SIGNIFICANT CHANGE UP
PLATELET # BLD AUTO: 379 K/UL — SIGNIFICANT CHANGE UP (ref 150–400)
POTASSIUM SERPL-MCNC: 3.3 MMOL/L — LOW (ref 3.5–5.3)
POTASSIUM SERPL-SCNC: 3.3 MMOL/L — LOW (ref 3.5–5.3)
PROT SERPL-MCNC: 5.1 G/DL — LOW (ref 6–8.3)
RBC # BLD: 2.71 M/UL — LOW (ref 4.2–5.8)
RBC # FLD: 21.9 % — HIGH (ref 10.3–14.5)
RBC BLD AUTO: ABNORMAL
SODIUM SERPL-SCNC: 135 MMOL/L — SIGNIFICANT CHANGE UP (ref 135–145)
SPECIMEN SOURCE: SIGNIFICANT CHANGE UP
SPECIMEN SOURCE: SIGNIFICANT CHANGE UP
WBC # BLD: 50.93 K/UL — CRITICAL HIGH (ref 3.8–10.5)
WBC # FLD AUTO: 50.93 K/UL — CRITICAL HIGH (ref 3.8–10.5)

## 2022-10-19 PROCEDURE — 99233 SBSQ HOSP IP/OBS HIGH 50: CPT

## 2022-10-19 PROCEDURE — 73030 X-RAY EXAM OF SHOULDER: CPT | Mod: 26,50

## 2022-10-19 PROCEDURE — 73560 X-RAY EXAM OF KNEE 1 OR 2: CPT | Mod: 26,50

## 2022-10-19 PROCEDURE — 73060 X-RAY EXAM OF HUMERUS: CPT | Mod: 26

## 2022-10-19 PROCEDURE — 99232 SBSQ HOSP IP/OBS MODERATE 35: CPT

## 2022-10-19 RX ORDER — POTASSIUM CHLORIDE 20 MEQ
20 PACKET (EA) ORAL ONCE
Refills: 0 | Status: COMPLETED | OUTPATIENT
Start: 2022-10-19 | End: 2022-10-19

## 2022-10-19 RX ADMIN — TIGECYCLINE 105 MILLIGRAM(S): 50 INJECTION, POWDER, LYOPHILIZED, FOR SOLUTION INTRAVENOUS at 17:19

## 2022-10-19 RX ADMIN — SODIUM CHLORIDE 75 MILLILITER(S): 9 INJECTION, SOLUTION INTRAVENOUS at 06:22

## 2022-10-19 RX ADMIN — HEPARIN SODIUM 5000 UNIT(S): 5000 INJECTION INTRAVENOUS; SUBCUTANEOUS at 14:21

## 2022-10-19 RX ADMIN — Medication 100 MILLIGRAM(S): at 12:21

## 2022-10-19 RX ADMIN — Medication 20 MILLIEQUIVALENT(S): at 17:18

## 2022-10-19 RX ADMIN — PANTOPRAZOLE SODIUM 40 MILLIGRAM(S): 20 TABLET, DELAYED RELEASE ORAL at 06:20

## 2022-10-19 RX ADMIN — Medication 1 MILLIGRAM(S): at 08:57

## 2022-10-19 RX ADMIN — Medication 30 MILLILITER(S): at 18:33

## 2022-10-19 RX ADMIN — NYSTATIN CREAM 1 APPLICATION(S): 100000 CREAM TOPICAL at 06:21

## 2022-10-19 RX ADMIN — NYSTATIN CREAM 1 APPLICATION(S): 100000 CREAM TOPICAL at 17:18

## 2022-10-19 RX ADMIN — Medication 1 MILLIGRAM(S): at 17:19

## 2022-10-19 RX ADMIN — TIGECYCLINE 105 MILLIGRAM(S): 50 INJECTION, POWDER, LYOPHILIZED, FOR SOLUTION INTRAVENOUS at 06:22

## 2022-10-19 RX ADMIN — Medication 1 TABLET(S): at 12:21

## 2022-10-19 RX ADMIN — HEPARIN SODIUM 5000 UNIT(S): 5000 INJECTION INTRAVENOUS; SUBCUTANEOUS at 07:09

## 2022-10-19 NOTE — PROGRESS NOTE ADULT - SUBJECTIVE AND OBJECTIVE BOX
Normal vision: sees adequately in most situations; can see medication labels, newsprint Patient is a 27y old  Male who presents with a chief complaint of Altered mental status (19 Oct 2022 15:28)      INTERVAL HPI/OVERNIGHT EVENTS: s/p fall overnight    I&O's Summary    Vital Signs Last 24 Hrs  T(C): 36.1 (19 Oct 2022 16:42), Max: 37.4 (18 Oct 2022 21:16)  T(F): 97 (19 Oct 2022 16:42), Max: 99.3 (18 Oct 2022 21:16)  HR: 118 (19 Oct 2022 16:42) (112 - 119)  BP: 127/76 (19 Oct 2022 16:42) (116/66 - 138/81)  BP(mean): --  RR: 18 (19 Oct 2022 16:42) (17 - 19)  SpO2: 97% (19 Oct 2022 16:42) (96% - 100%)    Parameters below as of 19 Oct 2022 16:42  Patient On (Oxygen Delivery Method): room air      PAST MEDICAL & SURGICAL HISTORY:  No pertinent past medical history      No significant past surgical history          SOCIAL HISTORY  Alcohol:  Tobacco:  Illicit substance use:      FAMILY HISTORY:      LABS:                        8.3    50.93 )-----------( 379      ( 19 Oct 2022 06:41 )             25.3     10-19    135  |  106  |  3<L>  ----------------------------<  111<H>  3.3<L>   |  17<L>  |  0.48<L>    Ca    8.1<L>      19 Oct 2022 06:41    TPro  5.1<L>  /  Alb  1.4<L>  /  TBili  17.0<H>  /  DBili  13.5<H>  /  AST  79<H>  /  ALT  18  /  AlkPhos  229<H>  10-19        CAPILLARY BLOOD GLUCOSE      POCT Blood Glucose.: 113 mg/dL (19 Oct 2022 16:35)  POCT Blood Glucose.: 104 mg/dL (19 Oct 2022 11:56)  POCT Blood Glucose.: 109 mg/dL (19 Oct 2022 07:45)  POCT Blood Glucose.: 91 mg/dL (18 Oct 2022 22:28)      MEDICATIONS  (STANDING):  dextrose 5% + sodium chloride 0.9%. 1000 milliLiter(s) (75 mL/Hr) IV Continuous <Continuous>  dextrose 5%. 1000 milliLiter(s) (100 mL/Hr) IV Continuous <Continuous>  dextrose 5%. 1000 milliLiter(s) (50 mL/Hr) IV Continuous <Continuous>  dextrose 50% Injectable 25 Gram(s) IV Push once  dextrose 50% Injectable 12.5 Gram(s) IV Push once  dextrose 50% Injectable 25 Gram(s) IV Push once  folic acid 1 milliGRAM(s) Oral <User Schedule>  glucagon  Injectable 1 milliGRAM(s) IntraMuscular once  heparin   Injectable 5000 Unit(s) SubCutaneous every 8 hours  insulin lispro (ADMELOG) corrective regimen sliding scale   SubCutaneous Before meals and at bedtime  multivitamin 1 Tablet(s) Oral daily  nystatin Powder 1 Application(s) Topical two times a day  pantoprazole    Tablet 40 milliGRAM(s) Oral before breakfast  rifAXIMin 550 milliGRAM(s) Oral two times a day  thiamine Injectable 100 milliGRAM(s) IntraMuscular daily  tigecycline IVPB 50 milliGRAM(s) IV Intermittent every 12 hours    MEDICATIONS  (PRN):  dextrose Oral Gel 15 Gram(s) Oral once PRN Blood Glucose LESS THAN 70 milliGRAM(s)/deciliter  traMADol 25 milliGRAM(s) Oral every 8 hours PRN Moderate Pain (4 - 6)      REVIEW OF SYSTEMS: unable to obtained ROS, pt. confused      RADIOLOGY & ADDITIONAL TESTS:  < from: CT Head No Cont (10.02.22 @ 16:39) >    ACC: [id number] EXAM:  CT BRAIN                          PROCEDURE DATE:  10/02/2022          INTERPRETATION:  INDICATIONS:  seizure    TECHNIQUE:  Serial axial images were obtained from the skull base to the   vertex without intravenous contrast. Sagittal and Coronal reformats were   performed    COMPARISON EXAMINATION: None.    FINDINGS:  VENTRICLES AND SULCI:  Ex vacuo dilatation of the left lateral ventricle   to a mild degree.  INTRA-AXIAL:  Left temporal parietal encephalomalacia subjacent to the   craniotomy.  EXTRA-AXIAL:  No mass or collection is seen.  VISUALIZED SINUSES: Left maxillary mucosal thickening. Right maxillary   mucosal thickening  VISUALIZED MASTOIDS:  Clear.  CALVARIUM: Left temporal parietal craniotomy. Right parietalsoft tissue   swelling.  MISCELLANEOUS:  None.    IMPRESSION:  Left temporal parietal craniotomy. Encephalomalacia   subjacent to the craniotomy. Ex vacuo dilatation of the left lateral   ventricle. No intracranial hemorrhage. No mass lesion    --- End of Report ---      ROBINSON NEGERTE MD; Attending Radiologist  This document has been electronically signed. Oct  2 2022  4:47PM    < end of copied text >    ACC: [id number] EXAM:  XR ABDOMEN PORTABLE ROUTINE 1V                        ACC: [id number] EXAM:  XR SHOULDER COMP MIN 2V BI                        ACC: [id number] EXAM:  XR KNEE 1-2 VIEWS  BI                        ACC: [id number] EXAM:  XR HUMERUS MIN 2 VIEWS BI                          PROCEDURE DATE:  10/19/2022          INTERPRETATION:  XR HUMERUS BILATERAL, XR KNEE 1 OR 2 VIEWS BILATERAL, XR   SHOULDER 2 VIEWS BILATERAL, XR ABDOMEN    Clinical History: Status post fall    Right humerus 2 view. Left humerus 2 view. Right shoulder 3 view. Left   shoulder 3 view. Right knee 3 view. Left knee 3 view.      FINDINGS:    There is no fracture, dislocation, soft tissue swelling or joint effusion.  The bilateral acromioclavicular joints are intact.  No degenerative changes.  No radiopaque foreign body.    Abdomen one view    FINDINGS: The lower pelvis and right flank is not completely included.   There is no evidence of bowel obstruction, free air or abdominal   calcifications. There are no acutefractures.    IMPRESSION:  1.  No fractures.  2.  No evidence of bowel obstruction however if intra-abdominal injury is   suspected recommend CT.    --- End of Report ---     RIVKA HERRERA DO; Attending Radiologist  This document has been electronically signed. Oct 19 2022 11:16AM  ACC: [id number] EXAM:  CT ABDOMEN AND PELVIS                          PROCEDURE DATE:  10/16/2022          INTERPRETATION:  CLINICAL INFORMATION: Hepatomegaly, liver failure.   Leukocytosis    COMPARISON: 10/2/2022    CONTRAST/COMPLICATIONS:  IV Contrast: None.  Oral Contrast: None.  Complications: None.    PROCEDURE:  CT of the Abdomen and Pelvis was performed. Sagittal and coronal   reformats were performed. The lack of intravenous contrast material   limits diagnostic sensitivity in evaluatingthe solid organs/vasculature.    FINDINGS:  LOWER CHEST: Bibasilar platelike atelectasis    LIVER: Marked hepatomegaly (28 cm max sagittal)/hepatic steatosis, not   significantly changed from 10/2/2022  BILE DUCTS: Normal caliber.  GALLBLADDER: Contracted gallbladder with stone/coalescent sludge.  SPLEEN: Borderline splenomegaly (13.5 cm max sagittal), unchanged.  PANCREAS: Within normal limits.  ADRENALS: Within normal limits.  KIDNEYS/URETERS: No renal stones or hydronephrosis.    BLADDER: Within normal limits.  REPRODUCTIVE ORGANS: Normal prostate.    BOWEL: Underdistended duodenum with increased fat stranding about the   distal duodenal sweep, nonspecific (coronal 4/68). No bowel obstruction.   Normal appendix. Equivocal ascending colonicmural prominence .   Undetermined rectosigmoid foreign body/medical hardware (2/54, CT    image).  PERITONEUM: Mild pelvic >>perihepatosplenic ascites, progressed. Central   mesenteric root edema, progressed.  VESSELS: Within normal limits.  RETROPERITONEUM/LYMPH NODES: No lymphadenopathy.  ABDOMINAL WALL: Fat-containing inguinal hernias, unchanged. Dependent   subcutaneous edema, progressed  BONES: L5 spondylolysis. Healed lower anterolateral LEFT rib fractures    IMPRESSION:  1.  Marked hepatomegaly/steatosis, unchanged.  2.  Ascites/mesenteric edema and subcutaneous edema, minimally progressed.  3.  Equivocal ascending colonic mural prominence raises the possibility   of portal colopathy or inflammation  4.  Underdistended duodenum with increased fat stranding about distal   duodenal sweep, nonspecific (coronal 4/68). Clinical correlation with   regard to duodenal inflammation recommended.  5.  Undetermined rectosigmoid foreign body/medical hardware (2/54, CT    image)-(this finding discussed with Iliana Keys 10/16/2022 11:25   AM).    --- End of Report ---    TIFF ACE MD; Attending Radiologist  This document has been electronically signed. Oct 16 2022 11:28AM    ACC: [id number] EXAM:  XR CHEST PORTABLE URGENT 1V                          PROCEDURE DATE:  10/12/2022          INTERPRETATION:  Portable chest radiograph    CLINICAL INFORMATION: Fever    TECHNIQUE:  Portable  AP chest radiograph.    COMPARISON: 10/7/2022 chest .    FINDINGS:  CATHETERS AND TUBES: None    PULMONARY: Mild bilateral perihilar diffuse airspace disease..   No pneumothorax.    HEART/VASCULAR: The heart and mediastinum size and configuration are   within normal limits.    BONES: Visualized osseous structures are intact.    IMPRESSION:   Mild bilateral perihilar diffuse airspace disease..    --- End of Report ---      CELINE HOLDEN MD; Attending Radiologist  This document has been electronically signed. Oct 13 2022  9:20AM  ACC: [id number] EXAM:  US ABD LIVER FOLLOWUP                          PROCEDURE DATE:  10/10/2022          INTERPRETATION:  CLINICAL INFORMATION: Cirrhosis.    COMPARISON: CT dated 10/02/2022.    TECHNIQUE: Sonography of the right upper quadrant.    FINDINGS:    Liver: Increased echogenicity. No focal hepatic mass lesion is   demonstrated. The liver appears to be enlarged, however, no measurements   were obtained. Normal portal venous flow.  Bile ducts: Normal caliber. Common bile duct measures 5mm.  Gallbladder: Nonspecific mildly thickened wall measuring 4 mm.  Pancreas: Not visualized due to overlying bowel.  Right kidney: 12 cm. No hydronephrosis.  Ascites: None.  IVC: Visualized portions are within normal limits.    IMPRESSION:    Enlarged fatty liver.        --- End of Report ---      LOIDA PACK MD; Attending Radiologist  This document has been electronically signed. Oct 10 2022  5:21PM    Imaging Personally Reviewed:  [x ] YES  [ ] NO    Consultant(s) Notes Reviewed:  [x ] YES  [ ] NO    PHYSICAL EXAM:  GENERAL: NAD  HEAD:  Atraumatic, Normocephalic  EYES: conjunctiva and scleral icteris  ENMT: Moist mucous membranes  NECK: Supple  NERVOUS SYSTEM:  Alert & confused,  MELA [id number]  CHEST/LUNG: CTA bilaterally; No rales, rhonchi, wheezing  HEART: Regular rate and rhythm  ABDOMEN: Soft, Nontender, Nondistended; Bowel sounds present  EXTREMITIES:  2+ Peripheral Pulses  SKIN: No rashes or lesions    Care Collaborated Discussed with Consultants/Other Providers [x ] YES  [ ] NO

## 2022-10-19 NOTE — PROGRESS NOTE ADULT - ASSESSMENT
Patient is a 26 yo Vietnamese speaking Male, with PMH alcohol abuse, s/p Left temporal parietal craniotomy noted in imaging, presenting to the ED with altered mental status and seizure that lasted about 2-4 minutes.  In the ED, pt's vitals were Temp 100.1, , /67, RR 18 on room air O2 sat 96% s/p IV Vanc + Zosyn, NS 1 L bolus, Keppra 2 gm x 1, Ativan, Mg, Kcl riders, Thiamine. admitted to ICU for severe metabolic derangements, CIWA protocol with PRN ativan. Patient was started on PO lactulose and IVF. Lactate trended down to normal levels. GI and Hepatology was consulted for transaminitis and hyperbilirubinemia. Patient was started on zosyn for possible cholangitis. CT abd showed hepatomegaly with biliary sludge and mild ascites. They recommended MRCP once patient is more stable. episode of melena overnight on October 4th. Hb was 7.3. Patient was made NPO, started on protonix drip and octreotide drip. GI on board recommending EGD which did not show any pathology. Flexible sigmoidoscopy showed rectal varices which was cauterized. Patient received one unit pRBC and one unit FFP. switched to ceftriaxone for SBP prophylaxis per ID.  Vit K for 3 days for elevated INR. He was started on prednisone 40mg daily for 28 days for alcoholic hepatitis.     Patient hospital course further complicated by fever, ID recommending. Initially held sending blood culture. However, patient develop leukocytosis, diarrhea, slight elevated temp-abdomen slightly more distended,  blood culture 10/16 NGTD, stool studies no pathogens following stool for Cdfif, and CT abdomen and pelvis-   1. Marked hepatomegaly/steatosis, unchanged.  2.  Ascites/mesenteric edema and subcutaneous edema, minimally progressed.  3.  Equivocal ascending colonic mural prominence raises the possibility   of portal colopathy or inflammation  4.  Underdistended duodenum with increased fat stranding about distal   duodenal sweep, nonspecific (coronal 4/68). Clinical correlation with   regard to duodenal inflammation recommended.  5.  Undetermined rectosigmoid foreign body/medical hardware

## 2022-10-19 NOTE — PROGRESS NOTE ADULT - PROBLEM SELECTOR PLAN 4
-  Completed Ceftriaxone  -  Continue Rifaximin 550 mg twice daily  -  septic work up pending from 10/16  -   BC - NGTD  -  Hepatology following  -  ID Dr. Puentes

## 2022-10-19 NOTE — PROGRESS NOTE ADULT - PROBLEM SELECTOR PLAN 7
oct 4th- 1 episode of melena   S/P EGD/Sigmoidoscopy with large rectal Dieulafoy lesion, clipped and cauterized  no overt bleeding  hgb 8.3  monitor cbc  GI following

## 2022-10-19 NOTE — PROGRESS NOTE ADULT - ASSESSMENT
Brief hospital course:  25yo Slovenian speaking Male s/p Left temporal parietal craniotomy and alcohol abuse (reported current drinking by family, [address]) presented to [facility] ED on 10/2/22 with 1 day Hx of AMS, after reported witnessed seizure episodes and 1 week Hx of jaundice and was admitted to MICU with suspected EtOH withdrawal seizure, electrolyte abnormalities (Na 125, K 2.7, Mg 1.0, P 1.1), lactic acidosis (with lactate 6.4, bicarb 19), leukocytosis with neutrophilia (WBC 20.35, roderick 90%) with low grade T (100.1F), anemia without evidence of overt bleeding (Hb 6.9->8.3 s/p 1 U PRBC) and abnormal liver tests including coagulopathy (INR 2.13), hyperbilirubinemia (Se bi 9.3->10.8, direct 8.0), hyperammonemia (131), elevated liver enzymes in AST> ALT pattern (->212, ALT 22) and elevated ->600 and CT a/p w/ iv 10/2/22 suggesting diffuse hepatic steatosis, hepatomegaly with possible GB sludge and GB wall thickening, but normal bile ducts.  He was started on Alcoholic hepatitis management after infection was ruled out on admission (except that rhinovirus was pos, w/o URI symptoms) and UGIB was ruled out. Completed SBP ppx b/o GIB, found to have 2 rectal Dieulafoy lesions. His bilirubin remained unchanged after 7 days on steroid (10/8-10/15), and developed low grade T, worsening leukocytosis up till 30-36k (prior to steroid 17k), worsening abdominal pain and distention and diarrhea despite being off lactulose, thus steroid was held 10/15 and repeat septic workup was sent. GI PCR panel showed STEC, culture was negative. He was also started on Zosyn on 10/14, and switched to Meropenem on 10/16 and on Tigacycline per ID since 10/18.  Had repeat CT abd / pelvis 10/16, had only mild ascites (pelvic, perihepatosplenic), prior it was none.   Hematology been following since 10/16 b/o leukocytosis. WBC still rising, 50k.      AMS in a TBI patient, with EtOH use, been on CIWA on admission, also s/p seizures, and with elevated ammonia on admission thus could not r/u HE either  Hepatomegaly, Alcoholic hepatitis, s/p 7 days prednisolone 10/8-10/15, and s/p total 3 days NAC, bilirubin 14.1 on 10/8 and 14.1 10/15.  Elevated transaminases in AST>ALT pattern - overall improved (-->79)  Elevated ALP - improving (669->229)  Coagulopathy - initially improved slightly, but got vit K and FFP (10/5-7 and 10/5), now up-trending  MDF> 32, MELD 28-23-27  Low grade T (T max 100.4 F 10/12 night and 100F 10/14), WNL since then   Diarrhea off lactulose  Abdominal pain  Worsening leukocytosis from 17k to 50k  - Initial septic workup was negative and labs suggested alcoholic hepatitis, was started on prednisolone 40mg, and got total 3 days of NAC. Lille score was not calculated because steroid was d/c due to low grade T, diarrhea (off lactulose) and worsening abdominal distention. Was on Meropenem,  followed by Tigacylcine since 10/18.   - C/w antibiotics per ID.   - Now monitored off steroid since 8/15 until ID workup completed and infection ruled out with certainty F/u ID recommendations. BCx neg 10/16. Stool GI PCR STEC pos. O/Px1 neg. F/u C. diff PCR. F/u Strongyloides serology.  - PLT WNL, only borderline splenomegaly, no or only trace ascites.   - CT w/ iv 10/2 was reviewed with IR, hepatic vasculature appeared patent.  - Viral, AI workup, ceruloplasmin unremarkable - see prior notes.  - Was no biliary obstruction on CT abd, US abd. MRCP was considered on admission, has not had it yet.   - If abdominal pain worsening, consider repeat abdominal imaging.   - HCC status - no focal hepatic lesion  - EV status: none. No further overt bleeding since admission, s/p sigmoidoscopy showing 2 rectal Dieulafoy lesions, s/p treatment  - AMS - monitor mental status, overall improved, possibly multifactorial. C/w constant observation. Aspiration, seizure, fall precautions. Folic, thiamine. Lactulose was held b/o diarrhea and was resumed with parameters per primary team on 10/16. Since 10/18 held again b/o > 6 BM. No need to trend ammonia. Lactulose can worsen his abdominal bloating, thus if in future need BM regimen (not now) can consider Miralax as alternative. Can consider neurology.  - Transplant candidacy: Obstacles: only emergency medicaid per d/w SW, recent EtOH     Thank you for consult  Will continue to monitor  D/w primary team

## 2022-10-19 NOTE — PROGRESS NOTE ADULT - SUBJECTIVE AND OBJECTIVE BOX
Chief Complaint:  Patient is a 27y old  Male who presents with a chief complaint of Altered mental status (18 Oct 2022 14:48)      Reason for consult:    Interval Events:     Hospital Medications:  dextrose 5% + sodium chloride 0.9%. 1000 milliLiter(s) IV Continuous <Continuous>  dextrose 5%. 1000 milliLiter(s) IV Continuous <Continuous>  dextrose 5%. 1000 milliLiter(s) IV Continuous <Continuous>  dextrose 50% Injectable 25 Gram(s) IV Push once  dextrose 50% Injectable 12.5 Gram(s) IV Push once  dextrose 50% Injectable 25 Gram(s) IV Push once  dextrose Oral Gel 15 Gram(s) Oral once PRN  folic acid 1 milliGRAM(s) Oral <User Schedule>  glucagon  Injectable 1 milliGRAM(s) IntraMuscular once  heparin   Injectable 5000 Unit(s) SubCutaneous every 8 hours  insulin lispro (ADMELOG) corrective regimen sliding scale   SubCutaneous Before meals and at bedtime  multivitamin 1 Tablet(s) Oral daily  nystatin Powder 1 Application(s) Topical two times a day  pantoprazole    Tablet 40 milliGRAM(s) Oral before breakfast  rifAXIMin 550 milliGRAM(s) Oral two times a day  thiamine Injectable 100 milliGRAM(s) IntraMuscular daily  tigecycline IVPB 50 milliGRAM(s) IV Intermittent every 12 hours  traMADol 25 milliGRAM(s) Oral every 8 hours PRN      ROS:   General:  No  fevers, chills, night sweats, fatigue  Eyes:  Good vision, no reported pain  ENT:  No sore throat, pain, runny nose  CV:  No pain, palpitations  Pulm:  No dyspnea, cough  GI:  See HPI, otherwise negative  :  No  incontinence, nocturia  Muscle:  No pain, weakness  Neuro:  No memory problems  Psych:  No insomnia, mood problems, depression  Endocrine:  No polyuria, polydipsia, cold/heat intolerance  Heme:  No petechiae, ecchymosis, easy bruisability  Skin:  No rash    PHYSICAL EXAM:   Vital Signs:  Vital Signs Last 24 Hrs  T(C): 36.6 (19 Oct 2022 12:00), Max: 37.4 (18 Oct 2022 21:16)  T(F): 97.8 (19 Oct 2022 12:00), Max: 99.3 (18 Oct 2022 21:16)  HR: 113 (19 Oct 2022 12:00) (112 - 119)  BP: 128/72 (19 Oct 2022 12:00) (116/66 - 138/81)  BP(mean): --  RR: 18 (19 Oct 2022 12:00) (17 - 19)  SpO2: 98% (19 Oct 2022 12:00) (96% - 100%)    Parameters below as of 19 Oct 2022 12:00  Patient On (Oxygen Delivery Method): room air      Daily     Daily     GENERAL: no acute distress  NEURO: alert, no asterixis  HEENT: anicteric sclera, no conjunctival pallor appreciated  CHEST: no respiratory distress, no accessory muscle use  CARDIAC: regular rate, rhythm  ABDOMEN: soft, non-tender, non-distended, no rebound or guarding  EXTREMITIES: warm, well perfused, no edema  SKIN: no lesions noted    LABS: reviewed                        8.3    50.93 )-----------( 379      ( 19 Oct 2022 06:41 )             25.3     10-19    135  |  106  |  3<L>  ----------------------------<  111<H>  3.3<L>   |  17<L>  |  0.48<L>    Ca    8.1<L>      19 Oct 2022 06:41    TPro  5.1<L>  /  Alb  1.4<L>  /  TBili  17.0<H>  /  DBili  13.5<H>  /  AST  79<H>  /  ALT  18  /  AlkPhos  229<H>  10-19    LIVER FUNCTIONS - ( 19 Oct 2022 06:41 )  Alb: 1.4 g/dL / Pro: 5.1 g/dL / ALK PHOS: 229 U/L / ALT: 18 U/L DA / AST: 79 U/L / GGT: x             Interval Diagnostic Studies: see sunrise for full report   Chief Complaint:  Patient is a 27y old  Male who presents with a chief complaint of Altered mental status (18 Oct 2022 14:48)      Reason for consult: Acute (on chronic) liver failure    Interval Events: Patient was seen and examined at bedside.  ID [id number]. Remains on constant observation.   He is more conversational, repeating stories about his arrival to the [place], but overall still confused. Oriented to self, place, not time. Thiamine and folic acid was increased yesterday per primary team.  Remains normotensive, afebrile, but tachycardic.   WBC still rising, 50k. Afebrile, T 99.8 on 10/17. C. diff PCR was sent out today. Still 7 BM reported, but some marked as small, liquid, in commode small brown loose/watery BM. No blood or melena seen/reported. C/o diffuse abdominal cramping. No N/V.   Bilirubin slightly rising, both direct and indirect. MELD 27.  He fell last night.     Hospital Medications:  dextrose 5% + sodium chloride 0.9%. 1000 milliLiter(s) IV Continuous <Continuous>  dextrose 5%. 1000 milliLiter(s) IV Continuous <Continuous>  dextrose 5%. 1000 milliLiter(s) IV Continuous <Continuous>  dextrose 50% Injectable 25 Gram(s) IV Push once  dextrose 50% Injectable 12.5 Gram(s) IV Push once  dextrose 50% Injectable 25 Gram(s) IV Push once  dextrose Oral Gel 15 Gram(s) Oral once PRN  folic acid 1 milliGRAM(s) Oral <User Schedule>  glucagon  Injectable 1 milliGRAM(s) IntraMuscular once  heparin   Injectable 5000 Unit(s) SubCutaneous every 8 hours  insulin lispro (ADMELOG) corrective regimen sliding scale   SubCutaneous Before meals and at bedtime  multivitamin 1 Tablet(s) Oral daily  nystatin Powder 1 Application(s) Topical two times a day  pantoprazole    Tablet 40 milliGRAM(s) Oral before breakfast  rifAXIMin 550 milliGRAM(s) Oral two times a day  thiamine Injectable 100 milliGRAM(s) IntraMuscular daily  tigecycline IVPB 50 milliGRAM(s) IV Intermittent every 12 hours  traMADol 25 milliGRAM(s) Oral every 8 hours PRN      ROS:   General:  No  fevers, chills  ENT:  No sore throat reported today, no runny nose. C/o frontal headache.  CV:  No pain, palpitations  Pulm:  No dyspnea, or cough (seems improved compared to yesterday)  GI:  Abdominal distention, reports diffuse abdominal pain, at some point suddenly got up b/o cramping, no N/V, still 7 BM so far today, some only small amount loose/watery, no overt bleeding  :  Unable to answer, keeps mentioning abdominal pain  Muscle:  No pain, or focal weakness  Neuro:  Awake, alert, oriented only to self and place, not in time.   Skin:  Jaundice. Denies itching.     PHYSICAL EXAM:   Vital Signs:  Vital Signs Last 24 Hrs  T(C): 36.6 (19 Oct 2022 12:00), Max: 37.4 (18 Oct 2022 21:16)  T(F): 97.8 (19 Oct 2022 12:00), Max: 99.3 (18 Oct 2022 21:16)  HR: 113 (19 Oct 2022 12:00) (112 - 119)  BP: 128/72 (19 Oct 2022 12:00) (116/66 - 138/81)  BP(mean): --  RR: 18 (19 Oct 2022 12:00) (17 - 19)  SpO2: 98% (19 Oct 2022 12:00) (96% - 100%)    Parameters below as of 19 Oct 2022 12:00  Patient On (Oxygen Delivery Method): room air      Daily     Daily     GENERAL: no acute distress  NEURO: Awake, alert, oriented only to self and place, not in time.   HEENT: icteric sclera  CHEST: no respiratory distress, no accessory muscle use  CARDIAC: mildly tachycardic  ABDOMEN: soft, distended, no fluid wave, hepatomegaly, mild diffuse tenderness, no rebound or guarding, BS+  EXTREMITIES: warm, well perfused, no edema  SKIN: jaundice, ecchymoses.    LABS: reviewed                        8.3    50.93 )-----------( 379      ( 19 Oct 2022 06:41 )             25.3     10-19    135  |  106  |  3<L>  ----------------------------<  111<H>  3.3<L>   |  17<L>  |  0.48<L>    Ca    8.1<L>      19 Oct 2022 06:41    TPro  5.1<L>  /  Alb  1.4<L>  /  TBili  17.0<H>  /  DBili  13.5<H>  /  AST  79<H>  /  ALT  18  /  AlkPhos  229<H>  10-19    LIVER FUNCTIONS - ( 19 Oct 2022 06:41 )  Alb: 1.4 g/dL / Pro: 5.1 g/dL / ALK PHOS: 229 U/L / ALT: 18 U/L DA / AST: 79 U/L / GGT: x             Interval Diagnostic Studies: see sunrise for full report

## 2022-10-19 NOTE — PROGRESS NOTE ADULT - PROBLEM SELECTOR PLAN 5
- c/w  CIWA   -  No prior hx of seizures  -  Continue with  thiamine, folic acid and MVI   -  Continue Lorazepam PRN for agitation  -  Maintain fall precaution   -  Maintain aspiration precaution  -  Maintain seizure precaution

## 2022-10-19 NOTE — PROGRESS NOTE ADULT - PROBLEM SELECTOR PLAN 2
-  Metabolic encephalopathy secondary to alcohol hepatitis   -  discontinue lactulose - Patient numerous loose BM having diarrhea  -  Continue with rifaximin   -  Transaminitis- unchanged  -  Severe hepatomegaly w/mild ascites on CT abdomen w/gall bladder sludge  -  Lipase level normal  -  Hepatitis panel negative   -  Hepatic USG for characterization of gall bladder attempted however patient was very agitated  -  EGD on 10/5 with GI Dr. Johnson showed Large rectal Dieulafoy lesion. Hemostasis achieved with over-the-scope clip  -  Foreign body noted in abdominal xray -  clip placed by GI   -  RUQ US without ascites, large fatty liver noted on 10/10

## 2022-10-19 NOTE — PROGRESS NOTE ADULT - SUBJECTIVE AND OBJECTIVE BOX
Patient is a 27y old  Male who presents with a chief complaint of Altered mental status       SUBJECTIVE / OVERNIGHT EVENTS:      MEDICATIONS  (STANDING):  dextrose 5% + sodium chloride 0.9%. 1000 milliLiter(s) (75 mL/Hr) IV Continuous <Continuous>  dextrose 5%. 1000 milliLiter(s) (100 mL/Hr) IV Continuous <Continuous>  dextrose 5%. 1000 milliLiter(s) (50 mL/Hr) IV Continuous <Continuous>  dextrose 50% Injectable 25 Gram(s) IV Push once  dextrose 50% Injectable 12.5 Gram(s) IV Push once  dextrose 50% Injectable 25 Gram(s) IV Push once  folic acid 1 milliGRAM(s) Oral <User Schedule>  glucagon  Injectable 1 milliGRAM(s) IntraMuscular once  heparin   Injectable 5000 Unit(s) SubCutaneous every 8 hours  insulin lispro (ADMELOG) corrective regimen sliding scale   SubCutaneous Before meals and at bedtime  multivitamin 1 Tablet(s) Oral daily  nystatin Powder 1 Application(s) Topical two times a day  pantoprazole    Tablet 40 milliGRAM(s) Oral before breakfast  potassium chloride    Tablet ER 20 milliEquivalent(s) Oral once  rifAXIMin 550 milliGRAM(s) Oral two times a day  thiamine Injectable 100 milliGRAM(s) IntraMuscular daily  tigecycline IVPB 50 milliGRAM(s) IV Intermittent every 12 hours    MEDICATIONS  (PRN):  dextrose Oral Gel 15 Gram(s) Oral once PRN Blood Glucose LESS THAN 70 milliGRAM(s)/deciliter  traMADol 25 milliGRAM(s) Oral every 8 hours PRN Moderate Pain (4 - 6)        PHYSICAL EXAM:  Vital Signs Last 24 Hrs  T(C): 36.1 (19 Oct 2022 16:42), Max: 37.4 (18 Oct 2022 21:16)  T(F): 97 (19 Oct 2022 16:42), Max: 99.3 (18 Oct 2022 21:16)  HR: 118 (19 Oct 2022 16:42) (112 - 119)  BP: 127/76 (19 Oct 2022 16:42) (116/66 - 138/81)  BP(mean): --  RR: 18 (19 Oct 2022 16:42) (17 - 19)  SpO2: 97% (19 Oct 2022 16:42) (96% - 100%)    Parameters below as of 19 Oct 2022 16:42  Patient On (Oxygen Delivery Method): room air          LABS:                        8.3    50.93 )-----------( 379      ( 19 Oct 2022 06:41 )             25.3     10-19    135  |  106  |  3<L>  ----------------------------<  111<H>  3.3<L>   |  17<L>  |  0.48<L>    Ca    8.1<L>      19 Oct 2022 06:41    TPro  5.1<L>  /  Alb  1.4<L>  /  TBili  17.0<H>  /  DBili  13.5<H>  /  AST  79<H>  /  ALT  18  /  AlkPhos  229<H>  10-19              COVID-19 PCR: NotDetec (12 Oct 2022 05:35)  COVID-19 PCR: NotDetec (05 Oct 2022 11:00)  SARS-CoV-2: NotDetec (02 Oct 2022 16:00)             Patient is a 27y old  Male who presents with a chief complaint of Altered mental status . S/p fall today. still very confused and distended ascites   c. diff sent and results pending. WBC further increased to 50K     SUBJECTIVE / OVERNIGHT EVENTS:      MEDICATIONS  (STANDING):  dextrose 5% + sodium chloride 0.9%. 1000 milliLiter(s) (75 mL/Hr) IV Continuous <Continuous>  dextrose 5%. 1000 milliLiter(s) (100 mL/Hr) IV Continuous <Continuous>  dextrose 5%. 1000 milliLiter(s) (50 mL/Hr) IV Continuous <Continuous>  dextrose 50% Injectable 25 Gram(s) IV Push once  dextrose 50% Injectable 12.5 Gram(s) IV Push once  dextrose 50% Injectable 25 Gram(s) IV Push once  folic acid 1 milliGRAM(s) Oral <User Schedule>  glucagon  Injectable 1 milliGRAM(s) IntraMuscular once  heparin   Injectable 5000 Unit(s) SubCutaneous every 8 hours  insulin lispro (ADMELOG) corrective regimen sliding scale   SubCutaneous Before meals and at bedtime  multivitamin 1 Tablet(s) Oral daily  nystatin Powder 1 Application(s) Topical two times a day  pantoprazole    Tablet 40 milliGRAM(s) Oral before breakfast  potassium chloride    Tablet ER 20 milliEquivalent(s) Oral once  rifAXIMin 550 milliGRAM(s) Oral two times a day  thiamine Injectable 100 milliGRAM(s) IntraMuscular daily  tigecycline IVPB 50 milliGRAM(s) IV Intermittent every 12 hours    MEDICATIONS  (PRN):  dextrose Oral Gel 15 Gram(s) Oral once PRN Blood Glucose LESS THAN 70 milliGRAM(s)/deciliter  traMADol 25 milliGRAM(s) Oral every 8 hours PRN Moderate Pain (4 - 6)        PHYSICAL EXAM:  Vital Signs Last 24 Hrs  T(C): 36.1 (19 Oct 2022 16:42), Max: 37.4 (18 Oct 2022 21:16)  T(F): 97 (19 Oct 2022 16:42), Max: 99.3 (18 Oct 2022 21:16)  HR: 118 (19 Oct 2022 16:42) (112 - 119)  BP: 127/76 (19 Oct 2022 16:42) (116/66 - 138/81)  BP(mean): --  RR: 18 (19 Oct 2022 16:42) (17 - 19)  SpO2: 97% (19 Oct 2022 16:42) (96% - 100%)    Parameters below as of 19 Oct 2022 16:42  Patient On (Oxygen Delivery Method): room air  general AAOX1 NAD  HEENT icteric and very jaundice   abd very distended and ascites   extrem no edema   neurology grossly intact without significant focal deficit      LABS:                        8.3    50.93 )-----------( 379      ( 19 Oct 2022 06:41 )             25.3     10-19    135  |  106  |  3<L>  ----------------------------<  111<H>  3.3<L>   |  17<L>  |  0.48<L>    Ca    8.1<L>      19 Oct 2022 06:41    TPro  5.1<L>  /  Alb  1.4<L>  /  TBili  17.0<H>  /  DBili  13.5<H>  /  AST  79<H>  /  ALT  18  /  AlkPhos  229<H>  10-19  COVID-19 PCR: NotDetec (12 Oct 2022 05:35)  COVID-19 PCR: NotDetec (05 Oct 2022 11:00)  SARS-CoV-2: NotDetec (02 Oct 2022 16:00)
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
## 2022-10-19 NOTE — PROGRESS NOTE ADULT - PROBLEM SELECTOR PLAN 3
-  Secondary Alcohol Hepatitis   -  U-tox negative for salicylate and acetaminophen levels  -  Hepatitis panel negative  -  bili - elevated  -  prednisone  40mg QD x 28 days ( 10/8/2022~)   -  Elevated INR and hypoalbuminemia in the setting of liver dysfunction  -  s/p vitamin K for 3 days  -  INR 2.18  -  Hepatology Dr. Gonzalez

## 2022-10-19 NOTE — PROGRESS NOTE ADULT - PROBLEM SELECTOR PLAN 1
-  WBC trending up 46.41 --->50.9  - Leukemoid reaction vs steroid induced vs GI infection vs possible C. diff colitis  - UA negative, CXR with no consolidations, covid 19 pcr neg   -  s/p Meropenem   -  Tigecycline 50mg IV ordered Q12 hours as per Dr Puentes  -  Blood cultures from 10/16  -  NGTD  - f/u C-diff result  - flow cytometry sent and AFP pending. CEA normal  - heme/onc following  -  ID:  Dr Puentes

## 2022-10-19 NOTE — PROGRESS NOTE ADULT - PROBLEM SELECTOR PLAN 6
-  Most likely due to poor P.O intake and acute liver failure  -  Replaced with supplement for  hypokalemia  -  Potassium 3.3   -  supplementation ordered  -  Repeat chemistry in AM
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
## 2022-10-20 DIAGNOSIS — D72.829 ELEVATED WHITE BLOOD CELL COUNT, UNSPECIFIED: ICD-10-CM

## 2022-10-20 LAB
AFP-TM SERPL-MCNC: 3.5 NG/ML — SIGNIFICANT CHANGE UP
ALBUMIN SERPL ELPH-MCNC: 1.4 G/DL — LOW (ref 3.5–5)
ALP SERPL-CCNC: 222 U/L — HIGH (ref 40–120)
ALT FLD-CCNC: 17 U/L DA — SIGNIFICANT CHANGE UP (ref 10–60)
AMMONIA BLD-MCNC: 45 UMOL/L — HIGH (ref 11–32)
ANION GAP SERPL CALC-SCNC: 13 MMOL/L — SIGNIFICANT CHANGE UP (ref 5–17)
AST SERPL-CCNC: 82 U/L — HIGH (ref 10–40)
BASOPHILS # BLD AUTO: 0.08 K/UL — SIGNIFICANT CHANGE UP (ref 0–0.2)
BASOPHILS NFR BLD AUTO: 0.1 % — SIGNIFICANT CHANGE UP (ref 0–2)
BILIRUB SERPL-MCNC: 17.7 MG/DL — HIGH (ref 0.2–1.2)
BUN SERPL-MCNC: 5 MG/DL — LOW (ref 7–18)
CALCIUM SERPL-MCNC: 8 MG/DL — LOW (ref 8.4–10.5)
CHLORIDE SERPL-SCNC: 106 MMOL/L — SIGNIFICANT CHANGE UP (ref 96–108)
CO2 SERPL-SCNC: 16 MMOL/L — LOW (ref 22–31)
CREAT SERPL-MCNC: 0.56 MG/DL — SIGNIFICANT CHANGE UP (ref 0.5–1.3)
EGFR: 140 ML/MIN/1.73M2 — SIGNIFICANT CHANGE UP
EOSINOPHIL # BLD AUTO: 0.42 K/UL — SIGNIFICANT CHANGE UP (ref 0–0.5)
EOSINOPHIL NFR BLD AUTO: 0.8 % — SIGNIFICANT CHANGE UP (ref 0–6)
GLUCOSE BLDC GLUCOMTR-MCNC: 104 MG/DL — HIGH (ref 70–99)
GLUCOSE BLDC GLUCOMTR-MCNC: 109 MG/DL — HIGH (ref 70–99)
GLUCOSE BLDC GLUCOMTR-MCNC: 126 MG/DL — HIGH (ref 70–99)
GLUCOSE BLDC GLUCOMTR-MCNC: 200 MG/DL — HIGH (ref 70–99)
GLUCOSE SERPL-MCNC: 94 MG/DL — SIGNIFICANT CHANGE UP (ref 70–99)
HCT VFR BLD CALC: 25.4 % — LOW (ref 39–50)
HGB BLD-MCNC: 8.2 G/DL — LOW (ref 13–17)
IMM GRANULOCYTES NFR BLD AUTO: 3.6 % — HIGH (ref 0–0.9)
LDH SERPL L TO P-CCNC: 221 U/L — SIGNIFICANT CHANGE UP (ref 120–225)
LYMPHOCYTES # BLD AUTO: 2.03 K/UL — SIGNIFICANT CHANGE UP (ref 1–3.3)
LYMPHOCYTES # BLD AUTO: 3.7 % — LOW (ref 13–44)
MAGNESIUM SERPL-MCNC: 1.6 MG/DL — SIGNIFICANT CHANGE UP (ref 1.6–2.6)
MCHC RBC-ENTMCNC: 30.1 PG — SIGNIFICANT CHANGE UP (ref 27–34)
MCHC RBC-ENTMCNC: 32.3 GM/DL — SIGNIFICANT CHANGE UP (ref 32–36)
MCV RBC AUTO: 93.4 FL — SIGNIFICANT CHANGE UP (ref 80–100)
MONOCYTES # BLD AUTO: 2.82 K/UL — HIGH (ref 0–0.9)
MONOCYTES NFR BLD AUTO: 5.1 % — SIGNIFICANT CHANGE UP (ref 2–14)
NEUTROPHILS # BLD AUTO: 47.66 K/UL — HIGH (ref 1.8–7.4)
NEUTROPHILS NFR BLD AUTO: 86.7 % — HIGH (ref 43–77)
NRBC # BLD: 0 /100 WBCS — SIGNIFICANT CHANGE UP (ref 0–0)
OB PNL STL: NEGATIVE — SIGNIFICANT CHANGE UP
PHOSPHATE SERPL-MCNC: 2.8 MG/DL — SIGNIFICANT CHANGE UP (ref 2.5–4.5)
PLATELET # BLD AUTO: 391 K/UL — SIGNIFICANT CHANGE UP (ref 150–400)
POTASSIUM SERPL-MCNC: 3.2 MMOL/L — LOW (ref 3.5–5.3)
POTASSIUM SERPL-SCNC: 3.2 MMOL/L — LOW (ref 3.5–5.3)
PROT SERPL-MCNC: 5.2 G/DL — LOW (ref 6–8.3)
RBC # BLD: 2.72 M/UL — LOW (ref 4.2–5.8)
RBC # FLD: 22.3 % — HIGH (ref 10.3–14.5)
SODIUM SERPL-SCNC: 135 MMOL/L — SIGNIFICANT CHANGE UP (ref 135–145)
TM INTERPRETATION: SIGNIFICANT CHANGE UP
WBC # BLD: 54.97 K/UL — CRITICAL HIGH (ref 3.8–10.5)
WBC # FLD AUTO: 54.97 K/UL — CRITICAL HIGH (ref 3.8–10.5)

## 2022-10-20 PROCEDURE — G0452: CPT | Mod: 26

## 2022-10-20 PROCEDURE — 99233 SBSQ HOSP IP/OBS HIGH 50: CPT

## 2022-10-20 PROCEDURE — 99232 SBSQ HOSP IP/OBS MODERATE 35: CPT

## 2022-10-20 PROCEDURE — 76705 ECHO EXAM OF ABDOMEN: CPT | Mod: 26

## 2022-10-20 RX ORDER — PHYTONADIONE (VIT K1) 5 MG
5 TABLET ORAL DAILY
Refills: 0 | Status: DISCONTINUED | OUTPATIENT
Start: 2022-10-20 | End: 2022-10-21

## 2022-10-20 RX ORDER — POTASSIUM CHLORIDE 20 MEQ
40 PACKET (EA) ORAL EVERY 4 HOURS
Refills: 0 | Status: COMPLETED | OUTPATIENT
Start: 2022-10-20 | End: 2022-10-20

## 2022-10-20 RX ADMIN — SODIUM CHLORIDE 75 MILLILITER(S): 9 INJECTION, SOLUTION INTRAVENOUS at 13:58

## 2022-10-20 RX ADMIN — Medication 40 MILLIEQUIVALENT(S): at 17:45

## 2022-10-20 RX ADMIN — HEPARIN SODIUM 5000 UNIT(S): 5000 INJECTION INTRAVENOUS; SUBCUTANEOUS at 13:31

## 2022-10-20 RX ADMIN — Medication 5 MILLIGRAM(S): at 13:51

## 2022-10-20 RX ADMIN — Medication 1 MILLIGRAM(S): at 17:43

## 2022-10-20 RX ADMIN — Medication 40 MILLIEQUIVALENT(S): at 21:19

## 2022-10-20 RX ADMIN — Medication 1 TABLET(S): at 13:04

## 2022-10-20 RX ADMIN — TIGECYCLINE 105 MILLIGRAM(S): 50 INJECTION, POWDER, LYOPHILIZED, FOR SOLUTION INTRAVENOUS at 06:05

## 2022-10-20 RX ADMIN — HEPARIN SODIUM 5000 UNIT(S): 5000 INJECTION INTRAVENOUS; SUBCUTANEOUS at 06:06

## 2022-10-20 RX ADMIN — Medication 1 MILLIGRAM(S): at 08:29

## 2022-10-20 RX ADMIN — Medication 100 MILLIGRAM(S): at 13:03

## 2022-10-20 RX ADMIN — NYSTATIN CREAM 1 APPLICATION(S): 100000 CREAM TOPICAL at 06:06

## 2022-10-20 RX ADMIN — PANTOPRAZOLE SODIUM 40 MILLIGRAM(S): 20 TABLET, DELAYED RELEASE ORAL at 06:06

## 2022-10-20 RX ADMIN — NYSTATIN CREAM 1 APPLICATION(S): 100000 CREAM TOPICAL at 17:46

## 2022-10-20 NOTE — PROGRESS NOTE ADULT - SUBJECTIVE AND OBJECTIVE BOX
Patient is a 27y old  Male who presents with a chief complaint of Altered mental status       SUBJECTIVE / OVERNIGHT EVENTS:      MEDICATIONS  (STANDING):  dextrose 5% + sodium chloride 0.9%. 1000 milliLiter(s) (75 mL/Hr) IV Continuous <Continuous>  dextrose 5%. 1000 milliLiter(s) (50 mL/Hr) IV Continuous <Continuous>  dextrose 5%. 1000 milliLiter(s) (100 mL/Hr) IV Continuous <Continuous>  dextrose 50% Injectable 25 Gram(s) IV Push once  dextrose 50% Injectable 12.5 Gram(s) IV Push once  dextrose 50% Injectable 25 Gram(s) IV Push once  folic acid 1 milliGRAM(s) Oral <User Schedule>  glucagon  Injectable 1 milliGRAM(s) IntraMuscular once  heparin   Injectable 5000 Unit(s) SubCutaneous every 8 hours  insulin lispro (ADMELOG) corrective regimen sliding scale   SubCutaneous Before meals and at bedtime  multivitamin 1 Tablet(s) Oral daily  nystatin Powder 1 Application(s) Topical two times a day  pantoprazole    Tablet 40 milliGRAM(s) Oral before breakfast  rifAXIMin 550 milliGRAM(s) Oral two times a day  thiamine Injectable 100 milliGRAM(s) IntraMuscular daily  tigecycline IVPB 50 milliGRAM(s) IV Intermittent every 12 hours    MEDICATIONS  (PRN):  aluminum hydroxide/magnesium hydroxide/simethicone Suspension 30 milliLiter(s) Oral every 4 hours PRN Dyspepsia  dextrose Oral Gel 15 Gram(s) Oral once PRN Blood Glucose LESS THAN 70 milliGRAM(s)/deciliter  LORazepam   Injectable 2 milliGRAM(s) IV Push every 4 hours PRN CIWA-Ar score 8 or greater  traMADol 25 milliGRAM(s) Oral every 8 hours PRN Moderate Pain (4 - 6)    CAPILLARY BLOOD GLUCOSE      POCT Blood Glucose.: 200 mg/dL (20 Oct 2022 11:55)  POCT Blood Glucose.: 109 mg/dL (20 Oct 2022 08:04)  POCT Blood Glucose.: 99 mg/dL (19 Oct 2022 21:42)  POCT Blood Glucose.: 113 mg/dL (19 Oct 2022 16:35)    I&O's Summary      PHYSICAL EXAM:  Vital Signs Last 24 Hrs  T(C): 37.6 (20 Oct 2022 04:52), Max: 37.6 (20 Oct 2022 04:52)  T(F): 99.7 (20 Oct 2022 04:52), Max: 99.7 (20 Oct 2022 04:52)  HR: 115 (20 Oct 2022 04:52) (115 - 119)  BP: 136/84 (20 Oct 2022 04:52) (127/76 - 140/71)  BP(mean): --  RR: 18 (20 Oct 2022 04:52) (18 - 18)  SpO2: 98% (20 Oct 2022 04:52) (97% - 98%)    Parameters below as of 20 Oct 2022 04:52  Patient On (Oxygen Delivery Method): room air      LABS:                        8.2    54.97 )-----------( 391      ( 20 Oct 2022 08:05 )             25.4     10-20    135  |  106  |  5<L>  ----------------------------<  94  3.2<L>   |  16<L>  |  0.56    Ca    8.0<L>      20 Oct 2022 08:05  Phos  2.8     10-20  Mg     1.6     10-20    TPro  5.2<L>  /  Alb  1.4<L>  /  TBili  17.7<H>  /  DBili  x   /  AST  82<H>  /  ALT  17  /  AlkPhos  222<H>  10-20              COVID-19 PCR: NotDetec (12 Oct 2022 05:35)  COVID-19 PCR: NotDetec (05 Oct 2022 11:00)  SARS-CoV-2: NotDetec (02 Oct 2022 16:00)       Patient is a 27y old  Male who presents with a chief complaint of Altered mental status       SUBJECTIVE / OVERNIGHT EVENTS:      MEDICATIONS  (STANDING):  dextrose 5% + sodium chloride 0.9%. 1000 milliLiter(s) (75 mL/Hr) IV Continuous <Continuous>  dextrose 5%. 1000 milliLiter(s) (50 mL/Hr) IV Continuous <Continuous>  dextrose 5%. 1000 milliLiter(s) (100 mL/Hr) IV Continuous <Continuous>  dextrose 50% Injectable 25 Gram(s) IV Push once  dextrose 50% Injectable 12.5 Gram(s) IV Push once  dextrose 50% Injectable 25 Gram(s) IV Push once  folic acid 1 milliGRAM(s) Oral <User Schedule>  glucagon  Injectable 1 milliGRAM(s) IntraMuscular once  heparin   Injectable 5000 Unit(s) SubCutaneous every 8 hours  insulin lispro (ADMELOG) corrective regimen sliding scale   SubCutaneous Before meals and at bedtime  multivitamin 1 Tablet(s) Oral daily  nystatin Powder 1 Application(s) Topical two times a day  pantoprazole    Tablet 40 milliGRAM(s) Oral before breakfast  rifAXIMin 550 milliGRAM(s) Oral two times a day  thiamine Injectable 100 milliGRAM(s) IntraMuscular daily  tigecycline IVPB 50 milliGRAM(s) IV Intermittent every 12 hours    MEDICATIONS  (PRN):  aluminum hydroxide/magnesium hydroxide/simethicone Suspension 30 milliLiter(s) Oral every 4 hours PRN Dyspepsia  dextrose Oral Gel 15 Gram(s) Oral once PRN Blood Glucose LESS THAN 70 milliGRAM(s)/deciliter  LORazepam   Injectable 2 milliGRAM(s) IV Push every 4 hours PRN CIWA-Ar score 8 or greater  traMADol 25 milliGRAM(s) Oral every 8 hours PRN Moderate Pain (4 - 6)    CAPILLARY BLOOD GLUCOSE      POCT Blood Glucose.: 200 mg/dL (20 Oct 2022 11:55)  POCT Blood Glucose.: 109 mg/dL (20 Oct 2022 08:04)  POCT Blood Glucose.: 99 mg/dL (19 Oct 2022 21:42)  POCT Blood Glucose.: 113 mg/dL (19 Oct 2022 16:35)    I&O's Summary      PHYSICAL EXAM:  Vital Signs Last 24 Hrs  T(C): 37.6 (20 Oct 2022 04:52), Max: 37.6 (20 Oct 2022 04:52)  T(F): 99.7 (20 Oct 2022 04:52), Max: 99.7 (20 Oct 2022 04:52)  HR: 115 (20 Oct 2022 04:52) (115 - 119)  BP: 136/84 (20 Oct 2022 04:52) (127/76 - 140/71)  BP(mean): --  RR: 18 (20 Oct 2022 04:52) (18 - 18)  SpO2: 98% (20 Oct 2022 04:52) (97% - 98%)    Parameters below as of 20 Oct 2022 04:52  Patient On (Oxygen Delivery Method): room air      general AAOX1 NAD  HEENT icteric and very jaundice   abd very distended and ascites   extrem no edema   neurology grossly intact without significant focal deficit    LABS:                        8.2    54.97 )-----------( 391      ( 20 Oct 2022 08:05 )             25.4     10-20    135  |  106  |  5<L>  ----------------------------<  94  3.2<L>   |  16<L>  |  0.56    Ca    8.0<L>      20 Oct 2022 08:05  Phos  2.8     10-20  Mg     1.6     10-20    TPro  5.2<L>  /  Alb  1.4<L>  /  TBili  17.7<H>  /  DBili  x   /  AST  82<H>  /  ALT  17  /  AlkPhos  222<H>  10-20              COVID-19 PCR: NotDetec (12 Oct 2022 05:35)  COVID-19 PCR: NotDetec (05 Oct 2022 11:00)  SARS-CoV-2: NotDetec (02 Oct 2022 16:00)       Patient is a 27y old  Male who presents with a chief complaint of Altered mental status       SUBJECTIVE / OVERNIGHT EVENTS: events noted. Still AMS. More alert and active today. ?anorexia, pt not eating according to PCA      MEDICATIONS  (STANDING):  dextrose 5% + sodium chloride 0.9%. 1000 milliLiter(s) (75 mL/Hr) IV Continuous <Continuous>  dextrose 5%. 1000 milliLiter(s) (50 mL/Hr) IV Continuous <Continuous>  dextrose 5%. 1000 milliLiter(s) (100 mL/Hr) IV Continuous <Continuous>  dextrose 50% Injectable 25 Gram(s) IV Push once  dextrose 50% Injectable 12.5 Gram(s) IV Push once  dextrose 50% Injectable 25 Gram(s) IV Push once  folic acid 1 milliGRAM(s) Oral <User Schedule>  glucagon  Injectable 1 milliGRAM(s) IntraMuscular once  heparin   Injectable 5000 Unit(s) SubCutaneous every 8 hours  insulin lispro (ADMELOG) corrective regimen sliding scale   SubCutaneous Before meals and at bedtime  multivitamin 1 Tablet(s) Oral daily  nystatin Powder 1 Application(s) Topical two times a day  pantoprazole    Tablet 40 milliGRAM(s) Oral before breakfast  rifAXIMin 550 milliGRAM(s) Oral two times a day  thiamine Injectable 100 milliGRAM(s) IntraMuscular daily  tigecycline IVPB 50 milliGRAM(s) IV Intermittent every 12 hours    MEDICATIONS  (PRN):  aluminum hydroxide/magnesium hydroxide/simethicone Suspension 30 milliLiter(s) Oral every 4 hours PRN Dyspepsia  dextrose Oral Gel 15 Gram(s) Oral once PRN Blood Glucose LESS THAN 70 milliGRAM(s)/deciliter  LORazepam   Injectable 2 milliGRAM(s) IV Push every 4 hours PRN CIWA-Ar score 8 or greater  traMADol 25 milliGRAM(s) Oral every 8 hours PRN Moderate Pain (4 - 6)    CAPILLARY BLOOD GLUCOSE      POCT Blood Glucose.: 200 mg/dL (20 Oct 2022 11:55)  POCT Blood Glucose.: 109 mg/dL (20 Oct 2022 08:04)  POCT Blood Glucose.: 99 mg/dL (19 Oct 2022 21:42)  POCT Blood Glucose.: 113 mg/dL (19 Oct 2022 16:35)    I&O's Summary      PHYSICAL EXAM:  Vital Signs Last 24 Hrs  T(C): 37.6 (20 Oct 2022 04:52), Max: 37.6 (20 Oct 2022 04:52)  T(F): 99.7 (20 Oct 2022 04:52), Max: 99.7 (20 Oct 2022 04:52)  HR: 115 (20 Oct 2022 04:52) (115 - 119)  BP: 136/84 (20 Oct 2022 04:52) (127/76 - 140/71)  BP(mean): --  RR: 18 (20 Oct 2022 04:52) (18 - 18)  SpO2: 98% (20 Oct 2022 04:52) (97% - 98%)    Parameters below as of 20 Oct 2022 04:52  Patient On (Oxygen Delivery Method): room air    PE:  general AAOX1 NAD  HEENT icteric and jaundice improved today  abd very distended and ascites   extrem no edema   neurology grossly intact without significant focal deficit    LABS:                        8.2    54.97 )-----------( 391      ( 20 Oct 2022 08:05 )             25.4     10-20    135  |  106  |  5<L>  ----------------------------<  94  3.2<L>   |  16<L>  |  0.56    Ca    8.0<L>      20 Oct 2022 08:05  Phos  2.8     10-20  Mg     1.6     10-20    TPro  5.2<L>  /  Alb  1.4<L>  /  TBili  17.7<H>  /  DBili  x   /  AST  82<H>  /  ALT  17  /  AlkPhos  222<H>  10-20              COVID-19 PCR: NotDetec (12 Oct 2022 05:35)  COVID-19 PCR: NotDetec (05 Oct 2022 11:00)  SARS-CoV-2: NotDetec (02 Oct 2022 16:00)    Other:    Flow Cytometry . (10.18.22 @ 10:20)   TM Interpretation:   Flow Cytometry Final Report   ________________________________________________________________________   Specimen: Peripheral Blood   Date Collected: 10/18/2022 10:20   Date Received: 10/19/2022 5:44   Date Processed: 10/19/2022 06:49   Date Reported: 10/20/2022 16:31   Accession #: [id number]   ________________________________________________________________________   CLINICAL DATA: Rule out Leukemia, Lymphoma   ________________________________________________________________________   CD45/Side Scatter Differential   Granulocytes: 92 % ; Lymphocytes: 2 % ; Monocytes: 4 % ; Dim CD45 and/or blast gate: 1 % ;   Erythroid/debris: 0 %   ________________________________________________________________________   DIAGNOSIS:   Peripheral blood:   - The lymphocyte immunophenotypic findings show no diagnostic abnormalities.   - The myeloid immunophenotypic findings show increased myeloid population with normal myeloid   granularity, no increase in myeloid immaturity, and normal myelomonocytic antigen maturation   pattern.   Please see interpretation.   INTERPRETATION:   MORPHOLOGY: Marked leukocytosis with neutrophilia   IMMUNOPHENOTYPE: _   Lymphocytes (2% of cells): Heterogeneous population of T-cells (with normal CD4 to CD8 ratio),   natural killer cells, and rare polytypic B-cells.   CD45/side scatter shows no blast population. There is no increase in CD34, CD14/CD64 or    positive cells. Myeloid population is increased with normal granularity. Myeloid antigen pattern is   normal with CD13, CD16, CD11b, CD15, and CD33. Monocytes show normal antigen pattern.

## 2022-10-20 NOTE — PROGRESS NOTE ADULT - PROBLEM SELECTOR PLAN 7
-  Most likely due to poor P.O intake and acute liver failure  -  Replaced with supplement for  hypokalemia  -  Potassium 3.3   -  supplementation ordered  -  Repeat chemistry in AM oct 4th- 1 episode of melena   S/P EGD/Sigmoidoscopy with large rectal Dieulafoy lesion, clipped and cauterized  no overt bleeding  hgb 8.3  monitor cbc  GI following

## 2022-10-20 NOTE — PROGRESS NOTE ADULT - PROBLEM SELECTOR PLAN 3
-  Metabolic encephalopathy secondary to alcohol hepatitis   -  discontinue lactulose - Patient numerous loose BM having diarrhea  -  Continue with rifaximin   -  Transaminitis- unchanged  -  Severe hepatomegaly w/mild ascites on CT abdomen w/gall bladder sludge  -  Lipase level normal  -  Hepatitis panel negative   -  Hepatic USG for characterization of gall bladder attempted however patient was very agitated  -  EGD on 10/5 with GI Dr. Johnson showed Large rectal Dieulafoy lesion. Hemostasis achieved with over-the-scope clip  -  Foreign body noted in abdominal xray -  clip placed by GI   -  RUQ US without ascites, large fatty liver noted on 10/10 -  Secondary Alcohol Hepatitis   -  U-tox negative for salicylate and acetaminophen levels  -  Hepatitis panel negative  -  bili - elevated  -  prednisone  40mg QD x 28 days ( 10/8/2022~)   -  Elevated INR and hypoalbuminemia in the setting of liver dysfunction  -  s/p vitamin K for 3 days  -  INR 2.18  -  Hepatology Dr. Gonzalez

## 2022-10-20 NOTE — PROGRESS NOTE ADULT - SUBJECTIVE AND OBJECTIVE BOX
Patient for possible liver biopsy  in IR tomorrow.  Please keep patient NPO, send early AM labs including CBC, BMP, and PT, INR / PTT.  Hold all anticoagulation, NSAIDS, and antiplatelet medication.  Please send a Covid PCR today.
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
## 2022-10-20 NOTE — PROGRESS NOTE ADULT - NUTRITIONAL ASSESSMENT
Diet, Minced and Moist (10-08-22 @ 09:34) [Active]    Leucocytosis without fever rising, likely Leukemoid reaction vs steroid induced vs GI infection vs possible C. diff colitis  Diarrhea Laxative induced vs infectious Colitis  Acute Metabolic Encephalopathy due to suspected  Alcoholic hepatitis with baseline Brain trauma and Craniotomy as well as chronic alcohol abuse  Electrolyte imbalances: Hypokalemia/ Hypomagnesemia  Severe Protein calorie malnutrition   Incidental Rectal Foreign body clamp which was placed in a Dieulafoy ulcer at the time of a hemorrhoidal bleed.   Alcohol withdrawal seizures on admission with Hx Chronic alcohol abuse  Gaseous Bowel distension possibly related to Lactulose    Plan:  Rising WBC without fever suspect combination of delayed clearance of steroids hepatically as well as some reactive leucocytosis  Concern for C. diff given patient has received a course of Zosyn followed by Ceftriaxone followed by Merrem. No fever  ID follow up appreciated; d/w Dr. Puentes' spoke with Infecvtion prevention and advised to send Stoool C. diff  Continue Tigecycline to cover intra abdominal pathology which may also cover C. difficile  Hepatology follow up appreciated; d/w Dr. Saleh; will consider resuming Prednisolone trial if infection is ruled out and okay with ID  Continue CIWA protocol/ PRN ativan  Continue Thiamine IM as Alcohol withdrawal thiamine deficiency related encephalopathy possibly. ;  Alsocontinue [id number] BID.   Monitor mental status closely; Follow up repeat cultures so far negative  GI follow up; H/H remain stable;   Hematology follow up; d/w Dr. Skinner; Follow up Flow cytometry (resting)  if no infection may need a BM biopsy   Once Diarrhea resolves and need laxatives will use only Miralax as Lactulose may contribute to the abdominal bloating and distension  Discussed with GUILLERMINA Turner/ Hepatology/ ID and RN  Patient does not have capacity to sign out AMA in case he is adamant on leaving. Diet, Minced and Moist (10-08-22 @ 09:34) [Active]
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
## 2022-10-20 NOTE — PROGRESS NOTE ADULT - PROBLEM SELECTOR PLAN 8
oct 4th- 1 episode of melena   S/P EGD/Sigmoidoscopy with large rectal Dieulafoy lesion, clipped and cauterized  no overt bleeding  hgb 8.3  monitor cbc  GI following -  Due to liver failure  -  s/p transfusion  -  No overt s/s of bleeding   -  Monitor CBC daily

## 2022-10-20 NOTE — PROGRESS NOTE ADULT - PROBLEM SELECTOR PLAN 6
- c/w  CIWA   -  No prior hx of seizures  -  Continue with  thiamine, folic acid and MVI   -  Continue Lorazepam PRN for agitation  -  Maintain fall precaution   -  Maintain aspiration precaution  -  Maintain seizure precaution -  Most likely due to poor P.O intake and acute liver failure  -  Replaced with supplement for  hypokalemia  -  Potassium 3.3   -  supplementation ordered  -  Repeat chemistry in AM

## 2022-10-20 NOTE — PROGRESS NOTE ADULT - SUBJECTIVE AND OBJECTIVE BOX
NP Note discussed with  primary attending    Patient is a 27y old  Male who presents with a chief complaint of Altered mental status (20 Oct 2022 15:20)      INTERVAL HPI/OVERNIGHT EVENTS: no new complaints    MEDICATIONS  (STANDING):  dextrose 5% + sodium chloride 0.9%. 1000 milliLiter(s) (75 mL/Hr) IV Continuous <Continuous>  dextrose 5%. 1000 milliLiter(s) (50 mL/Hr) IV Continuous <Continuous>  dextrose 5%. 1000 milliLiter(s) (100 mL/Hr) IV Continuous <Continuous>  dextrose 50% Injectable 25 Gram(s) IV Push once  dextrose 50% Injectable 12.5 Gram(s) IV Push once  dextrose 50% Injectable 25 Gram(s) IV Push once  folic acid 1 milliGRAM(s) Oral <User Schedule>  glucagon  Injectable 1 milliGRAM(s) IntraMuscular once  heparin   Injectable 5000 Unit(s) SubCutaneous every 8 hours  insulin lispro (ADMELOG) corrective regimen sliding scale   SubCutaneous Before meals and at bedtime  multivitamin 1 Tablet(s) Oral daily  nystatin Powder 1 Application(s) Topical two times a day  pantoprazole    Tablet 40 milliGRAM(s) Oral before breakfast  phytonadione   Solution 5 milliGRAM(s) Oral daily  thiamine Injectable 100 milliGRAM(s) IntraMuscular daily    MEDICATIONS  (PRN):  aluminum hydroxide/magnesium hydroxide/simethicone Suspension 30 milliLiter(s) Oral every 4 hours PRN Dyspepsia  dextrose Oral Gel 15 Gram(s) Oral once PRN Blood Glucose LESS THAN 70 milliGRAM(s)/deciliter  LORazepam   Injectable 2 milliGRAM(s) IV Push every 4 hours PRN CIWA-Ar score 8 or greater  traMADol 25 milliGRAM(s) Oral every 8 hours PRN Moderate Pain (4 - 6)      __________________________________________________  REVIEW OF SYSTEMS:    CONSTITUTIONAL: No fever,   EYES: no acute visual disturbances  NECK: No pain or stiffness  RESPIRATORY: No cough; No shortness of breath  CARDIOVASCULAR: No chest pain, no palpitations  GASTROINTESTINAL: No pain. No nausea or vomiting; No diarrhea   NEUROLOGICAL: No headache or numbness, no tremors  MUSCULOSKELETAL: No joint pain, no muscle pain  GENITOURINARY: no dysuria, no frequency, no hesitancy  PSYCHIATRY: no depression , no anxiety  ALL OTHER  ROS negative        Vital Signs Last 24 Hrs  T(C): 36.5 (20 Oct 2022 13:40), Max: 37.6 (20 Oct 2022 04:52)  T(F): 97.7 (20 Oct 2022 13:40), Max: 99.7 (20 Oct 2022 04:52)  HR: 126 (20 Oct 2022 13:40) (115 - 126)  BP: 135/64 (20 Oct 2022 13:40) (127/76 - 140/71)  BP(mean): --  RR: 18 (20 Oct 2022 13:40) (18 - 18)  SpO2: 100% (20 Oct 2022 13:40) (97% - 100%)    Parameters below as of 20 Oct 2022 13:40  Patient On (Oxygen Delivery Method): room air        ________________________________________________  PHYSICAL EXAM:  GENERAL: NAD  HEENT: Normocephalic;  conjunctivae and sclerae clear; moist mucous membranes;   NECK : supple  CHEST/LUNG: Clear to ausculitation bilaterally with good air entry   HEART: S1 S2  regular; no murmurs, gallops or rubs  ABDOMEN: Soft, Nontender, Nondistended; Bowel sounds present  EXTREMITIES: no cyanosis; no edema; no calf tenderness  SKIN: warm and dry; no rash  NERVOUS SYSTEM:  Awake and alert; Oriented  to place, person and time ; no new deficits    _________________________________________________  LABS:                        8.2    54.97 )-----------( 391      ( 20 Oct 2022 08:05 )             25.4     10-20    135  |  106  |  5<L>  ----------------------------<  94  3.2<L>   |  16<L>  |  0.56    Ca    8.0<L>      20 Oct 2022 08:05  Phos  2.8     10-20  Mg     1.6     10-20    TPro  5.2<L>  /  Alb  1.4<L>  /  TBili  17.7<H>  /  DBili  x   /  AST  82<H>  /  ALT  17  /  AlkPhos  222<H>  10-20        CAPILLARY BLOOD GLUCOSE      POCT Blood Glucose.: 200 mg/dL (20 Oct 2022 11:55)  POCT Blood Glucose.: 109 mg/dL (20 Oct 2022 08:04)  POCT Blood Glucose.: 99 mg/dL (19 Oct 2022 21:42)  POCT Blood Glucose.: 113 mg/dL (19 Oct 2022 16:35)        RADIOLOGY & ADDITIONAL TESTS:    Imaging Personally Reviewed:  YES/NO    Consultant(s) Notes Reviewed:   YES/ No    Care Discussed with Consultants :     Plan of care was discussed with patient and /or primary care giver; all questions and concerns were addressed and care was aligned with patient's wishes.

## 2022-10-20 NOTE — PROGRESS NOTE ADULT - PROBLEM SELECTOR PLAN 2
- UA negative, CXR with no consolidations, covid 19 pcr neg   -  s/p Meropenem   -  Tigecycline 50mg IV ordered Q12 hours as per Dr Puentes  -  Blood cultures from 10/16  -  NGTD  - f/u C-diff result -  Metabolic encephalopathy secondary to alcohol hepatitis   - liver biopsy tomorrow 10/21  -  discontinue lactulose - Patient numerous loose BM having diarrhea  -  Transaminitis- unchanged  -  Severe hepatomegaly w/mild ascites on CT abdomen w/gall bladder sludge  -  Lipase level normal  -  Hepatitis panel negative   -  Hepatic USG for characterization of gall bladder attempted however patient was very agitated  -  EGD on 10/5 with GI Dr. Johnson showed Large rectal Dieulafoy lesion. Hemostasis achieved with over-the-scope clip  -  Foreign body noted in abdominal xray -  clip placed by GI   -  RUQ US without ascites, large fatty liver noted on 10/10

## 2022-10-20 NOTE — PROGRESS NOTE ADULT - NS ATTEND AMEND GEN_ALL_CORE FT
pt seen and examined at bedside. Chart reviewed and agreed with ACP 's A/p. pt somewhat better clinically. C.diff negative abd WBC 54K. flowcytometry showed no major pathology. BCR/ABL fish sent. pt also scheduled for liver biopsy am. case d/w attending on case. I will be off service and Dr. Lambert will take over this pt's care tomorrow. we will f/u

## 2022-10-20 NOTE — PROGRESS NOTE ADULT - PROBLEM SELECTOR PLAN 1
-  WBC trending up 46.41 --->50.9-->54  - Leukemoid reaction vs steroid induced vs GI infection   - C. diff negative   - completed multiple courses of antibiotics   - antibiotic discontinued  -  flow cytometry and AFP pending (unable to reach family for consent)   -  CEA normal  - heme/onc following  -  ID:  Dr Puentes

## 2022-10-20 NOTE — PROGRESS NOTE ADULT - NS ATTEND AMEND GEN_ALL_CORE FT
Patient seen and examined earlier this morning around 11 AM; Spoke with Patient via  [place]     26 yo Turkmen speaking Male, with no known PMH s/p Left temporal parietal craniotomy (head trauma 6 months ago) went to rehab and returned home. He was able to work after the accident. presenting to the ED with altered mental status. Patient had seizures at home prior to presentation as per Brother. Patient with chronic alcohol use but not drank for 2 days prior to presentation.. Patient was noted to be jaundice developed acutely over the last 1 week as per family. Patient admitted to the ICU for severe metabolic derangements, CIWA protocol with PRN ativan. Patient was started on PO lactulose and IVF. Lactate trended down to normal levels. GI and Hepatology was consulted for transaminitis and hyperbilirubinemia. Patient was started on zosyn for possible cholangitis. CT abd showed hepatomegaly with biliary sludge and mild ascites recommended MRCP once patient is more stable. Patient had episode of melena overnight on October 4th. Hb was 7.3. Patient was made NPO, started on Protonix drip and octreotide drip. GI did EGD which did not show any pathology. Flexible sigmoidoscopy showed rectal varices which was cauterized. Patient received one unit PRBC and one unit FFP. Patient was switched to ceftriaxone for SBP prophylaxis per ID. He was started on NAC per hepatology which was discontinued He was started on Vit K for 3 days for elevated INR. He was started on prednisone 40mg daily for 28 days for alcoholic hepatitis but was discontinued after a week due to poor response as per Hepatology and patient having a fever spike.  Patients diet was advanced and downgraded to medical floor.     Patient hospital course further complicated by leukocytosis which is rising despite adequate broad spectrum antibiotics for many days as well as profuse watery diarrhea  but also was on Laxatives with Miralax and Lactulose; No appreciable fever; Despite holding all laxatives last 24 hrs, still with loose stools although somewhat more formed. ID spoke with Infection control RN and advised to send C. diff for definitive treatment plan.     Patient is clinically improved ; OOB to chair; much more alert and awake; still confused with . Eating better as per PCA. had a fall last night.  He wants to leave but unclear where he wants to head towards. [address]     Vital Signs Last 24 Hrs  T(C): 36.5 (20 Oct 2022 13:40), Max: 37.6 (20 Oct 2022 04:52)  T(F): 97.7 (20 Oct 2022 13:40), Max: 99.7 (20 Oct 2022 04:52)  HR: 126 (20 Oct 2022 13:40) (115 - 126)  BP: 135/64 (20 Oct 2022 13:40) (135/64 - 140/71)  RR: 18 (20 Oct 2022 13:40) (18 - 18)  SpO2: 100% (20 Oct 2022 13:40) (97% - 100%) room air    P/E: limited exam  Psych: Alert and awake, young overweight male, confused oriented x 1-2  Neuro: No gross focal deficits  CVS: S1S2 present, regular  Resp: BLAE+, No wheeze or rhonchi appreciated  Abdomen:  hepatomegaly, soft, distended protuberant abdomen; non tender    Labs:                           8.2    54.97 )-----------( 391      ( 20 Oct 2022 08:05 )             25.4   10-20    135  |  106  |  5<L>  ----------------------------<  94  3.2<L>   |  16<L>  |  0.56    Ca    8.0<L>      20 Oct 2022 08:05  Phos  2.8     10-20  Mg     1.6     10-20  TPro  5.2<L>  /  Alb  1.4<L>  /  TBili  17.7<H>  /  DBili  x   /  AST  82<H>  /  ALT  17  /  AlkPhos  222<H>  10-20    Clostridium difficile Toxin by PCR (10.19.22 @ 11:30) C Diff by PCR Result: NotDetec     Culture - Blood (10.16.22 @ 15:05)   Specimen Source: .Blood Blood Culture Results: No growth to date.   Culture - Blood (10.16.22 @ 15:00)   Specimen Source: .Blood Blood  Culture Results: No growth to date.     CT Abdomen and Pelvis No Cont (10.16.22 @ 11:03)   IMPRESSION:  1.  Marked hepatomegaly/steatosis, unchanged.  2.  Ascites/mesenteric edema and subcutaneous edema, minimally progressed.  3.  Equivocal ascending colonic mural prominence raises the possibility of portal colopathy or inflammation  4.  Underdistended duodenum with increased fat stranding about distal duodenal sweep, nonspecific (coronal 4/68). Clinical correlation with   regard to duodenal inflammation recommended.  5.  Undetermined rectosigmoid foreign body/medical hardware (2/54, CT  image)    US Abdomen Limited (10.20.22 @ 13:05)     FINDINGS: There is severe enlargement of the liver with right lobe measuring approximately 31 cm. Diffusely increased hepatic echogenicity   is seen consistent with a fatty liver. No focal hepatic mass lesion is identified.  Main portal vein demonstrates normal flow. Hepatic veins are partially delineated with color Doppler.  There is evidence of mild ascites.    IMPRESSION:  Severe hepatomegaly and steatosis. Mild ascites.    D/D:  Leucocytosis without fever rising, likely Leukemoid reaction vs steroid induced (less likely) vs GI infection vs possible C. diff colitis ruled out  Diarrhea Laxative induced vs infectious Colitis  Acute Metabolic Encephalopathy due to suspected  Alcoholic hepatitis with baseline Brain trauma and Craniotomy as well as chronic alcohol abuse  Electrolyte imbalances: Hypokalemia/ Hypomagnesemia  Severe Protein calorie malnutrition   Incidental Rectal Foreign body clamp which was placed in a Dieulafoy ulcer at the time of a hemorrhoidal bleed.   Alcohol withdrawal seizures on admission with Hx Chronic alcohol abuse  Gaseous Bowel distension possibly related to Lactulose    Plan:  Rising WBC without fever suspect  reactive leucocytosis concerning for Leukemoid reaction/ Myeloproliferation  C.diff negative; D/C Tigecycline; d/w ID Dr. Puentes; ID is unable to clear at this time for steroids given persistent leucocytosis  Hepatology follow up appreciated; d/w Dr. Saleh; will consider resuming Prednisolone trial if infection is ruled out and okay with ID  Hepatology spoke with IR and agree with Liver Biopsy if labs acceptable  Will give Vitamin K 5mg daily for 3 days. Check INR AM  Spoke with USG and repeated a limited USG; No significant ascites to drain; Large Hepatomegaly. Also no clear evidence of Portal circulation obstruction; no clear evidence of Budd Chiari.   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   Monitor mental status closely; Follow up repeat cultures so far negative  GI follow up; H/H remain stable;   Hematology follow up; d/w Dr. Skinner and NALINI Aguilar;  Flow cytometry WNL;  ordered BCR/ ABL    Discussed with ACP Yue/ Hepatology/ ID and RN  Patient does not have capacity to sign out AMA in case he is adamant on leaving Patient seen and examined earlier this morning around 11 AM;    26 yo Setswana speaking Male, with no known PMH s/p Left temporal parietal craniotomy (head trauma 6 months ago) went to rehab and returned home. He was able to work after the accident. presenting to the ED with altered mental status. Patient had seizures at home prior to presentation as per Brother. Patient with chronic alcohol use but not drank for 2 days prior to presentation.. Patient was noted to be jaundice developed acutely over the last 1 week as per family. Patient admitted to the ICU for severe metabolic derangements, CIWA protocol with PRN ativan. Patient was started on PO lactulose and IVF. Lactate trended down to normal levels. GI and Hepatology was consulted for transaminitis and hyperbilirubinemia. Patient was started on zosyn for possible cholangitis. CT abd showed hepatomegaly with biliary sludge and mild ascites recommended MRCP once patient is more stable. Patient had episode of melena overnight on October 4th. Hb was 7.3. Patient was made NPO, started on Protonix drip and octreotide drip. GI did EGD which did not show any pathology. Flexible sigmoidoscopy showed rectal varices which was cauterized. Patient received one unit PRBC and one unit FFP. Patient was switched to ceftriaxone for SBP prophylaxis per ID. He was started on NAC per hepatology which was discontinued He was started on Vit K for 3 days for elevated INR. He was started on prednisone 40mg daily for 28 days for alcoholic hepatitis but was discontinued after a week due to poor response as per Hepatology and patient having a fever spike.  Patients diet was advanced and downgraded to medical floor.     Patient hospital course further complicated by leukocytosis which is rising despite adequate broad spectrum antibiotics for many days as well as profuse watery diarrhea  but also was on Laxatives with Miralax and Lactulose; No appreciable fever; Despite holding all laxatives last 24 hrs, still with loose stools although somewhat more formed. ID spoke with Infection control RN and advised to send C. diff for definitive treatment plan. C. diff negative.     Patient is clinically improved mentation wise although confused. much more alert and awake; no significant abdominal pain.     Vital Signs Last 24 Hrs  T(C): 36.5 (20 Oct 2022 13:40), Max: 37.6 (20 Oct 2022 04:52)  T(F): 97.7 (20 Oct 2022 13:40), Max: 99.7 (20 Oct 2022 04:52)  HR: 126 (20 Oct 2022 13:40) (115 - 126)  BP: 135/64 (20 Oct 2022 13:40) (135/64 - 140/71)  RR: 18 (20 Oct 2022 13:40) (18 - 18)  SpO2: 100% (20 Oct 2022 13:40) (97% - 100%) room air    P/E: limited exam  Psych: Alert and awake, young overweight male, confused oriented x 1-2  Neuro: No gross focal deficits  CVS: S1S2 present, regular  Resp: BLAE+, No wheeze or rhonchi appreciated  Abdomen:  hepatomegaly, soft, distended protuberant abdomen; non tender    Labs:                           8.2    54.97 )-----------( 391      ( 20 Oct 2022 08:05 )             25.4   10-20    135  |  106  |  5<L>  ----------------------------<  94  3.2<L>   |  16<L>  |  0.56    Ca    8.0<L>      20 Oct 2022 08:05  Phos  2.8     10-20  Mg     1.6     10-20  TPro  5.2<L>  /  Alb  1.4<L>  /  TBili  17.7<H>  /  DBili  x   /  AST  82<H>  /  ALT  17  /  AlkPhos  222<H>  10-20    Clostridium difficile Toxin by PCR (10.19.22 @ 11:30) C Diff by PCR Result: NotDetec     Culture - Blood (10.16.22 @ 15:05)   Specimen Source: .Blood Blood Culture Results: No growth to date.   Culture - Blood (10.16.22 @ 15:00)   Specimen Source: .Blood Blood  Culture Results: No growth to date.     CT Abdomen and Pelvis No Cont (10.16.22 @ 11:03)   IMPRESSION:  1.  Marked hepatomegaly/steatosis, unchanged.  2.  Ascites/mesenteric edema and subcutaneous edema, minimally progressed.  3.  Equivocal ascending colonic mural prominence raises the possibility of portal colopathy or inflammation  4.  Underdistended duodenum with increased fat stranding about distal duodenal sweep, nonspecific (coronal 4/68). Clinical correlation with   regard to duodenal inflammation recommended.  5.  Undetermined rectosigmoid foreign body/medical hardware (2/54, CT  image)    US Abdomen Limited (10.20.22 @ 13:05)     FINDINGS: There is severe enlargement of the liver with right lobe measuring approximately 31 cm. Diffusely increased hepatic echogenicity   is seen consistent with a fatty liver. No focal hepatic mass lesion is identified.  Main portal vein demonstrates normal flow. Hepatic veins are partially delineated with color Doppler.  There is evidence of mild ascites.    IMPRESSION:  Severe hepatomegaly and steatosis. Mild ascites.    D/D:  Leucocytosis without fever rising, likely Leukemoid reaction vs steroid induced (less likely) vs GI infection vs possible C. diff colitis ruled out  Diarrhea Laxative induced vs infectious Colitis improving  Acute Metabolic Encephalopathy due to suspected  Alcoholic hepatitis with baseline Brain trauma and Craniotomy as well as chronic alcohol abuse  Electrolyte imbalances: Hypokalemia/ Hypomagnesemia  Severe Protein calorie malnutrition   Incidental Rectal Foreign body clamp which was placed in a Dieulafoy ulcer at the time of a hemorrhoidal bleed.   Alcohol withdrawal seizures on admission with Hx Chronic alcohol abuse  Gaseous Bowel distension possibly related to Lactulose    Plan:  Rising WBC without fever suspect reactive leucocytosis concerning for Leukemoid reaction/ Myeloproliferation  C.diff negative; D/C Tigecycline; d/w ID Dr. Puentes; ID is unable to clear at this time for steroids given persistent leucocytosis  Hepatology follow up appreciated; d/w Dr. Saleh; will consider resuming Prednisolone trial if infection is ruled out and okay with ID  Hepatology spoke with IR and agree with Liver Biopsy if labs acceptable  Will give Vitamin K 5mg daily for 3 days. Check INR AM  Spoke with USG and repeated a limited USG; No significant ascites to drain; Large Hepatomegaly. Also no clear evidence of Portal circulation obstruction; no clear evidence of Budd Chiari.   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   Monitor mental status closely; Follow up repeat cultures so far negative  GI follow up; H/H remain stable;   Hematology follow up; d/w Dr. Skinner and NALINI Aguilar;  Flow cytometry WNL;  ordered BCR/ ABL    Discussed with ACP Caitlin/ Hepatology/ ID/ Hematology and RN  Patient does not have capacity to sign out AMA in case he is adamant on leaving, not pressing today Patient seen and examined earlier this morning around 11 AM;    26 yo Wolof speaking Male, with no known PMH s/p Left temporal parietal craniotomy (head trauma 6 months ago) went to rehab and returned home. He was able to work after the accident. presenting to the ED with altered mental status. Patient had seizures at home prior to presentation as per Brother. Patient with chronic alcohol use but not drank for 2 days prior to presentation.. Patient was noted to be jaundice developed acutely over the last 1 week as per family. Patient admitted to the ICU for severe metabolic derangements, CIWA protocol with PRN ativan. Patient was started on PO lactulose and IVF. Lactate trended down to normal levels. GI and Hepatology was consulted for transaminitis and hyperbilirubinemia. Patient was started on zosyn for possible cholangitis. CT abd showed hepatomegaly with biliary sludge and mild ascites recommended MRCP once patient is more stable. Patient had episode of melena overnight on October 4th. Hb was 7.3. Patient was made NPO, started on Protonix drip and octreotide drip. GI did EGD which did not show any pathology. Flexible sigmoidoscopy showed rectal varices which was cauterized. Patient received one unit PRBC and one unit FFP. Patient was switched to ceftriaxone for SBP prophylaxis per ID. He was started on NAC per hepatology which was discontinued He was started on Vit K for 3 days for elevated INR. He was started on prednisone 40mg daily for 28 days for alcoholic hepatitis but was discontinued after a week due to poor response as per Hepatology and patient having a fever spike.  Patients diet was advanced and downgraded to medical floor.     Patient hospital course further complicated by leukocytosis which is rising despite adequate broad spectrum antibiotics for many days as well as profuse watery diarrhea  but also was on Laxatives with Miralax and Lactulose; No appreciable fever; Despite holding all laxatives last 24 hrs, still with loose stools although somewhat more formed. ID spoke with Infection control RN and advised to send C. diff for definitive treatment plan. C. diff negative.     Patient is clinically improved mentation wise although confused. much more alert and awake; no significant abdominal pain.     Vital Signs Last 24 Hrs  T(C): 36.5 (20 Oct 2022 13:40), Max: 37.6 (20 Oct 2022 04:52)  T(F): 97.7 (20 Oct 2022 13:40), Max: 99.7 (20 Oct 2022 04:52)  HR: 126 (20 Oct 2022 13:40) (115 - 126)  BP: 135/64 (20 Oct 2022 13:40) (135/64 - 140/71)  RR: 18 (20 Oct 2022 13:40) (18 - 18)  SpO2: 100% (20 Oct 2022 13:40) (97% - 100%) room air    P/E: limited exam  Psych: Alert and awake, young overweight male, confused oriented x 1-2  Neuro: No gross focal deficits  CVS: S1S2 present, regular  Resp: BLAE+, No wheeze or rhonchi appreciated  Abdomen:  hepatomegaly, soft, distended protuberant abdomen; non tender    Labs:                           8.2    54.97 )-----------( 391      ( 20 Oct 2022 08:05 )             25.4   10-20    135  |  106  |  5<L>  ----------------------------<  94  3.2<L>   |  16<L>  |  0.56    Ca    8.0<L>      20 Oct 2022 08:05  Phos  2.8     10-20  Mg     1.6     10-20  TPro  5.2<L>  /  Alb  1.4<L>  /  TBili  17.7<H>  /  DBili  x   /  AST  82<H>  /  ALT  17  /  AlkPhos  222<H>  10-20    Clostridium difficile Toxin by PCR (10.19.22 @ 11:30) C Diff by PCR Result: NotDetec     Culture - Blood (10.16.22 @ 15:05)   Specimen Source: .Blood Blood Culture Results: No growth to date.   Culture - Blood (10.16.22 @ 15:00)   Specimen Source: .Blood Blood  Culture Results: No growth to date.     CT Abdomen and Pelvis No Cont (10.16.22 @ 11:03)   IMPRESSION:  1.  Marked hepatomegaly/steatosis, unchanged.  2.  Ascites/mesenteric edema and subcutaneous edema, minimally progressed.  3.  Equivocal ascending colonic mural prominence raises the possibility of portal colopathy or inflammation  4.  Underdistended duodenum with increased fat stranding about distal duodenal sweep, nonspecific (coronal 4/68). Clinical correlation with   regard to duodenal inflammation recommended.  5.  Undetermined rectosigmoid foreign body/medical hardware (2/54, CT  image)    US Abdomen Limited (10.20.22 @ 13:05)     FINDINGS: There is severe enlargement of the liver with right lobe measuring approximately 31 cm. Diffusely increased hepatic echogenicity   is seen consistent with a fatty liver. No focal hepatic mass lesion is identified.  Main portal vein demonstrates normal flow. Hepatic veins are partially delineated with color Doppler.  There is evidence of mild ascites.    IMPRESSION:  Severe hepatomegaly and steatosis. Mild ascites.    D/D:  Leucocytosis without fever rising, likely Leukemoid reaction vs steroid induced (less likely) vs GI infection vs possible C. diff colitis ruled out  Diarrhea Laxative induced vs infectious Colitis improving  Acute Metabolic Encephalopathy due to suspected  Alcoholic hepatitis with baseline Brain trauma and Craniotomy as well as chronic alcohol abuse  Electrolyte imbalances: Hypokalemia/ Hypomagnesemia  Severe Protein calorie malnutrition   Incidental Rectal Foreign body clamp which was placed in a Dieulafoy ulcer at the time of a hemorrhoidal bleed.   Alcohol withdrawal seizures on admission with Hx Chronic alcohol abuse  Gaseous Bowel distension possibly related to Lactulose    Plan:  Rising WBC without fever suspect reactive leucocytosis concerning for Leukemoid reaction/ Myeloproliferation  C.diff negative; D/C Tigecycline; d/w ID Dr. Puentes; ID is unable to clear at this time for steroids given persistent leucocytosis  Hepatology follow up appreciated; d/w Dr. Saleh; will consider resuming Prednisolone trial if infection is ruled out and okay with ID  Hepatology spoke with IR and agree with Liver Biopsy if labs acceptable  Will give Vitamin K 5mg daily for 3 days. Check INR AM  Spoke with USG and repeated a limited USG; No significant ascites to drain; Large Hepatomegaly. Also no clear evidence of Portal circulation obstruction; no clear evidence of Budd Chiari.   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   Monitor mental status closely; Follow up repeat cultures so far negative  GI follow up; H/H remain stable;   Hematology follow up; d/w Dr. Skinner and NALINI Aguilar;  Flow cytometry WNL;  ordered BCR/ ABL    Discussed with ACP Niya/ Hepatology/ ID/ Hematology and RN  Patient does not have capacity to sign out AMA in case he is adamant on leaving, not pressing today

## 2022-10-20 NOTE — PROGRESS NOTE ADULT - PROBLEM SELECTOR PLAN 5
-  Completed Ceftriaxone  -  Continue Rifaximin 550 mg twice daily  -  septic work up pending from 10/16  -   BC - NGTD  -  Hepatology following  -  ID Dr. Puentes - c/w  CIWA   -  No prior hx of seizures  -  Continue with  thiamine, folic acid and MVI   -  Continue Lorazepam PRN for agitation  -  Maintain fall precaution   -  Maintain aspiration precaution  -  Maintain seizure precaution

## 2022-10-20 NOTE — PROGRESS NOTE ADULT - SUBJECTIVE AND OBJECTIVE BOX
Chief Complaint:  Patient is a 27y old  Male who presents with a chief complaint of Altered mental status (20 Oct 2022 15:26)      Reason for consult:    Interval Events:     Hospital Medications:  aluminum hydroxide/magnesium hydroxide/simethicone Suspension 30 milliLiter(s) Oral every 4 hours PRN  dextrose 5% + sodium chloride 0.9%. 1000 milliLiter(s) IV Continuous <Continuous>  dextrose 5%. 1000 milliLiter(s) IV Continuous <Continuous>  dextrose 5%. 1000 milliLiter(s) IV Continuous <Continuous>  dextrose 50% Injectable 25 Gram(s) IV Push once  dextrose 50% Injectable 12.5 Gram(s) IV Push once  dextrose 50% Injectable 25 Gram(s) IV Push once  dextrose Oral Gel 15 Gram(s) Oral once PRN  folic acid 1 milliGRAM(s) Oral <User Schedule>  glucagon  Injectable 1 milliGRAM(s) IntraMuscular once  insulin lispro (ADMELOG) corrective regimen sliding scale   SubCutaneous Before meals and at bedtime  LORazepam   Injectable 2 milliGRAM(s) IV Push every 4 hours PRN  multivitamin 1 Tablet(s) Oral daily  nystatin Powder 1 Application(s) Topical two times a day  pantoprazole    Tablet 40 milliGRAM(s) Oral before breakfast  phytonadione   Solution 5 milliGRAM(s) Oral daily  potassium chloride    Tablet ER 40 milliEquivalent(s) Oral every 4 hours  thiamine Injectable 100 milliGRAM(s) IntraMuscular daily  traMADol 25 milliGRAM(s) Oral every 8 hours PRN      ROS:   General:  No  fevers, chills, night sweats, fatigue  Eyes:  Good vision, no reported pain  ENT:  No sore throat, pain, runny nose  CV:  No pain, palpitations  Pulm:  No dyspnea, cough  GI:  See HPI, otherwise negative  :  No  incontinence, nocturia  Muscle:  No pain, weakness  Neuro:  No memory problems  Psych:  No insomnia, mood problems, depression  Endocrine:  No polyuria, polydipsia, cold/heat intolerance  Heme:  No petechiae, ecchymosis, easy bruisability  Skin:  No rash    PHYSICAL EXAM:   Vital Signs:  Vital Signs Last 24 Hrs  T(C): 36.5 (20 Oct 2022 13:40), Max: 37.6 (20 Oct 2022 04:52)  T(F): 97.7 (20 Oct 2022 13:40), Max: 99.7 (20 Oct 2022 04:52)  HR: 126 (20 Oct 2022 13:40) (115 - 126)  BP: 135/64 (20 Oct 2022 13:40) (127/76 - 140/71)  BP(mean): --  RR: 18 (20 Oct 2022 13:40) (18 - 18)  SpO2: 100% (20 Oct 2022 13:40) (97% - 100%)    Parameters below as of 20 Oct 2022 13:40  Patient On (Oxygen Delivery Method): room air      Daily     Daily     GENERAL: no acute distress  NEURO: alert, no asterixis  HEENT: anicteric sclera, no conjunctival pallor appreciated  CHEST: no respiratory distress, no accessory muscle use  CARDIAC: regular rate, rhythm  ABDOMEN: soft, non-tender, non-distended, no rebound or guarding  EXTREMITIES: warm, well perfused, no edema  SKIN: no lesions noted    LABS: reviewed                        8.2    54.97 )-----------( 391      ( 20 Oct 2022 08:05 )             25.4     10-20    135  |  106  |  5<L>  ----------------------------<  94  3.2<L>   |  16<L>  |  0.56    Ca    8.0<L>      20 Oct 2022 08:05  Phos  2.8     10-20  Mg     1.6     10-20    TPro  5.2<L>  /  Alb  1.4<L>  /  TBili  17.7<H>  /  DBili  x   /  AST  82<H>  /  ALT  17  /  AlkPhos  222<H>  10-20    LIVER FUNCTIONS - ( 20 Oct 2022 08:05 )  Alb: 1.4 g/dL / Pro: 5.2 g/dL / ALK PHOS: 222 U/L / ALT: 17 U/L DA / AST: 82 U/L / GGT: x             Interval Diagnostic Studies: see sunrise for full report   Chief Complaint:  Patient is a 27y old  Male who presents with a chief complaint of Altered mental status (20 Oct 2022 15:26)      Reason for consult: Liver failure    Interval Events:   Patient was seen and examined at bedside.  ID [id number]. Remains AAOx2, not oriented in time, but today was able to tell his birthday. Remains afebrile and normotensive but tachycardic.   C diff neg.  Abdomen remains distended, but repeat US abd was reviewed with IR, very small pocket, and mostly pelvic, unavailable for Dx paracentesis. Seems less tender compared to yesterday.  In chart 3 BMs reported o/n, and none for the day. PCA at bedside still reports multiple BMs during day, but small ones.   WBC continued to rise, 55k.  Being observed now off antibiotics, tigecycline was d/c per d/w ID.   Cr remains WNL. Bilirubin, INR without significant improvement, MELD remains 27.   Given no improvement in liver tests, massive hepatomegaly, tentatively scheduled for parenchymal liver biopsy, depending on am labs, but still alcoholic hepatitis is the most likely Dx.       Hospital Medications:  aluminum hydroxide/magnesium hydroxide/simethicone Suspension 30 milliLiter(s) Oral every 4 hours PRN  dextrose 5% + sodium chloride 0.9%. 1000 milliLiter(s) IV Continuous <Continuous>  dextrose 5%. 1000 milliLiter(s) IV Continuous <Continuous>  dextrose 5%. 1000 milliLiter(s) IV Continuous <Continuous>  dextrose 50% Injectable 25 Gram(s) IV Push once  dextrose 50% Injectable 12.5 Gram(s) IV Push once  dextrose 50% Injectable 25 Gram(s) IV Push once  dextrose Oral Gel 15 Gram(s) Oral once PRN  folic acid 1 milliGRAM(s) Oral <User Schedule>  glucagon  Injectable 1 milliGRAM(s) IntraMuscular once  insulin lispro (ADMELOG) corrective regimen sliding scale   SubCutaneous Before meals and at bedtime  LORazepam   Injectable 2 milliGRAM(s) IV Push every 4 hours PRN  multivitamin 1 Tablet(s) Oral daily  nystatin Powder 1 Application(s) Topical two times a day  pantoprazole    Tablet 40 milliGRAM(s) Oral before breakfast  phytonadione   Solution 5 milliGRAM(s) Oral daily  potassium chloride    Tablet ER 40 milliEquivalent(s) Oral every 4 hours  thiamine Injectable 100 milliGRAM(s) IntraMuscular daily  traMADol 25 milliGRAM(s) Oral every 8 hours PRN      ROS:   General:  No  fevers, chills  ENT:  No headache today  CV:  No pain, palpitations  Pulm:  No dyspnea, or cough  GI:  Abdominal distention, reports diffuse abdominal pain, but overall more comfortable than yesterday, 3BM reported o/n, daytime BMs not recorded, per nurse had "few small amount", no overt bleeding  :  No dysuria  Muscle:  No pain, or focal weakness  Neuro:  Awake, alert, oriented only to self and place, not in time.   Skin:  Jaundice. Denies itching.       PHYSICAL EXAM:   Vital Signs:  Vital Signs Last 24 Hrs  T(C): 36.5 (20 Oct 2022 13:40), Max: 37.6 (20 Oct 2022 04:52)  T(F): 97.7 (20 Oct 2022 13:40), Max: 99.7 (20 Oct 2022 04:52)  HR: 126 (20 Oct 2022 13:40) (115 - 126)  BP: 135/64 (20 Oct 2022 13:40) (127/76 - 140/71)  BP(mean): --  RR: 18 (20 Oct 2022 13:40) (18 - 18)  SpO2: 100% (20 Oct 2022 13:40) (97% - 100%)    Parameters below as of 20 Oct 2022 13:40  Patient On (Oxygen Delivery Method): room air      Daily     Daily   GENERAL: no acute distress  NEURO: Awake, alert, oriented only to self and place, not in time.   HEENT: icteric sclera  CHEST: no respiratory distress, no accessory muscle use  CARDIAC: mildly tachycardic  ABDOMEN: soft, distended, no fluid wave, hepatomegaly, mild diffuse tenderness, no rebound or guarding, BS+  EXTREMITIES: warm, well perfused, no edema  SKIN: jaundice, ecchymoses.    LABS: reviewed                        8.2    54.97 )-----------( 391      ( 20 Oct 2022 08:05 )             25.4     10-20    135  |  106  |  5<L>  ----------------------------<  94  3.2<L>   |  16<L>  |  0.56    Ca    8.0<L>      20 Oct 2022 08:05  Phos  2.8     10-20  Mg     1.6     10-20    TPro  5.2<L>  /  Alb  1.4<L>  /  TBili  17.7<H>  /  DBili  x   /  AST  82<H>  /  ALT  17  /  AlkPhos  222<H>  10-20    LIVER FUNCTIONS - ( 20 Oct 2022 08:05 )  Alb: 1.4 g/dL / Pro: 5.2 g/dL / ALK PHOS: 222 U/L / ALT: 17 U/L DA / AST: 82 U/L / GGT: x             Interval Diagnostic Studies: see sunrise for full report

## 2022-10-20 NOTE — PROGRESS NOTE ADULT - PROBLEM SELECTOR PLAN 4
-  Secondary Alcohol Hepatitis   -  U-tox negative for salicylate and acetaminophen levels  -  Hepatitis panel negative  -  bili - elevated  -  prednisone  40mg QD x 28 days ( 10/8/2022~)   -  Elevated INR and hypoalbuminemia in the setting of liver dysfunction  -  s/p vitamin K for 3 days  -  INR 2.18  -  Hepatology Dr. Gonzalez -  Completed Ceftriaxone  -  Continue Rifaximin 550 mg twice daily  -  septic work up pending from 10/16  -   BC - NGTD  -  Hepatology following  -  ID Dr. Puentes

## 2022-10-21 LAB
ALBUMIN SERPL ELPH-MCNC: 1.4 G/DL — LOW (ref 3.5–5)
ALP SERPL-CCNC: 225 U/L — HIGH (ref 40–120)
ALT FLD-CCNC: 16 U/L DA — SIGNIFICANT CHANGE UP (ref 10–60)
ANION GAP SERPL CALC-SCNC: 13 MMOL/L — SIGNIFICANT CHANGE UP (ref 5–17)
APTT BLD: 48.4 SEC — HIGH (ref 27.5–35.5)
AST SERPL-CCNC: 59 U/L — HIGH (ref 10–40)
BILIRUB SERPL-MCNC: 17.6 MG/DL — HIGH (ref 0.2–1.2)
BLD GP AB SCN SERPL QL: SIGNIFICANT CHANGE UP
BLD GP AB SCN SERPL QL: SIGNIFICANT CHANGE UP
BUN SERPL-MCNC: 5 MG/DL — LOW (ref 7–18)
CALCIUM SERPL-MCNC: 8 MG/DL — LOW (ref 8.4–10.5)
CHLORIDE SERPL-SCNC: 108 MMOL/L — SIGNIFICANT CHANGE UP (ref 96–108)
CO2 SERPL-SCNC: 16 MMOL/L — LOW (ref 22–31)
CREAT SERPL-MCNC: 0.52 MG/DL — SIGNIFICANT CHANGE UP (ref 0.5–1.3)
CULTURE RESULTS: SIGNIFICANT CHANGE UP
CULTURE RESULTS: SIGNIFICANT CHANGE UP
DIR ANTIGLOB POLYSPECIFIC INTERPRETATION: SIGNIFICANT CHANGE UP
EGFR: 143 ML/MIN/1.73M2 — SIGNIFICANT CHANGE UP
FIBRINOGEN PPP-MCNC: 411 MG/DL — SIGNIFICANT CHANGE UP (ref 340–550)
GLUCOSE BLDC GLUCOMTR-MCNC: 113 MG/DL — HIGH (ref 70–99)
GLUCOSE BLDC GLUCOMTR-MCNC: 129 MG/DL — HIGH (ref 70–99)
GLUCOSE BLDC GLUCOMTR-MCNC: 132 MG/DL — HIGH (ref 70–99)
GLUCOSE BLDC GLUCOMTR-MCNC: 96 MG/DL — SIGNIFICANT CHANGE UP (ref 70–99)
GLUCOSE SERPL-MCNC: 91 MG/DL — SIGNIFICANT CHANGE UP (ref 70–99)
HCT VFR BLD CALC: 25 % — LOW (ref 39–50)
HCT VFR BLD CALC: 26 % — LOW (ref 39–50)
HGB BLD-MCNC: 8.3 G/DL — LOW (ref 13–17)
HGB BLD-MCNC: 8.6 G/DL — LOW (ref 13–17)
HSV DNA1: SIGNIFICANT CHANGE UP
HSV DNA2: SIGNIFICANT CHANGE UP
HSV1 DNA BLD QL NAA+PROBE: SIGNIFICANT CHANGE UP
HSV2 DNA BLD QL NAA+PROBE: SIGNIFICANT CHANGE UP
INR BLD: 3.16 RATIO — HIGH (ref 0.88–1.16)
MCHC RBC-ENTMCNC: 31.3 PG — SIGNIFICANT CHANGE UP (ref 27–34)
MCHC RBC-ENTMCNC: 31.5 PG — SIGNIFICANT CHANGE UP (ref 27–34)
MCHC RBC-ENTMCNC: 33.1 GM/DL — SIGNIFICANT CHANGE UP (ref 32–36)
MCHC RBC-ENTMCNC: 33.2 GM/DL — SIGNIFICANT CHANGE UP (ref 32–36)
MCV RBC AUTO: 94.3 FL — SIGNIFICANT CHANGE UP (ref 80–100)
MCV RBC AUTO: 95.2 FL — SIGNIFICANT CHANGE UP (ref 80–100)
NRBC # BLD: 0 /100 WBCS — SIGNIFICANT CHANGE UP (ref 0–0)
PLATELET # BLD AUTO: 410 K/UL — HIGH (ref 150–400)
PLATELET # BLD AUTO: 427 K/UL — HIGH (ref 150–400)
POTASSIUM SERPL-MCNC: 3.6 MMOL/L — SIGNIFICANT CHANGE UP (ref 3.5–5.3)
POTASSIUM SERPL-SCNC: 3.6 MMOL/L — SIGNIFICANT CHANGE UP (ref 3.5–5.3)
PROT SERPL-MCNC: 5.2 G/DL — LOW (ref 6–8.3)
PROTHROM AB SERPL-ACNC: 38.1 SEC — HIGH (ref 10.5–13.4)
RBC # BLD: 2.65 M/UL — LOW (ref 4.2–5.8)
RBC # BLD: 2.73 M/UL — LOW (ref 4.2–5.8)
RBC # FLD: 22.4 % — HIGH (ref 10.3–14.5)
RBC # FLD: 22.6 % — HIGH (ref 10.3–14.5)
SARS-COV-2 RNA SPEC QL NAA+PROBE: SIGNIFICANT CHANGE UP
SODIUM SERPL-SCNC: 137 MMOL/L — SIGNIFICANT CHANGE UP (ref 135–145)
SPECIMEN SOURCE: SIGNIFICANT CHANGE UP
SPECIMEN SOURCE: SIGNIFICANT CHANGE UP
WBC # BLD: 60.87 K/UL — CRITICAL HIGH (ref 3.8–10.5)
WBC # FLD AUTO: 60.87 K/UL — CRITICAL HIGH (ref 3.8–10.5)

## 2022-10-21 PROCEDURE — 99233 SBSQ HOSP IP/OBS HIGH 50: CPT

## 2022-10-21 PROCEDURE — 99232 SBSQ HOSP IP/OBS MODERATE 35: CPT

## 2022-10-21 RX ORDER — PHYTONADIONE (VIT K1) 5 MG
10 TABLET ORAL DAILY
Refills: 0 | Status: COMPLETED | OUTPATIENT
Start: 2022-10-21 | End: 2022-10-23

## 2022-10-21 RX ORDER — SODIUM CHLORIDE 9 MG/ML
500 INJECTION INTRAMUSCULAR; INTRAVENOUS; SUBCUTANEOUS ONCE
Refills: 0 | Status: COMPLETED | OUTPATIENT
Start: 2022-10-21 | End: 2022-10-21

## 2022-10-21 RX ADMIN — Medication 100 MILLIGRAM(S): at 12:38

## 2022-10-21 RX ADMIN — Medication 1 MILLIGRAM(S): at 16:58

## 2022-10-21 RX ADMIN — Medication 10 MILLIGRAM(S): at 12:37

## 2022-10-21 RX ADMIN — Medication 1 TABLET(S): at 12:36

## 2022-10-21 RX ADMIN — SODIUM CHLORIDE 75 MILLILITER(S): 9 INJECTION, SOLUTION INTRAVENOUS at 03:50

## 2022-10-21 RX ADMIN — NYSTATIN CREAM 1 APPLICATION(S): 100000 CREAM TOPICAL at 05:23

## 2022-10-21 RX ADMIN — PANTOPRAZOLE SODIUM 40 MILLIGRAM(S): 20 TABLET, DELAYED RELEASE ORAL at 06:13

## 2022-10-21 RX ADMIN — Medication 1 MILLIGRAM(S): at 09:49

## 2022-10-21 RX ADMIN — SODIUM CHLORIDE 1000 MILLILITER(S): 9 INJECTION INTRAMUSCULAR; INTRAVENOUS; SUBCUTANEOUS at 00:23

## 2022-10-21 RX ADMIN — NYSTATIN CREAM 1 APPLICATION(S): 100000 CREAM TOPICAL at 17:57

## 2022-10-21 NOTE — PROGRESS NOTE ADULT - NS ATTEND AMEND GEN_ALL_CORE FT
Patient seen and examined earlier this morning around 11 AM;    28 yo Yakut speaking Male, with no known PMH s/p Left temporal parietal craniotomy (head trauma 6 months ago) went to rehab and returned home. He was able to work after the accident. presenting to the ED with altered mental status. Patient had seizures at home prior to presentation as per Brother. Patient with chronic alcohol use but not drank for 2 days prior to presentation.. Patient was noted to be jaundice developed acutely over the last 1 week as per family. Patient admitted to the ICU for severe metabolic derangements, CIWA protocol with PRN ativan. Patient was started on PO lactulose and IVF. Lactate trended down to normal levels. GI and Hepatology was consulted for transaminitis and hyperbilirubinemia. Patient was started on zosyn for possible cholangitis. CT abd showed hepatomegaly with biliary sludge and mild ascites recommended MRCP once patient is more stable. Patient had episode of melena overnight on October 4th. Hb was 7.3. Patient was made NPO, started on Protonix drip and octreotide drip. GI did EGD which did not show any pathology. Flexible sigmoidoscopy showed rectal varices which was cauterized. Patient received one unit PRBC and one unit FFP. Patient was switched to ceftriaxone for SBP prophylaxis per ID. He was started on NAC per hepatology which was discontinued He was started on Vit K for 3 days for elevated INR. He was started on prednisone 40mg daily for 28 days for alcoholic hepatitis but was discontinued after a week due to poor response as per Hepatology and patient having a fever spike.  Patients diet was advanced and downgraded to medical floor.     Patient hospital course further complicated by leukocytosis which is rising despite adequate broad spectrum antibiotics for many days as well as profuse watery diarrhea  but also was on Laxatives with Miralax and Lactulose; No appreciable fever; Despite holding all laxatives last 24 hrs, still with loose stools although somewhat more formed. ID spoke with Infection control RN and advised to send C. diff for definitive treatment plan. C. diff negative.     Patient is clinically improved mentation wise although confused. much more alert and awake; no significant abdominal pain.       Vital Signs Last 24 Hrs  T(C): 36.2 (21 Oct 2022 12:32), Max: 37.7 (20 Oct 2022 21:20)  T(F): 97.1 (21 Oct 2022 12:32), Max: 99.8 (20 Oct 2022 21:20)  HR: 105 (21 Oct 2022 12:32) (105 - 123)  BP: 120/78 (21 Oct 2022 12:32) (104/86 - 127/74)  RR: 19 (21 Oct 2022 12:32) (19 - 19)  SpO2: 98% (21 Oct 2022 12:32) (98% - 99%)  room air      P/E: limited exam  Psych: Alert and awake, young overweight male, confused oriented x 1-2  Neuro: No gross focal deficits  CVS: S1S2 present, regular  Resp: BLAE+, No wheeze or rhonchi appreciated  Abdomen:  hepatomegaly, soft, distended protuberant abdomen; non tender    Labs:                                      8.6    60.87 )-----------( 427      ( 21 Oct 2022 06:55 )             26.0   10-21    137  |  108  |  5<L>  ----------------------------<  91  3.6   |  16<L>  |  0.52    Ca    8.0<L>      21 Oct 2022 06:55  Phos  2.8     10-20  Mg     1.6     10-20    TPro  5.2<L>  /  Alb  1.4<L>  /  TBili  17.6<H>  /  DBili  x   /  AST  59<H>  /  ALT  16  /  AlkPhos  225<H>  10-21      Clostridium difficile Toxin by PCR (10.19.22 @ 11:30) C Diff by PCR Result: NotDete     Culture - Blood (10.16.22 @ 15:05)   Specimen Source: .Blood Blood Culture Results: No growth to date.   Culture - Blood (10.16.22 @ 15:00)   Specimen Source: .Blood Blood  Culture Results: No growth to date.     CT Abdomen and Pelvis No Cont (10.16.22 @ 11:03)   IMPRESSION:  1.  Marked hepatomegaly/steatosis, unchanged.  2.  Ascites/mesenteric edema and subcutaneous edema, minimally progressed.  3.  Equivocal ascending colonic mural prominence raises the possibility of portal colopathy or inflammation  4.  Underdistended duodenum with increased fat stranding about distal duodenal sweep, nonspecific (coronal 4/68). Clinical correlation with   regard to duodenal inflammation recommended.  5.  Undetermined rectosigmoid foreign body/medical hardware (2/54, CT  image)    US Abdomen Limited (10.20.22 @ 13:05)     FINDINGS: There is severe enlargement of the liver with right lobe measuring approximately 31 cm. Diffusely increased hepatic echogenicity   is seen consistent with a fatty liver. No focal hepatic mass lesion is identified.  Main portal vein demonstrates normal flow. Hepatic veins are partially delineated with color Doppler.  There is evidence of mild ascites.    IMPRESSION:  Severe hepatomegaly and steatosis. Mild ascites.    D/D:  Leucocytosis without fever rising, likely Leukemoid reaction vs steroid induced (less likely) vs GI infection vs possible C. diff colitis ruled out  Diarrhea Laxative induced vs infectious Colitis improving  Acute Metabolic Encephalopathy due to suspected  Alcoholic hepatitis with baseline Brain trauma and Craniotomy as well as chronic alcohol abuse  Electrolyte imbalances: Hypokalemia/ Hypomagnesemia  Severe Protein calorie malnutrition   Incidental Rectal Foreign body clamp which was placed in a Dieulafoy ulcer at the time of a hemorrhoidal bleed.   Alcohol withdrawal seizures on admission with Hx Chronic alcohol abuse  Gaseous Bowel distension possibly related to Lactulose    Plan:  Rising WBC without fever suspect reactive leucocytosis concerning for Leukemoid reaction/ Myeloproliferation  C.diff negative; D/C Tigecycline; d/w ID Dr. Puentes; ID is unable to clear at this time for steroids given persistent leucocytosis  Hepatology follow up appreciated; d/w Dr. Saleh; will consider resuming Prednisolone trial if infection is ruled out and okay with ID  Hepatology spoke with IR and agree with Liver Biopsy if labs acceptable  Will give Vitamin K 5mg daily for 3 days. Check INR AM  Spoke with USG and repeated a limited USG; No significant ascites to drain; Large Hepatomegaly. Also no clear evidence of Portal circulation obstruction; no clear evidence of Budd Chiari.   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   Monitor mental status closely; Follow up repeat cultures so far negative  GI follow up; H/H remain stable;   Hematology follow up; d/w Dr. Skinner and NALINI Aguilar;  Flow cytometry WNL;  ordered BCR/ ABL    Discussed with ACP Caitlin/ Hepatology/ ID/ Hematology and RN  Patient does not have capacity to sign out AMA in case he is adamant on leaving, not pressing today Patient seen and examined earlier this morning around 11 AM; spoke with pt via [place]  ID # [id number] (Mary)    26 yo Slovak speaking Male, with no known PMH s/p Left temporal parietal craniotomy (head trauma 6 months ago) went to rehab and returned home. He was able to work after the accident. presenting to the ED with altered mental status. Patient had seizures at home prior to presentation as per Brother. Patient with chronic alcohol use but not drank for 2 days prior to presentation.. Patient was noted to be jaundice developed acutely over the last 1 week as per family. Patient admitted to the ICU for severe metabolic derangements, CIWA protocol with PRN ativan. Patient was started on PO lactulose and IVF. Lactate trended down to normal levels. GI and Hepatology was consulted for transaminitis and hyperbilirubinemia. Patient was started on zosyn for possible cholangitis. CT abd showed hepatomegaly with biliary sludge and mild ascites recommended MRCP once patient is more stable. Patient had episode of melena overnight on October 4th. Hb was 7.3. Patient was made NPO, started on Protonix drip and octreotide drip. GI did EGD which did not show any pathology. Flexible sigmoidoscopy showed rectal varices which was cauterized. Patient received one unit PRBC and one unit FFP. Patient was switched to ceftriaxone for SBP prophylaxis per ID. He was started on NAC per hepatology which was discontinued He was started on Vit K for 3 days for elevated INR. He was started on prednisone 40mg daily for 28 days for alcoholic hepatitis but was discontinued after a week due to poor response as per Hepatology and patient having a fever spike.  Patients diet was advanced and downgraded to medical floor.     Patient hospital course further complicated by leukocytosis which is rising despite adequate broad spectrum antibiotics for many days as well as profuse watery diarrhea  but also was on Laxatives with Miralax and Lactulose; No appreciable fever; Despite holding all laxatives last 24 hrs, still with loose stools although somewhat more formed. ID spoke with Infection control RN and advised to send C. diff for definitive treatment plan. C. diff negative.     Patient is clinically improved mentation wise although confused. much more alert and awake; no significant abdominal pain.   As per  patient is not clear on his answers and moves from one answer to other which does not make sense  as per ID Dr. Dhaval patient is oriented x2 when she speaks to him face to face in [place]      Vital Signs Last 24 Hrs  T(C): 36.2 (21 Oct 2022 12:32), Max: 37.7 (20 Oct 2022 21:20)  T(F): 97.1 (21 Oct 2022 12:32), Max: 99.8 (20 Oct 2022 21:20)  HR: 105 (21 Oct 2022 12:32) (105 - 123)  BP: 120/78 (21 Oct 2022 12:32) (104/86 - 127/74)  RR: 19 (21 Oct 2022 12:32) (19 - 19)  SpO2: 98% (21 Oct 2022 12:32) (98% - 99%)  room air      P/E: limited exam  Psych: Alert and awake, young overweight male, confused oriented x 1-2  Neuro: No gross focal deficits  CVS: S1S2 present, regular  Resp: BLAE+, No wheeze or rhonchi appreciated  Abdomen:  hepatomegaly, soft, distended protuberant abdomen; non tender    Labs:                                      8.6    60.87 )-----------( 427      ( 21 Oct 2022 06:55 )             26.0   10-21    137  |  108  |  5<L>  ----------------------------<  91  3.6   |  16<L>  |  0.52    Ca    8.0<L>      21 Oct 2022 06:55  Phos  2.8     10-20  Mg     1.6     10-20    TPro  5.2<L>  /  Alb  1.4<L>  /  TBili  17.6<H>  /  DBili  x   /  AST  59<H>  /  ALT  16  /  AlkPhos  225<H>  10-21      Clostridium difficile Toxin by PCR (10.19.22 @ 11:30) C Diff by PCR Result: NotDetec     Culture - Blood (10.16.22 @ 15:05)   Specimen Source: .Blood Blood Culture Results: No growth to date.   Culture - Blood (10.16.22 @ 15:00)   Specimen Source: .Blood Blood  Culture Results: No growth to date.     CT Abdomen and Pelvis No Cont (10.16.22 @ 11:03)   IMPRESSION:  1.  Marked hepatomegaly/steatosis, unchanged.  2.  Ascites/mesenteric edema and subcutaneous edema, minimally progressed.  3.  Equivocal ascending colonic mural prominence raises the possibility of portal colopathy or inflammation  4.  Underdistended duodenum with increased fat stranding about distal duodenal sweep, nonspecific (coronal 4/68). Clinical correlation with   regard to duodenal inflammation recommended.  5.  Undetermined rectosigmoid foreign body/medical hardware (2/54, CT  image)    US Abdomen Limited (10.20.22 @ 13:05)     FINDINGS: There is severe enlargement of the liver with right lobe measuring approximately 31 cm. Diffusely increased hepatic echogenicity   is seen consistent with a fatty liver. No focal hepatic mass lesion is identified.  Main portal vein demonstrates normal flow. Hepatic veins are partially delineated with color Doppler.  There is evidence of mild ascites.    IMPRESSION:  Severe hepatomegaly and steatosis. Mild ascites.    D/D:  Leucocytosis without fever rising, likely Leukemoid reaction vs steroid induced (less likely) vs GI infection vs possible C. diff colitis ruled out  Diarrhea Laxative induced vs infectious Colitis improving  Acute Metabolic Encephalopathy due to suspected  Alcoholic hepatitis with baseline Brain trauma and Craniotomy as well as chronic alcohol abuse  Electrolyte imbalances: Hypokalemia/ Hypomagnesemia  Severe Protein calorie malnutrition   Incidental Rectal Foreign body clamp which was placed in a Dieulafoy ulcer at the time of a hemorrhoidal bleed.   Alcohol withdrawal seizures on admission with Hx Chronic alcohol abuse  Gaseous Bowel distension possibly related to Lactulose    Plan:  Rising WBC without fever suspect reactive leucocytosis concerning for Leukemoid reaction/ Myeloproliferation  C.diff negative; off ABX;  d/w ID Dr. Puentes; ID is unable to clear at this time for steroids given persistent leucocytosis  Hepatology follow up appreciated; d/w Dr. Saleh; will consider resuming Prednisolone trial if infection is ruled out and okay with ID  Rising INR; unable to do Liver biopsy percutaneously as d/w IR MD Dr. Aguilera; may consider [facility]jug[facility] at [facility]/ [place] if INR remain elevated; Will give Vitamin K 10 mg daily for 3 days. Check INR Monday  d/w Dr. Saleh Hepatology; will d/w Hepatology team at [place]. Patient may benefit form liver transplant but continued alcohol use, lack of good family support and insurance issues are obstacles  Repeat limited USG; No significant ascites to drain; Large Hepatomegaly. Also no clear evidence of Portal circulation obstruction; no clear evidence of Budd Chiari.   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   Monitor mental status closely; Follow up repeat cultures so far negative  GI follow up; H/H remain stable;   Hematology follow up  Flow cytometry WNL;  await BCR/ ABL    Discussed with ACP Niya/ Hepatology/ ID and RN  Patient does not have capacity to sign out AMA in case he is adamant on leaving, not pressing today Patient seen and examined earlier this morning around 11 AM; spoke with pt via [place]  ID # [id number] (Mary)    26 yo [place]ian speaking Male, with no known PMH s/p Left temporal parietal craniotomy (head trauma 6 months ago) went to rehab and returned home. He was able to work after the accident. presenting to the ED with altered mental status. Patient had seizures at home prior to presentation as per Brother. Patient with chronic alcohol use but not drank for 2 days prior to presentation.. Patient was noted to be jaundice developed acutely over the last 1 week as per family. Patient admitted to the ICU for severe metabolic derangements, CIWA protocol with PRN ativan. Patient was started on PO lactulose and IVF. Lactate trended down to normal levels. GI and Hepatology was consulted for transaminitis and hyperbilirubinemia. Patient was started on zosyn for possible cholangitis. CT abd showed hepatomegaly with biliary sludge and mild ascites recommended MRCP once patient is more stable. Patient had episode of melena overnight on October 4th. Hb was 7.3. Patient was made NPO, started on Protonix drip and octreotide drip. GI did EGD which did not show any pathology. Flexible sigmoidoscopy showed rectal varices which was cauterized. Patient received one unit PRBC and one unit FFP. Patient was switched to ceftriaxone for SBP prophylaxis per ID. He was started on NAC per hepatology which was discontinued He was started on Vit K for 3 days for elevated INR. He was started on prednisone 40mg daily for 28 days for alcoholic hepatitis but was discontinued after a week due to poor response as per Hepatology and patient having a fever spike.  Patients diet was advanced and downgraded to medical floor.     Patient hospital course further complicated by leukocytosis which is rising despite adequate broad spectrum antibiotics for many days as well as profuse watery diarrhea  but also was on Laxatives with Miralax and Lactulose; No appreciable fever; Despite holding all laxatives last 24 hrs, still with loose stools although somewhat more formed. ID spoke with Infection control RN and advised to send C. diff for definitive treatment plan. C. diff negative.     Patient is clinically improved mentation wise although confused. much more alert and awake; no significant abdominal pain.   As per  patient is not clear on his answers and moves from one answer to other which does not make sense  as per ID Dr. Dhaval patient is oriented x2 when she speaks to him face to face in [place]      Vital Signs Last 24 Hrs  T(C): 36.2 (21 Oct 2022 12:32), Max: 37.7 (20 Oct 2022 21:20)  T(F): 97.1 (21 Oct 2022 12:32), Max: 99.8 (20 Oct 2022 21:20)  HR: 105 (21 Oct 2022 12:32) (105 - 123)  BP: 120/78 (21 Oct 2022 12:32) (104/86 - 127/74)  RR: 19 (21 Oct 2022 12:32) (19 - 19)  SpO2: 98% (21 Oct 2022 12:32) (98% - 99%)  room air      P/E: limited exam  Psych: Alert and awake, young overweight male, confused oriented x 1-2  Neuro: No gross focal deficits  CVS: S1S2 present, regular  Resp: BLAE+, No wheeze or rhonchi appreciated  Abdomen:  hepatomegaly, soft, distended protuberant abdomen; non tender    Labs:                                      8.6    60.87 )-----------( 427      ( 21 Oct 2022 06:55 )             26.0   10-21    137  |  108  |  5<L>  ----------------------------<  91  3.6   |  16<L>  |  0.52    Ca    8.0<L>      21 Oct 2022 06:55  Phos  2.8     10-20  Mg     1.6     10-20    TPro  5.2<L>  /  Alb  1.4<L>  /  TBili  17.6<H>  /  DBili  x   /  AST  59<H>  /  ALT  16  /  AlkPhos  225<H>  10-21      Clostridium difficile Toxin by PCR (10.19.22 @ 11:30) C Diff by PCR Result: NotDetec     Culture - Blood (10.16.22 @ 15:05)   Specimen Source: .Blood Blood Culture Results: No growth to date.   Culture - Blood (10.16.22 @ 15:00)   Specimen Source: .Blood Blood  Culture Results: No growth to date.     CT Abdomen and Pelvis No Cont (10.16.22 @ 11:03)   IMPRESSION:  1.  Marked hepatomegaly/steatosis, unchanged.  2.  Ascites/mesenteric edema and subcutaneous edema, minimally progressed.  3.  Equivocal ascending colonic mural prominence raises the possibility of portal colopathy or inflammation  4.  Underdistended duodenum with increased fat stranding about distal duodenal sweep, nonspecific (coronal 4/68). Clinical correlation with   regard to duodenal inflammation recommended.  5.  Undetermined rectosigmoid foreign body/medical hardware (2/54, CT  image)    US Abdomen Limited (10.20.22 @ 13:05)     FINDINGS: There is severe enlargement of the liver with right lobe measuring approximately 31 cm. Diffusely increased hepatic echogenicity   is seen consistent with a fatty liver. No focal hepatic mass lesion is identified.  Main portal vein demonstrates normal flow. Hepatic veins are partially delineated with color Doppler.  There is evidence of mild ascites.    IMPRESSION:  Severe hepatomegaly and steatosis. Mild ascites.    D/D:  Leucocytosis without fever rising, likely Leukemoid reaction vs steroid induced (less likely) vs GI infection vs possible C. diff colitis ruled out  Diarrhea Laxative induced vs infectious Colitis improving  Acute Metabolic Encephalopathy due to suspected  Alcoholic hepatitis with baseline Brain trauma and Craniotomy as well as chronic alcohol abuse  Electrolyte imbalances: Hypokalemia/ Hypomagnesemia  Severe Protein calorie malnutrition   Incidental Rectal Foreign body clamp which was placed in a Dieulafoy ulcer at the time of a hemorrhoidal bleed.   Alcohol withdrawal seizures on admission with Hx Chronic alcohol abuse  Gaseous Bowel distension possibly related to Lactulose    Plan:  Rising WBC without fever suspect reactive leucocytosis concerning for Leukemoid reaction/ Myeloproliferation  C.diff negative; off ABX;  d/w ID Dr. Puentes; ID is unable to clear at this time for steroids given persistent leucocytosis  Hepatology follow up appreciated; d/w Dr. Saleh; will consider resuming Prednisolone trial if infection is ruled out and okay with ID  Rising INR; unable to do Liver biopsy percutaneously as d/w IR MD Dr. Aguilera; may consider trnasjugular biopsy at [facility]/ [place] if INR remain elevated; Will give Vitamin K 10 mg daily for 3 days. Check INR Monday  d/w Dr. Saleh Hepatology; will d/w Hepatology team at [place]. Patient may benefit form liver transplant but continued alcohol use, lack of good family support and insurance issues are obstacles  Repeat limited USG; No significant ascites to drain; Large Hepatomegaly. Also no clear evidence of Portal circulation obstruction; no clear evidence of Budd Chiari.   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   Monitor mental status closely; Follow up repeat cultures so far negative  GI follow up; H/H remain stable;   Hematology follow up  Flow cytometry WNL;  await BCR/ ABL    Discussed with ACP/ Hepatology/ ID and RN  Patient does not have capacity to sign out AMA in case he is adamant on leaving, not pressing today
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
## 2022-10-21 NOTE — PROGRESS NOTE ADULT - ASSESSMENT
28 yo Egyptian speaking Male, with PMH alcohol abuse, s/p Left temporal parietal craniotomy noted in imaging, presented  with altered mental status and seizure that lasted about 2-4 minutes.  In the ED, pt with low grade fever, tachycardic   Admitted to ICU for severe metabolic derangements, CIWA protocol with PRN ativan. Pt was started on antibiotics and IV fluids  Started on lactulose for elevated amonnia level.  Lactate trended down to normal levels. GI and Hepatology was consulted for transaminitis and hyperbilirubinemia. Patient was started on zosyn for possible cholangitis. CT abd showed hepatomegaly with biliary sludge and mild ascites. MRCP was recommended.  Noted with  Hb  7.3,  started on protonix drip and octreotide drip. Patient received one unit PRBC and one unit FFP.  GI on board recommending EGD which did not show any pathology. Flexible sigmoidoscopy showed rectal varices which was cauterized.  switched to ceftriaxone for SBP prophylaxis per ID.  Vit K for 3 days for elevated INR. He was started on prednisone 40mg daily for 28 days for alcoholic hepatitis.     Patient hospital course further complicated by fever, and rising leucocytosis. ID consulted.  blood culture 10/16 NGTD, stool studies no pathogens, Cdfif negative. CT abdomen and pelvis- 1. Marked hepatomegaly/steatosis, unchanged. 2.  Ascites/mesenteric edema and subcutaneous edema, minimally progressed. 3.  Equivocal ascending colonic mural prominence raises the possibility of portal colopathy or inflammation    Pt seen at bedside, alert, awake, confused to person and place.   WBC trending up 60k today.  BCR/ ABL sent pending results. INR 3.16,  liver biopsy plan for biopsy cancelled due to elevated INT. Vit K po continued   Pt  brother Rosalio updated   at [phone] via
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
## 2022-10-21 NOTE — PROGRESS NOTE ADULT - PROBLEM SELECTOR PLAN 4
-  Completed Ceftriaxone  -  septic work up pending from 10/16  -   BC - NGTD  -  Hepatology following  -  ID Dr. Puentes

## 2022-10-21 NOTE — PROGRESS NOTE ADULT - PROBLEM SELECTOR PLAN 2
-  Metabolic encephalopathy secondary to alcohol hepatitis   - liver biopsy tomorrow 10/21  -  discontinue lactulose - Patient numerous loose BM having diarrhea  -  Transaminitis- unchanged  -  Severe hepatomegaly w/mild ascites on CT abdomen w/gall bladder sludge  -  Lipase level normal  -  Hepatitis panel negative   -  Hepatic USG for characterization of gall bladder attempted however patient was very agitated  -  EGD on 10/5 with GI Dr. Johnson showed Large rectal Dieulafoy lesion. Hemostasis achieved with over-the-scope clip  -  Foreign body noted in abdominal xray -  clip placed by GI   -  RUQ US without ascites, large fatty liver noted on 10/10

## 2022-10-21 NOTE — PROGRESS NOTE ADULT - PROBLEM SELECTOR PLAN 5
- c/w  CIWA   -  No prior hx of seizures  -  Continue with  thiamine, folic acid and MVI   -  Continue Lorazepam PRN for agitation  -  Maintain fall precaution   -  Maintain aspiration precaution  -  Maintain seizure precaution No

## 2022-10-21 NOTE — PROGRESS NOTE ADULT - PROBLEM SELECTOR PLAN 1
-  WBC trending up 46.41 --->50.9-->54-->60k  - Leukemoid reaction vs steroid induced vs GI infection   - C. diff negative   - completed multiple courses of antibiotics   - antibiotic discontinued  -  flow cytometry, AFP, BCR/ ABL  -  CEA normal  -  heme/onc following  -  ID:  Dr Puentes

## 2022-10-21 NOTE — PROGRESS NOTE ADULT - SUBJECTIVE AND OBJECTIVE BOX
Chief Complaint:  Patient is a 27y old  Male who presents with a chief complaint of Altered mental status (21 Oct 2022 17:48)      Reason for consult: Liver failure    Interval Events:   Patient was seen and examined at bedside.   Remains awake, alert, not oriented to time, but to person and place and appears more coherent, and per roommate who has been speaking with him and per d/w primary team he speaks a dialect of Hebrew thus  line does not give the full picture. No asterixis. No need to trend ammonia.  Noted still rising WBC, but afebrile, being monitored off antibiotics, also pending hematology workup. Possibly multifactorial.  Bilirubin stable but INR rising, suggesting worsening liver function, but might be some nutritional component in latter one, c/w vit K trial. B/o coagulopathy, biopsy was deferred.  Hb was stable in am, but noted 2 episodes bloody BM in afternoon, pending repeat CBC. Consider GI f/u.    Hospital Medications:  aluminum hydroxide/magnesium hydroxide/simethicone Suspension 30 milliLiter(s) Oral every 4 hours PRN  dextrose 5% + sodium chloride 0.9%. 1000 milliLiter(s) IV Continuous <Continuous>  dextrose 5%. 1000 milliLiter(s) IV Continuous <Continuous>  dextrose 5%. 1000 milliLiter(s) IV Continuous <Continuous>  dextrose 50% Injectable 25 Gram(s) IV Push once  dextrose 50% Injectable 12.5 Gram(s) IV Push once  dextrose 50% Injectable 25 Gram(s) IV Push once  dextrose Oral Gel 15 Gram(s) Oral once PRN  folic acid 1 milliGRAM(s) Oral <User Schedule>  glucagon  Injectable 1 milliGRAM(s) IntraMuscular once  insulin lispro (ADMELOG) corrective regimen sliding scale   SubCutaneous Before meals and at bedtime  LORazepam   Injectable 2 milliGRAM(s) IV Push every 4 hours PRN  multivitamin 1 Tablet(s) Oral daily  nystatin Powder 1 Application(s) Topical two times a day  pantoprazole    Tablet 40 milliGRAM(s) Oral before breakfast  phytonadione   Solution 10 milliGRAM(s) Oral daily  thiamine Injectable 100 milliGRAM(s) IntraMuscular daily      ROS:   General:  No  fevers, chills  Eyes:  Good vision, no reported pain  ENT:  No sore throat, pain, runny nose  CV:  No pain, palpitations  Pulm:  No dyspnea, cough  GI:  C/o worsened abdominal pain when  ar bedside, cramps prior to BM. Per d/w primary team he was more comfortable earlier during the day. No N/V. Noted 2 bloody BM, no melena.   :  No  dysuria  Muscle:  No pain, or focal weakness  Neuro:  awake, alert, oriented x2, but more coherent.  Heme:  Easy bruisability  Skin:  Few ecchymoses, jaundice    PHYSICAL EXAM:   Vital Signs:  Vital Signs Last 24 Hrs  T(C): 37.1 (21 Oct 2022 20:39), Max: 37.1 (21 Oct 2022 20:39)  T(F): 98.8 (21 Oct 2022 20:39), Max: 98.8 (21 Oct 2022 20:39)  HR: 110 (21 Oct 2022 20:39) (105 - 118)  BP: 111/62 (21 Oct 2022 20:39) (104/86 - 120/78)  BP(mean): --  RR: 18 (21 Oct 2022 20:39) (18 - 19)  SpO2: 98% (21 Oct 2022 20:39) (98% - 99%)    Parameters below as of 21 Oct 2022 20:39  Patient On (Oxygen Delivery Method): room air      Daily     Daily     GENERAL: no acute distress  NEURO: AAOX2, no asterixis  HEENT: icteric sclera, poor dentation  CHEST: no respiratory distress, no accessory muscle use  CARDIAC: mildly tachycardic  ABDOMEN: soft, remains distended, no fluid wave, has hepatomegaly, no rebound or guarding, BS+, mild diffuse tenderness  EXTREMITIES: warm, well perfused, no edema  SKIN: jaundice, few ecchymoses, exfoliating keratolysis on hands ?    LABS: reviewed                        8.6    60.87 )-----------( 427      ( 21 Oct 2022 06:55 )             26.0     10-21    137  |  108  |  5<L>  ----------------------------<  91  3.6   |  16<L>  |  0.52    Ca    8.0<L>      21 Oct 2022 06:55  Phos  2.8     10-20  Mg     1.6     10-20    TPro  5.2<L>  /  Alb  1.4<L>  /  TBili  17.6<H>  /  DBili  x   /  AST  59<H>  /  ALT  16  /  AlkPhos  225<H>  10-21    LIVER FUNCTIONS - ( 21 Oct 2022 06:55 )  Alb: 1.4 g/dL / Pro: 5.2 g/dL / ALK PHOS: 225 U/L / ALT: 16 U/L DA / AST: 59 U/L / GGT: x             Interval Diagnostic Studies: see sunrise for full report

## 2022-10-21 NOTE — PROGRESS NOTE ADULT - ASSESSMENT
Brief hospital course:  27yo Polish speaking Male from [facility], s/p Left temporal parietal craniotomy b/o TBI and alcohol abuse (reported current drinking by family, Peth 399) presented to [facility] ED on 10/2/22 with 1 day Hx of AMS, after reported witnessed seizure episodes and 1 week Hx of jaundice and was admitted to MICU with suspected EtOH withdrawal seizure, electrolyte abnormalities (Na 125, K 2.7, Mg 1.0, P 1.1), lactic acidosis (with lactate 6.4, bicarb 19), leukocytosis with neutrophilia (WBC 20.35, roderick 90%) with low grade T (100.1F), anemia without evidence of overt bleeding (Hb 6.9->8.3 s/p 1 U PRBC) and abnormal liver tests including coagulopathy (INR 2.13), hyperbilirubinemia (Se bi 9.3->10.8, direct 8.0), hyperammonemia (131), elevated liver enzymes in AST> ALT pattern (, ALT 22) and elevated ALP (669) and CT a/p w/ iv 10/2/22 suggesting diffuse hepatic steatosis, hepatomegaly with possible GB sludge and GB wall thickening, but normal bile ducts.  He was started on Alcoholic hepatitis management after infection was ruled out on admission (except that rhinovirus was pos, w/o URI symptoms) and UGIB was ruled out. He had LGIB, 2 rectal Dieulafoy lesions. Completed SBP ppx b/o GIB. His bilirubin remained unchanged after 7 days on steroid (10/8-10/15) + total 3 days NAC, and developed low grade T, worsening leukocytosis up till 30-36k while on steroid (prior to steroid 17k), worsening abdominal pain,  distention and diarrhea despite being off lactulose, thus steroid was held 10/15 and repeat septic workup was sent. GI PCR panel showed STEC, culture was negative. BCx neg 10/16. C. diff neg. He was also started on Zosyn on 10/14, and switched to Meropenem on 10/16 and to Tigacycline on 10/18 per ID, was d/c 10/20.  Had repeat CT abd / pelvis 10/16, had only mild ascites (pelvic, perihepatosplenic), not feasible for Dx paracentesis and  prior it was none.   WBC continued to rise, currently > 60k, possibly multifactorial.  Hematology been following since 10/16 b/o leukocytosis.   Prior liver workup: Hep C ab neg, Hep A IgM neg, HBsAg, HBcAb IgM, HBcAb, HBsAb all neg, Hep E IgM neg, HIV neg, EBV past infection, CMV past infection, leptospirosis neg. LUCIUS neg, SMA neg, LKM neg, AMA neg, IgG and IgM WNL. Ceruloplasmin 25. Fungitell neg.       # Hepatomegaly, Alcoholic hepatitis, s/p 7 days prednisolone 10/8-10/15, and s/p total 3 days NAC, bilirubin 14.1 on 10/8 and 14.1 10/15.  MDF> 32, MELD 28-23-27-->30 (b/o worsening coagulopathy)  # Elevated transaminases in AST>ALT pattern - overall improved (-->59)  # Elevated ALP - improving (669->225)  # Coagulopathy - initially improved but also got vit K and FFP (10/5-7 and 10/5), now up-trending  # Diarrhea off lactulose - improving?  # Abdominal pain  # LGIB  # Anemia - stable  # Worsening leukocytosis from 17k to 60k,   # Hx of Low grade T (T max 100.4 F 10/12 night and 100F 10/14), WNL since then     - Being monitored off steroid since 8/15, (got 7 days), until ID workup completed and infection ruled out with certainty. F/u ID recommendations.   - F/u Strongyloides serology, HSV PCR, can add VZV PCR for completeness.   - F/u hematology workup.  - PLT WNL, only borderline splenomegaly, no or only trace ascites suggests less prominent portal hypertension.  - CT a/p w/ iv 10/2 was reviewed with IR, hepatic vasculature appeared patent. Patent vasculature reported on 10/20 US too.   - Was no biliary obstruction on CT abd, US abd. MRCP was considered on admission, has not been done yet, can consider to obtain, would assess the splanchnic circulation as well.   - Still alcoholic hepatitis remains the working Dx, and might be just poor responder to steroid, but given the rising WBC, the massive hepatomegaly, liver biopsy was considered but was deferred b/o coagulopathy.     - No EV on EGD 10/5/22, but mild portal gastropathy. S/p sigmoidoscopy 10/5 showing 2 rectal Dieulafoy lesions, s/p treatment. Today noted 2 episodes of bloody BM. Repeat CBC pending tonight, keep T/S active, keep Hb > 7, GI follow up, c/w vit K, consider FFP if continued bleeding noted.    - HCC status - no focal hepatic lesion    # AMS in a TBI patient, with EtOH use, initially been on CIWA on admission, also s/p seizures, and with elevated ammonia on admission thus could not r/u HE either  - Monitor mental status, overall improved compared to admission, possibly multifactorial. C/w constant observation. Aspiration, seizure, fall precautions.   - Folic, thiamine.   - Lactulose was held b/o diarrhea and was resumed with parameters per primary team on 10/16. Since 10/18 held again b/o > 6 BM. No need to trend ammonia. Lactulose can worsen his abdominal bloating, thus IF in future need BM regimen (not now) can consider Miralax as alternative. Can consider neurology. Agree w/ holding rifaximin (since 10/20).      # Transplant candidacy: Obstacles: only emergency medicaid per d/w SW, recent EtOH     Thank you for consult  Will continue to monitor. Hepatology returns Monday. Please, consult GI on call if change in status, questions, concerns.  Been d/w [facility] Hepatology service attending.  D/w primary team               Brief hospital course:  25yo Greenlandic speaking Male from [facility], s/p Left temporal parietal craniotomy b/o TBI and alcohol abuse (reported current drinking by family, Peth 399) presented to [facility] ED on 10/2/22 with 1 day Hx of AMS, after reported witnessed seizure episodes and 1 week Hx of jaundice and was admitted to MICU with suspected EtOH withdrawal seizure, electrolyte abnormalities (Na 125, K 2.7, Mg 1.0, P 1.1), lactic acidosis (with lactate 6.4, bicarb 19), leukocytosis with neutrophilia (WBC 20.35, roderick 90%) with low grade T (100.1F), anemia without evidence of overt bleeding (Hb 6.9->8.3 s/p 1 U PRBC) and abnormal liver tests including coagulopathy (INR 2.13), hyperbilirubinemia (Se bi 9.3->10.8, direct 8.0), hyperammonemia (131), elevated liver enzymes in AST> ALT pattern (, ALT 22) and elevated ALP (669) and CT a/p w/ iv 10/2/22 suggesting diffuse hepatic steatosis, hepatomegaly with possible GB sludge and GB wall thickening, but normal bile ducts.  He was started on Alcoholic hepatitis management after infection was ruled out on admission (except that rhinovirus was pos, w/o URI symptoms) and UGIB was ruled out. He had LGIB, 2 rectal Dieulafoy lesions. Completed SBP ppx b/o GIB. His bilirubin remained unchanged after 7 days on steroid (10/8-10/15) + total 3 days NAC, and developed low grade T, worsening leukocytosis up till 30-36k while on steroid (prior to steroid 17k), worsening abdominal pain,  distention and diarrhea despite being off lactulose, thus steroid was held 10/15 and repeat septic workup was sent. GI PCR panel showed STEC, culture was negative. BCx neg 10/16. C. diff neg. He was also started on Zosyn on 10/14, and switched to Meropenem on 10/16 and to Tigacycline on 10/18 per ID, was d/c 10/20.  Had repeat CT abd / pelvis 10/16, had only mild ascites (pelvic, perihepatosplenic), not feasible for Dx paracentesis and  prior it was none.   WBC continued to rise, currently > 60k, possibly multifactorial.  Hematology been following since 10/16 b/o leukocytosis.   Prior liver workup: Hep C ab neg, Hep A IgM neg, HBsAg, HBcAb IgM, HBcAb, HBsAb all neg, Hep E IgM neg, HIV neg, EBV past infection, CMV past infection, leptospirosis neg. LUCIUS neg, SMA neg, LKM neg, AMA neg, IgG and IgM WNL. Ceruloplasmin 25. Fungitell neg.       # Hepatomegaly, Alcoholic hepatitis, s/p 7 days prednisolone 10/8-10/15, and s/p total 3 days NAC, bilirubin 14.1 on 10/8 and 14.1 10/15.  MDF> 32, MELD 28-23-27-->30 (b/o worsening coagulopathy)  # Elevated transaminases in AST>ALT pattern - overall improved (-->59)  # Elevated ALP - improving (669->225)  # Coagulopathy - initially improved but also got vit K and FFP (10/5-7 and 10/5), now up-trending  # Diarrhea off lactulose - improving?  # Abdominal pain  # LGIB  # Anemia - stable  # Worsening leukocytosis from 17k to 60k,   # Hx of Low grade T (T max 100.4 F 10/12 night and 100F 10/14), WNL since then     - Being monitored off steroid since 8/15, (got 7 days), until ID workup completed and infection ruled out with certainty. F/u ID recommendations.   - F/u Strongyloides serology, HSV PCR, can add VZV PCR for completeness.   - F/u hematology workup.  - PLT WNL, only borderline splenomegaly, no or only trace ascites suggests less prominent portal hypertension.  - CT a/p w/ iv 10/2 was reviewed with IR, hepatic vasculature appeared patent. Patent vasculature reported on 10/20 US too.   - Was no biliary obstruction on CT abd, US abd. MRCP was considered on admission, has not been done yet, can consider to obtain, would assess the splanchnic circulation as well.   - Still alcoholic hepatitis remains the working Dx, and might be just poor responder to steroid, but given the rising WBC, the massive hepatomegaly, liver biopsy was considered but was deferred b/o coagulopathy. Nos s/p vit K again, f/u INR.    - No EV on EGD 10/5/22, but mild portal gastropathy. S/p sigmoidoscopy 10/5 showing 2 rectal Dieulafoy lesions, s/p treatment. Today noted 2 episodes of bloody BM. Repeat CBC pending tonight, keep T/S active, keep Hb > 7, GI follow up, c/w vit K, consider FFP if continued bleeding noted.    - HCC status - no focal hepatic lesion    # AMS in a TBI patient, with EtOH use, initially been on CIWA on admission, also s/p seizures, and with elevated ammonia on admission thus could not r/u HE either  - Monitor mental status, overall improved compared to admission, possibly multifactorial. C/w constant observation. Aspiration, seizure, fall precautions.   - Folic, thiamine.   - Lactulose was held b/o diarrhea and was resumed with parameters per primary team on 10/16. Since 10/18 held again b/o > 6 BM. No need to trend ammonia. Lactulose can worsen his abdominal bloating, thus IF in future need BM regimen (not now) can consider Miralax as alternative. Can consider neurology. Agree w/ holding rifaximin (since 10/20).      # Transplant candidacy: Obstacles: only emergency medicaid per d/w SW, recent EtOH     Thank you for consult  Will continue to monitor. Hepatology returns Monday. Please, consult GI on call if change in status, questions, concerns.  Been d/w [facility] Hepatology service attending.  D/w primary team

## 2022-10-21 NOTE — PROGRESS NOTE ADULT - SUBJECTIVE AND OBJECTIVE BOX
CC: Leukocytosis    INTERVAL HPI: Patient seen and examined at bedside; Events noted;     MEDICATIONS  (STANDING):  dextrose 5% + sodium chloride 0.9%. 1000 milliLiter(s) (75 mL/Hr) IV Continuous <Continuous>  dextrose 5%. 1000 milliLiter(s) (50 mL/Hr) IV Continuous <Continuous>  dextrose 5%. 1000 milliLiter(s) (100 mL/Hr) IV Continuous <Continuous>  dextrose 50% Injectable 25 Gram(s) IV Push once  dextrose 50% Injectable 12.5 Gram(s) IV Push once  dextrose 50% Injectable 25 Gram(s) IV Push once  folic acid 1 milliGRAM(s) Oral <User Schedule>  glucagon  Injectable 1 milliGRAM(s) IntraMuscular once  insulin lispro (ADMELOG) corrective regimen sliding scale   SubCutaneous Before meals and at bedtime  multivitamin 1 Tablet(s) Oral daily  nystatin Powder 1 Application(s) Topical two times a day  pantoprazole    Tablet 40 milliGRAM(s) Oral before breakfast  phytonadione   Solution 10 milliGRAM(s) Oral daily  thiamine Injectable 100 milliGRAM(s) IntraMuscular daily    MEDICATIONS  (PRN):  aluminum hydroxide/magnesium hydroxide/simethicone Suspension 30 milliLiter(s) Oral every 4 hours PRN Dyspepsia  dextrose Oral Gel 15 Gram(s) Oral once PRN Blood Glucose LESS THAN 70 milliGRAM(s)/deciliter  LORazepam   Injectable 2 milliGRAM(s) IV Push every 4 hours PRN CIWA-Ar score 8 or greater      Vital Signs Last 24 Hrs  T(C): 36.2 (21 Oct 2022 12:32), Max: 37.7 (20 Oct 2022 21:20)  T(F): 97.1 (21 Oct 2022 12:32), Max: 99.8 (20 Oct 2022 21:20)  HR: 105 (21 Oct 2022 12:32) (105 - 123)  BP: 120/78 (21 Oct 2022 12:32) (104/86 - 127/74)  BP(mean): --  RR: 19 (21 Oct 2022 12:32) (19 - 19)  SpO2: 98% (21 Oct 2022 12:32) (98% - 99%)    Parameters below as of 21 Oct 2022 12:32  Patient On (Oxygen Delivery Method): room air      _________________  PHYSICAL EXAM:  ---------------------------  GEN: NAD; NC/AT; A and O x   LUNGS: no wheezing; decreased bilateral air entry; no use of accessory muscles for breathing  HEART: Nl S1 S2; no M   ABDOMEN: Soft, Nontender, non distended  EXTREMITIES: no cyanosis; no edema; warm and dry  NERVOUS SYSTEM:  Awake and alert; no focal neuro  deficits    _________________________________________________  LABS:                        8.6    60.87 )-----------( 427      ( 21 Oct 2022 06:55 )             26.0     10-21    137  |  108  |  5<L>  ----------------------------<  91  3.6   |  16<L>  |  0.52    Ca    8.0<L>      21 Oct 2022 06:55  Phos  2.8     10-20  Mg     1.6     10-20    TPro  5.2<L>  /  Alb  1.4<L>  /  TBili  17.6<H>  /  DBili  x   /  AST  59<H>  /  ALT  16  /  AlkPhos  225<H>  10-21    PT/INR - ( 21 Oct 2022 06:55 )   PT: 38.1 sec;   INR: 3.16 ratio         PTT - ( 21 Oct 2022 06:55 )  PTT:48.4 sec  CAPILLARY BLOOD GLUCOSE      POCT Blood Glucose.: 132 mg/dL (21 Oct 2022 16:53)  POCT Blood Glucose.: 129 mg/dL (21 Oct 2022 11:59)  POCT Blood Glucose.: 113 mg/dL (21 Oct 2022 08:07)  POCT Blood Glucose.: 126 mg/dL (20 Oct 2022 21:49)  POCT Blood Glucose.: 104 mg/dL (20 Oct 2022 18:02)                 CC: Leukocytosis    INTERVAL HPI: Patient seen and examined at bedside; Events noted; Pt is poor historian despite using interpretor service. Pt denies pain.    MEDICATIONS  (STANDING):  dextrose 5% + sodium chloride 0.9%. 1000 milliLiter(s) (75 mL/Hr) IV Continuous <Continuous>  dextrose 5%. 1000 milliLiter(s) (50 mL/Hr) IV Continuous <Continuous>  dextrose 5%. 1000 milliLiter(s) (100 mL/Hr) IV Continuous <Continuous>  dextrose 50% Injectable 25 Gram(s) IV Push once  dextrose 50% Injectable 12.5 Gram(s) IV Push once  dextrose 50% Injectable 25 Gram(s) IV Push once  folic acid 1 milliGRAM(s) Oral <User Schedule>  glucagon  Injectable 1 milliGRAM(s) IntraMuscular once  insulin lispro (ADMELOG) corrective regimen sliding scale   SubCutaneous Before meals and at bedtime  multivitamin 1 Tablet(s) Oral daily  nystatin Powder 1 Application(s) Topical two times a day  pantoprazole    Tablet 40 milliGRAM(s) Oral before breakfast  phytonadione   Solution 10 milliGRAM(s) Oral daily  thiamine Injectable 100 milliGRAM(s) IntraMuscular daily    MEDICATIONS  (PRN):  aluminum hydroxide/magnesium hydroxide/simethicone Suspension 30 milliLiter(s) Oral every 4 hours PRN Dyspepsia  dextrose Oral Gel 15 Gram(s) Oral once PRN Blood Glucose LESS THAN 70 milliGRAM(s)/deciliter  LORazepam   Injectable 2 milliGRAM(s) IV Push every 4 hours PRN CIWA-Ar score 8 or greater      Vital Signs Last 24 Hrs  T(C): 36.2 (21 Oct 2022 12:32), Max: 37.7 (20 Oct 2022 21:20)  T(F): 97.1 (21 Oct 2022 12:32), Max: 99.8 (20 Oct 2022 21:20)  HR: 105 (21 Oct 2022 12:32) (105 - 123)  BP: 120/78 (21 Oct 2022 12:32) (104/86 - 127/74)  BP(mean): --  RR: 19 (21 Oct 2022 12:32) (19 - 19)  SpO2: 98% (21 Oct 2022 12:32) (98% - 99%)    Parameters below as of 21 Oct 2022 12:32  Patient On (Oxygen Delivery Method): room air      _________________  PHYSICAL EXAM:  ---------------------------  GEN: NAD; NC/AT; A and O x 2; obeys simple commands  HEENT: poor dentition; MMM  LUNGS: no wheezing; + bilateral air entry; no use of accessory muscles for breathing  HEART: Nl S1 S2; no M   ABDOMEN: Soft, Nontender, non distended  EXTREMITIES: no cyanosis; no edema; warm and dry  NERVOUS SYSTEM:  Awake and alert; no focal neuro  deficits    _________________________________________________  LABS:                        8.6    60.87 )-----------( 427      ( 21 Oct 2022 06:55 )             26.0     10-21    137  |  108  |  5<L>  ----------------------------<  91  3.6   |  16<L>  |  0.52    Ca    8.0<L>      21 Oct 2022 06:55  Phos  2.8     10-20  Mg     1.6     10-20    TPro  5.2<L>  /  Alb  1.4<L>  /  TBili  17.6<H>  /  DBili  x   /  AST  59<H>  /  ALT  16  /  AlkPhos  225<H>  10-21    PT/INR - ( 21 Oct 2022 06:55 )   PT: 38.1 sec;   INR: 3.16 ratio         PTT - ( 21 Oct 2022 06:55 )  PTT:48.4 sec  CAPILLARY BLOOD GLUCOSE      POCT Blood Glucose.: 132 mg/dL (21 Oct 2022 16:53)  POCT Blood Glucose.: 129 mg/dL (21 Oct 2022 11:59)  POCT Blood Glucose.: 113 mg/dL (21 Oct 2022 08:07)  POCT Blood Glucose.: 126 mg/dL (20 Oct 2022 21:49)  POCT Blood Glucose.: 104 mg/dL (20 Oct 2022 18:02)

## 2022-10-21 NOTE — PROGRESS NOTE ADULT - SUBJECTIVE AND OBJECTIVE BOX
NP Note discussed with  primary attending    Patient is a 27y old  Male who presents with a chief complaint of Altered mental status (21 Oct 2022 11:03)      INTERVAL HPI/OVERNIGHT EVENTS: no new complaints    MEDICATIONS  (STANDING):  dextrose 5% + sodium chloride 0.9%. 1000 milliLiter(s) (75 mL/Hr) IV Continuous <Continuous>  dextrose 5%. 1000 milliLiter(s) (50 mL/Hr) IV Continuous <Continuous>  dextrose 5%. 1000 milliLiter(s) (100 mL/Hr) IV Continuous <Continuous>  dextrose 50% Injectable 25 Gram(s) IV Push once  dextrose 50% Injectable 12.5 Gram(s) IV Push once  dextrose 50% Injectable 25 Gram(s) IV Push once  folic acid 1 milliGRAM(s) Oral <User Schedule>  glucagon  Injectable 1 milliGRAM(s) IntraMuscular once  insulin lispro (ADMELOG) corrective regimen sliding scale   SubCutaneous Before meals and at bedtime  multivitamin 1 Tablet(s) Oral daily  nystatin Powder 1 Application(s) Topical two times a day  pantoprazole    Tablet 40 milliGRAM(s) Oral before breakfast  phytonadione   Solution 10 milliGRAM(s) Oral daily  thiamine Injectable 100 milliGRAM(s) IntraMuscular daily    MEDICATIONS  (PRN):  aluminum hydroxide/magnesium hydroxide/simethicone Suspension 30 milliLiter(s) Oral every 4 hours PRN Dyspepsia  dextrose Oral Gel 15 Gram(s) Oral once PRN Blood Glucose LESS THAN 70 milliGRAM(s)/deciliter  LORazepam   Injectable 2 milliGRAM(s) IV Push every 4 hours PRN CIWA-Ar score 8 or greater      __________________________________________________  REVIEW OF SYSTEMS:    CONSTITUTIONAL: No fever,           Vital Signs Last 24 Hrs  T(C): 36.2 (21 Oct 2022 12:32), Max: 37.7 (20 Oct 2022 21:20)  T(F): 97.1 (21 Oct 2022 12:32), Max: 99.8 (20 Oct 2022 21:20)  HR: 105 (21 Oct 2022 12:32) (105 - 123)  BP: 120/78 (21 Oct 2022 12:32) (104/86 - 127/74)  BP(mean): --  RR: 19 (21 Oct 2022 12:32) (19 - 19)  SpO2: 98% (21 Oct 2022 12:32) (98% - 99%)    Parameters below as of 21 Oct 2022 12:32  Patient On (Oxygen Delivery Method): room air        ________________________________________________  PHYSICAL EXAM:  GENERAL: NAD  HEENT: Normocephalic;  conjunctivae and sclerae jaundiced ; moist mucous membranes;   CHEST/LUNG: Clear to ausculitation bilaterally   HEART: S1 S2  regular; tachycardic to 110 bm   ABDOMEN: mildly tender, ++distended; Bowel sounds present  EXTREMITIES: no cyanosis; + 2 LE  edema; no calf tenderness  SKIN: warm and dry; dry flaky skin to upper extremities, + generalized juandice    NERVOUS SYSTEM:  Awake and alert; disoriented to place and time,    _________________________________________________  LABS:                        8.6    60.87 )-----------( 427      ( 21 Oct 2022 06:55 )             26.0     10-21    137  |  108  |  5<L>  ----------------------------<  91  3.6   |  16<L>  |  0.52    Ca    8.0<L>      21 Oct 2022 06:55  Phos  2.8     10-20  Mg     1.6     10-20    TPro  5.2<L>  /  Alb  1.4<L>  /  TBili  17.6<H>  /  DBili  x   /  AST  59<H>  /  ALT  16  /  AlkPhos  225<H>  10-21    PT/INR - ( 21 Oct 2022 06:55 )   PT: 38.1 sec;   INR: 3.16 ratio         PTT - ( 21 Oct 2022 06:55 )  PTT:48.4 sec    CAPILLARY BLOOD GLUCOSE      POCT Blood Glucose.: 129 mg/dL (21 Oct 2022 11:59)  POCT Blood Glucose.: 113 mg/dL (21 Oct 2022 08:07)  POCT Blood Glucose.: 126 mg/dL (20 Oct 2022 21:49)  POCT Blood Glucose.: 104 mg/dL (20 Oct 2022 18:02)        RADIOLOGY & ADDITIONAL TESTS:    Imaging Personally Reviewed:  YES/NO    Consultant(s) Notes Reviewed:   YES/ No    Care Discussed with Consultants :     Plan of care was discussed with patient and /or primary care giver; all questions and concerns were addressed and care was aligned with patient's wishes.

## 2022-10-21 NOTE — PROGRESS NOTE ADULT - ASSESSMENT
A/P    FULL NOTE TO FOLLOW    Problem #1 Leukocytosis - mostly neutrophilia A/P    Problem #1 Leukocytosis - mostly neutrophilia  -work up so far negative  -awaiting BCR/ABL; if negative will need to obtain screening for chronic neutrophilic leukemia w/ CSF3R mutations  -no need for cytoreductive therapy at this time    Problem #2 Anemia - asymptomatic; on need for PRBC support unless Hg < 7 g/dL    Overall prognosis guarded; will continue to follow; call with questions [phone]    Pablo Lambert MD

## 2022-10-22 LAB
ALBUMIN SERPL ELPH-MCNC: 1.3 G/DL — LOW (ref 3.5–5)
ALP SERPL-CCNC: 228 U/L — HIGH (ref 40–120)
ALT FLD-CCNC: 13 U/L DA — SIGNIFICANT CHANGE UP (ref 10–60)
ANION GAP SERPL CALC-SCNC: 14 MMOL/L — SIGNIFICANT CHANGE UP (ref 5–17)
AST SERPL-CCNC: 54 U/L — HIGH (ref 10–40)
BILIRUB DIRECT SERPL-MCNC: 13.4 MG/DL — HIGH (ref 0–0.3)
BILIRUB INDIRECT FLD-MCNC: 4.4 MG/DL — HIGH (ref 0.2–1)
BILIRUB SERPL-MCNC: 17.8 MG/DL — HIGH (ref 0.2–1.2)
BUN SERPL-MCNC: 6 MG/DL — LOW (ref 7–18)
CALCIUM SERPL-MCNC: 8.1 MG/DL — LOW (ref 8.4–10.5)
CHLORIDE SERPL-SCNC: 107 MMOL/L — SIGNIFICANT CHANGE UP (ref 96–108)
CO2 SERPL-SCNC: 15 MMOL/L — LOW (ref 22–31)
CREAT SERPL-MCNC: 0.49 MG/DL — LOW (ref 0.5–1.3)
EGFR: 146 ML/MIN/1.73M2 — SIGNIFICANT CHANGE UP
GLUCOSE BLDC GLUCOMTR-MCNC: 130 MG/DL — HIGH (ref 70–99)
GLUCOSE BLDC GLUCOMTR-MCNC: 91 MG/DL — SIGNIFICANT CHANGE UP (ref 70–99)
GLUCOSE BLDC GLUCOMTR-MCNC: 99 MG/DL — SIGNIFICANT CHANGE UP (ref 70–99)
GLUCOSE BLDC GLUCOMTR-MCNC: 99 MG/DL — SIGNIFICANT CHANGE UP (ref 70–99)
GLUCOSE SERPL-MCNC: 68 MG/DL — LOW (ref 70–99)
HCT VFR BLD CALC: 25.7 % — LOW (ref 39–50)
HGB BLD-MCNC: 8.4 G/DL — LOW (ref 13–17)
INR BLD: 3.15 RATIO — HIGH (ref 0.88–1.16)
MCHC RBC-ENTMCNC: 31.2 PG — SIGNIFICANT CHANGE UP (ref 27–34)
MCHC RBC-ENTMCNC: 32.7 GM/DL — SIGNIFICANT CHANGE UP (ref 32–36)
MCV RBC AUTO: 95.5 FL — SIGNIFICANT CHANGE UP (ref 80–100)
NRBC # BLD: 0 /100 WBCS — SIGNIFICANT CHANGE UP (ref 0–0)
NRBC # BLD: 0 /100 WBCS — SIGNIFICANT CHANGE UP (ref 0–0)
PLATELET # BLD AUTO: 414 K/UL — HIGH (ref 150–400)
POTASSIUM SERPL-MCNC: 3.5 MMOL/L — SIGNIFICANT CHANGE UP (ref 3.5–5.3)
POTASSIUM SERPL-SCNC: 3.5 MMOL/L — SIGNIFICANT CHANGE UP (ref 3.5–5.3)
PROT SERPL-MCNC: 5.1 G/DL — LOW (ref 6–8.3)
PROTHROM AB SERPL-ACNC: 37.9 SEC — HIGH (ref 10.5–13.4)
RBC # BLD: 2.69 M/UL — LOW (ref 4.2–5.8)
RBC # FLD: 22.5 % — HIGH (ref 10.3–14.5)
SODIUM SERPL-SCNC: 136 MMOL/L — SIGNIFICANT CHANGE UP (ref 135–145)
WBC # BLD: 54.47 K/UL — CRITICAL HIGH (ref 3.8–10.5)
WBC # BLD: 54.47 K/UL — CRITICAL HIGH (ref 3.8–10.5)
WBC # FLD AUTO: 54.47 K/UL — CRITICAL HIGH (ref 3.8–10.5)
WBC # FLD AUTO: 54.47 K/UL — CRITICAL HIGH (ref 3.8–10.5)

## 2022-10-22 PROCEDURE — 99233 SBSQ HOSP IP/OBS HIGH 50: CPT

## 2022-10-22 RX ADMIN — Medication 10 MILLIGRAM(S): at 11:56

## 2022-10-22 RX ADMIN — Medication 100 MILLIGRAM(S): at 11:57

## 2022-10-22 RX ADMIN — SODIUM CHLORIDE 75 MILLILITER(S): 9 INJECTION, SOLUTION INTRAVENOUS at 08:50

## 2022-10-22 RX ADMIN — Medication 1 MILLIGRAM(S): at 11:57

## 2022-10-22 RX ADMIN — Medication 1 MILLIGRAM(S): at 17:50

## 2022-10-22 RX ADMIN — Medication 1 TABLET(S): at 11:57

## 2022-10-22 RX ADMIN — NYSTATIN CREAM 1 APPLICATION(S): 100000 CREAM TOPICAL at 17:46

## 2022-10-22 RX ADMIN — NYSTATIN CREAM 1 APPLICATION(S): 100000 CREAM TOPICAL at 07:11

## 2022-10-22 NOTE — DISCHARGE NOTE PROVIDER - CARE PROVIDER_API CALL
Vahid Barber  INTERNAL MEDICINE  [address] [place] [address]  Phone: [phone]  Fax: [phone]  Follow Up Time: Routine    Bhavana Gonzalez)  Internal Medicine  [address] [place] [address]  Phone: [phone]  Fax: [phone]  Follow Up Time: Routine    Blue Johnson)  Gastroenterology; Internal Medicine  [address] [facility] [address] [place] [address] [place] [address]  Phone: [phone]  Fax: [phone]  Follow Up Time: Routine

## 2022-10-22 NOTE — DISCHARGE NOTE PROVIDER - HOSPITAL COURSE
26 yo Kiswahili speaking Male, with PMH alcohol abuse, s/p Left temporal parietal craniotomy noted in imaging, presented  with altered mental status and seizure that lasted about 2-4 minutes.  In the ED, pt with low grade fever, tachycardic   Admitted to ICU for severe metabolic derangements, CIWA protocol with PRN ativan. Pt was started on antibiotics and IV fluids  Started on lactulose for elevated amonnia level.  Lactate trended down to normal levels. GI and Hepatology was consulted for transaminitis and hyperbilirubinemia. Patient was started on zosyn for possible cholangitis. CT abd showed hepatomegaly with biliary sludge and mild ascites. MRCP was recommended.  Noted with  Hb  7.3,  started on protonix drip and octreotide drip. Patient received one unit PRBC and one unit FFP.  GI on board recommending EGD which did not show any pathology. Flexible sigmoidoscopy showed rectal varices which was cauterized.  switched to ceftriaxone for SBP prophylaxis per ID.  Vit K for 3 days for elevated INR. He was started on prednisone 40mg daily for 28 days for alcoholic hepatitis.   Patient hospital course further complicated by fever, and rising leucocytosis. ID consulted.  blood culture 10/16 NGTD, stool studies no pathogens,   Cdfif negative. CT abdomen and pelvis- 1. Marked hepatomegaly/steatosis, unchanged. 2.  Ascites/mesenteric edema and subcutaneous edema, minimally progressed. 3.  Equivocal ascending colonic mural prominence raises the possibility of portal colopathy or inflammation    INCOMPLETE    28 yo Slovenian speaking Male, with PMH alcohol abuse, s/p Left temporal parietal craniotomy noted in imaging, presented  with altered mental status and seizure that lasted about 2-4 minutes.  In the ED, pt with low grade fever, tachycardic   Admitted to ICU for severe metabolic derangements, CIWA protocol with PRN ativan. Pt was started on antibiotics and IV fluids  Started on lactulose for elevated amonnia level.  Lactate trended down to normal levels. GI and Hepatology was consulted for transaminitis and hyperbilirubinemia. Patient was started on zosyn for possible cholangitis. CT abd showed hepatomegaly with biliary sludge and mild ascites. MRCP was recommended.  Noted with  Hb  7.3,  started on protonix drip and octreotide drip. Patient received one unit PRBC and one unit FFP.  GI on board recommending EGD which did not show any pathology. Flexible sigmoidoscopy showed rectal varices which was cauterized.  switched to ceftriaxone for SBP prophylaxis per ID.  Vit K for 3 days for elevated INR. He was started on prednisone 40mg daily for 28 days for alcoholic hepatitis.   Patient hospital course further complicated by fever, and rising leucocytosis. ID consulted.  blood culture 10/16 NGTD, stool studies no pathogens,   Cdfif negative. CT abdomen and pelvis- 1. Marked hepatomegaly/steatosis, unchanged. 2.  Ascites/mesenteric edema and subcutaneous edema, minimally progressed. 3.  Equivocal ascending colonic mural prominence raises the possibility of portal colopathy or inflammation      BCR/ ABL sent pending results. INR 3.16,  liver biopsy plan for biopsy cancelled due to elevated INR. Vit K po continued   Pt seen at bedside, alert, awake, confused to person and place.      10/25-Awake and alert, confused x 3, talkative but does not make sense.  Leukocytosis persists and slightly uptrending, started Acetylcysteine by attending.  Pt. will need safe discharge planning, disposition currently unknown, CM previously consulted.  10/27 Pt is noted with worsening leukocytosis, heme/onc plan for malignancy work up. noted blood in stool, no diarrhea. on Vt k for elevated INR.  GI consulted.       INCOMPLETE   10/27   28 yo Chinese speaking Male, with PMH alcohol abuse, s/p Left temporal parietal craniotomy noted in imaging, presented  with altered mental status and seizure that lasted about 2-4 minutes.  In the ED, pt with low grade fever, tachycardic.   Admitted to ICU for severe metabolic derangements due to ETOH withdrawal, seizure.  CIWA protocol with PRN ativan. Pt was started on antibiotics and IV fluids  Started on lactulose for elevated amonnia level.  Lactate trended down to normal levels. GI and Hepatology was consulted for transaminitis and hyperbilirubinemia. Patient was started on zosyn for possible cholangitis. CT abd showed hepatomegaly with biliary sludge and mild ascites.   Pt with anemia, Hgb 6.9. elevated INR.  started on protonix drip and octreotide drip. Patient received total 3 units PRBC and one unit FFP during this admission.  GI on board recommending EGD which did not show any pathology. Flexible sigmoidoscopy showed rectal varices which was cauterized. s/p Vanco and zosyn, then switched to ceftriaxone for SBP prophylaxis per ID.  Vit K for 3 days for elevated INR. He was started on prednisone 40mg daily for 28 days for alcoholic hepatitis.   Patient hospital course further complicated by fever, and rising leucocytosis. ID consulted.  blood culture 10/16 NGTD, Urine study negative. stool studies no pathogens,   C-dfif negative. repeat CT abdomen and pelvis- 1. Marked hepatomegaly/steatosis, unchanged. 2.  Ascites/mesenteric edema and subcutaneous edema, minimally progressed. 3.  Equivocal ascending colonic mural prominence raises the possibility of portal colopathy or inflammation.   Heme/onc consulted for worsening leukocytosis, anemia and coagulapathy. Malignancy work up sent. work up so far negative-  flow cytometry, AFP, BCR/ ABL negative. no need for cytoreductive therapy at this time.    liver biopsy plan for biopsy cancelled due to elevated INR. Vit K po continued.  s/p Acetylcysteine. Leukocytosis slightly improved. Remains afebrile, no apparent source of infection.   Pt was reported for blood in stool several episodes, on rectal exam, no visible blood. GI followed and planned for colonoscopy but unable due to failed bowel prep. unable NGT for prep due to combative behavior. No further episodes of bloody stool. H/H stable around 8.4/24.7.    Pt with encephalopathy, resumed lactulose. Ammonia level trended down to 55.   Patient with worsening abdominal distention. CT abd/pelvis resulted Marked hepatomegaly. Moderate volume ascites, increased from prior study.    Abdominal US resulted moderate ascites. ICU team performed paracentesis, 60ml removed. s/p Albumin x 2 days.   Leukocytosis slightly improving to 43.7K, no acute issues reported. DC plan started. VIVO meds sent.    Patient is medially optimized for discharge home with outpatient follow up.  Discharge plan discussed with attending physician.   Please refer to medical records/consult/progress notes for in depth hospital course as this is a brief summary. 28 yo Malagasy speaking Male, with PMH alcohol abuse, s/p Left temporal parietal craniotomy noted in imaging, presented  with altered mental status and seizure that lasted about 2-4 minutes.  In the ED, pt with low grade fever, tachycardic.   Admitted to ICU for severe metabolic derangements due to ETOH withdrawal, seizure.  CIWA protocol with PRN ativan. Pt was started on antibiotics and IV fluids  Started on lactulose for elevated amonnia level.  Lactate trended down to normal levels. GI and Hepatology was consulted for transaminitis and hyperbilirubinemia. Patient was started on zosyn for possible cholangitis. CT abd showed hepatomegaly with biliary sludge and mild ascites.   Pt with anemia, Hgb 6.9. elevated INR.  started on protonix drip and octreotide drip. Patient received total 3 units PRBC and one unit FFP during this admission.  GI on board recommending EGD which did not show any pathology. Flexible sigmoidoscopy showed rectal varices which was cauterized. s/p Vanco and zosyn, then switched to ceftriaxone for SBP prophylaxis per ID.  Vit K for 3 days for elevated INR. He was started on prednisone 40mg daily for 28 days for alcoholic hepatitis.   Patient hospital course further complicated by fever, and rising leucocytosis. ID consulted.  blood culture 10/16 NGTD, Urine study negative. stool studies no pathogens,   C-dfif negative. repeat CT abdomen and pelvis- 1. Marked hepatomegaly/steatosis, unchanged. 2.  Ascites/mesenteric edema and subcutaneous edema, minimally progressed. 3.  Equivocal ascending colonic mural prominence raises the possibility of portal colopathy or inflammation.   Heme/onc consulted for worsening leukocytosis, anemia and coagulapathy. Malignancy work up sent. work up so far negative-  flow cytometry, AFP, BCR/ ABL negative. no need for cytoreductive therapy at this time.    liver biopsy plan for biopsy cancelled due to elevated INR. Vit K po continued.  s/p Acetylcysteine. Leukocytosis slightly improved. Remains afebrile, no apparent source of infection.   Pt was reported for blood in stool several episodes, on rectal exam, no visible blood. GI followed and planned for colonoscopy but unable due to failed bowel prep. unable NGT for prep due to combative behavior. No further episodes of bloody stool. H/H stable around 8.4/24.7.    Pt with encephalopathy, resumed lactulose. Ammonia level trended down to 55.   Patient with worsening abdominal distention. CT abd/pelvis resulted Marked hepatomegaly. Moderate volume ascites, increased from prior study.    Abdominal US resulted moderate ascites. ICU team performed paracentesis 10/29, 60ml removed. s/p Albumin x 2 days. Left paracentesis site leaking repeat abdominal ultrasound ordered.  Leukocytosis slightly improving to 43.7K, no acute issues reported. DC plan started. VIVO meds sent.    Patient is medically optimized for discharge home with outpatient follow up.  Discharge plan discussed with attending physician.     Please refer to medical records/consult/progress notes for in depth hospital course as this is a brief summary. 26 yo Guamanian speaking Male, with PMH alcohol abuse, s/p Left temporal parietal craniotomy noted in imaging, presented  with altered mental status and seizure that lasted about 2-4 minutes.  In the ED, pt with low grade fever, tachycardic.   Admitted to ICU for severe metabolic derangements due to ETOH withdrawal, seizure.  CIWA protocol with PRN ativan. Pt was started on antibiotics and IV fluids  Started on lactulose for elevated amonnia level.  Lactate trended down to normal levels. GI and Hepatology was consulted for transaminitis and hyperbilirubinemia. Patient was started on zosyn for possible cholangitis. CT abd showed hepatomegaly with biliary sludge and mild ascites.   Pt with anemia, Hgb 6.9. elevated INR.  started on protonix drip and octreotide drip. Patient received total 3 units PRBC and one unit FFP during this admission.  GI on board recommending EGD which did not show any pathology. Flexible sigmoidoscopy showed rectal varices which was cauterized. s/p Vanco and zosyn, then switched to ceftriaxone for SBP prophylaxis per ID.  Vit K for 3 days for elevated INR. He was started on prednisone 40mg daily for alcoholic hepatitis.   Patient hospital course further complicated by fever, and rising leucocytosis. ID consulted.  blood culture 10/16 NGTD, Urine study negative. stool studies no pathogens,   C-dfif negative. repeat CT abdomen and pelvis- 1. Marked hepatomegaly/steatosis, unchanged. 2.  Ascites/mesenteric edema and subcutaneous edema, minimally progressed. 3.  Equivocal ascending colonic mural prominence raises the possibility of portal colopathy or inflammation.   Heme/onc consulted for worsening leukocytosis, anemia and coagulapathy. Malignancy work up sent. work up so far negative-  flow cytometry, AFP, BCR/ ABL negative. no need for cytoreductive therapy at this time.    liver biopsy plan for biopsy cancelled due to elevated INR. Vit K po continued.  s/p Acetylcysteine. Leukocytosis slightly improved. Remains afebrile, no apparent source of infection.   Pt was reported for blood in stool several episodes, on rectal exam, no visible blood. GI followed and planned for colonoscopy but unable due to failed bowel prep. unable NGT for prep due to combative behavior. No further episodes of bloody stool. H/H stable around 8.4/24.7.    Pt with encephalopathy, resumed lactulose. Ammonia level trended down to 55.   Patient with worsening abdominal distention. CT abd/pelvis resulted Marked hepatomegaly. Moderate volume ascites, increased from prior study.    Abdominal US resulted moderate ascites. ICU team performed paracentesis 10/29, 60ml removed. s/p Albumin x 2 days. Left paracentesis site leaking repeat abdominal ultrasound ordered.  Leukocytosis slightly improving to 43.7K, no acute issues reported. DC plan started. VIVO meds sent.    Patient is medically optimized for discharge home with outpatient follow up.  Discharge plan discussed with attending physician.     Please refer to medical records/consult/progress notes for in depth hospital course as this is a brief summary. Patient is a 27 yo Upper sorbian speaking Male, lives at home with brother, with no known past medical history,  s/p Left temporal parietal craniotomy noted in imaging.  Patient presented with AMS and admitted to ICU with acute metabolic encephalopathy. and EtOH withdrawal.   Pt was started on antibiotics, IV fluids, and lactulose for elevated amonnia level.  Lactate trended down to normal levels. GI and Hepatology was consulted for transaminitis and hyperbilirubinemia. Patient was started on zosyn for possible cholangitis. CT abd showed hepatomegaly with biliary sludge and mild ascites. MRCP was recommended.  Noted with  Hb  7.3,  started on protonix drip and octreotide drip. Patient received one unit PRBC and one unit FFP.  GI recommended EGD which did not show any pathology. Flexible sigmoidoscopy showed rectal varices which was cauterized. Pt then switched ceftriaxone for SBP prophylaxis per ID.  Vit K for 3 days for elevated INR. He was started on prednisone 40mg daily for 28 days for alcoholic hepatitis.   Patient hospital course further complicated by fever, and rising leucocytosis. ID consulted.  blood culture 10/16 NGTD, stool studies no pathogens, Cdfif negative. CT abdomen and pelvis- 1. Marked hepatomegaly/steatosis, unchanged. 2.  Ascites/mesenteric edema and subcutaneous edema, minimally progressed. 3.  Equivocal ascending colonic mural prominence raises the possibility of portal colopathy or inflammation. RCP today to r/o biliary obstruction.   BCR/ ABL sent pending results. INR 3.16,  liver biopsy plan for biopsy cancelled due to elevated INR. Vit K po continued   Pt seen at bedside, alert, awake, confused to person and place.  Persistent ascites despite having paracentesis recovering 60mL fluid. Repeat US revealed moderate ascites. Needs repeat paracentesis however on hold given supratherapeutic INR not responding to vit K. HBG remained stable, patient will need routine paracentesis outpatient. Reccomended to F.U at [facility]/clinic. Discharge discussed with attending   This is only a brief summary of patient's hospital stay, for full course please see EMR   Please refer to medical records/consult/progress notes for in depth hospital course as this is a brief summary.

## 2022-10-22 NOTE — DISCHARGE NOTE PROVIDER - NSFOLLOWUPCLINICS_GEN_ALL_ED_FT
[facility]  Family Medicine  [phone] [place], [place] [address]  Phone: [phone]  Fax: [phone]  Follow Up Time: 1 week

## 2022-10-22 NOTE — DISCHARGE NOTE PROVIDER - PROVIDER TOKENS
as evidenced by s/p dysphagia evaluation, rec for pureed consistency with honey thick fluids. PROVIDER:[TOKEN:[[id number]],FOLLOWUP:[Routine]],PROVIDER:[TOKEN:[[id number]],FOLLOWUP:[Routine]],PROVIDER:[TOKEN:[[id number]],FOLLOWUP:[Routine]]

## 2022-10-22 NOTE — DISCHARGE NOTE PROVIDER - NSDCMRMEDTOKEN_GEN_ALL_CORE_FT
folic acid 1 mg oral tablet: 1 tab(s) orally once a day   lactulose 10 g/15 mL oral syrup: 30 milliliter(s) orally every 8 hours  Multiple Vitamins oral tablet: 1 tab(s) orally once a day  nystatin 100,000 units/g topical powder: 1 application topically 2 times a day PERINEUM AREA  pantoprazole 40 mg oral delayed release tablet: 1 tab(s) orally once a day (before a meal)  phytonadione 5 mg oral tablet: 2 tab(s) orally once a day  X 14  DAYS  simethicone 80 mg oral tablet, chewable: 1 tab(s) orally 3 times a day  thiamine 100 mg oral tablet: 1 tab(s) orally once a day   folic acid 1 mg oral tablet: 1 tab(s) orally once a day   lactulose 10 g/15 mL oral syrup: 30 milliliter(s) orally every 8 hours  Multiple Vitamins oral tablet: 1 tab(s) orally once a day  pantoprazole 40 mg oral delayed release tablet: 1 tab(s) orally once a day (before a meal)  phytonadione 5 mg oral tablet: 2 tab(s) orally once a day  X 14  DAYS  thiamine 100 mg oral tablet: 1 tab(s) orally once a day   folic acid 1 mg oral tablet: 1 tab(s) orally once a day   lactulose 10 g/15 mL oral syrup: 15 milliliter(s) orally once a day   Multiple Vitamins oral tablet: 1 tab(s) orally once a day  thiamine 100 mg oral tablet: 1 tab(s) orally once a day

## 2022-10-22 NOTE — PROGRESS NOTE ADULT - SUBJECTIVE AND OBJECTIVE BOX
Patient seen and examined earlier this morning around 11 AM; spoke with pt via [place]  ID # [id number]    28 yo Sami speaking Male, with no known PMH s/p Left temporal parietal craniotomy (head trauma 6 months ago) went to rehab and returned home. He was able to work after the accident. presenting to the ED with altered mental status. Patient had seizures at home prior to presentation as per Brother. Patient with chronic alcohol use but not drank for 2 days prior to presentation.. Patient was noted to be jaundice developed acutely over the last 1 week as per family. Patient admitted to the ICU for severe metabolic derangements, CIWA protocol with PRN ativan. Patient was started on PO lactulose and IVF. Lactate trended down to normal levels. GI and Hepatology was consulted for transaminitis and hyperbilirubinemia. Patient was started on zosyn for possible cholangitis. CT abd showed hepatomegaly with biliary sludge and mild ascites recommended MRCP once patient is more stable. Patient had episode of melena overnight on October 4th. Hb was 7.3. Patient was made NPO, started on Protonix drip and octreotide drip. GI did EGD which did not show any pathology. Flexible sigmoidoscopy showed rectal varices which was cauterized. Patient received one unit PRBC and one unit FFP. Patient was switched to ceftriaxone for SBP prophylaxis per ID. He was started on NAC per hepatology which was discontinued He was started on Vit K for 3 days for elevated INR. He was started on prednisone 40mg daily for 28 days for alcoholic hepatitis but was discontinued after a week due to poor response as per Hepatology and patient having a fever spike.  Patients diet was advanced and downgraded to medical floor.     Patient hospital course further complicated by leukocytosis which is rising despite adequate broad spectrum antibiotics for many days as well as profuse watery diarrhea  but also was on Laxatives with Miralax and Lactulose; No appreciable fever; Despite holding all laxatives last 24 hrs, still with loose stools although somewhat more formed. ID spoke with Infection control RN and advised to send C. diff for definitive treatment plan. C. diff negative.     Patient is clinically improved mentation wise although confused. much more alert and awake; no significant abdominal pain.   As per  patient is not clear on his answers although was more focussed today; He has intermittent abdominal pain and intermittent stools ; no bloody stools today      P/E: limited exam  Psych: Alert and awake, young overweight male, confused oriented x 1-2  Neuro: No gross focal deficits  CVS: S1S2 present, regular  Resp: BLAE+, No wheeze or rhonchi appreciated  Abdomen:  hepatomegaly, soft, distended protuberant abdomen; non tender    Labs:                                      8.4    54.47 )-----------( 414      ( 22 Oct 2022 07:39 )             25.7   10-22    136  |  107  |  6<L>  ----------------------------<  68<L>  3.5   |  15<L>  |  0.49<L>    Ca    8.1<L>      22 Oct 2022 07:39    TPro  5.2<L>  /  Alb  1.4<L>  /  TBili  17.6<H>  /  DBili  x   /  AST  59<H>  /  ALT  16  /  AlkPhos  225<H>  10-21    COVID-19 PCR . (10.21.22 @ 06:30) COVID-19 PCR: NotDetec:  Clostridium difficile Toxin by PCR (10.19.22 @ 11:30) C Diff by PCR Result: NotDetec     Culture - Blood (10.16.22 @ 15:05)   Specimen Source: .Blood Blood Culture Results: No growth to date.   Culture - Blood (10.16.22 @ 15:00)   Specimen Source: .Blood Blood  Culture Results: No growth to date.     CT Abdomen and Pelvis No Cont (10.16.22 @ 11:03)   IMPRESSION:  1.  Marked hepatomegaly/steatosis, unchanged.  2.  Ascites/mesenteric edema and subcutaneous edema, minimally progressed.  3.  Equivocal ascending colonic mural prominence raises the possibility of portal colopathy or inflammation  4.  Underdistended duodenum with increased fat stranding about distal duodenal sweep, nonspecific (coronal 4/68). Clinical correlation with   regard to duodenal inflammation recommended.  5.  Undetermined rectosigmoid foreign body/medical hardware (2/54, CT  image)    US Abdomen Limited (10.20.22 @ 13:05)     FINDINGS: There is severe enlargement of the liver with right lobe measuring approximately 31 cm. Diffusely increased hepatic echogenicity   is seen consistent with a fatty liver. No focal hepatic mass lesion is identified.  Main portal vein demonstrates normal flow. Hepatic veins are partially delineated with color Doppler.  There is evidence of mild ascites.    IMPRESSION:  Severe hepatomegaly and steatosis. Mild ascites.    D/D:  Leucocytosis without fever likely Leukemoid reaction now downtrending vs steroid induced (less likely) vs GI infection vs possible C. 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   Alcohol withdrawal seizures on admission with Hx Chronic alcohol abuse  Gaseous Bowel distension possibly related to Lactulose    Plan:   leucocytosis concerning for Leukemoid reaction/ Myeloproliferation finally downtrending  C.diff negative; off ABX;  ID is unable to clear at this time for steroids given persistent leucocytosis  Hepatology follow up; will consider resuming Prednisolone trial if infection is ruled out and okay with ID  Rising INR; unable to do Liver biopsy percutaneously as d/w IR MD Dr. Aguilera; may consider transjugular biopsy at [facility]/ [place] if INR remain elevated; Will give Vitamin K 10 mg daily for 3 days. Check INR Monday  Dr. Saleh Hepatology; will d/w Hepatology team at [place]. Patient may benefit form liver transplant but continued alcohol use, lack of good family support and insurance issues are obstacles  Repeat limited USG; No significant ascites to drain; Large Hepatomegaly. Also no clear evidence of Portal circulation obstruction; no clear evidence of Budd Chiari.   Continue IVF; no significant ascites on limited USG  Ativan PRN; Enhanced supervision   Continue Thiamine IM as Alcohol withdrawal thiamine deficiency related encephalopathy possibly which has improved since;   Continue [id number] BID.   Monitor mental status closely; Follow up repeat cultures so far negative (5 days)  GI follow up; H/H remain stable;   Hematology follow up  Flow cytometry WNL;  await BCR/ ABL    Discussed with ACP covering Niya/ Hepatology/ ID and RN  Patient does not have capacity to sign out AMA in case he is adamant on leaving, not pressing today.Will give a Lab holiday and repeat all labs Monday morning    Patient seen and examined earlier this morning around 11 AM; spoke with pt via [employer]  ID # [id number]    28 y/o [place] speaking Male, with no known PMH s/p Left temporal parietal craniotomy (head trauma 6 months ago) went to rehab and returned home. He was able to work after the accident. presenting to the ED with altered mental status. Patient had seizures at home prior to presentation as per Brother. Patient with chronic alcohol use but not drank for 2 days prior to presentation.. Patient was noted to be jaundice developed acutely over the last 1 week as per family. Patient admitted to the ICU for severe metabolic derangements, alcohol withdrawal symptoms, CIWA protocol with PRN ativan. Patient was started on PO lactulose and IVF. Lactate trended down to normal levels. GI and Hepatology was consulted for transaminitis and hyperbilirubinemia. Patient was started on zosyn for possible cholangitis. CT abd showed hepatomegaly with biliary sludge and mild ascites recommended MRCP once patient is more stable. Patient had episode of melena overnight on October 4th. Hb was 7.3. Patient was made NPO, started on Protonix drip and octreotide drip. GI did EGD which did not show any pathology. Flexible sigmoidoscopy showed rectal varices which was cauterized. Patient received one unit PRBC and one unit FFP. Patient was switched to ceftriaxone for SBP prophylaxis per ID. He was started on NAC per hepatology x 3 days which was discontinued He was started on Vit K for 3 days for elevated INR. He was started on prednisone 40mg daily for 28 days for alcoholic hepatitis but was discontinued after a week due to poor response as per Hepatology and patient having a fever spike.  Patients diet was advanced and downgraded to medical floor.     Patient hospital course further complicated by leukocytosis which is rising despite adequate broad spectrum antibiotics for many days as well as profuse watery diarrhea  but also was on Laxatives with Miralax and Lactulose; No appreciable fever; Despite holding all laxatives last 24 hrs, still with loose stools although somewhat more formed. C. diff negative.     Patient is clinically improved mentation wise although still confused. much more alert and awake; no significant abdominal pain.   As per  again, patient is not clear on his answers although was more focussed today; He has intermittent abdominal pain and intermittent stools ; no bloody stools today (had one episode yesterday). continues to have intermittent small soft stools     MEDICATIONS  (STANDING):  dextrose 5% + sodium chloride 0.9%. 1000 milliLiter(s) (75 mL/Hr) IV Continuous <Continuous>  dextrose 5%. 1000 milliLiter(s) (50 mL/Hr) IV Continuous <Continuous>  dextrose 5%. 1000 milliLiter(s) (100 mL/Hr) IV Continuous <Continuous>  dextrose 50% Injectable 25 Gram(s) IV Push once  dextrose 50% Injectable 12.5 Gram(s) IV Push once  dextrose 50% Injectable 25 Gram(s) IV Push once  folic acid 1 milliGRAM(s) Oral <User Schedule>  glucagon  Injectable 1 milliGRAM(s) IntraMuscular once  insulin lispro (ADMELOG) corrective regimen sliding scale   SubCutaneous Before meals and at bedtime  multivitamin 1 Tablet(s) Oral daily  nystatin Powder 1 Application(s) Topical two times a day  pantoprazole    Tablet 40 milliGRAM(s) Oral before breakfast  phytonadione   Solution 10 milliGRAM(s) Oral daily  thiamine Injectable 100 milliGRAM(s) IntraMuscular daily    MEDICATIONS  (PRN):  aluminum hydroxide/magnesium hydroxide/simethicone Suspension 30 milliLiter(s) Oral every 4 hours PRN Dyspepsia  dextrose Oral Gel 15 Gram(s) Oral once PRN Blood Glucose LESS THAN 70 milliGRAM(s)/deciliter  LORazepam   Injectable 2 milliGRAM(s) IV Push every 4 hours PRN CIWA-Ar score 8 or greater      P/E: limited exam  Psych: Alert and awake, young overweight male, confused oriented x 1-2  Neuro: No gross focal deficits  CVS: S1S2 present, regular  Resp: BLAE+, No wheeze or rhonchi appreciated  Abdomen:  hepatomegaly, soft, distended protuberant abdomen; non tender    Labs:                                   8.4    54.47 )-----------( 414      ( 22 Oct 2022 07:39 )             25.7   10-22    136  |  107  |  6<L>  ----------------------------<  68<L>  3.5   |  15<L>  |  0.49<L>    Ca    8.1<L>      22 Oct 2022 07:39    TPro  5.2<L>  /  Alb  1.4<L>  /  TBili  17.6<H>  /  DBili  x   /  AST  59<H>  /  ALT  16  /  AlkPhos  225<H>  10-21    COVID-19 PCR . (10.21.22 @ 06:30) COVID-19 PCR: NotDetec:  Clostridium difficile Toxin by PCR (10.19.22 @ 11:30) C Diff by PCR Result: NotDetec     Culture - Blood (10.16.22 @ 15:05)   Specimen Source: .Blood Blood Culture Results: No growth to date.   Culture - Blood (10.16.22 @ 15:00)   Specimen Source: .Blood Blood  Culture Results: No growth to date.     CT Abdomen and Pelvis No Cont (10.16.22 @ 11:03)   IMPRESSION:  1.  Marked hepatomegaly/steatosis, unchanged.  2.  Ascites/mesenteric edema and subcutaneous edema, minimally progressed.  3.  Equivocal ascending colonic mural prominence raises the possibility of portal colopathy or inflammation  4.  Underdistended duodenum with increased fat stranding about distal duodenal sweep, nonspecific (coronal 4/68). Clinical correlation with   regard to duodenal inflammation recommended.  5.  Undetermined rectosigmoid foreign body/medical hardware (2/54, CT  image)    US Abdomen Limited (10.20.22 @ 13:05)     FINDINGS: There is severe enlargement of the liver with right lobe measuring approximately 31 cm. Diffusely increased hepatic echogenicity is seen consistent with a fatty liver. No focal hepatic mass lesion is identified.  Main portal vein demonstrates normal flow. Hepatic veins are partially delineated with color Doppler.  There is evidence of mild ascites.    IMPRESSION:  Severe hepatomegaly and steatosis. Mild ascites.

## 2022-10-22 NOTE — PROGRESS NOTE ADULT - ASSESSMENT
D/D:  Leucocytosis without fever likely Leukemoid reaction now downtrending vs steroid induced (less likely) vs GI infection vs possible C. 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   Alcohol withdrawal seizures on admission with Hx Chronic alcohol abuse  Gaseous Bowel distension possibly related to Lactulose improved    Plan:  Leucocytosis concerning for Leukemoid reaction/ Myeloproliferation finally downtrending  C.diff negative; off ABX;  ID is unable to clear at this time for steroids given persistent leucocytosis; will consider second ID opinion  Hepatology follow up; will consider resuming Prednisolone trial if iWBC continues to trend down and okay with ID  No fever past 4 days  Rising INR; unable to do Liver biopsy percutaneously as d/w IR MD Dr. Aguilera; may consider transjugular biopsy at [facility]/ [place] if INR remain elevated; Will give Vitamin K 10 mg daily for 3 days. Check INR Monday  I have discontinued Rifaximin couple of days ago as clear benefit and concern for possible patchy exfoliative dermatitis on his hands  Dr. Saleh Hepatology; will d/w Hepatology team at [place]. Patient may benefit form liver transplant but continued alcohol use, lack of good family support and insurance issues are obstacles  Repeat limited USG; No significant ascites to drain; Large Hepatomegaly. Also no clear evidence of Portal circulation obstruction; no clear evidence of Budd Chiari.   Continue IVF; no significant ascites on limited USG  Ativan PRN; Enhanced supervision   Continue Thiamine IM as Alcohol withdrawal thiamine deficiency related encephalopathy possibly which has improved since;   Continue [id number] BID.   Monitor mental status closely; Repeat blood cultures so far negative (5 days)  GI follow up; H/H remain stable;   Hematology follow up  Flow cytometry WNL;  await BCR/ ABL  Prognosis poor     Discussed with ACP covering Niya/ Hepatology/ ID and RN  Patient does not have capacity to sign out AMA in case he is adamant on leaving, not pressing today.  Will give a Lab holiday and repeat all labs Monday morning

## 2022-10-22 NOTE — DISCHARGE NOTE PROVIDER - NSDCCPCAREPLAN_GEN_ALL_CORE_FT
PRINCIPAL DISCHARGE DIAGNOSIS  Diagnosis: Seizure  Assessment and Plan of Treatment:       SECONDARY DISCHARGE DIAGNOSES  Diagnosis: Acute encephalopathy  Assessment and Plan of Treatment:     Diagnosis: Acute liver failure  Assessment and Plan of Treatment:     Diagnosis: Hyponatremia  Assessment and Plan of Treatment:     Diagnosis: Alcohol abuse  Assessment and Plan of Treatment:     Diagnosis: Leukocytosis  Assessment and Plan of Treatment:     Diagnosis: Hypokalemia  Assessment and Plan of Treatment:     Diagnosis: Anemia of chronic disease  Assessment and Plan of Treatment:     Diagnosis: Jaundice  Assessment and Plan of Treatment:      PRINCIPAL DISCHARGE DIAGNOSIS  Diagnosis: Acute encephalopathy  Assessment and Plan of Treatment: Admitted to Medical ICU for change in mental status due to alcohol withdrawal seizure, treated with antiseizure medication in ICU, lactulose started.   Now mental status improves but still not at baseline.   Continue medication only prescirbed. Follow up with doctor at [facility].         SECONDARY DISCHARGE DIAGNOSES  Diagnosis: Alcohol withdrawal seizure  Assessment and Plan of Treatment: Alcohol withdrawal seizure.   ·  Plan: Resolved   -  No prior hx of seizures  -  Continue with  thiamine, folic acid and MVI   -  Continue Lorazepam PRN for agitation  -  Maintain fall precaution   -  Maintain aspiration precaution  -  Maintain seizure precaution.      Diagnosis: Acute liver failure  Assessment and Plan of Treatment: Acute liver failure.   ·  Plan: -  Secondary Alcohol Hepatitis   -  U-tox negative for salicylate and acetaminophen levels  -  Hepatitis panel negative  -  bili - elevated  -  Elevated INR and hypoalbuminemia in the setting of liver dysfunction  -  INR 3.3 today (10/25)  -  Vit K 10 mg PO x 3 days 10/25-10/27  -  INR remains elevated at 3.34, restarted Vt K  -  Hepatology Dr. Montilla  -  CT abd/pelvis and US to eval ascites-result above, mod ascites  - s/p paracentesis 10/29, 60ml removed.   - c/w lactulose.      Diagnosis: Spontaneous bacterial peritonitis  Assessment and Plan of Treatment: You were treated with antibiotic for possible spontaneous bacterial peritonitis. No signs of infection. You were followed by gastroenterology, hepatology and infectious disease team. monitor for infection, abdominal pain, shortness or breathing, fever, diarreha or nausea or votimiting.       Diagnosis: Ascites  Assessment and Plan of Treatment: You were followed by hepatology and Gastroenterology for liver failure. CT abdoman and ultrasound resulted moderate amount ascites, ICU team removed 60ml fluid at bedside on 10/29. You tolerated well. Follow up with doctor at [place] [facility].    Diagnosis: Electrolyte imbalance  Assessment and Plan of Treatment: Treated for low potassium and low sodium level. This is likely due to decreased food intake and liver failure. Now improving. Last serum sodium 134, Potassium 3.5. contiune balanced diet.  Follow up with doctor at [facility].    Diagnosis: Alcohol abuse  Assessment and Plan of Treatment: Metabolic encephalopathy secondary to alcohol hepatitis   - liver biopsy cxd 10/21 due to elevated INR   -  discontinue lactulose - Patient numerous loose BM having diarrhea  -  Transaminitis- unchanged  -  Severe hepatomegaly w/mild ascites on CT abdomen w/gall bladder sludge  -  Lipase level normal  -  Hepatitis panel negative   -  Hepatic USG for characterization of gall bladder attempted however patient was very agitated  -  EGD on 10/5 with GI Dr. Johnson showed Large rectal Dieulafoy lesion. Hemostasis achieved with over-the-scope clip  -  Foreign body noted in abdominal xray -  clip placed by GI   -  RUQ US without ascites, large fatty liver noted on 10/10  -hepatology dr. montilla following  - restarted lactulose.    Diagnosis: Leukocytosis  Assessment and Plan of Treatment: Your white blood cell count was elevated without any source of infection. blood and urine culture negative, no fever reported. You received course of antibiotic for prevention spontaneous bacterial peritonitis. You were followed by heme/oncology. cancer marker negative. Malignancy work up is negative. Infectious disease team follwoed. Now white blood cell count slightly trended down to 43.7K.    Diagnosis: Anemia of chronic disease  Assessment and Plan of Treatment:       Diagnosis: Hematochezia  Assessment and Plan of Treatment: You had several episodes for blood in stool. Hemoglobin stable around 8.4. Followed by Gastroenterology and colonoscopy scheduled but cancelled as you failed bowel prep.   Follow up Follow up with doctor at [facility].    Diagnosis: Elevated INR  Assessment and Plan of Treatment: Likely due to liver failure, You received vitamin K supplment. Continue Vitamin K x 14 more days for now. Follow up with doctor at [facility].     PRINCIPAL DISCHARGE DIAGNOSIS  Diagnosis: Acute encephalopathy  Assessment and Plan of Treatment: Admitted to Medical ICU for change in mental status due to alcohol withdrawal seizure, treated with antiseizure medication in ICU, lactulose started.   Now mental status improves but still not at baseline. Maintain safety at home, fall precaution. Assist with walking, out of bed.   Continue medication only prescirbed. Follow up with doctor at [facility].         SECONDARY DISCHARGE DIAGNOSES  Diagnosis: Alcohol withdrawal seizure  Assessment and Plan of Treatment: Now resolved. no prior history of seizure. continue wiht thiamine, and folic acid, multivitamin as prescribed.   -  Maintain fall precaution   -  Maintain aspiration precaution  -  Maintain seizure precaution.      Diagnosis: Acute liver failure  Assessment and Plan of Treatment: due to alcohol use.   Urine toxicity negative for  salicylate and acetaminophen levels, Hepatitis panel is negative. Total bilirubin elevated and INR level elevated.   Noted elevated ammonia level. Treated with lactulose and vitamin K. You wree followed by hepatology. Not a candidate for liver transplant.   -  CT abdomen/pelvis and US to eval ascites-resulted moderate ascites. Underwent paracentesis at bedside on 10/29, removed 60ml.     -Continue laculose and Vitamin K as prescribed.   - Follow up with doctor at [facility].    Diagnosis: Spontaneous bacterial peritonitis  Assessment and Plan of Treatment: You were treated with antibiotic for possible spontaneous bacterial peritonitis. No signs of infection. You were followed by gastroenterology, hepatology and infectious disease team. monitor for infection, abdominal pain, shortness or breathing, fever, diarreha or nausea or votimiting.   - Follow up with doctor at [facility].       Diagnosis: Ascites  Assessment and Plan of Treatment: You were followed by hepatology and Gastroenterology for liver failure. Initial CT abdomen result Severe hepatomegaly w/mild ascites and abdomen w/gall bladder sludge and Abdominal US without ascites, large fatty liver noted on 10/10. repeat CT abdoman and ultrasound resulted large liver and moderate amount ascites 10/28, ICU team removed 60ml fluid at bedside on 10/29. You tolerated well. Follow up with doctor at [facility].    Diagnosis: Electrolyte imbalance  Assessment and Plan of Treatment: Treated for low potassium and low sodium level. This is likely due to decreased food intake and liver failure. Now improving. Last serum sodium 134, Potassium 3.5. contiune balanced diet.  Follow up with doctor at [facility].    Diagnosis: Alcohol abuse  Assessment and Plan of Treatment: You have a history of alcohol dependence or alcoholism. With alcohol dependence, you drink alcohol too much and too often for a longer period of time. Dependence symptoms include needing more alcohol to get the same effects, having symptoms of withdrawal if you stop drinking, and may be unable to control your drinking. Alcohol abuse is dangerous to your liver and you may experience alcohol withdrawal if you stop drinking suddenly. Common symptoms of alcohol withdrawal include shaking, throwing up, sweating, and anxiety. Please follow up closely with your PCP to be properly monitored and treated. Please seek care immediately or call 911 if you have a convulsion, trouble breathing, chest pain, fast heartbeat, and feel like hurting yourself or someone else.      Diagnosis: Leukocytosis  Assessment and Plan of Treatment: Your white blood cell count was elevated without any source of infection. blood and urine culture negative, no fever reported. You received course of antibiotic for prevention spontaneous bacterial peritonitis. You were followed by heme/oncology. cancer marker negative. Malignancy work up is negative. Infectious disease team follwoed. Now white blood cell count slightly trended down to 43.7K. Follow up with doctor at [facility] for bood test.    Diagnosis: Anemia of chronic disease  Assessment and Plan of Treatment: Due to coagulopathy from liver failure. Followed by gastoenterology and heme/oncology. underwent EGD on 10/5 with GI Dr. Johnson-resulted Large rectal Dieulafoy lesion. Hemostasis achieved with over-the-scope clip,   Foreign body noted in abdominal xray -  clip placed by GI.   Blood in stool reported and GI plan for colonoscoty but unable due to failed bowel preparation.   continue Multivitamin as prescribed. Follow up with doctor at [facility].       Diagnosis: Hematochezia  Assessment and Plan of Treatment: You had several episodes for blood in stool. Hemoglobin stable around 8.4. Followed by Gastroenterology and colonoscopy scheduled but cancelled as you failed bowel preperation.   Follow up Follow up with doctor at [facility].    Diagnosis: Elevated INR  Assessment and Plan of Treatment: Likely due to liver failure, You received vitamin K supplment. Continue Vitamin K x 14 more days for now. Follow up with doctor at [facility].     PRINCIPAL DISCHARGE DIAGNOSIS  Diagnosis: Acute encephalopathy  Assessment and Plan of Treatment: Admitted to Medical ICU for change in mental status due to alcohol withdrawal seizure and acute hepatic encephalopathy, treated with antiseizure medication in ICU, lactulose started.   Now mental status improves but still not at baseline. Maintain safety at home, fall precaution. Assist with walking, out of bed.   Continue medication only prescirbed. Follow up with doctor at [facility] or follow up with [facility].        SECONDARY DISCHARGE DIAGNOSES  Diagnosis: Alcohol abuse  Assessment and Plan of Treatment: You were treated with prednisone for suspected alcoholic hepatitis without any improvement in 7days.   You have a history of alcohol dependence or alcoholism. With alcohol dependence, you drink alcohol too much and too often for a longer period of time. Dependence symptoms include needing more alcohol to get the same effects, having symptoms of withdrawal if you stop drinking, and may be unable to control your drinking. Alcohol abuse is dangerous to your liver and you may experience alcohol withdrawal if you stop drinking suddenly. Common symptoms of alcohol withdrawal include shaking, throwing up, sweating, and anxiety. Please follow up closely with your PCP to be properly monitored and treated. Please seek care immediately or call 911 if you have a convulsion, trouble breathing, chest pain, fast heartbeat, and feel like hurting yourself or someone else.      Diagnosis: Acute liver failure  Assessment and Plan of Treatment: due to alcohol use.   Urine toxicity negative for  salicylate and acetaminophen levels, Hepatitis panel is negative. Total bilirubin elevated and INR level elevated.   Noted elevated ammonia level. Treated with lactulose and vitamin K. You wree followed by hepatology. Not a candidate for liver transplant due to ongoing alcohol use   -  CT abdomen/pelvis and US to eval ascites-resulted moderate ascites. Underwent paracentesis at bedside on 10/29, removed 60ml.     -Continue laculose and Vitamin K as prescribed.   - Cont bandage over ther paracentesis site for any leaking from the site. Please follow up with [facility] or [facility] for further care  - Follow up with doctor at [facility].    Diagnosis: Leukocytosis  Assessment and Plan of Treatment: Your white blood cell count was elevated without any source of infection. blood and urine culture negative, no fever reported. You received courses of antibiotic for prevention spontaneous bacterial peritonitis. You were followed by heme/oncology. cancer marker negative. Malignancy work up is negative. Infectious disease team follwoed. Now white blood cell count slightly trended down to 43.7K. Follow up with doctor at [facility] or [facility] for bood test.    Diagnosis: Anemia of chronic disease  Assessment and Plan of Treatment: Due to coagulopathy from liver failure. Followed by gastoenterology and heme/oncology. underwent EGD on 10/5 with GI Dr. Johnson-resulted Large rectal Dieulafoy lesion. Hemostasis achieved with over-the-scope clip,   Foreign body noted in abdominal xray -  clip placed by GI.   Blood in stool reported and GI plan for colonoscopy but unable due to failed bowel preparation.   continue Multivitamin as prescribed. Follow up with doctor at [facility].       Diagnosis: Hematochezia  Assessment and Plan of Treatment: You had several episodes for blood in stool. Hemoglobin stable around 8.4. Followed by Gastroenterology and colonoscopy scheduled but cancelled as you failed bowel preperation.   Follow up Follow up with doctor at [facility] or [facility]. Your Hemoglobin was stable at 8.4 on discharge.    Diagnosis: Ascites  Assessment and Plan of Treatment: You were followed by hepatology and Gastroenterology for liver failure. Initial CT abdomen result Severe hepatomegaly w/mild ascites and abdomen w/gall bladder sludge and Abdominal US without ascites, large fatty liver noted on 10/10. repeat CT abdoman and ultrasound resulted large liver and moderate amount ascites 10/28, ICU team removed 60ml fluid at bedside on 10/29. You tolerated well. Follow up with doctor at [facility].    Diagnosis: Alcohol withdrawal seizure  Assessment and Plan of Treatment: Now resolved. no prior history of seizure. continue wiht thiamine, and folic acid, multivitamin as prescribed.   -  Maintain fall precaution   -  Maintain aspiration precaution  -  Maintain seizure precaution.      Diagnosis: Electrolyte imbalance  Assessment and Plan of Treatment: Treated for low potassium and low sodium level. This is likely due to decreased food intake and liver failure. Now improving. Last serum sodium 134, Potassium 3.5. contiune balanced diet.  Follow up with doctor at [facility].    Diagnosis: Elevated INR  Assessment and Plan of Treatment: Likely due to liver failure, You received vitamin K supplment. Continue Vitamin K x 14 more days for now. Follow up with doctor at [facility].    Diagnosis: Spontaneous bacterial peritonitis  Assessment and Plan of Treatment: You were treated with antibiotic for possible spontaneous bacterial peritonitis. No signs of infection. You were followed by gastroenterology, hepatology and infectious disease team. monitor for infection, abdominal pain, shortness or breathing, fever, diarreha or nausea or votimiting.   - Follow up with doctor at [facility].        PRINCIPAL DISCHARGE DIAGNOSIS  Diagnosis: Hepatic encephalopathy  Assessment and Plan of Treatment: Admitted to Medical ICU for change in mental status due to alcohol withdrawal seizure and acute hepatic encephalopathy, treated with antiseizure medication in ICU, lactulose started.   Now mental status improves but still not at baseline. Maintain safety at home, fall precaution. Assist with walking, out of bed.   Continue medication only prescirbed. Follow up with doctor at [facility] or follow up with [facility].        SECONDARY DISCHARGE DIAGNOSES  Diagnosis: Alcohol abuse  Assessment and Plan of Treatment: You were treated with prednisone for suspected alcoholic hepatitis without any improvement in 7days.   You have a history of alcohol dependence or alcoholism. With alcohol dependence, you drink alcohol too much and too often for a longer period of time. Dependence symptoms include needing more alcohol to get the same effects, having symptoms of withdrawal if you stop drinking, and may be unable to control your drinking. Alcohol abuse is dangerous to your liver and you may experience alcohol withdrawal if you stop drinking suddenly. Common symptoms of alcohol withdrawal include shaking, throwing up, sweating, and anxiety. Please follow up closely with your PCP to be properly monitored and treated. Please seek care immediately or call 911 if you have a convulsion, trouble breathing, chest pain, fast heartbeat, and feel like hurting yourself or someone else.      Diagnosis: Acute liver failure  Assessment and Plan of Treatment: due to alcohol use.   Urine toxicity negative for  salicylate and acetaminophen levels, Hepatitis panel is negative. Total bilirubin elevated and INR level elevated.   Noted elevated ammonia level. Treated with lactulose and vitamin K. You wree followed by hepatology. Not a candidate for liver transplant due to ongoing alcohol use   -  CT abdomen/pelvis and US to eval ascites-resulted moderate ascites. Underwent paracentesis at bedside on 10/29, removed 60ml.     -Continue laculose and Vitamin K as prescribed.   - Cont bandage over ther paracentesis site for any leaking from the site. Please follow up with [facility] or [facility] for further care  - Follow up with doctor at [facility].    Diagnosis: Leukocytosis  Assessment and Plan of Treatment: Your white blood cell count was elevated without any source of infection. blood and urine culture negative, no fever reported. You received courses of antibiotic for prevention spontaneous bacterial peritonitis. You were followed by heme/oncology. cancer marker negative. Malignancy work up is negative. Infectious disease team follwoed. Now white blood cell count slightly trended down to 43.7K. Follow up with doctor at [facility] or [facility] for bood test.    Diagnosis: Anemia of chronic disease  Assessment and Plan of Treatment: Due to coagulopathy from liver failure. Followed by gastoenterology and heme/oncology. underwent EGD on 10/5 with GI Dr. Johnson-resulted Large rectal Dieulafoy lesion. Hemostasis achieved with over-the-scope clip,   Foreign body noted in abdominal xray -  clip placed by GI.   Blood in stool reported and GI plan for colonoscopy but unable due to failed bowel preparation.   continue Multivitamin as prescribed. Follow up with doctor at [facility].       Diagnosis: Hematochezia  Assessment and Plan of Treatment: You had several episodes for blood in stool. Hemoglobin stable around 8.4. Followed by Gastroenterology and colonoscopy scheduled but cancelled as you failed bowel preperation.   Follow up Follow up with doctor at [facility] or [facility]. Your Hemoglobin was stable at 8.4 on discharge.    Diagnosis: Ascites  Assessment and Plan of Treatment: You were followed by hepatology and Gastroenterology for liver failure. Initial CT abdomen result Severe hepatomegaly w/mild ascites and abdomen w/gall bladder sludge and Abdominal US without ascites, large fatty liver noted on 10/10. repeat CT abdoman and ultrasound resulted large liver and moderate amount ascites 10/28, ICU team removed 60ml fluid at bedside on 10/29. You tolerated well. Follow up with doctor at [facility].    Diagnosis: Alcohol withdrawal seizure  Assessment and Plan of Treatment: Now resolved. no prior history of seizure. continue wiht thiamine, and folic acid, multivitamin as prescribed.   -  Maintain fall precaution   -  Maintain aspiration precaution  -  Maintain seizure precaution.      Diagnosis: Electrolyte imbalance  Assessment and Plan of Treatment: Treated for low potassium and low sodium level. This is likely due to decreased food intake and liver failure. Now improving. Last serum sodium 134, Potassium 3.5. contiune balanced diet.  Follow up with doctor at [facility].    Diagnosis: Elevated INR  Assessment and Plan of Treatment: Likely due to liver failure, You received vitamin K supplment. Continue Vitamin K x 14 more days for now. Follow up with doctor at [facility].    Diagnosis: Spontaneous bacterial peritonitis  Assessment and Plan of Treatment: You were treated with antibiotic for possible spontaneous bacterial peritonitis. No signs of infection. You were followed by gastroenterology, hepatology and infectious disease team. monitor for infection, abdominal pain, shortness or breathing, fever, diarreha or nausea or votimiting.   - Follow up with doctor at [facility].

## 2022-10-23 LAB
ANION GAP SERPL CALC-SCNC: 12 MMOL/L — SIGNIFICANT CHANGE UP (ref 5–17)
BUN SERPL-MCNC: 8 MG/DL — SIGNIFICANT CHANGE UP (ref 7–18)
CALCIUM SERPL-MCNC: 8 MG/DL — LOW (ref 8.4–10.5)
CHLORIDE SERPL-SCNC: 106 MMOL/L — SIGNIFICANT CHANGE UP (ref 96–108)
CO2 SERPL-SCNC: 16 MMOL/L — LOW (ref 22–31)
CREAT SERPL-MCNC: 0.55 MG/DL — SIGNIFICANT CHANGE UP (ref 0.5–1.3)
EGFR: 141 ML/MIN/1.73M2 — SIGNIFICANT CHANGE UP
GLUCOSE BLDC GLUCOMTR-MCNC: 116 MG/DL — HIGH (ref 70–99)
GLUCOSE BLDC GLUCOMTR-MCNC: 124 MG/DL — HIGH (ref 70–99)
GLUCOSE BLDC GLUCOMTR-MCNC: 86 MG/DL — SIGNIFICANT CHANGE UP (ref 70–99)
GLUCOSE BLDC GLUCOMTR-MCNC: 90 MG/DL — SIGNIFICANT CHANGE UP (ref 70–99)
GLUCOSE SERPL-MCNC: 84 MG/DL — SIGNIFICANT CHANGE UP (ref 70–99)
HCT VFR BLD CALC: 26.5 % — LOW (ref 39–50)
HGB BLD-MCNC: 8.8 G/DL — LOW (ref 13–17)
INR BLD: 3.35 RATIO — HIGH (ref 0.88–1.16)
MCHC RBC-ENTMCNC: 30.9 PG — SIGNIFICANT CHANGE UP (ref 27–34)
MCHC RBC-ENTMCNC: 33.2 GM/DL — SIGNIFICANT CHANGE UP (ref 32–36)
MCV RBC AUTO: 93 FL — SIGNIFICANT CHANGE UP (ref 80–100)
PLATELET # BLD AUTO: 418 K/UL — HIGH (ref 150–400)
POTASSIUM SERPL-MCNC: 3.5 MMOL/L — SIGNIFICANT CHANGE UP (ref 3.5–5.3)
POTASSIUM SERPL-SCNC: 3.5 MMOL/L — SIGNIFICANT CHANGE UP (ref 3.5–5.3)
PROTHROM AB SERPL-ACNC: 40.4 SEC — HIGH (ref 10.5–13.4)
RBC # BLD: 2.85 M/UL — LOW (ref 4.2–5.8)
RBC # FLD: 22.5 % — HIGH (ref 10.3–14.5)
SODIUM SERPL-SCNC: 134 MMOL/L — LOW (ref 135–145)
WBC # BLD: 53.26 K/UL — CRITICAL HIGH (ref 3.8–10.5)
WBC # FLD AUTO: 53.26 K/UL — CRITICAL HIGH (ref 3.8–10.5)

## 2022-10-23 PROCEDURE — 74019 RADEX ABDOMEN 2 VIEWS: CPT | Mod: 26

## 2022-10-23 PROCEDURE — 99233 SBSQ HOSP IP/OBS HIGH 50: CPT

## 2022-10-23 RX ORDER — SODIUM CHLORIDE 9 MG/ML
1000 INJECTION, SOLUTION INTRAVENOUS
Refills: 0 | Status: DISCONTINUED | OUTPATIENT
Start: 2022-10-23 | End: 2022-10-30

## 2022-10-23 RX ORDER — SIMETHICONE 80 MG/1
80 TABLET, CHEWABLE ORAL THREE TIMES A DAY
Refills: 0 | Status: DISCONTINUED | OUTPATIENT
Start: 2022-10-23 | End: 2022-11-04

## 2022-10-23 RX ORDER — ONDANSETRON 8 MG/1
4 TABLET, FILM COATED ORAL EVERY 6 HOURS
Refills: 0 | Status: DISCONTINUED | OUTPATIENT
Start: 2022-10-23 | End: 2022-10-26

## 2022-10-23 RX ORDER — MORPHINE SULFATE 50 MG/1
2 CAPSULE, EXTENDED RELEASE ORAL EVERY 8 HOURS
Refills: 0 | Status: DISCONTINUED | OUTPATIENT
Start: 2022-10-23 | End: 2022-10-26

## 2022-10-23 RX ADMIN — Medication 10 MILLIGRAM(S): at 14:00

## 2022-10-23 RX ADMIN — MORPHINE SULFATE 2 MILLIGRAM(S): 50 CAPSULE, EXTENDED RELEASE ORAL at 16:20

## 2022-10-23 RX ADMIN — Medication 1 TABLET(S): at 13:44

## 2022-10-23 RX ADMIN — Medication 1 MILLIGRAM(S): at 18:11

## 2022-10-23 RX ADMIN — SODIUM CHLORIDE 75 MILLILITER(S): 9 INJECTION, SOLUTION INTRAVENOUS at 09:18

## 2022-10-23 RX ADMIN — Medication 100 MILLIGRAM(S): at 13:44

## 2022-10-23 RX ADMIN — SIMETHICONE 80 MILLIGRAM(S): 80 TABLET, CHEWABLE ORAL at 14:37

## 2022-10-23 RX ADMIN — SIMETHICONE 80 MILLIGRAM(S): 80 TABLET, CHEWABLE ORAL at 22:43

## 2022-10-23 RX ADMIN — Medication 1 MILLIGRAM(S): at 08:50

## 2022-10-23 RX ADMIN — MORPHINE SULFATE 2 MILLIGRAM(S): 50 CAPSULE, EXTENDED RELEASE ORAL at 16:05

## 2022-10-23 RX ADMIN — NYSTATIN CREAM 1 APPLICATION(S): 100000 CREAM TOPICAL at 06:51

## 2022-10-23 RX ADMIN — SODIUM CHLORIDE 75 MILLILITER(S): 9 INJECTION, SOLUTION INTRAVENOUS at 21:00

## 2022-10-23 RX ADMIN — NYSTATIN CREAM 1 APPLICATION(S): 100000 CREAM TOPICAL at 17:47

## 2022-10-23 RX ADMIN — ONDANSETRON 4 MILLIGRAM(S): 8 TABLET, FILM COATED ORAL at 16:07

## 2022-10-23 RX ADMIN — PANTOPRAZOLE SODIUM 40 MILLIGRAM(S): 20 TABLET, DELAYED RELEASE ORAL at 06:50

## 2022-10-23 NOTE — PROGRESS NOTE ADULT - SUBJECTIVE AND OBJECTIVE BOX
Patient seen and examined earlier this morning around 11 AM; spoke with patient brother pt via [facility]  ID #     28 y/o Nauruan speaking Male, with no known PMH s/p Left temporal parietal craniotomy (head trauma 6 months ago) went to rehab and returned home. He was able to work after the accident. presenting to the ED with altered mental status. Patient had seizures at home prior to presentation as per Brother. Patient with chronic alcohol use but not drank for 2 days prior to presentation.. Patient was noted to be jaundice developed acutely over the last 1 week as per family. Patient admitted to the ICU for severe metabolic derangements, alcohol withdrawal symptoms, CIWA protocol with PRN ativan. Patient was started on PO lactulose and IVF. Lactate trended down to normal levels. GI and Hepatology was consulted for transaminitis and hyperbilirubinemia. Patient was started on zosyn for possible cholangitis. CT abd showed hepatomegaly with biliary sludge and mild ascites recommended MRCP once patient is more stable. Patient had episode of melena overnight on October 4th. Hb was 7.3. Patient was made NPO, started on Protonix drip and octreotide drip. GI did EGD which did not show any pathology. Flexible sigmoidoscopy showed rectal varices which was cauterized. Patient received one unit PRBC and one unit FFP. Patient was switched to ceftriaxone for SBP prophylaxis per ID. He was started on NAC per hepatology x 3 days which was discontinued He was started on Vit K for 3 days for elevated INR. He was started on prednisone 40mg daily for 28 days for alcoholic hepatitis but was discontinued after a week due to poor response as per Hepatology and patient having a fever spike.  Patients diet was advanced and downgraded to medical floor.     Patient hospital course further complicated by leukocytosis which is rising despite adequate broad spectrum antibiotics for many days as well as profuse watery diarrhea  but also was on Laxatives with Miralax and Lactulose; No appreciable fever; Despite holding all laxatives last 24 hrs, still with loose stools although somewhat more formed. C. diff negative.     Patient is clinically improved mentation wise although still confused. much more alert and awake; no significant abdominal pain.   As per  again, patient is not clear on his answers although was more focussed today; He has intermittent abdominal pain and intermittent stools ; no bloody stools today (had one episode yesterday). continues to have intermittent small soft stools     MEDICATIONS  (STANDING):  dextrose 5% + sodium chloride 0.9%. 1000 milliLiter(s) (75 mL/Hr) IV Continuous <Continuous>  dextrose 5%. 1000 milliLiter(s) (50 mL/Hr) IV Continuous <Continuous>  dextrose 5%. 1000 milliLiter(s) (100 mL/Hr) IV Continuous <Continuous>  dextrose 50% Injectable 25 Gram(s) IV Push once  dextrose 50% Injectable 12.5 Gram(s) IV Push once  dextrose 50% Injectable 25 Gram(s) IV Push once  folic acid 1 milliGRAM(s) Oral <User Schedule>  glucagon  Injectable 1 milliGRAM(s) IntraMuscular once  insulin lispro (ADMELOG) corrective regimen sliding scale   SubCutaneous Before meals and at bedtime  multivitamin 1 Tablet(s) Oral daily  nystatin Powder 1 Application(s) Topical two times a day  pantoprazole    Tablet 40 milliGRAM(s) Oral before breakfast  simethicone 80 milliGRAM(s) Chew three times a day  thiamine Injectable 100 milliGRAM(s) IntraMuscular daily    MEDICATIONS  (PRN):  aluminum hydroxide/magnesium hydroxide/simethicone Suspension 30 milliLiter(s) Oral every 4 hours PRN Dyspepsia  dextrose Oral Gel 15 Gram(s) Oral once PRN Blood Glucose LESS THAN 70 milliGRAM(s)/deciliter  LORazepam   Injectable 2 milliGRAM(s) IV Push every 4 hours PRN CIWA-Ar score 8 or greater  morphine  - Injectable 2 milliGRAM(s) IV Push every 8 hours PRN Severe Pain (7 - 10)  ondansetron Injectable 4 milliGRAM(s) IV Push every 6 hours PRN Nausea and/or Vomiting    Vital Signs Last 24 Hrs  T(C): 36.4 (23 Oct 2022 15:26), Max: 37.1 (22 Oct 2022 20:18)  T(F): 97.6 (23 Oct 2022 15:26), Max: 98.7 (22 Oct 2022 20:18)  HR: 106 (23 Oct 2022 15:26) (99 - 109)  BP: 121/60 (23 Oct 2022 15:26) (113/63 - 125/78)  BP(mean): --  RR: 22 (23 Oct 2022 15:26) (17 - 22)  SpO2: 98% (23 Oct 2022 15:26) (97% - 100%) room air            P/E: limited exam  Psych: Alert and awake, young overweight male, confused oriented x 1-2  Neuro: No gross focal deficits  CVS: S1S2 present, regular  Resp: BLAE+, No wheeze or rhonchi appreciated  Abdomen:  hepatomegaly, soft, distended protuberant abdomen; non tender    Labs:                                              8.8    53.26 )-----------( 418      ( 23 Oct 2022 07:17 )             26.5   10-23    134<L>  |  106  |  8   ----------------------------<  84  3.5   |  16<L>  |  0.55    Ca    8.0<L>      23 Oct 2022 07:17    TPro  5.1<L>  /  Alb  1.3<L>  /  TBili  17.8<H>  /  DBili  13.4<H>  /  AST  54<H>  /  ALT  13  /  AlkPhos  228<H>  10-22      COVID-19 PCR . (10.21.22 @ 06:30) COVID-19 PCR: NotDetec:  Clostridium difficile Toxin by PCR (10.19.22 @ 11:30) C Diff by PCR Result: NotDetec     Culture - Blood (10.16.22 @ 15:05)   Specimen Source: .Blood Blood Culture Results: No growth to date.   Culture - Blood (10.16.22 @ 15:00)   Specimen Source: .Blood Blood  Culture Results: No growth to date.     CT Abdomen and Pelvis No Cont (10.16.22 @ 11:03)   IMPRESSION:  1.  Marked hepatomegaly/steatosis, unchanged.  2.  Ascites/mesenteric edema and subcutaneous edema, minimally progressed.  3.  Equivocal ascending colonic mural prominence raises the possibility of portal colopathy or inflammation  4.  Underdistended duodenum with increased fat stranding about distal duodenal sweep, nonspecific (coronal 4/68). Clinical correlation with   regard to duodenal inflammation recommended.  5.  Undetermined rectosigmoid foreign body/medical hardware (2/54, CT  image)    US Abdomen Limited (10.20.22 @ 13:05)     FINDINGS: There is severe enlargement of the liver with right lobe measuring approximately 31 cm. Diffusely increased hepatic echogenicity is seen consistent with a fatty liver. No focal hepatic mass lesion is identified.  Main portal vein demonstrates normal flow. Hepatic veins are partially delineated with color Doppler.  There is evidence of mild ascites.    IMPRESSION:  Severe hepatomegaly and steatosis. Mild ascites.

## 2022-10-23 NOTE — CHART NOTE - NSCHARTNOTEFT_GEN_A_CORE
Spoke with Brother earlier this evening around 4.30 PM;  As per Brother, he saw his brother last time was  last week. He is unable to vidit as he does not have a photo ID and last time they let him visit with Birth Certificate  His identity was verified on the phone to be Patient Brother  Brother works twojobs in [place] Monday to Friday and only gets off work at 9 PM.   I spoke with Associate Medical director Dr. Shiloh Acevedo and Nursing Supervisor Kristi and obtained permission for Brother to visit as patient is critically ill and as he is off today obtained permission     Spoke with him at [facility] with [facility]  translating ID Spoke with Brother earlier this evening around 4.30 PM;  As per Brother, he saw his brother last time was  last week. He is unable to visit his Brother as he does not have a photo ID and last time they let him visit with Birth Certificate  His identity was verified on the phone to be Patient Brother  Brother works two jobs in [place] Monday to Friday and only gets off work at 9 PM and unable to visit weekdays.   I spoke with Associate Medical director Dr. Shiloh Acevedo and Nursing Supervisor Kristi and obtained permission for Brother to visit as patient is critically ill and as he is off today obtained permission     Spoke with him at length with [facility]  translating ID. I reviewed the whole course of Hospitalization Acute Alcoholic Hepatitis related liver failure and initial non response to steroids and stopping because of an episode of fever but unable to give steroids due to rising WBC count past week despite multiple course of antibiotic. I have discussed that all possible source of Sepsis has been ruled out and WBC count slowly trending down with conservative mgmt but still unable to give steroids. Also discussed, Hepatology unable to offer Liver transplant evaluation due to his continued Alcohol use, need for psychosocial support to take post transplant meds as well as his mood stability post his Brain injury. Patient also lacks Insurance which is least of the problem. I have suggested that if WBC continue to trend down and okay with ID will consider retrial of steroids but unclear if will help. He will be available to  Phone during work. Also has an Uncle as Back up Justin. Spoke with Brother earlier this evening around 4.30 PM;  As per Brother, he saw his brother last time was  last week. He is unable to visit his Brother as he does not have a photo ID and last time they let him visit with Birth Certificate  His identity was verified on the phone to be Patient Brother  Brother works two jobs in [place] Monday to Friday and only gets off work at 9 PM and unable to visit weekdays.   I spoke with Associate Medical director Dr. Shiloh Acevedo and Nursing Supervisor Kristi and obtained permission for Brother to visit as patient is critically ill and as he is off today obtained permission     Spoke with him at length with [facility]  translating ID. I reviewed the whole course of Hospitalization Acute Alcoholic Hepatitis related liver failure and initial non response to steroids and stopping because of an episode of fever but unable to give steroids due to rising WBC count past week despite multiple course of antibiotic. I have discussed that all possible source of Sepsis has been ruled out and WBC count slowly trending down with conservative mgmt but still unable to give steroids. Also discussed, Hepatology unable to offer Liver transplant evaluation due to his continued Alcohol use, need for psychosocial support to take post transplant meds as well as his mood stability post his Brain injury. Patient also lacks Insurance which is least of the problem. I have suggested that if WBC continue to trend down and okay with ID will consider retrial of steroids but unclear if will help. He will be available to  Phone during work. Also has an Uncle as Back up Justin ([phone])

## 2022-10-23 NOTE — PROGRESS NOTE ADULT - ASSESSMENT
D/D:  Leucocytosis without fever likely Leukemoid reaction now downtrending vs steroid induced (less likely) vs GI infection vs possible C. 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   Alcohol withdrawal seizures on admission with Hx Chronic alcohol abuse  Gaseous Bowel distension possibly related to Lactulose improved    Plan:  Leucocytosis concerning for Leukemoid reaction/ Myeloproliferation finally downtrending  C.diff negative; off ABX;  ID is unable to clear at this time for steroids given persistent leucocytosis; will consider second ID opinion  Hepatology follow up; will consider resuming Prednisolone trial if iWBC continues to trend down and okay with ID  No fever past 4 days  Rising INR; unable to do Liver biopsy percutaneously as d/w IR MD Dr. Aguilera; may consider transjugular biopsy at [facility]/ [place] if INR remain elevated; Will give Vitamin K 10 mg daily for 3 days. Check INR Monday  I have discontinued Rifaximin couple of days ago as clear benefit and concern for possible patchy exfoliative dermatitis on his hands  Dr. Saleh Hepatology; will d/w Hepatology team at [place]. Patient may benefit form liver transplant but continued alcohol use, lack of good family support and insurance issues are obstacles  Repeat limited USG; No significant ascites to drain; Large Hepatomegaly. Also no clear evidence of Portal circulation obstruction; no clear evidence of Budd Chiari.   Continue IVF; no significant ascites on limited USG  Ativan PRN; Enhanced supervision   Continue Thiamine IM as Alcohol withdrawal thiamine deficiency related encephalopathy possibly which has improved since;   Continue [id number] BID.   Monitor mental status closely; Repeat blood cultures so far negative (5 days)  GI follow up; H/H remain stable;   Hematology follow up  Flow cytometry WNL;  await BCR/ ABL  Prognosis poor     Discussed with ACP covering Niya/ Hepatology/ ID and RN  Patient does not have capacity to sign out AMA in case he is adamant on leaving, not pressing today.  Will give a Lab holiday and repeat all labs Monday morning    D/D:  Leucocytosis without fever likely Leukemoid reaction now downtrending vs steroid induced (less likely) vs GI infection vs possible C. 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   Alcohol withdrawal seizures on admission with Hx Chronic alcohol abuse  Gaseous Bowel distension possibly related to Lactulose improved    Plan:  Leucocytosis concerning for Leukemoid reaction/ Myeloproliferation finally downtrending  C.diff negative; off ABX;  ID is unable to clear at this time for steroids given persistent leucocytosis; will consider second ID opinion  Hepatology follow up; will consider resuming Prednisolone trial if iWBC continues to trend down and okay with ID  No fever past 4 days  Rising INR; unable to do Liver biopsy percutaneously as d/w IR MD Dr. Aguilera; may consider transjugular biopsy at [facility]/ [place] if INR remain elevated; Will give Vitamin K 10 mg daily for 3 days. Check INR Monday  I have discontinued Rifaximin couple of days ago as clear benefit and concern for possible patchy exfoliative dermatitis on his hands  Dr. Saleh Hepatology; will d/w Hepatology team at [place]. Patient may benefit form liver transplant but continued alcohol use, lack of good family support and insurance issues are obstacles  Repeat limited USG; No significant ascites to drain; Large Hepatomegaly. Also no clear evidence of Portal circulation obstruction; no clear evidence of Budd Chiari.   Continue IVF; no significant ascites on limited USG  Ativan PRN; Enhanced supervision   Continue Thiamine IM as Alcohol withdrawal thiamine deficiency related encephalopathy possibly which has improved since;   Continue [id number] BID.   Monitor mental status closely; Repeat blood cultures so far negative (5 days)  GI follow up; H/H remain stable;   Hematology follow up  Flow cytometry WNL;  await BCR/ ABL  Prognosis poor and guarded    Discussed with ACP covering Niya/ Hepatology/ ID and RN  Patient does not have capacity to sign out AMA in case he is adamant on leaving,   Discussed with Brother at length; see chart note

## 2022-10-24 LAB
ALBUMIN SERPL ELPH-MCNC: 1.2 G/DL — LOW (ref 3.5–5)
ALLERGY+IMMUNOLOGY DIAG STUDY NOTE: SIGNIFICANT CHANGE UP
ALP SERPL-CCNC: 233 U/L — HIGH (ref 40–120)
ALT FLD-CCNC: 13 U/L DA — SIGNIFICANT CHANGE UP (ref 10–60)
ANION GAP SERPL CALC-SCNC: 12 MMOL/L — SIGNIFICANT CHANGE UP (ref 5–17)
ANISOCYTOSIS BLD QL: SIGNIFICANT CHANGE UP
AST SERPL-CCNC: 55 U/L — HIGH (ref 10–40)
BASOPHILS # BLD AUTO: 0 K/UL — SIGNIFICANT CHANGE UP (ref 0–0.2)
BASOPHILS NFR BLD AUTO: 0 % — SIGNIFICANT CHANGE UP (ref 0–2)
BILIRUB DIRECT SERPL-MCNC: 13 MG/DL — HIGH (ref 0–0.3)
BILIRUB INDIRECT FLD-MCNC: 3.4 MG/DL — HIGH (ref 0.2–1)
BILIRUB SERPL-MCNC: 16.4 MG/DL — HIGH (ref 0.2–1.2)
BUN SERPL-MCNC: 8 MG/DL — SIGNIFICANT CHANGE UP (ref 7–18)
BURR CELLS BLD QL SMEAR: PRESENT — SIGNIFICANT CHANGE UP
CALCIUM SERPL-MCNC: 7.7 MG/DL — LOW (ref 8.4–10.5)
CHLORIDE SERPL-SCNC: 104 MMOL/L — SIGNIFICANT CHANGE UP (ref 96–108)
CO2 SERPL-SCNC: 17 MMOL/L — LOW (ref 22–31)
CREAT SERPL-MCNC: 0.49 MG/DL — LOW (ref 0.5–1.3)
DACRYOCYTES BLD QL SMEAR: SLIGHT — SIGNIFICANT CHANGE UP
EGFR: 146 ML/MIN/1.73M2 — SIGNIFICANT CHANGE UP
EOSINOPHIL # BLD AUTO: 0 K/UL — SIGNIFICANT CHANGE UP (ref 0–0.5)
EOSINOPHIL NFR BLD AUTO: 0 % — SIGNIFICANT CHANGE UP (ref 0–6)
GLUCOSE BLDC GLUCOMTR-MCNC: 116 MG/DL — HIGH (ref 70–99)
GLUCOSE BLDC GLUCOMTR-MCNC: 121 MG/DL — HIGH (ref 70–99)
GLUCOSE BLDC GLUCOMTR-MCNC: 88 MG/DL — SIGNIFICANT CHANGE UP (ref 70–99)
GLUCOSE BLDC GLUCOMTR-MCNC: 95 MG/DL — SIGNIFICANT CHANGE UP (ref 70–99)
GLUCOSE SERPL-MCNC: 76 MG/DL — SIGNIFICANT CHANGE UP (ref 70–99)
HCT VFR BLD CALC: 26.2 % — LOW (ref 39–50)
HGB BLD-MCNC: 8.6 G/DL — LOW (ref 13–17)
LYMPHOCYTES # BLD AUTO: 1.96 K/UL — SIGNIFICANT CHANGE UP (ref 1–3.3)
LYMPHOCYTES # BLD AUTO: 4 % — LOW (ref 13–44)
MANUAL SMEAR VERIFICATION: SIGNIFICANT CHANGE UP
MCHC RBC-ENTMCNC: 31.2 PG — SIGNIFICANT CHANGE UP (ref 27–34)
MCHC RBC-ENTMCNC: 32.8 GM/DL — SIGNIFICANT CHANGE UP (ref 32–36)
MCV RBC AUTO: 94.9 FL — SIGNIFICANT CHANGE UP (ref 80–100)
MONOCYTES # BLD AUTO: 2.94 K/UL — HIGH (ref 0–0.9)
MONOCYTES NFR BLD AUTO: 6 % — SIGNIFICANT CHANGE UP (ref 2–14)
MYELOCYTES NFR BLD: 2 % — HIGH (ref 0–0)
NEUTROPHILS # BLD AUTO: 43.05 K/UL — HIGH (ref 1.8–7.4)
NEUTROPHILS NFR BLD AUTO: 88 % — HIGH (ref 43–77)
NRBC # BLD: 0 /100 — SIGNIFICANT CHANGE UP (ref 0–0)
OVALOCYTES BLD QL SMEAR: SLIGHT — SIGNIFICANT CHANGE UP
PHOSPHATIDYLETHANOL (PETH) - RESULT: 282 NG/ML — HIGH
PLAT MORPH BLD: NORMAL — SIGNIFICANT CHANGE UP
PLATELET # BLD AUTO: 324 K/UL — SIGNIFICANT CHANGE UP (ref 150–400)
POIKILOCYTOSIS BLD QL AUTO: SLIGHT — SIGNIFICANT CHANGE UP
POLYCHROMASIA BLD QL SMEAR: SLIGHT — SIGNIFICANT CHANGE UP
POTASSIUM SERPL-MCNC: 3.4 MMOL/L — LOW (ref 3.5–5.3)
POTASSIUM SERPL-SCNC: 3.4 MMOL/L — LOW (ref 3.5–5.3)
PROT SERPL-MCNC: 5 G/DL — LOW (ref 6–8.3)
RBC # BLD: 2.76 M/UL — LOW (ref 4.2–5.8)
RBC # FLD: 23.1 % — HIGH (ref 10.3–14.5)
RBC BLD AUTO: ABNORMAL
SCHISTOCYTES BLD QL AUTO: SLIGHT — SIGNIFICANT CHANGE UP
SODIUM SERPL-SCNC: 133 MMOL/L — LOW (ref 135–145)
WBC # BLD: 48.92 K/UL — CRITICAL HIGH (ref 3.8–10.5)
WBC # FLD AUTO: 48.92 K/UL — CRITICAL HIGH (ref 3.8–10.5)

## 2022-10-24 PROCEDURE — 99233 SBSQ HOSP IP/OBS HIGH 50: CPT

## 2022-10-24 PROCEDURE — 99232 SBSQ HOSP IP/OBS MODERATE 35: CPT

## 2022-10-24 RX ORDER — POTASSIUM CHLORIDE 20 MEQ
20 PACKET (EA) ORAL ONCE
Refills: 0 | Status: COMPLETED | OUTPATIENT
Start: 2022-10-24 | End: 2022-10-24

## 2022-10-24 RX ADMIN — NYSTATIN CREAM 1 APPLICATION(S): 100000 CREAM TOPICAL at 06:44

## 2022-10-24 RX ADMIN — Medication 100 MILLIGRAM(S): at 11:15

## 2022-10-24 RX ADMIN — SIMETHICONE 80 MILLIGRAM(S): 80 TABLET, CHEWABLE ORAL at 13:18

## 2022-10-24 RX ADMIN — PANTOPRAZOLE SODIUM 40 MILLIGRAM(S): 20 TABLET, DELAYED RELEASE ORAL at 06:06

## 2022-10-24 RX ADMIN — Medication 1 MILLIGRAM(S): at 08:08

## 2022-10-24 RX ADMIN — Medication 1 TABLET(S): at 11:16

## 2022-10-24 RX ADMIN — SIMETHICONE 80 MILLIGRAM(S): 80 TABLET, CHEWABLE ORAL at 21:22

## 2022-10-24 RX ADMIN — Medication 20 MILLIEQUIVALENT(S): at 11:17

## 2022-10-24 RX ADMIN — SIMETHICONE 80 MILLIGRAM(S): 80 TABLET, CHEWABLE ORAL at 06:06

## 2022-10-24 RX ADMIN — Medication 1 MILLIGRAM(S): at 17:16

## 2022-10-24 NOTE — PROGRESS NOTE ADULT - SUBJECTIVE AND OBJECTIVE BOX
GI PROGRESS NOTE    Patient is a 27y old  Male who presents with a chief complaint of Altered mental status (24 Oct 2022 11:26)      HPI:  Patient is a 28 yo Lithuanian speaking Male, with no known past medical history, s/p Left temporal parietal craniotomy noted in imaging, presenting to the ED with altered mental status since this morning. Pt moaning, not able to provide history and pt's brother's contact information not in chart as family left ED prior to phone number being collected. As per EMS charts, pt was found to be 'asleep' when EMS arrived..as per family, pt has a seizure that lasted about 2-4 minutes. Family states pt has a seizure earlier in the day as well. Pt family denies hx of seizures. They stated pt drinks alcohol on a daily basis and hasn't drank any alcohol in the past 2 days. Family denies any recent trauma related injuries to pt.' Pt's brother told ED attending that the jaundice developed acutely over the last 1 week.     In the ED, pt's vitals were  Temp 100.1, , /67, RR 18 on room air O2 sat 96%  s/p IV Vanc + Zosyn, NS 1 L bolus, Keppra 2 gm x 1, Ativan, Mg, Kcl riders, Thiamine (02 Oct 2022 20:13)    GI HPI   Patient walking around the room. Denies any N&V. Has intermittent abdominal discomfort. BMs over the weekend loose and green in color.       ______________________________________________________________________  PMH/PSH:  PAST MEDICAL & SURGICAL HISTORY:  No pertinent past medical history      No significant past surgical history        ______________________________________________________________________  MEDS:  MEDICATIONS  (STANDING):  dextrose 5%. 1000 milliLiter(s) (50 mL/Hr) IV Continuous <Continuous>  dextrose 5%. 1000 milliLiter(s) (100 mL/Hr) IV Continuous <Continuous>  dextrose 50% Injectable 25 Gram(s) IV Push once  dextrose 50% Injectable 12.5 Gram(s) IV Push once  dextrose 50% Injectable 25 Gram(s) IV Push once  folic acid 1 milliGRAM(s) Oral <User Schedule>  glucagon  Injectable 1 milliGRAM(s) IntraMuscular once  insulin lispro (ADMELOG) corrective regimen sliding scale   SubCutaneous Before meals and at bedtime  multivitamin 1 Tablet(s) Oral daily  nystatin Powder 1 Application(s) Topical two times a day  pantoprazole    Tablet 40 milliGRAM(s) Oral before breakfast  simethicone 80 milliGRAM(s) Chew three times a day  sodium chloride 0.45% 1000 milliLiter(s) (75 mL/Hr) IV Continuous <Continuous>  thiamine Injectable 100 milliGRAM(s) IntraMuscular daily    MEDICATIONS  (PRN):  aluminum hydroxide/magnesium hydroxide/simethicone Suspension 30 milliLiter(s) Oral every 4 hours PRN Dyspepsia  dextrose Oral Gel 15 Gram(s) Oral once PRN Blood Glucose LESS THAN 70 milliGRAM(s)/deciliter  LORazepam   Injectable 2 milliGRAM(s) IV Push every 4 hours PRN CIWA-Ar score 8 or greater  morphine  - Injectable 2 milliGRAM(s) IV Push every 8 hours PRN Severe Pain (7 - 10)  ondansetron Injectable 4 milliGRAM(s) IV Push every 6 hours PRN Nausea and/or Vomiting    ______________________________________________________________________  ALL:   Allergies    No Known Allergies    Intolerances      ______________________________________________________________________  SH: Social History:  From home  Hx of Alcohol abuse from family  Unknown drug hx/smoking hx (02 Oct 2022 20:13)    ______________________________________________________________________  FH:  FAMILY HISTORY:  No pertinent family history in first degree relatives      ______________________________________________________________________  ROS:    Can not fully assess due to mental status       ______________________________________________________________________  PHYSICAL EXAM:  T(C): 36.6 (10-24-22 @ 11:45), Max: 36.9 (10-24-22 @ 05:06)  HR: 106 (10-24-22 @ 11:45)  BP: 125/72 (10-24-22 @ 11:45)  RR: 18 (10-24-22 @ 11:45)  SpO2: 99% (10-24-22 @ 11:45)  Wt(kg): --      GEN: NAD   HEENT: sclera icteric   CVS- S1 S2  ABD: soft nontender, distended, bowel sounds+  LUNGS: clear to auscultation  NEURO: noin focal neuro exam; AAO x 1  Extremities: no cyanosis, no calf tenderness, LLE edema 2+, no clubbing  Skin: icteric       ______________________________________________________________________  LABS:                        8.6    48.92 )-----------( 324      ( 24 Oct 2022 07:02 )             26.2     10-24    133<L>  |  104  |  8   ----------------------------<  76  3.4<L>   |  17<L>  |  0.49<L>    Ca    7.7<L>      24 Oct 2022 07:02    TPro  5.0<L>  /  Alb  1.2<L>  /  TBili  16.4<H>  /  DBili  13.0<H>  /  AST  55<H>  /  ALT  13  /  AlkPhos  233<H>  10-24    LIVER FUNCTIONS - ( 24 Oct 2022 07:02 )  Alb: 1.2 g/dL / Pro: 5.0 g/dL / ALK PHOS: 233 U/L / ALT: 13 U/L DA / AST: 55 U/L / GGT: x           ______________________________________________________________________  IMAGING:    ______________________________________________________________________  < from: US Abdomen Limited (10.20.22 @ 13:05) >  ACC: [id number] EXAM:  US ABDOMEN LIMITED                          PROCEDURE DATE:  10/20/2022          INTERPRETATION:  CLINICAL INFORMATION: Severe abdominal distention.   Alcoholic hepatitis. Concern for Budd-Chiari.    COMPARISON: None available.    TECHNIQUE:  Sonography of the liver and ascites.    FINDINGS: There is severe enlargement of the liver with right lobe   measuring approximately 31 cm. Diffusely increased hepatic echogenicity   is seen consistent with a fatty liver. No focal hepatic mass lesion is   identified.    Main portal vein demonstrates normal flow. Hepatic veins are partially   delineated with color Doppler.    There is evidence of mild ascites.    IMPRESSION:  Severe hepatomegaly and steatosis.  Mild ascites.        --- End of Report ---        < end of copied text >  < from: CT Abdomen and Pelvis No Cont (10.16.22 @ 11:03) >    ACC: [id number] EXAM:  CT ABDOMEN AND PELVIS                          PROCEDURE DATE:  10/16/2022          INTERPRETATION:  CLINICAL INFORMATION: Hepatomegaly, liver failure.   Leukocytosis    COMPARISON: 10/2/2022    CONTRAST/COMPLICATIONS:  IV Contrast: None.  Oral Contrast: None.  Complications: None.    PROCEDURE:  CT of the Abdomen and Pelvis was performed. Sagittal and coronal   reformats were performed. The lack of intravenous contrast material   limits diagnostic sensitivity in evaluatingthe solid organs/vasculature.    FINDINGS:  LOWER CHEST: Bibasilar platelike atelectasis    LIVER: Marked hepatomegaly (28 cm max sagittal)/hepatic steatosis, not   significantly changed from 10/2/2022  BILE DUCTS: Normal caliber.  GALLBLADDER: Contracted gallbladder with stone/coalescent sludge.  SPLEEN: Borderline splenomegaly (13.5 cm max sagittal), unchanged.  PANCREAS: Within normal limits.  ADRENALS: Within normal limits.  KIDNEYS/URETERS: No renal stones or hydronephrosis.    BLADDER: Within normal limits.  REPRODUCTIVE ORGANS: Normal prostate.    BOWEL: Underdistended duodenum with increased fat stranding about the   distal duodenal sweep, nonspecific (coronal 4/68). No bowel obstruction.   Normal appendix. Equivocal ascending colonicmural prominence .   Undetermined rectosigmoid foreign body/medical hardware (2/54, CT    image).  PERITONEUM: Mild pelvic >>perihepatosplenic ascites, progressed. Central   mesenteric root edema, progressed.  VESSELS: Within normal limits.  RETROPERITONEUM/LYMPH NODES: No lymphadenopathy.  ABDOMINAL WALL: Fat-containing inguinal hernias, unchanged. Dependent   subcutaneous edema, progressed  BONES: L5 spondylolysis. Healed lower anterolateral LEFT rib fractures    IMPRESSION:  1.  Marked hepatomegaly/steatosis, unchanged.  2.  Ascites/mesenteric edema and subcutaneous edema, minimally progressed.  3.  Equivocal ascending colonic mural prominence raises the possibility   of portal colopathy or inflammation  4.  Underdistended duodenum with increased fat stranding about distal   duodenal sweep, nonspecific (coronal 4/68). Clinical correlation with   regard to duodenal inflammation recommended.  5.  Undetermined rectosigmoid foreign body/medical hardware (2/54, CT    image)-(this finding discussed with Iliana Keys 10/16/2022 11:25   AM).    --- End of Report ---      < end of copied text >               GI PROGRESS NOTE    Patient is a 27y old  Male who presents with a chief complaint of Altered mental status (24 Oct 2022 11:26)      HPI:  Patient is a 28 yo Ukrainian speaking Male, with no known past medical history, s/p Left temporal parietal craniotomy noted in imaging, presenting to the ED with altered mental status since this morning. Pt moaning, not able to provide history and pt's brother's contact information not in chart as family left ED prior to phone number being collected. As per EMS charts, pt was found to be 'asleep' when EMS arrived..as per family, pt has a seizure that lasted about 2-4 minutes. Family states pt has a seizure earlier in the day as well. Pt family denies hx of seizures. They stated pt drinks alcohol on a daily basis and hasn't drank any alcohol in the past 2 days. Family denies any recent trauma related injuries to pt.' Pt's brother told ED attending that the jaundice developed acutely over the last 1 week.     In the ED, pt's vitals were  Temp 100.1, , /67, RR 18 on room air O2 sat 96%  s/p IV Vanc + Zosyn, NS 1 L bolus, Keppra 2 gm x 1, Ativan, Mg, Kcl riders, Thiamine (02 Oct 2022 20:13)    GI HPI   Patient walking around the room. Denies any N&V. Has intermittent abdominal discomfort. BMs over the weekend loose and green in color.    [place]  Dale  [id number]      ______________________________________________________________________  PMH/PSH:  PAST MEDICAL & SURGICAL HISTORY:  No pertinent past medical history      No significant past surgical history        ______________________________________________________________________  MEDS:  MEDICATIONS  (STANDING):  dextrose 5%. 1000 milliLiter(s) (50 mL/Hr) IV Continuous <Continuous>  dextrose 5%. 1000 milliLiter(s) (100 mL/Hr) IV Continuous <Continuous>  dextrose 50% Injectable 25 Gram(s) IV Push once  dextrose 50% Injectable 12.5 Gram(s) IV Push once  dextrose 50% Injectable 25 Gram(s) IV Push once  folic acid 1 milliGRAM(s) Oral <User Schedule>  glucagon  Injectable 1 milliGRAM(s) IntraMuscular once  insulin lispro (ADMELOG) corrective regimen sliding scale   SubCutaneous Before meals and at bedtime  multivitamin 1 Tablet(s) Oral daily  nystatin Powder 1 Application(s) Topical two times a day  pantoprazole    Tablet 40 milliGRAM(s) Oral before breakfast  simethicone 80 milliGRAM(s) Chew three times a day  sodium chloride 0.45% 1000 milliLiter(s) (75 mL/Hr) IV Continuous <Continuous>  thiamine Injectable 100 milliGRAM(s) IntraMuscular daily    MEDICATIONS  (PRN):  aluminum hydroxide/magnesium hydroxide/simethicone Suspension 30 milliLiter(s) Oral every 4 hours PRN Dyspepsia  dextrose Oral Gel 15 Gram(s) Oral once PRN Blood Glucose LESS THAN 70 milliGRAM(s)/deciliter  LORazepam   Injectable 2 milliGRAM(s) IV Push every 4 hours PRN CIWA-Ar score 8 or greater  morphine  - Injectable 2 milliGRAM(s) IV Push every 8 hours PRN Severe Pain (7 - 10)  ondansetron Injectable 4 milliGRAM(s) IV Push every 6 hours PRN Nausea and/or Vomiting    ______________________________________________________________________  ALL:   Allergies    No Known Allergies    Intolerances      ______________________________________________________________________  SH: Social History:  From home  Hx of Alcohol abuse from family  Unknown drug hx/smoking hx (02 Oct 2022 20:13)    ______________________________________________________________________  FH:  FAMILY HISTORY:  No pertinent family history in first degree relatives      ______________________________________________________________________  ROS:    Can not fully assess due to mental status       ______________________________________________________________________  PHYSICAL EXAM:  T(C): 36.6 (10-24-22 @ 11:45), Max: 36.9 (10-24-22 @ 05:06)  HR: 106 (10-24-22 @ 11:45)  BP: 125/72 (10-24-22 @ 11:45)  RR: 18 (10-24-22 @ 11:45)  SpO2: 99% (10-24-22 @ 11:45)  Wt(kg): --      GEN: NAD   HEENT: sclera icteric   CVS- S1 S2  ABD: soft nontender, distended, bowel sounds+  LUNGS: clear to auscultation  NEURO: noin focal neuro exam; AAO x 1  Extremities: no cyanosis, no calf tenderness, LLE edema 2+, no clubbing  Skin: icteric       ______________________________________________________________________  LABS:                        8.6    48.92 )-----------( 324      ( 24 Oct 2022 07:02 )             26.2     10-24    133<L>  |  104  |  8   ----------------------------<  76  3.4<L>   |  17<L>  |  0.49<L>    Ca    7.7<L>      24 Oct 2022 07:02    TPro  5.0<L>  /  Alb  1.2<L>  /  TBili  16.4<H>  /  DBili  13.0<H>  /  AST  55<H>  /  ALT  13  /  AlkPhos  233<H>  10-24    LIVER FUNCTIONS - ( 24 Oct 2022 07:02 )  Alb: 1.2 g/dL / Pro: 5.0 g/dL / ALK PHOS: 233 U/L / ALT: 13 U/L DA / AST: 55 U/L / GGT: x           ______________________________________________________________________  IMAGING:    ______________________________________________________________________  < from: US Abdomen Limited (10.20.22 @ 13:05) >  ACC: [id number] EXAM:  US ABDOMEN LIMITED                          PROCEDURE DATE:  10/20/2022          INTERPRETATION:  CLINICAL INFORMATION: Severe abdominal distention.   Alcoholic hepatitis. Concern for Budd-Chiari.    COMPARISON: None available.    TECHNIQUE:  Sonography of the liver and ascites.    FINDINGS: There is severe enlargement of the liver with right lobe   measuring approximately 31 cm. Diffusely increased hepatic echogenicity   is seen consistent with a fatty liver. No focal hepatic mass lesion is   identified.    Main portal vein demonstrates normal flow. Hepatic veins are partially   delineated with color Doppler.    There is evidence of mild ascites.    IMPRESSION:  Severe hepatomegaly and steatosis.  Mild ascites.        --- End of Report ---        < end of copied text >  < from: CT Abdomen and Pelvis No Cont (10.16.22 @ 11:03) >    ACC: [id number] EXAM:  CT ABDOMEN AND PELVIS                          PROCEDURE DATE:  10/16/2022          INTERPRETATION:  CLINICAL INFORMATION: Hepatomegaly, liver failure.   Leukocytosis    COMPARISON: 10/2/2022    CONTRAST/COMPLICATIONS:  IV Contrast: None.  Oral Contrast: None.  Complications: None.    PROCEDURE:  CT of the Abdomen and Pelvis was performed. Sagittal and coronal   reformats were performed. The lack of intravenous contrast material   limits diagnostic sensitivity in evaluatingthe solid organs/vasculature.    FINDINGS:  LOWER CHEST: Bibasilar platelike atelectasis    LIVER: Marked hepatomegaly (28 cm max sagittal)/hepatic steatosis, not   significantly changed from 10/2/2022  BILE DUCTS: Normal caliber.  GALLBLADDER: Contracted gallbladder with stone/coalescent sludge.  SPLEEN: Borderline splenomegaly (13.5 cm max sagittal), unchanged.  PANCREAS: Within normal limits.  ADRENALS: Within normal limits.  KIDNEYS/URETERS: No renal stones or hydronephrosis.    BLADDER: Within normal limits.  REPRODUCTIVE ORGANS: Normal prostate.    BOWEL: Underdistended duodenum with increased fat stranding about the   distal duodenal sweep, nonspecific (coronal 4/68). No bowel obstruction.   Normal appendix. Equivocal ascending colonicmural prominence .   Undetermined rectosigmoid foreign body/medical hardware (2/54, CT    image).  PERITONEUM: Mild pelvic >>perihepatosplenic ascites, progressed. Central   mesenteric root edema, progressed.  VESSELS: Within normal limits.  RETROPERITONEUM/LYMPH NODES: No lymphadenopathy.  ABDOMINAL WALL: Fat-containing inguinal hernias, unchanged. Dependent   subcutaneous edema, progressed  BONES: L5 spondylolysis. Healed lower anterolateral LEFT rib fractures    IMPRESSION:  1.  Marked hepatomegaly/steatosis, unchanged.  2.  Ascites/mesenteric edema and subcutaneous edema, minimally progressed.  3.  Equivocal ascending colonic mural prominence raises the possibility   of portal colopathy or inflammation  4.  Underdistended duodenum with increased fat stranding about distal   duodenal sweep, nonspecific (coronal 4/68). Clinical correlation with   regard to duodenal inflammation recommended.  5.  Undetermined rectosigmoid foreign body/medical hardware (2/54, CT    image)-(this finding discussed with Iliana Keys 10/16/2022 11:25   AM).    --- End of Report ---      < end of copied text >

## 2022-10-24 NOTE — PROGRESS NOTE ADULT - ASSESSMENT
26 yo Cameroonian speaking Male, with no known past medical history, s/p Left temporal parietal craniotomy noted in imaging, presenting to the ED with altered mental status . GI consult for transaminitis. CT showed  Severe hepatomegaly and diffuse steatosis. Mild ascites and infiltration of the central mesenteric fat. Possible gallbladder sludge and likely secondary/reactive wall thickening.  Left temporal parietal craniotomy. Encephalomalacia subjacent to the craniotomy. Ex vacuo dilatation of the left lateral ventricle. No intracranial hemorrhage. No mass lesion. Labs significant for MWC 16 HH 8.3/26.7 MCV 87  INR 2 Tbili 8 ALk 600   ammonia 131 lactate 3 albumin 1.8. Patient is non verbal. Most information obtained from primary team and medical records. Pt moaning, not able to provide history and pt's brother's contact information not in chart as family left ED prior to phone number being collected. As per EMS charts, pt was found to be 'asleep' when EMS arrived. As per family, pt has a seizure that lasted about 2-4 minutes. Family states pt has a seizure earlier in the day as well. Pt family denies hx of seizures. They stated pt drinks alcohol on a daily basis and hasn't drank any alcohol in the past 2 days. Family denies any recent trauma related injuries to pt.' Pt's brother told ED attending that the jaundice developed acutely over the last 1 week. Patient seen and examined at bedside. Non verbal, only moaning. Abdominal distended and soft. Sclera icteric. Skin icteric. Stool color light brown.

## 2022-10-24 NOTE — PROGRESS NOTE ADULT - NS ATTEND AMEND GEN_ALL_CORE FT
CC: Leukocytosis    S: Pt w/o new complaint    PHYSICAL EXAM:  Vital Signs Last 24 Hrs  T(C): 36.9 (24 Oct 2022 05:06), Max: 36.9 (24 Oct 2022 05:06)  T(F): 98.5 (24 Oct 2022 05:06), Max: 98.5 (24 Oct 2022 05:06)  HR: 108 (24 Oct 2022 05:06) (99 - 108)  BP: 124/75 (24 Oct 2022 05:06) (121/60 - 128/72)  BP(mean): --  RR: 18 (24 Oct 2022 05:06) (18 - 22)  SpO2: 97% (24 Oct 2022 05:06) (97% - 100%)    Parameters below as of 24 Oct 2022 05:06  Patient On (Oxygen Delivery Method): room air      PE:  general AAOX2 NAD, poor oral hygeine  HEENT icteric and jaundice   abd distended, tender  extremity w/ no edema   neurology grossly intact without significant focal deficit        LABS:                        8.6    48.92 )-----------( 324      ( 24 Oct 2022 07:02 )             26.2     10-24    133<L>  |  104  |  8   ----------------------------<  76  3.4<L>   |  17<L>  |  0.49<L>    Ca    7.7<L>      24 Oct 2022 07:02    TPro  5.0<L>  /  Alb  1.2<L>  /  TBili  16.4<H>  /  DBili  13.0<H>  /  AST  55<H>  /  ALT  13  /  AlkPhos  233<H>  10-24    PT/INR - ( 23 Oct 2022 07:17 )   PT: 40.4 sec;   INR: 3.35 ratio      A/P    Problem #1 Leukocytosis - decreasing; leukemoid reaction w/ mostly neutrophilia; work up so far negative  -awaiting BCR/ABL; send out for CSF3R if negative  -no need for cytoreductive tx    Problem #2 Anemia - multifactorial; transfuse to keep Hg > 7 g/dL    Problem #3 Coagulopathy - likely from severe cirrhosis    Overall prognosis poor; call with questions    Pablo Lambert MD

## 2022-10-24 NOTE — PROGRESS NOTE ADULT - NS ATTEND AMEND GEN_ALL_CORE FT
Patient seen and examined earlier today; spoke with pt via [place]  ID # [id number] (Sarah)    28 y/o Sami speaking Male, with no known PMH s/p Left temporal parietal craniotomy (head trauma 6 months ago) went to rehab and returned home. He was able to work after the accident. presenting to the ED with altered mental status. Patient had seizures at home prior to presentation as per Brother. Patient with chronic alcohol use but not drank for 2 days prior to presentation.. Patient was noted to be jaundice developed acutely over the last 1 week as per family. Patient admitted to the ICU for severe metabolic derangements, alcohol withdrawal symptoms, CIWA protocol with PRN ativan. Patient was started on PO lactulose and IVF. Lactate trended down to normal levels. GI and Hepatology was consulted for transaminitis and hyperbilirubinemia. Patient was started on zosyn for possible cholangitis. CT abd showed hepatomegaly with biliary sludge and mild ascites recommended MRCP once patient is more stable. Patient had episode of melena overnight on October 4th. Hb was 7.3. Patient was made NPO, started on Protonix drip and octreotide drip. GI did EGD which did not show any pathology. Flexible sigmoidoscopy showed rectal varices which was cauterized. Patient received one unit PRBC and one unit FFP. Patient was switched to ceftriaxone for SBP prophylaxis per ID. He was started on NAC per hepatology x 3 days which was discontinued He was started on Vit K for 3 days for elevated INR. He was started on prednisone 40mg daily for 28 days for alcoholic hepatitis but was discontinued after a week due to poor response as per Hepatology and patient having a fever spike.  Patients diet was advanced and downgraded to medical floor.     Patient hospital course further complicated by leukocytosis which was rising despite adequate broad spectrum antibiotics for many days as well as profuse watery diarrhea  but also was on Laxatives with Miralax and Lactulose; No appreciable fever; Despite holding all laxatives last 24 hrs, still with loose stools although somewhat more formed. C. diff was send as per ID recs negative. Diarrhea improved last couple of days    Patient is clinically improved mentation wise although still confused. much more alert and awake; intermittent abdominal pain.   As per  again, patient is not clear on his answers which tend to sway away from asked question.  no bloody stools last 2 days. Hepatology spoke with transplant Hepatology and advised against steroids at this time. Patient does c/o pain in left ankle.     Vital Signs Last 24 Hrs  T(C): 37 (24 Oct 2022 21:21), Max: 37 (24 Oct 2022 21:21)  T(F): 98.6 (24 Oct 2022 21:21), Max: 98.6 (24 Oct 2022 21:21)  HR: 115 (24 Oct 2022 21:21) (106 - 115)  BP: 119/68 (24 Oct 2022 21:21) (119/68 - 125/72)  RR: 19 (24 Oct 2022 21:21) (18 - 19)  SpO2: 98% (24 Oct 2022 21:21) (97% - 99%): room air    P/E: as above  alert and awake, confused, not oriented to time and place  CVS: S1S2 present  Resp: BLAE+, no wheeze or Rhonchi  GI: Soft, distended, large hepatomegaly, non tender on palpation  Extr; B/L LE edema +2    Labs:                    8.6    48.92 )-----------( 324      ( 24 Oct 2022 07:02 )             26.2   10-24  133<L>  |  104  |  8   ----------------------------<  76  3.4<L>   |  17<L>  |  0.49<L>  Ca    7.7<L>      24 Oct 2022 07:02  TPro  5.0<L>  /  Alb  1.2<L>  /  TBili  16.4<H>  /  DBili  13.0<H>  /  AST  55<H>  /  ALT  13  /  AlkPhos  233<H>  10-24    Blood Cx negative 10/16/22 at 5 days; Patient seen and examined earlier today; spoke with pt via [place]  ID # [id number] (Sarah)    26 y/o Danish speaking Male, with no known PMH s/p Left temporal parietal craniotomy (head trauma 6 months ago) went to rehab and returned home. He was able to work after the accident. presenting to the ED with altered mental status. Patient had seizures at home prior to presentation as per Brother. Patient with chronic alcohol use but not drank for 2 days prior to presentation.. Patient was noted to be jaundice developed acutely over the last 1 week as per family. Patient admitted to the ICU for severe metabolic derangements, alcohol withdrawal symptoms, CIWA protocol with PRN ativan. Patient was started on PO lactulose and IVF. Lactate trended down to normal levels. GI and Hepatology was consulted for transaminitis and hyperbilirubinemia. Patient was started on zosyn for possible cholangitis. CT abd showed hepatomegaly with biliary sludge and mild ascites recommended MRCP once patient is more stable. Patient had episode of melena overnight on October 4th. Hb was 7.3. Patient was made NPO, started on Protonix drip and octreotide drip. GI did EGD which did not show any pathology. Flexible sigmoidoscopy showed rectal varices which was cauterized. Patient received one unit PRBC and one unit FFP. Patient was switched to ceftriaxone for SBP prophylaxis per ID. He was started on NAC per hepatology x 3 days which was discontinued He was started on Vit K for 3 days for elevated INR. He was started on prednisone 40mg daily for 28 days for alcoholic hepatitis but was discontinued after a week due to poor response as per Hepatology and patient having a fever spike.  Patients diet was advanced and downgraded to medical floor.     Patient hospital course further complicated by leukocytosis which was rising despite adequate broad spectrum antibiotics for many days as well as profuse watery diarrhea  but also was on Laxatives with Miralax and Lactulose; No appreciable fever; Despite holding all laxatives last 24 hrs, still with loose stools although somewhat more formed. C. diff was send as per ID recs negative. Diarrhea improved last couple of days    Patient is clinically improved mentation wise although still confused. much more alert and awake; intermittent abdominal pain.   As per  again, patient is not clear on his answers which tend to sway away from asked question.  no bloody stools last 2 days. Hepatology spoke with transplant Hepatology and advised against steroids at this time. Patient does c/o pain in left ankle.     Vital Signs Last 24 Hrs  T(C): 37 (24 Oct 2022 21:21), Max: 37 (24 Oct 2022 21:21)  T(F): 98.6 (24 Oct 2022 21:21), Max: 98.6 (24 Oct 2022 21:21)  HR: 115 (24 Oct 2022 21:21) (106 - 115)  BP: 119/68 (24 Oct 2022 21:21) (119/68 - 125/72)  RR: 19 (24 Oct 2022 21:21) (18 - 19)  SpO2: 98% (24 Oct 2022 21:21) (97% - 99%): room air    P/E: as above  alert and awake, confused, not oriented to time and place  CVS: S1S2 present  Resp: BLAE+, no wheeze or Rhonchi  GI: Soft, distended, large hepatomegaly, non tender on palpation  Extr; B/L LE edema +2    Labs:                    8.6    48.92 )-----------( 324      ( 24 Oct 2022 07:02 )             26.2   10-24  133<L>  |  104  |  8   ----------------------------<  76  3.4<L>   |  17<L>  |  0.49<L>  Ca    7.7<L>      24 Oct 2022 07:02  TPro  5.0<L>  /  Alb  1.2<L>  /  TBili  16.4<H>  /  DBili  13.0<H>  /  AST  55<H>  /  ALT  13  /  AlkPhos  233<H>  10-24    Blood Cx negative 10/16/22 at 5 days;    D/D:  Leucocytosis without fever likely Leukemoid reaction now downtrending vs steroid induced (unlikely) vs GI infection vs possible C. diff colitis, all neg   Diarrhea Laxative induced vs infectious Colitis improving  Acute Metabolic Encephalopathy due to suspected  Alcoholic hepatitis with baseline Brain trauma and Craniotomy as well as chronic alcohol abuse somewhat improved  Electrolyte imbalances: Hypokalemia replaced  Severe Protein calorie malnutrition   Incidental Rectal Foreign body clamp which was placed in a Dieulafoy ulcer at the time of a hemorrhoidal bleed.   Alcohol withdrawal seizures on admission with Hx Chronic alcohol abuse  Gaseous Bowel distension possibly related to Lactulose improved    Plan:  Leucocytosis concerning for Leukemoid reaction/ Myeloproliferation finally downtrending 60 >> 48 K  C.diff negative; off ABX;  ID is unable to clear at this time for steroids given persistent leucocytosis; d/w Dr. Puentes today has signed off  will consider second ID opinion/ evaluation if leucocytosis persist while Hematology following up  Flow cytometry WNL;  await BCR/ ABL  Hepatology follow up; recommend against steroids at this time due to leucocytosis as d/w Transplant Hepatologist  No fever past week  Repeat limited USG; No significant ascites to drain; Large Hepatomegaly. Also no clear evidence of Portal circulation obstruction; no clear evidence of Budd Chiari.   Rising INR; unable to do Liver biopsy percutaneously as d/w IR MD Dr. Aguilera; may consider transjugular biopsy at [facility] if INR remain elevated; on Vitamin K 10 mg daily for 3 days. Check INR AM  I have discontinued Rifaximin few days ago as no clear benefit, no cirrhosis and concern for possible patchy exfoliative dermatitis on his hands  Dr. Saleh Hepatology following;  Patient may benefit from liver transplant but continued alcohol use, lack of good family support and insurance issues are obstacles  As per Hepatology may consider a trial of NAC; No harm; Please consider with maintenance infusion AM if INR remain elevated and WBC not trending down.   Continue IVF; no significant ascites on limited USG  Ativan PRN; Enhanced supervision   Continue Thiamine IM as Alcohol withdrawal thiamine deficiency related encephalopathy possibly which has improved since; Continue [id number] BID.   Nutrition support; Ensure with meals  Monitor mental status closely; Repeat blood cultures negative (5 days)  GI follow up noted; H/H remain stable;     Prognosis poor and guarded    Discussed with ACP covering [place]/ Hepatology/ ID and RN  Patient does not have capacity to sign out AMA in case he is adamant on leaving,   Brother could be reached with ; see chart note from 10/23/22  I will be away 10/25/22 onwards. Hospitalist colleague to assume care

## 2022-10-24 NOTE — PROGRESS NOTE ADULT - SUBJECTIVE AND OBJECTIVE BOX
NP Note discussed with  Primary Attending    Patient is a 27y old  Male who presents with a chief complaint of Altered mental status (24 Oct 2022 13:21)      INTERVAL HPI/OVERNIGHT EVENTS: no new complaints    MEDICATIONS  (STANDING):  dextrose 5%. 1000 milliLiter(s) (50 mL/Hr) IV Continuous <Continuous>  dextrose 5%. 1000 milliLiter(s) (100 mL/Hr) IV Continuous <Continuous>  dextrose 50% Injectable 25 Gram(s) IV Push once  dextrose 50% Injectable 12.5 Gram(s) IV Push once  dextrose 50% Injectable 25 Gram(s) IV Push once  folic acid 1 milliGRAM(s) Oral <User Schedule>  glucagon  Injectable 1 milliGRAM(s) IntraMuscular once  insulin lispro (ADMELOG) corrective regimen sliding scale   SubCutaneous Before meals and at bedtime  multivitamin 1 Tablet(s) Oral daily  nystatin Powder 1 Application(s) Topical two times a day  pantoprazole    Tablet 40 milliGRAM(s) Oral before breakfast  simethicone 80 milliGRAM(s) Chew three times a day  sodium chloride 0.45% 1000 milliLiter(s) (75 mL/Hr) IV Continuous <Continuous>  thiamine Injectable 100 milliGRAM(s) IntraMuscular daily    MEDICATIONS  (PRN):  aluminum hydroxide/magnesium hydroxide/simethicone Suspension 30 milliLiter(s) Oral every 4 hours PRN Dyspepsia  dextrose Oral Gel 15 Gram(s) Oral once PRN Blood Glucose LESS THAN 70 milliGRAM(s)/deciliter  LORazepam   Injectable 2 milliGRAM(s) IV Push every 4 hours PRN CIWA-Ar score 8 or greater  morphine  - Injectable 2 milliGRAM(s) IV Push every 8 hours PRN Severe Pain (7 - 10)  ondansetron Injectable 4 milliGRAM(s) IV Push every 6 hours PRN Nausea and/or Vomiting      __________________________________________________  REVIEW OF SYSTEMS:    CONSTITUTIONAL: No fever,   EYES: no acute visual disturbances  NECK: No pain or stiffness  RESPIRATORY: No cough; No shortness of breath  CARDIOVASCULAR: No chest pain, no palpitations  GASTROINTESTINAL: No pain. No nausea or vomiting; No diarrhea   NEUROLOGICAL: No headache or numbness, no tremors  MUSCULOSKELETAL: No joint pain, no muscle pain  GENITOURINARY: no dysuria, no frequency, no hesitancy  PSYCHIATRY: no depression , no anxiety  ALL OTHER  ROS negative        Vital Signs Last 24 Hrs  T(C): 36.6 (24 Oct 2022 11:45), Max: 36.9 (24 Oct 2022 05:06)  T(F): 97.8 (24 Oct 2022 11:45), Max: 98.5 (24 Oct 2022 05:06)  HR: 106 (24 Oct 2022 11:45) (106 - 108)  BP: 125/72 (24 Oct 2022 11:45) (121/60 - 128/72)  BP(mean): --  RR: 18 (24 Oct 2022 11:45) (18 - 22)  SpO2: 99% (24 Oct 2022 11:45) (97% - 99%)    Parameters below as of 24 Oct 2022 11:45  Patient On (Oxygen Delivery Method): room air        ________________________________________________  PHYSICAL EXAM:  GENERAL: NAD  HEENT: Normocephalic;  conjunctivae and sclerae clear; moist mucous membranes;   NECK : supple  CHEST/LUNG: Clear to auscultation bilaterally with good air entry   HEART: S1 S2  regular; no murmurs, gallops or rubs  ABDOMEN: Soft, Nontender, Nondistended; Bowel sounds present  EXTREMITIES: no cyanosis; no edema; no calf tenderness  SKIN: warm and dry; no rash  NERVOUS SYSTEM:  Awake and alert; Oriented  to place, person and time ; no new deficits    _________________________________________________  LABS:                        8.6    48.92 )-----------( 324      ( 24 Oct 2022 07:02 )             26.2     10-24    133<L>  |  104  |  8   ----------------------------<  76  3.4<L>   |  17<L>  |  0.49<L>    Ca    7.7<L>      24 Oct 2022 07:02    TPro  5.0<L>  /  Alb  1.2<L>  /  TBili  16.4<H>  /  DBili  13.0<H>  /  AST  55<H>  /  ALT  13  /  AlkPhos  233<H>  10-24    PT/INR - ( 23 Oct 2022 07:17 )   PT: 40.4 sec;   INR: 3.35 ratio             CAPILLARY BLOOD GLUCOSE      POCT Blood Glucose.: 116 mg/dL (24 Oct 2022 10:49)  POCT Blood Glucose.: 88 mg/dL (24 Oct 2022 07:45)  POCT Blood Glucose.: 90 mg/dL (23 Oct 2022 20:58)  POCT Blood Glucose.: 116 mg/dL (23 Oct 2022 16:22)        RADIOLOGY & ADDITIONAL TESTS:    Imaging  Reviewed:  YES/NO    Consultant(s) Notes Reviewed:   YES/ No      Plan of care was discussed with patient and /or primary care giver; all questions and concerns were addressed  NP Note discussed with  Primary Attending    Patient is a 27y old  Male who presents with a chief complaint of Altered mental status (24 Oct 2022 13:21)    Interpretor: [place] ID # [id number]  INTERVAL HPI/OVERNIGHT EVENTS: no new complaints    MEDICATIONS  (STANDING):  dextrose 5%. 1000 milliLiter(s) (50 mL/Hr) IV Continuous <Continuous>  dextrose 5%. 1000 milliLiter(s) (100 mL/Hr) IV Continuous <Continuous>  dextrose 50% Injectable 25 Gram(s) IV Push once  dextrose 50% Injectable 12.5 Gram(s) IV Push once  dextrose 50% Injectable 25 Gram(s) IV Push once  folic acid 1 milliGRAM(s) Oral <User Schedule>  glucagon  Injectable 1 milliGRAM(s) IntraMuscular once  insulin lispro (ADMELOG) corrective regimen sliding scale   SubCutaneous Before meals and at bedtime  multivitamin 1 Tablet(s) Oral daily  nystatin Powder 1 Application(s) Topical two times a day  pantoprazole    Tablet 40 milliGRAM(s) Oral before breakfast  simethicone 80 milliGRAM(s) Chew three times a day  sodium chloride 0.45% 1000 milliLiter(s) (75 mL/Hr) IV Continuous <Continuous>  thiamine Injectable 100 milliGRAM(s) IntraMuscular daily    MEDICATIONS  (PRN):  aluminum hydroxide/magnesium hydroxide/simethicone Suspension 30 milliLiter(s) Oral every 4 hours PRN Dyspepsia  dextrose Oral Gel 15 Gram(s) Oral once PRN Blood Glucose LESS THAN 70 milliGRAM(s)/deciliter  LORazepam   Injectable 2 milliGRAM(s) IV Push every 4 hours PRN CIWA-Ar score 8 or greater  morphine  - Injectable 2 milliGRAM(s) IV Push every 8 hours PRN Severe Pain (7 - 10)  ondansetron Injectable 4 milliGRAM(s) IV Push every 6 hours PRN Nausea and/or Vomiting      __________________________________________________  REVIEW OF SYSTEMS: unable to assess due to mentation. pt alert, responsive, however has garbled speech and unable to follow questions.     Vital Signs Last 24 Hrs  T(C): 36.6 (24 Oct 2022 11:45), Max: 36.9 (24 Oct 2022 05:06)  T(F): 97.8 (24 Oct 2022 11:45), Max: 98.5 (24 Oct 2022 05:06)  HR: 106 (24 Oct 2022 11:45) (106 - 108)  BP: 125/72 (24 Oct 2022 11:45) (121/60 - 128/72)  BP(mean): --  RR: 18 (24 Oct 2022 11:45) (18 - 22)  SpO2: 99% (24 Oct 2022 11:45) (97% - 99%)    Parameters below as of 24 Oct 2022 11:45  Patient On (Oxygen Delivery Method): room air        ________________________________________________  PHYSICAL EXAM:  GENERAL: NAD  HEENT: Normocephalic;  conjunctivae and sclerae icteric; moist mucous membranes;   NECK : supple  CHEST/LUNG: Clear to auscultation bilaterally with good air entry   HEART: S1 S2  regular; no murmurs, gallops or rubs  ABDOMEN:  hepatomegaly, soft, distended protuberant abdomen; non tender  EXTREMITIES: no cyanosis; no edema; no calf tenderness  SKIN: warm and dry; no rash. Jaundiced skin   NERVOUS SYSTEM:  Awake and alert; Oriented to self only; no new deficits    _________________________________________________  LABS:                        8.6    48.92 )-----------( 324      ( 24 Oct 2022 07:02 )             26.2     10-24    133<L>  |  104  |  8   ----------------------------<  76  3.4<L>   |  17<L>  |  0.49<L>    Ca    7.7<L>      24 Oct 2022 07:02    TPro  5.0<L>  /  Alb  1.2<L>  /  TBili  16.4<H>  /  DBili  13.0<H>  /  AST  55<H>  /  ALT  13  /  AlkPhos  233<H>  10-24    PT/INR - ( 23 Oct 2022 07:17 )   PT: 40.4 sec;   INR: 3.35 ratio             CAPILLARY BLOOD GLUCOSE      POCT Blood Glucose.: 116 mg/dL (24 Oct 2022 10:49)  POCT Blood Glucose.: 88 mg/dL (24 Oct 2022 07:45)  POCT Blood Glucose.: 90 mg/dL (23 Oct 2022 20:58)  POCT Blood Glucose.: 116 mg/dL (23 Oct 2022 16:22)        RADIOLOGY & ADDITIONAL TESTS:    Imaging  Reviewed:  YES    Consultant(s) Notes Reviewed:   YES

## 2022-10-24 NOTE — PROGRESS NOTE ADULT - PROBLEM SELECTOR PLAN 1
P/W Had hematochezia x2   Hgb fluctuating   Vitamin K for elevated INR  S/p EGD-Sigmoidoscopy which revealed large rectal Dieulafoy lesion.  Hemostasis achieved with over-the-scope clip and cautery.  Although Hgb is fluctuating, patient with no acute signs of GI bleeding. As per RN loose green-brown stools. No acute GI intervention is warranted at this time.   CBC daily

## 2022-10-24 NOTE — PROGRESS NOTE ADULT - PROBLEM SELECTOR PLAN 3
-  Secondary Alcohol Hepatitis   -  U-tox negative for salicylate and acetaminophen levels  -  Hepatitis panel negative  -  bili - elevated  -  prednisone  40mg QD x 28 days ( 10/8/2022~)   -  Elevated INR and hypoalbuminemia in the setting of liver dysfunction  -  s/p vitamin K for 3 days  -f/u INR in Am   -  Hepatology Dr. Gonzalez

## 2022-10-24 NOTE — PROGRESS NOTE ADULT - SUBJECTIVE AND OBJECTIVE BOX
Chief Complaint:  Patient is a 27y old  Male who presents with a chief complaint of Altered mental status (24 Oct 2022 14:04)      Reason for consult:    Interval Events:     Hospital Medications:  aluminum hydroxide/magnesium hydroxide/simethicone Suspension 30 milliLiter(s) Oral every 4 hours PRN  dextrose 5%. 1000 milliLiter(s) IV Continuous <Continuous>  dextrose 5%. 1000 milliLiter(s) IV Continuous <Continuous>  dextrose 50% Injectable 25 Gram(s) IV Push once  dextrose 50% Injectable 12.5 Gram(s) IV Push once  dextrose 50% Injectable 25 Gram(s) IV Push once  dextrose Oral Gel 15 Gram(s) Oral once PRN  folic acid 1 milliGRAM(s) Oral <User Schedule>  glucagon  Injectable 1 milliGRAM(s) IntraMuscular once  insulin lispro (ADMELOG) corrective regimen sliding scale   SubCutaneous Before meals and at bedtime  LORazepam   Injectable 2 milliGRAM(s) IV Push every 4 hours PRN  morphine  - Injectable 2 milliGRAM(s) IV Push every 8 hours PRN  multivitamin 1 Tablet(s) Oral daily  nystatin Powder 1 Application(s) Topical two times a day  ondansetron Injectable 4 milliGRAM(s) IV Push every 6 hours PRN  pantoprazole    Tablet 40 milliGRAM(s) Oral before breakfast  simethicone 80 milliGRAM(s) Chew three times a day  sodium chloride 0.45% 1000 milliLiter(s) IV Continuous <Continuous>  thiamine Injectable 100 milliGRAM(s) IntraMuscular daily      ROS:   General:  No  fevers, chills, night sweats, fatigue  Eyes:  Good vision, no reported pain  ENT:  No sore throat, pain, runny nose  CV:  No pain, palpitations  Pulm:  No dyspnea, cough  GI:  See HPI, otherwise negative  :  No  incontinence, nocturia  Muscle:  No pain, weakness  Neuro:  No memory problems  Psych:  No insomnia, mood problems, depression  Endocrine:  No polyuria, polydipsia, cold/heat intolerance  Heme:  No petechiae, ecchymosis, easy bruisability  Skin:  No rash    PHYSICAL EXAM:   Vital Signs:  Vital Signs Last 24 Hrs  T(C): 36.6 (24 Oct 2022 11:45), Max: 36.9 (24 Oct 2022 05:06)  T(F): 97.8 (24 Oct 2022 11:45), Max: 98.5 (24 Oct 2022 05:06)  HR: 106 (24 Oct 2022 11:45) (106 - 108)  BP: 125/72 (24 Oct 2022 11:45) (121/60 - 128/72)  BP(mean): --  RR: 18 (24 Oct 2022 11:45) (18 - 22)  SpO2: 99% (24 Oct 2022 11:45) (97% - 99%)    Parameters below as of 24 Oct 2022 11:45  Patient On (Oxygen Delivery Method): room air      Daily     Daily     GENERAL: no acute distress  NEURO: alert, no asterixis  HEENT: anicteric sclera, no conjunctival pallor appreciated  CHEST: no respiratory distress, no accessory muscle use  CARDIAC: regular rate, rhythm  ABDOMEN: soft, non-tender, non-distended, no rebound or guarding  EXTREMITIES: warm, well perfused, no edema  SKIN: no lesions noted    LABS: reviewed                        8.6    48.92 )-----------( 324      ( 24 Oct 2022 07:02 )             26.2     10-24    133<L>  |  104  |  8   ----------------------------<  76  3.4<L>   |  17<L>  |  0.49<L>    Ca    7.7<L>      24 Oct 2022 07:02    TPro  5.0<L>  /  Alb  1.2<L>  /  TBili  16.4<H>  /  DBili  13.0<H>  /  AST  55<H>  /  ALT  13  /  AlkPhos  233<H>  10-24    LIVER FUNCTIONS - ( 24 Oct 2022 07:02 )  Alb: 1.2 g/dL / Pro: 5.0 g/dL / ALK PHOS: 233 U/L / ALT: 13 U/L DA / AST: 55 U/L / GGT: x             Interval Diagnostic Studies: see sunrise for full report   Chief Complaint:  Patient is a 27y old  Male who presents with a chief complaint of Altered mental status (24 Oct 2022 14:04)      Reason for consult: SYEDA    Interval Events: Patient was seen and examined at bedside.  ID: [id number]. Remains awake, alert, oriented to self, place, still not time. WBC and bilirubin slightly downtrending. PCA bedside reports that no more bleeding noted since Saturday. Patient still c/o multiple BMs, and per PCA had around 8, but small amount each time.     Hospital Medications:  aluminum hydroxide/magnesium hydroxide/simethicone Suspension 30 milliLiter(s) Oral every 4 hours PRN  dextrose 5%. 1000 milliLiter(s) IV Continuous <Continuous>  dextrose 5%. 1000 milliLiter(s) IV Continuous <Continuous>  dextrose 50% Injectable 25 Gram(s) IV Push once  dextrose 50% Injectable 12.5 Gram(s) IV Push once  dextrose 50% Injectable 25 Gram(s) IV Push once  dextrose Oral Gel 15 Gram(s) Oral once PRN  folic acid 1 milliGRAM(s) Oral <User Schedule>  glucagon  Injectable 1 milliGRAM(s) IntraMuscular once  insulin lispro (ADMELOG) corrective regimen sliding scale   SubCutaneous Before meals and at bedtime  LORazepam   Injectable 2 milliGRAM(s) IV Push every 4 hours PRN  morphine  - Injectable 2 milliGRAM(s) IV Push every 8 hours PRN  multivitamin 1 Tablet(s) Oral daily  nystatin Powder 1 Application(s) Topical two times a day  ondansetron Injectable 4 milliGRAM(s) IV Push every 6 hours PRN  pantoprazole    Tablet 40 milliGRAM(s) Oral before breakfast  simethicone 80 milliGRAM(s) Chew three times a day  sodium chloride 0.45% 1000 milliLiter(s) IV Continuous <Continuous>  thiamine Injectable 100 milliGRAM(s) IntraMuscular daily      ROS: Limited b/o lack of cooperation and patient not answering the right questions  General:  No  fevers, chills  CV:  No pain  Pulm:  No dyspnea, cough  GI:  Still c/o abdominal pain, distention, no reported N/V, per PCA still has 8 BM but only small amount each time. No bleeding noted today.   :  does not answer question  Muscle:  No pain, or focal weakness  Neuro:  AAOx2  Skin:  denies itching    PHYSICAL EXAM:   Vital Signs:  Vital Signs Last 24 Hrs  T(C): 36.6 (24 Oct 2022 11:45), Max: 36.9 (24 Oct 2022 05:06)  T(F): 97.8 (24 Oct 2022 11:45), Max: 98.5 (24 Oct 2022 05:06)  HR: 106 (24 Oct 2022 11:45) (106 - 108)  BP: 125/72 (24 Oct 2022 11:45) (121/60 - 128/72)  BP(mean): --  RR: 18 (24 Oct 2022 11:45) (18 - 22)  SpO2: 99% (24 Oct 2022 11:45) (97% - 99%)    Parameters below as of 24 Oct 2022 11:45  Patient On (Oxygen Delivery Method): room air      Daily     Daily     GENERAL: no acute distress  NEURO: AAOX2, remains not oriented in time and still gives confused answers to multiple questions, try to answer something else, not the question, no asterixis  HEENT: icteric sclera  CHEST: no respiratory distress, no accessory muscle use  CARDIAC: remains mildly tachycardic  ABDOMEN: soft, non-tender, still distended, no rebound or guarding, no fluid wave, marked hepatomegaly  EXTREMITIES: warm, well perfused, no edema  SKIN: jaundice, some exfoliative dermatitis on hands, improved?    LABS: reviewed                        8.6    48.92 )-----------( 324      ( 24 Oct 2022 07:02 )             26.2     10-24    133<L>  |  104  |  8   ----------------------------<  76  3.4<L>   |  17<L>  |  0.49<L>    Ca    7.7<L>      24 Oct 2022 07:02    TPro  5.0<L>  /  Alb  1.2<L>  /  TBili  16.4<H>  /  DBili  13.0<H>  /  AST  55<H>  /  ALT  13  /  AlkPhos  233<H>  10-24    LIVER FUNCTIONS - ( 24 Oct 2022 07:02 )  Alb: 1.2 g/dL / Pro: 5.0 g/dL / ALK PHOS: 233 U/L / ALT: 13 U/L DA / AST: 55 U/L / GGT: x             Interval Diagnostic Studies: see sunrise for full report

## 2022-10-24 NOTE — PROGRESS NOTE ADULT - PROBLEM SELECTOR PLAN 1
-  WBC trending down 60k-->48  - Leukemoid reaction vs steroid induced vs GI infection   - C. diff negative   - completed multiple courses of antibiotics   - antibiotic discontinued  -  flow cytometry, AFP, BCR/ ABL  -  CEA normal  -  heme/onc following QMA   -  ID:  Dr Puentes

## 2022-10-24 NOTE — PROGRESS NOTE ADULT - NS ATTEND AMEND GEN_ALL_CORE FT
- Hematochezia.  - LGIB.  - Etoh heaptitis.    Patient seen and examined. Hematochezia over the weekend in setting of elevated INR. Now resolved. Hb now stable. Likely 2/2 coagulopathy. Would keep INR <2 if feasible. Holding off on any repeat colonoscopy in setting of severe etoh hepatitis, sepsis/leukocytosis given resolution of bleeding. Notify us if develops recurring bleeding.

## 2022-10-24 NOTE — PROGRESS NOTE ADULT - ASSESSMENT
complete note to follow   Assessment and Plan:   · Assessment	    Problem #1 Leukocytosis - mostly neutrophilia  -work up so far negative  -awaiting BCR/ABL; if negative will need to obtain screening for chronic neutrophilic leukemia w/ CSF3R mutations  -no need for cytoreductive therapy at this time  trending now, now 48    Problem #2 Anemia - asymptomatic; on need for PRBC support unless Hg < 7 g/dL  H/H stable    #3 Liver failure  d/t alcoholic hepatitis  plan for Bx pending INR  vitamin K given, with no improvement    Thank you for the referral. Will continue to monitor the patient.  Please call with any questions [phone]  Above reviewed with Attending Dr. Fausto MICHAEL/[facility]  [address], [address], [place], [place]  [phone]  *Note not finalized until signed by Attending Physician

## 2022-10-24 NOTE — PROGRESS NOTE ADULT - PROBLEM SELECTOR PLAN 2
Hgb fluctuating s/p EGD/Sigmoidoscopy. Anemia Likely due to liver failure   Not hematochezia x 2 past 12 hrs. Plan as above.

## 2022-10-24 NOTE — PROGRESS NOTE ADULT - PROBLEM SELECTOR PLAN 3
Likely alcohol transaminitis  CT showed  Severe hepatomegaly and diffuse steatosis. Mild ascites and infiltration of the central mesenteric fat. Possible gallbladder sludge and likely secondary/reactive wall thickening.  Left temporal parietal craniotomy. Encephalomalacia subjacent to the craniotomy. Ex vacuo dilatation of the left lateral ventricle. No intracranial hemorrhage. No mass lesion.  Tbili uptrending  Trend hepatic panel, INR  Avoid hepatoxic agents   Hepatology following

## 2022-10-24 NOTE — PROGRESS NOTE ADULT - ASSESSMENT
Brief hospital course:  25yo [place] speaking Male from [facility], s/p Left temporal parietal craniotomy b/o TBI and alcohol abuse (reported current drinking by family, [address] [address]) presented to [facility] ED on 10/2/22 with 1 day Hx of AMS, after reported witnessed seizure episodes and 1 week Hx of jaundice and was admitted to MICU with suspected EtOH withdrawal seizure, electrolyte abnormalities (Na 125, K 2.7, Mg 1.0, P 1.1), lactic acidosis (with lactate 6.4, bicarb 19), leukocytosis with neutrophilia (WBC 20.35, roderick 90%) with low grade T (100.1F), anemia without evidence of overt bleeding (Hb 6.9->8.3 s/p 1 U PRBC) and abnormal liver tests including coagulopathy (INR 2.13), hyperbilirubinemia (Se bi 9.3->10.8, direct 8.0), hyperammonemia (131), elevated liver enzymes in AST> ALT pattern (, ALT 22) and elevated ALP (669) and CT a/p w/ iv 10/2/22 suggesting diffuse hepatic steatosis, hepatomegaly with possible GB sludge and GB wall thickening, but normal bile ducts.  He was started on Alcoholic hepatitis management after infection was ruled out on admission (except that rhinovirus was pos, w/o URI symptoms) and UGIB was ruled out. He had LGIB, 2 rectal Dieulafoy lesions. Completed SBP ppx b/o GIB. His bilirubin remained unchanged after 7 days on steroid (10/8-10/15) + total 3 days NAC, and developed low grade T, worsening leukocytosis up till 30-36k while on steroid (prior to steroid 17k), worsening abdominal pain,  distention and diarrhea despite being off lactulose, thus steroid was held 10/15 and repeat septic workup was sent. GI PCR panel showed STEC, culture was negative. BCx neg 10/16. C. diff neg 10/19. He was also started on Zosyn on 10/14, and switched to Meropenem on 10/16 and to Tigacycline on 10/18 per ID, and all abx were d/c 10/20.  Had repeat CT abd / pelvis 10/16, had only mild ascites (pelvic, perihepatosplenic), not feasible for Dx paracentesis and  prior it was none.   WBC continued to rise till 10/21 up till > 60k, thought to be possibly multifactorial.  Hematology been following since 10/16 b/o leukocytosis, pending workup.     Prior liver workup: Hep C ab neg, Hep A IgM neg, HBsAg, HBcAb IgM, HBcAb, HBsAb all neg, Hep E IgM neg, HIV neg, EBV past infection, CMV past infection, HSV PCR neg, leptospirosis neg. LUCIUS neg, SMA neg, LKM neg, AMA neg, IgG and IgM WNL. Ceruloplasmin 25. Fungitell neg.       # Hepatomegaly, Alcoholic hepatitis, s/p 7 days prednisolone 10/8-10/15, and s/p total 3 days NAC, bilirubin 14.1 on 10/8 and 14.1 10/15.  MDF> 32, MELD 28-23-27-->32 (b/o worsening coagulopathy)  # Elevated transaminases in AST>ALT pattern - overall improved (-->55)  # Elevated ALP - improving (669->233)  # Coagulopathy - initially improved but also got vit K and FFP (10/5-7 and 10/5), now up-trending (3.35)  # Diarrhea off lactulose - improving?  # STEC  # Abdominal pain - improving?  # LGIB - resolved again?  # Anemia - stable  # Worsening leukocytosis from 17k to 60k, now with slight improvement to 48k  # Hx of Low grade T (T max 100.4 F 10/12 night and 100F 10/14), WNL since then     - Being monitored off steroid since 8/15, (got 7 days) for reasons as above. Consider resuming NAC.  C/w supportive measures, including nutritional optimization.  - F/u Strongyloides serology, can add VZV PCR for completeness.   - F/u hematology workup.  - PLT WNL, only borderline splenomegaly, no or only trace ascites suggests less prominent portal hypertension.  - CT a/p w/ iv 10/2 was reviewed with IR, hepatic vasculature appeared patent. Patent vasculature reported on 10/20 US too.   - Was no biliary obstruction on CT abd, US abd. MRCP was considered on admission, has not been done yet, can consider to obtain, would assess the splanchnic circulation as well.   - Still alcoholic hepatitis remains the working Dx, and might be just poor responder to steroid, but given the rising WBC, the massive hepatomegaly, liver biopsy was considered but was deferred b/o coagulopathy.     - No EV on EGD 10/5/22, but mild portal gastropathy. S/p sigmoidoscopy 10/5 showing 2 rectal Dieulafoy lesions, s/p treatment. Had few episodes of hematochezia again Friday and Saturday but Hb remained stable and reportedly resolved. GI follow up appreciated.    - HCC status - no focal hepatic lesion    # AMS in a TBI patient, with EtOH use, initially been on CIWA on admission, also s/p seizures, and with elevated ammonia on admission thus could not r/u HE either  - Monitor mental status, overall improved compared to admission, possibly multifactorial. C/w constant observation. Aspiration, seizure, fall precautions.   - Folic, thiamine.   - Lactulose was held b/o diarrhea and was resumed with parameters per primary team on 10/16. From10/18 held again b/o > 6 BM. No need to trend ammonia. Lactulose could have worsened his abdominal bloating. Still has multiple BMs off lactulose per d/w PCA at bedside. Rifaximin being held since 10/20 b/o dermatitis w/o other explanation..      # Transplant candidacy: Obstacles: only emergency medicaid per d/w SW, recent EtOH     Thank you for consult  Will continue to monitor.   Been d/w [facility] Hepatology service attending(s) throughout the course..  D/w primary team

## 2022-10-24 NOTE — PROGRESS NOTE ADULT - SUBJECTIVE AND OBJECTIVE BOX
Patient is a 27y old  Male who presents with a chief complaint of Altered mental status       SUBJECTIVE / OVERNIGHT EVENTS:      MEDICATIONS  (STANDING):  dextrose 5%. 1000 milliLiter(s) (100 mL/Hr) IV Continuous <Continuous>  dextrose 5%. 1000 milliLiter(s) (50 mL/Hr) IV Continuous <Continuous>  dextrose 50% Injectable 25 Gram(s) IV Push once  dextrose 50% Injectable 12.5 Gram(s) IV Push once  dextrose 50% Injectable 25 Gram(s) IV Push once  folic acid 1 milliGRAM(s) Oral <User Schedule>  glucagon  Injectable 1 milliGRAM(s) IntraMuscular once  insulin lispro (ADMELOG) corrective regimen sliding scale   SubCutaneous Before meals and at bedtime  multivitamin 1 Tablet(s) Oral daily  nystatin Powder 1 Application(s) Topical two times a day  pantoprazole    Tablet 40 milliGRAM(s) Oral before breakfast  simethicone 80 milliGRAM(s) Chew three times a day  sodium chloride 0.45% 1000 milliLiter(s) (75 mL/Hr) IV Continuous <Continuous>  thiamine Injectable 100 milliGRAM(s) IntraMuscular daily    MEDICATIONS  (PRN):  aluminum hydroxide/magnesium hydroxide/simethicone Suspension 30 milliLiter(s) Oral every 4 hours PRN Dyspepsia  dextrose Oral Gel 15 Gram(s) Oral once PRN Blood Glucose LESS THAN 70 milliGRAM(s)/deciliter  LORazepam   Injectable 2 milliGRAM(s) IV Push every 4 hours PRN CIWA-Ar score 8 or greater  morphine  - Injectable 2 milliGRAM(s) IV Push every 8 hours PRN Severe Pain (7 - 10)  ondansetron Injectable 4 milliGRAM(s) IV Push every 6 hours PRN Nausea and/or Vomiting    CAPILLARY BLOOD GLUCOSE      POCT Blood Glucose.: 116 mg/dL (24 Oct 2022 10:49)  POCT Blood Glucose.: 88 mg/dL (24 Oct 2022 07:45)  POCT Blood Glucose.: 90 mg/dL (23 Oct 2022 20:58)  POCT Blood Glucose.: 116 mg/dL (23 Oct 2022 16:22)    I&O's Summary      PHYSICAL EXAM:  Vital Signs Last 24 Hrs  T(C): 36.9 (24 Oct 2022 05:06), Max: 36.9 (24 Oct 2022 05:06)  T(F): 98.5 (24 Oct 2022 05:06), Max: 98.5 (24 Oct 2022 05:06)  HR: 108 (24 Oct 2022 05:06) (99 - 108)  BP: 124/75 (24 Oct 2022 05:06) (121/60 - 128/72)  BP(mean): --  RR: 18 (24 Oct 2022 05:06) (18 - 22)  SpO2: 97% (24 Oct 2022 05:06) (97% - 100%)    Parameters below as of 24 Oct 2022 05:06  Patient On (Oxygen Delivery Method): room air        LABS:                        8.6    48.92 )-----------( 324      ( 24 Oct 2022 07:02 )             26.2     10-24    133<L>  |  104  |  8   ----------------------------<  76  3.4<L>   |  17<L>  |  0.49<L>    Ca    7.7<L>      24 Oct 2022 07:02    TPro  5.0<L>  /  Alb  1.2<L>  /  TBili  16.4<H>  /  DBili  13.0<H>  /  AST  55<H>  /  ALT  13  /  AlkPhos  233<H>  10-24    PT/INR - ( 23 Oct 2022 07:17 )   PT: 40.4 sec;   INR: 3.35 ratio                   COVID-19 PCR: NotDetec (21 Oct 2022 06:30)  COVID-19 PCR: NotDetec (12 Oct 2022 05:35)  COVID-19 PCR: NotDetec (05 Oct 2022 11:00)  SARS-CoV-2: NotDetec (02 Oct 2022 16:00)         Patient is a 27y old  Male who presents with a chief complaint of Altered mental status       SUBJECTIVE / OVERNIGHT EVENTS: events noted.      MEDICATIONS  (STANDING):  dextrose 5%. 1000 milliLiter(s) (100 mL/Hr) IV Continuous <Continuous>  dextrose 5%. 1000 milliLiter(s) (50 mL/Hr) IV Continuous <Continuous>  dextrose 50% Injectable 25 Gram(s) IV Push once  dextrose 50% Injectable 12.5 Gram(s) IV Push once  dextrose 50% Injectable 25 Gram(s) IV Push once  folic acid 1 milliGRAM(s) Oral <User Schedule>  glucagon  Injectable 1 milliGRAM(s) IntraMuscular once  insulin lispro (ADMELOG) corrective regimen sliding scale   SubCutaneous Before meals and at bedtime  multivitamin 1 Tablet(s) Oral daily  nystatin Powder 1 Application(s) Topical two times a day  pantoprazole    Tablet 40 milliGRAM(s) Oral before breakfast  simethicone 80 milliGRAM(s) Chew three times a day  sodium chloride 0.45% 1000 milliLiter(s) (75 mL/Hr) IV Continuous <Continuous>  thiamine Injectable 100 milliGRAM(s) IntraMuscular daily    MEDICATIONS  (PRN):  aluminum hydroxide/magnesium hydroxide/simethicone Suspension 30 milliLiter(s) Oral every 4 hours PRN Dyspepsia  dextrose Oral Gel 15 Gram(s) Oral once PRN Blood Glucose LESS THAN 70 milliGRAM(s)/deciliter  LORazepam   Injectable 2 milliGRAM(s) IV Push every 4 hours PRN CIWA-Ar score 8 or greater  morphine  - Injectable 2 milliGRAM(s) IV Push every 8 hours PRN Severe Pain (7 - 10)  ondansetron Injectable 4 milliGRAM(s) IV Push every 6 hours PRN Nausea and/or Vomiting    CAPILLARY BLOOD GLUCOSE      POCT Blood Glucose.: 116 mg/dL (24 Oct 2022 10:49)  POCT Blood Glucose.: 88 mg/dL (24 Oct 2022 07:45)  POCT Blood Glucose.: 90 mg/dL (23 Oct 2022 20:58)  POCT Blood Glucose.: 116 mg/dL (23 Oct 2022 16:22)    I&O's Summary      PHYSICAL EXAM:  Vital Signs Last 24 Hrs  T(C): 36.9 (24 Oct 2022 05:06), Max: 36.9 (24 Oct 2022 05:06)  T(F): 98.5 (24 Oct 2022 05:06), Max: 98.5 (24 Oct 2022 05:06)  HR: 108 (24 Oct 2022 05:06) (99 - 108)  BP: 124/75 (24 Oct 2022 05:06) (121/60 - 128/72)  BP(mean): --  RR: 18 (24 Oct 2022 05:06) (18 - 22)  SpO2: 97% (24 Oct 2022 05:06) (97% - 100%)    Parameters below as of 24 Oct 2022 05:06  Patient On (Oxygen Delivery Method): room air      PE:  general AAOX2 NAD, poor oral hygeine  HEENT icteric and jaundice   abd distended, tender  extrem no edema   neurology grossly intact without significant focal deficit        LABS:                        8.6    48.92 )-----------( 324      ( 24 Oct 2022 07:02 )             26.2     10-24    133<L>  |  104  |  8   ----------------------------<  76  3.4<L>   |  17<L>  |  0.49<L>    Ca    7.7<L>      24 Oct 2022 07:02    TPro  5.0<L>  /  Alb  1.2<L>  /  TBili  16.4<H>  /  DBili  13.0<H>  /  AST  55<H>  /  ALT  13  /  AlkPhos  233<H>  10-24    PT/INR - ( 23 Oct 2022 07:17 )   PT: 40.4 sec;   INR: 3.35 ratio                   COVID-19 PCR: NotDetec (21 Oct 2022 06:30)  COVID-19 PCR: NotDetec (12 Oct 2022 05:35)  COVID-19 PCR: NotDetec (05 Oct 2022 11:00)  SARS-CoV-2: NotDetec (02 Oct 2022 16:00)

## 2022-10-24 NOTE — PROGRESS NOTE ADULT - PROBLEM SELECTOR PLAN 2
-  Metabolic encephalopathy secondary to alcohol hepatitis   - liver biopsy cxd 10/21 due to elevated INR   -  discontinue lactulose - Patient numerous loose BM having diarrhea  -  Transaminitis- unchanged  -  Severe hepatomegaly w/mild ascites on CT abdomen w/gall bladder sludge  -  Lipase level normal  -  Hepatitis panel negative   -  Hepatic USG for characterization of gall bladder attempted however patient was very agitated  -  EGD on 10/5 with GI Dr. Johnson showed Large rectal Dieulafoy lesion. Hemostasis achieved with over-the-scope clip  -  Foreign body noted in abdominal xray -  clip placed by GI   -  RUQ US without ascites, large fatty liver noted on 10/10  -hepatology dr. montilla following

## 2022-10-24 NOTE — PROGRESS NOTE ADULT - PROBLEM SELECTOR PLAN 6
-  Most likely due to poor P.O intake and acute liver failure  -  Replaced with supplement for  hypokalemia  -  Potassium 3.4, repleted   monitor bmp

## 2022-10-24 NOTE — PROGRESS NOTE ADULT - PROBLEM SELECTOR PLAN 7
oct 4th- 1 episode of melena   S/P EGD/Sigmoidoscopy with large rectal Dieulafoy lesion, clipped and cauterized  no overt bleeding  hgb 8.6  monitor cbc  GI following

## 2022-10-25 LAB
ANION GAP SERPL CALC-SCNC: 12 MMOL/L — SIGNIFICANT CHANGE UP (ref 5–17)
APTT BLD: 50.3 SEC — HIGH (ref 27.5–35.5)
BCR/ABL BY RT - PCR QUANTITATIVE: SIGNIFICANT CHANGE UP
BUN SERPL-MCNC: 9 MG/DL — SIGNIFICANT CHANGE UP (ref 7–18)
CALCIUM SERPL-MCNC: 8 MG/DL — LOW (ref 8.4–10.5)
CHLORIDE SERPL-SCNC: 107 MMOL/L — SIGNIFICANT CHANGE UP (ref 96–108)
CO2 SERPL-SCNC: 17 MMOL/L — LOW (ref 22–31)
CREAT SERPL-MCNC: 0.59 MG/DL — SIGNIFICANT CHANGE UP (ref 0.5–1.3)
EGFR: 138 ML/MIN/1.73M2 — SIGNIFICANT CHANGE UP
GLUCOSE BLDC GLUCOMTR-MCNC: 100 MG/DL — HIGH (ref 70–99)
GLUCOSE BLDC GLUCOMTR-MCNC: 115 MG/DL — HIGH (ref 70–99)
GLUCOSE BLDC GLUCOMTR-MCNC: 117 MG/DL — HIGH (ref 70–99)
GLUCOSE BLDC GLUCOMTR-MCNC: 130 MG/DL — HIGH (ref 70–99)
GLUCOSE SERPL-MCNC: 84 MG/DL — SIGNIFICANT CHANGE UP (ref 70–99)
HCT VFR BLD CALC: 25.1 % — LOW (ref 39–50)
HGB BLD-MCNC: 8.6 G/DL — LOW (ref 13–17)
INR BLD: 3.3 RATIO — HIGH (ref 0.88–1.16)
INR BLD: 3.98 RATIO — HIGH (ref 0.88–1.16)
MCHC RBC-ENTMCNC: 31.3 PG — SIGNIFICANT CHANGE UP (ref 27–34)
MCHC RBC-ENTMCNC: 34.3 GM/DL — SIGNIFICANT CHANGE UP (ref 32–36)
MCV RBC AUTO: 91.3 FL — SIGNIFICANT CHANGE UP (ref 80–100)
NRBC # BLD: 0 /100 WBCS — SIGNIFICANT CHANGE UP (ref 0–0)
PLATELET # BLD AUTO: 360 K/UL — SIGNIFICANT CHANGE UP (ref 150–400)
POTASSIUM SERPL-MCNC: 3.5 MMOL/L — SIGNIFICANT CHANGE UP (ref 3.5–5.3)
POTASSIUM SERPL-SCNC: 3.5 MMOL/L — SIGNIFICANT CHANGE UP (ref 3.5–5.3)
PROTHROM AB SERPL-ACNC: 39.8 SEC — HIGH (ref 10.5–13.4)
PROTHROM AB SERPL-ACNC: 48 SEC — HIGH (ref 10.5–13.4)
RBC # BLD: 2.75 M/UL — LOW (ref 4.2–5.8)
RBC # FLD: 22.7 % — HIGH (ref 10.3–14.5)
SODIUM SERPL-SCNC: 136 MMOL/L — SIGNIFICANT CHANGE UP (ref 135–145)
STRONGYLOIDES AB SER-ACNC: NEGATIVE — SIGNIFICANT CHANGE UP
WBC # BLD: 49.5 K/UL — CRITICAL HIGH (ref 3.8–10.5)
WBC # FLD AUTO: 49.5 K/UL — CRITICAL HIGH (ref 3.8–10.5)

## 2022-10-25 PROCEDURE — 99233 SBSQ HOSP IP/OBS HIGH 50: CPT

## 2022-10-25 RX ORDER — ACETYLCYSTEINE 200 MG/ML
12 VIAL (ML) MISCELLANEOUS ONCE
Refills: 0 | Status: DISCONTINUED | OUTPATIENT
Start: 2022-10-25 | End: 2022-10-25

## 2022-10-25 RX ORDER — ACETYLCYSTEINE 200 MG/ML
8 VIAL (ML) MISCELLANEOUS ONCE
Refills: 0 | Status: COMPLETED | OUTPATIENT
Start: 2022-10-25 | End: 2022-10-25

## 2022-10-25 RX ORDER — ACETYLCYSTEINE 200 MG/ML
12 VIAL (ML) MISCELLANEOUS ONCE
Refills: 0 | Status: COMPLETED | OUTPATIENT
Start: 2022-10-25 | End: 2022-10-25

## 2022-10-25 RX ORDER — PHYTONADIONE (VIT K1) 5 MG
10 TABLET ORAL DAILY
Refills: 0 | Status: COMPLETED | OUTPATIENT
Start: 2022-10-25 | End: 2022-10-27

## 2022-10-25 RX ORDER — ACETYLCYSTEINE 200 MG/ML
10 VIAL (ML) MISCELLANEOUS ONCE
Refills: 0 | Status: DISCONTINUED | OUTPATIENT
Start: 2022-10-25 | End: 2022-10-25

## 2022-10-25 RX ORDER — ACETYLCYSTEINE 200 MG/ML
10 VIAL (ML) MISCELLANEOUS EVERY 24 HOURS
Refills: 0 | Status: DISCONTINUED | OUTPATIENT
Start: 2022-10-25 | End: 2022-10-25

## 2022-10-25 RX ORDER — ACETYLCYSTEINE 200 MG/ML
3.9 VIAL (ML) MISCELLANEOUS ONCE
Refills: 0 | Status: COMPLETED | OUTPATIENT
Start: 2022-10-25 | End: 2022-10-25

## 2022-10-25 RX ADMIN — Medication 65 GRAM(S): at 17:42

## 2022-10-25 RX ADMIN — NYSTATIN CREAM 1 APPLICATION(S): 100000 CREAM TOPICAL at 08:23

## 2022-10-25 RX ADMIN — Medication 1 MILLIGRAM(S): at 09:18

## 2022-10-25 RX ADMIN — Medication 10 MILLIGRAM(S): at 15:17

## 2022-10-25 RX ADMIN — SODIUM CHLORIDE 75 MILLILITER(S): 9 INJECTION, SOLUTION INTRAVENOUS at 09:11

## 2022-10-25 RX ADMIN — SIMETHICONE 80 MILLIGRAM(S): 80 TABLET, CHEWABLE ORAL at 14:07

## 2022-10-25 RX ADMIN — Medication 310 GRAM(S): at 11:57

## 2022-10-25 RX ADMIN — Medication 129.88 GRAM(S): at 13:51

## 2022-10-25 RX ADMIN — SIMETHICONE 80 MILLIGRAM(S): 80 TABLET, CHEWABLE ORAL at 21:21

## 2022-10-25 RX ADMIN — Medication 100 MILLIGRAM(S): at 11:56

## 2022-10-25 RX ADMIN — Medication 1 TABLET(S): at 11:57

## 2022-10-25 RX ADMIN — NYSTATIN CREAM 1 APPLICATION(S): 100000 CREAM TOPICAL at 17:40

## 2022-10-25 RX ADMIN — Medication 1 MILLIGRAM(S): at 17:41

## 2022-10-25 RX ADMIN — SIMETHICONE 80 MILLIGRAM(S): 80 TABLET, CHEWABLE ORAL at 05:21

## 2022-10-25 RX ADMIN — PANTOPRAZOLE SODIUM 40 MILLIGRAM(S): 20 TABLET, DELAYED RELEASE ORAL at 07:21

## 2022-10-25 NOTE — PROGRESS NOTE ADULT - PROBLEM SELECTOR PLAN 5
Resolved   -  No prior hx of seizures  -  Continue with  thiamine, folic acid and MVI   -  Continue Lorazepam PRN for agitation  -  Maintain fall precaution   -  Maintain aspiration precaution  -  Maintain seizure precaution

## 2022-10-25 NOTE — PROGRESS NOTE ADULT - ASSESSMENT
28 yo [place] speaking Male, with PMH alcohol abuse, s/p Left temporal parietal craniotomy noted in imaging, presented  with altered mental status and seizure that lasted about 2-4 minutes.  In the ED, pt with low grade fever, tachycardic   Admitted to ICU for severe metabolic derangements, CIWA protocol with PRN ativan. Pt was started on antibiotics and IV fluids  Started on lactulose for elevated amonnia level.  Lactate trended down to normal levels. GI and Hepatology was consulted for transaminitis and hyperbilirubinemia. Patient was started on zosyn for possible cholangitis. CT abd showed hepatomegaly with biliary sludge and mild ascites. MRCP was recommended.  Noted with  Hb  7.3,  started on protonix drip and octreotide drip. Patient received one unit PRBC and one unit FFP.  GI on board recommending EGD which did not show any pathology. Flexible sigmoidoscopy showed rectal varices which was cauterized.  switched to ceftriaxone for SBP prophylaxis per ID.  Vit K for 3 days for elevated INR. He was started on prednisone 40mg daily for 28 days for alcoholic hepatitis.   Patient hospital course further complicated by fever, and rising leucocytosis. ID consulted.  blood culture 10/16 NGTD, stool studies no pathogens, Cdfif negative. CT abdomen and pelvis- 1. Marked hepatomegaly/steatosis, unchanged. 2.  Ascites/mesenteric edema and subcutaneous edema, minimally progressed. 3.  Equivocal ascending colonic mural prominence raises the possibility of portal colopathy or inflammation   BCR/ ABL sent pending results. INR 3.16,  liver biopsy plan for biopsy cancelled due to elevated INT. Vit K po continued   Pt seen at bedside, alert, awake, confused to person and place. wbc slowly downtrending, 48 today. f/u INR in am   10/25-Awake and alert, confused x 3, talkative but does not make sense.  Leukocytosis persists and slightly uptrending, started Acetylcysteine by attending.  Pt. will need safe discharge planning, disposition currently unknown, CM previously consulted.

## 2022-10-25 NOTE — PROGRESS NOTE ADULT - PROBLEM SELECTOR PLAN 1
- WBC trending down 60k-->48-->49.5  - Leukemoid reaction vs steroid induced vs GI infection   - C. diff negative   - completed multiple courses of antibiotics   - Acetylserine started by attending   -  flow cytometry, AFP, BCR/ ABL  -  CEA normal  -  heme/onc following QMA   -  ID:  Dr Puentes

## 2022-10-25 NOTE — PROGRESS NOTE ADULT - SUBJECTIVE AND OBJECTIVE BOX
NP Note discussed with  Primary Attending    Patient is a 27y old  Male who presents with a chief complaint of Altered mental status (24 Oct 2022 14:55)      INTERVAL HPI/OVERNIGHT EVENTS: no new complaints    MEDICATIONS  (STANDING):  dextrose 5%. 1000 milliLiter(s) (50 mL/Hr) IV Continuous <Continuous>  dextrose 5%. 1000 milliLiter(s) (100 mL/Hr) IV Continuous <Continuous>  dextrose 50% Injectable 25 Gram(s) IV Push once  dextrose 50% Injectable 12.5 Gram(s) IV Push once  dextrose 50% Injectable 25 Gram(s) IV Push once  folic acid 1 milliGRAM(s) Oral <User Schedule>  glucagon  Injectable 1 milliGRAM(s) IntraMuscular once  insulin lispro (ADMELOG) corrective regimen sliding scale   SubCutaneous Before meals and at bedtime  multivitamin 1 Tablet(s) Oral daily  nystatin Powder 1 Application(s) Topical two times a day  pantoprazole    Tablet 40 milliGRAM(s) Oral before breakfast  simethicone 80 milliGRAM(s) Chew three times a day  sodium chloride 0.45% 1000 milliLiter(s) (75 mL/Hr) IV Continuous <Continuous>  thiamine Injectable 100 milliGRAM(s) IntraMuscular daily    MEDICATIONS  (PRN):  aluminum hydroxide/magnesium hydroxide/simethicone Suspension 30 milliLiter(s) Oral every 4 hours PRN Dyspepsia  dextrose Oral Gel 15 Gram(s) Oral once PRN Blood Glucose LESS THAN 70 milliGRAM(s)/deciliter  LORazepam   Injectable 2 milliGRAM(s) IV Push every 4 hours PRN CIWA-Ar score 8 or greater  morphine  - Injectable 2 milliGRAM(s) IV Push every 8 hours PRN Severe Pain (7 - 10)  ondansetron Injectable 4 milliGRAM(s) IV Push every 6 hours PRN Nausea and/or Vomiting      __________________________________________________  REVIEW OF SYSTEMS:    CONSTITUTIONAL: No fever,   EYES: no acute visual disturbances  NECK: No pain or stiffness  RESPIRATORY: No cough; No shortness of breath  CARDIOVASCULAR: No chest pain, no palpitations  GASTROINTESTINAL: No pain. No nausea or vomiting; No diarrhea   NEUROLOGICAL: No headache or numbness, no tremors  MUSCULOSKELETAL: No joint pain, no muscle pain  GENITOURINARY: no dysuria, no frequency, no hesitancy  PSYCHIATRY: no depression , no anxiety  ALL OTHER  ROS negative        Vital Signs Last 24 Hrs  T(C): 36.9 (25 Oct 2022 05:03), Max: 37 (24 Oct 2022 21:21)  T(F): 98.5 (25 Oct 2022 05:03), Max: 98.6 (24 Oct 2022 21:21)  HR: 109 (25 Oct 2022 05:03) (106 - 115)  BP: 112/54 (25 Oct 2022 05:03) (112/54 - 125/72)  BP(mean): --  RR: 19 (25 Oct 2022 05:03) (18 - 19)  SpO2: 97% (25 Oct 2022 05:03) (97% - 99%)    Parameters below as of 25 Oct 2022 05:03  Patient On (Oxygen Delivery Method): room air        ________________________________________________  PHYSICAL EXAM:  GENERAL: NAD  HEENT: Normocephalic;  conjunctivae and sclerae clear; moist mucous membranes;   NECK : supple  CHEST/LUNG: Clear to auscultation bilaterally with good air entry   HEART: S1 S2  regular; no murmurs, gallops or rubs  ABDOMEN: Soft, Nontender, Nondistended; Bowel sounds present  EXTREMITIES: no cyanosis; no edema; no calf tenderness  SKIN: warm and dry; no rash  NERVOUS SYSTEM:  Awake and alert; Oriented  to place, person and time ; no new deficits    _________________________________________________  LABS:                        8.6    49.50 )-----------( 360      ( 25 Oct 2022 07:54 )             25.1     10-25    136  |  107  |  9   ----------------------------<  84  3.5   |  17<L>  |  0.59    Ca    8.0<L>      25 Oct 2022 07:54    TPro  5.0<L>  /  Alb  1.2<L>  /  TBili  16.4<H>  /  DBili  13.0<H>  /  AST  55<H>  /  ALT  13  /  AlkPhos  233<H>  10-24    PT/INR - ( 25 Oct 2022 07:54 )   PT: 39.8 sec;   INR: 3.30 ratio         PTT - ( 25 Oct 2022 07:54 )  PTT:50.3 sec    CAPILLARY BLOOD GLUCOSE      POCT Blood Glucose.: 100 mg/dL (25 Oct 2022 07:34)  POCT Blood Glucose.: 95 mg/dL (24 Oct 2022 21:27)  POCT Blood Glucose.: 121 mg/dL (24 Oct 2022 16:43)  POCT Blood Glucose.: 116 mg/dL (24 Oct 2022 10:49)      RADIOLOGY & ADDITIONAL TESTS:  < from: US Abdomen Limited (10.20.22 @ 13:05) >  ACC: [id number] EXAM:  US ABDOMEN LIMITED                          PROCEDURE DATE:  10/20/2022          INTERPRETATION:  CLINICAL INFORMATION: Severe abdominal distention.   Alcoholic hepatitis. Concern for Budd-Chiari.    COMPARISON: None available.    TECHNIQUE:  Sonography of the liver and ascites.    FINDINGS: There is severe enlargement of the liver with right lobe   measuring approximately 31 cm. Diffusely increased hepatic echogenicity   is seen consistent with a fatty liver. No focal hepatic mass lesion is   identified.    Main portal vein demonstrates normal flow. Hepatic veins are partially   delineated with color Doppler.    There is evidence of mild ascites.    IMPRESSION:  Severe hepatomegaly and steatosis.  Mild ascites.    < end of copied text >    < from: Xray Humerus, Bilateral (10.19.22 @ 10:51) >    ACC: [id number] EXAM:  XR ABDOMEN PORTABLE ROUTINE 1V                        ACC: [id number] EXAM:  XR SHOULDER COMP MIN 2V BI                        ACC: [id number] EXAM:  XR KNEE 1-2 VIEWS  BI                        ACC: [id number] EXAM:  XR HUMERUS MIN 2 VIEWS BI                          PROCEDURE DATE:  10/19/2022          INTERPRETATION:  XR HUMERUS BILATERAL, XR KNEE 1 OR 2 VIEWS BILATERAL, XR   SHOULDER 2 VIEWS BILATERAL, XR ABDOMEN    Clinical History: Status post fall    Right humerus 2 view. Left humerus 2 view. Right shoulder 3 view. Left   shoulder 3 view. Right knee 3 view. Left knee 3 view.      FINDINGS:    There is no fracture, dislocation, soft tissue swelling or joint effusion.  The bilateral acromioclavicular joints are intact.  No degenerative changes.  No radiopaque foreign body.    Abdomen one view    FINDINGS: The lower pelvis and right flank is not completely included.   There is no evidence of bowel obstruction, free air or abdominal   calcifications. There are no acutefractures.    IMPRESSION:  1.  No fractures.  2.  No evidence of bowel obstruction however if intra-abdominal injury is   suspected recommend CT.    < end of copied text >    < from: Xray Knee 1 or 2 Views, Bilateral (10.19.22 @ 10:50) >    ACC: [id number] EXAM:  XR ABDOMEN PORTABLE ROUTINE 1V                        ACC: [id number] EXAM:  XR SHOULDER COMP MIN 2V BI                        ACC: [id number] EXAM:  XR KNEE 1-2 VIEWS  BI                        ACC: [id number] EXAM:  XR HUMERUS MIN 2 VIEWS BI                          PROCEDURE DATE:  10/19/2022          INTERPRETATION:  XR HUMERUS BILATERAL, XR KNEE 1 OR 2 VIEWS BILATERAL, XR   SHOULDER 2 VIEWS BILATERAL, XR ABDOMEN    Clinical History: Status post fall    Right humerus 2 view. Left humerus 2 view. Right shoulder 3 view. Left   shoulder 3 view. Right knee 3 view. Left knee 3 view.      FINDINGS:    There is no fracture, dislocation, soft tissue swelling or joint effusion.  The bilateral acromioclavicular joints are intact.  No degenerative changes.  No radiopaque foreign body.    Abdomen one view    FINDINGS: The lower pelvis and right flank is not completely included.   There is no evidence of bowel obstruction, free air or abdominal   calcifications. There are no acutefractures.    IMPRESSION:  1.  No fractures.  2.  No evidence of bowel obstruction however if intra-abdominal injury is   suspected recommend CT.    --- End of Report ---    < end of copied text >      Imaging Personally Reviewed:  YES/NO    Consultant(s) Notes Reviewed:   YES/ No    Care Discussed with Consultants :     Plan of care was discussed with patient and /or primary care giver; all questions and concerns were addressed and care was aligned with patient's wishes.     NP Note discussed with  Primary Attending    Patient is a 27y old  Male who presents with a chief complaint of Altered mental status (24 Oct 2022 14:55)      INTERVAL HPI/OVERNIGHT EVENTS: no new complaints    MEDICATIONS  (STANDING):  dextrose 5%. 1000 milliLiter(s) (50 mL/Hr) IV Continuous <Continuous>  dextrose 5%. 1000 milliLiter(s) (100 mL/Hr) IV Continuous <Continuous>  dextrose 50% Injectable 25 Gram(s) IV Push once  dextrose 50% Injectable 12.5 Gram(s) IV Push once  dextrose 50% Injectable 25 Gram(s) IV Push once  folic acid 1 milliGRAM(s) Oral <User Schedule>  glucagon  Injectable 1 milliGRAM(s) IntraMuscular once  insulin lispro (ADMELOG) corrective regimen sliding scale   SubCutaneous Before meals and at bedtime  multivitamin 1 Tablet(s) Oral daily  nystatin Powder 1 Application(s) Topical two times a day  pantoprazole    Tablet 40 milliGRAM(s) Oral before breakfast  simethicone 80 milliGRAM(s) Chew three times a day  sodium chloride 0.45% 1000 milliLiter(s) (75 mL/Hr) IV Continuous <Continuous>  thiamine Injectable 100 milliGRAM(s) IntraMuscular daily    MEDICATIONS  (PRN):  aluminum hydroxide/magnesium hydroxide/simethicone Suspension 30 milliLiter(s) Oral every 4 hours PRN Dyspepsia  dextrose Oral Gel 15 Gram(s) Oral once PRN Blood Glucose LESS THAN 70 milliGRAM(s)/deciliter  LORazepam   Injectable 2 milliGRAM(s) IV Push every 4 hours PRN CIWA-Ar score 8 or greater  morphine  - Injectable 2 milliGRAM(s) IV Push every 8 hours PRN Severe Pain (7 - 10)  ondansetron Injectable 4 milliGRAM(s) IV Push every 6 hours PRN Nausea and/or Vomiting      __________________________________________________  REVIEW OF SYSTEMS:    CONSTITUTIONAL: No fever,   EYES: no acute visual disturbances  NECK: No pain or stiffness  RESPIRATORY: No cough; No shortness of breath  CARDIOVASCULAR: No chest pain, no palpitations  GASTROINTESTINAL: No pain. No nausea or vomiting; No diarrhea   NEUROLOGICAL: No headache or numbness, no tremors  MUSCULOSKELETAL: No joint pain, no muscle pain  GENITOURINARY: no dysuria, no frequency, no hesitancy  PSYCHIATRY: no depression , no anxiety  ALL OTHER  ROS negative        Vital Signs Last 24 Hrs  T(C): 36.9 (25 Oct 2022 05:03), Max: 37 (24 Oct 2022 21:21)  T(F): 98.5 (25 Oct 2022 05:03), Max: 98.6 (24 Oct 2022 21:21)  HR: 109 (25 Oct 2022 05:03) (106 - 115)  BP: 112/54 (25 Oct 2022 05:03) (112/54 - 125/72)  BP(mean): --  RR: 19 (25 Oct 2022 05:03) (18 - 19)  SpO2: 97% (25 Oct 2022 05:03) (97% - 99%)    Parameters below as of 25 Oct 2022 05:03  Patient On (Oxygen Delivery Method): room air        ________________________________________________  PHYSICAL EXAM:  Awake and alert, confused,  GENERAL: NAD  HEENT: Icteric sclera, Normocephalic;  moist mucous membranes;   NECK : supple  CHEST/LUNG: Clear to auscultation bilaterally with good air entry   HEART: S1 S2  regular; no murmurs, gallops or rubs  ABDOMEN: Very distended, Soft, Nontender, Nondistended; Bowel sounds present  EXTREMITIES: no cyanosis; no edema; no calf tenderness  SKIN: Generalized jaundice, warm and dry; no rash  NERVOUS SYSTEM:  Awake and alert; confused, talkative but does not make sense  _________________________________________________  LABS:                        8.6    49.50 )-----------( 360      ( 25 Oct 2022 07:54 )             25.1     10-25    136  |  107  |  9   ----------------------------<  84  3.5   |  17<L>  |  0.59    Ca    8.0<L>      25 Oct 2022 07:54    TPro  5.0<L>  /  Alb  1.2<L>  /  TBili  16.4<H>  /  DBili  13.0<H>  /  AST  55<H>  /  ALT  13  /  AlkPhos  233<H>  10-24    PT/INR - ( 25 Oct 2022 07:54 )   PT: 39.8 sec;   INR: 3.30 ratio         PTT - ( 25 Oct 2022 07:54 )  PTT:50.3 sec    CAPILLARY BLOOD GLUCOSE      POCT Blood Glucose.: 100 mg/dL (25 Oct 2022 07:34)  POCT Blood Glucose.: 95 mg/dL (24 Oct 2022 21:27)  POCT Blood Glucose.: 121 mg/dL (24 Oct 2022 16:43)  POCT Blood Glucose.: 116 mg/dL (24 Oct 2022 10:49)      RADIOLOGY & ADDITIONAL TESTS:  < from: US Abdomen Limited (10.20.22 @ 13:05) >  ACC: [id number] EXAM:  US ABDOMEN LIMITED                          PROCEDURE DATE:  10/20/2022          INTERPRETATION:  CLINICAL INFORMATION: Severe abdominal distention.   Alcoholic hepatitis. Concern for Budd-Chiari.    COMPARISON: None available.    TECHNIQUE:  Sonography of the liver and ascites.    FINDINGS: There is severe enlargement of the liver with right lobe   measuring approximately 31 cm. Diffusely increased hepatic echogenicity   is seen consistent with a fatty liver. No focal hepatic mass lesion is   identified.    Main portal vein demonstrates normal flow. Hepatic veins are partially   delineated with color Doppler.    There is evidence of mild ascites.    IMPRESSION:  Severe hepatomegaly and steatosis.  Mild ascites.    < end of copied text >    < from: Xray Humerus, Bilateral (10.19.22 @ 10:51) >    ACC: [id number] EXAM:  XR ABDOMEN PORTABLE ROUTINE 1V                        ACC: [id number] EXAM:  XR SHOULDER COMP MIN 2V BI                        ACC: [id number] EXAM:  XR KNEE 1-2 VIEWS  BI                        ACC: [id number] EXAM:  XR HUMERUS MIN 2 VIEWS BI                          PROCEDURE DATE:  10/19/2022          INTERPRETATION:  XR HUMERUS BILATERAL, XR KNEE 1 OR 2 VIEWS BILATERAL, XR   SHOULDER 2 VIEWS BILATERAL, XR ABDOMEN    Clinical History: Status post fall    Right humerus 2 view. Left humerus 2 view. Right shoulder 3 view. Left   shoulder 3 view. Right knee 3 view. Left knee 3 view.      FINDINGS:    There is no fracture, dislocation, soft tissue swelling or joint effusion.  The bilateral acromioclavicular joints are intact.  No degenerative changes.  No radiopaque foreign body.    Abdomen one view    FINDINGS: The lower pelvis and right flank is not completely included.   There is no evidence of bowel obstruction, free air or abdominal   calcifications. There are no acutefractures.    IMPRESSION:  1.  No fractures.  2.  No evidence of bowel obstruction however if intra-abdominal injury is   suspected recommend CT.    < end of copied text >    < from: Xray Knee 1 or 2 Views, Bilateral (10.19.22 @ 10:50) >    ACC: [id number] EXAM:  XR ABDOMEN PORTABLE ROUTINE 1V                        ACC: [id number] EXAM:  XR SHOULDER COMP MIN 2V BI                        ACC: [id number] EXAM:  XR KNEE 1-2 VIEWS  BI                        ACC: [id number] EXAM:  XR HUMERUS MIN 2 VIEWS BI                          PROCEDURE DATE:  10/19/2022          INTERPRETATION:  XR HUMERUS BILATERAL, XR KNEE 1 OR 2 VIEWS BILATERAL, XR   SHOULDER 2 VIEWS BILATERAL, XR ABDOMEN    Clinical History: Status post fall    Right humerus 2 view. Left humerus 2 view. Right shoulder 3 view. Left   shoulder 3 view. Right knee 3 view. Left knee 3 view.      FINDINGS:    There is no fracture, dislocation, soft tissue swelling or joint effusion.  The bilateral acromioclavicular joints are intact.  No degenerative changes.  No radiopaque foreign body.    Abdomen one view    FINDINGS: The lower pelvis and right flank is not completely included.   There is no evidence of bowel obstruction, free air or abdominal   calcifications. There are no acutefractures.    IMPRESSION:  1.  No fractures.  2.  No evidence of bowel obstruction however if intra-abdominal injury is   suspected recommend CT.    --- End of Report ---    < end of copied text >      Imaging Personally Reviewed:  YES/NO    Consultant(s) Notes Reviewed:   YES/ No    Care Discussed with Consultants :     Plan of care was discussed with patient and /or primary care giver; all questions and concerns were addressed and care was aligned with patient's wishes.

## 2022-10-25 NOTE — PROGRESS NOTE ADULT - NS ATTEND AMEND GEN_ALL_CORE FT
Patient is alert, cooperative, eating well.   Responds to questions, but not with appropriate answers to questions, both in English and in Brazilian.   Friend is visiting.   Jaundice is easily visible.  Craniotomy scar is well-healed.  Vital Signs Last 24 Hrs  T(C): 36.3 (25 Oct 2022 13:16), Max: 37 (24 Oct 2022 21:21)  T(F): 97.3 (25 Oct 2022 13:16), Max: 98.6 (24 Oct 2022 21:21)  HR: 109 (25 Oct 2022 13:16) (101 - 115)  BP: 120/69 (25 Oct 2022 13:16) (112/54 - 127/71)  BP(mean): --  RR: 18 (25 Oct 2022 13:16) (18 - 19)  SpO2: 99% (25 Oct 2022 13:16) (97% - 100%)    Parameters below as of 25 Oct 2022 13:16  Patient On (Oxygen Delivery Method): room air  Lungs, clear  Cor, RRR  abdomen, massive hepatosplenomegaly  Neurological, inappropriate answers to questions, socially appropriate, non-focal                        8.6    49.50 )-----------( 360      ( 25 Oct 2022 07:54 )             25.1   10-25    136  |  107  |  9   ----------------------------<  84  3.5   |  17<L>  |  0.59    Ca    8.0<L>      25 Oct 2022 07:54    TPro  5.0<L>  /  Alb  1.2<L>  /  TBili  16.4<H>  /  DBili  13.0<H>  /  AST  55<H>  /  ALT  13  /  AlkPhos  233<H>  10-24    IMP: Encephalopathy is improved, but there is a disconnect in his responses, possibly related to his antecedent head trauma/craniotomy         Hyperbilirubinemia, coagulapathy, hypoalbuminemia, all related to underlying liver disease, which is likely alcoholic hepatitis         Leukocytosis, likely reactive.          Patient has not had hyperglycemia  Plan: Will give an additional course of N-acetylcysteine.            Ferritin level in AM.           discontinue FSBS.

## 2022-10-25 NOTE — CHART NOTE - NSCHARTNOTEFT_GEN_A_CORE
Assessment:   27yMalePatient is a 27y old  Male who presents with a chief complaint of Altered mental status (25 Oct 2022 15:00)      Factors impacting intake: [ ] none [ ] nausea  [ ] vomiting [ ] diarrhea [ ] constipation  [ ]chewing problems [ ] swallowing issues  [x ] other: visited patient in room. unable to interview patient as confused. Per one On one, patient not eating much but is drinking the ensure. food preferences taken. will try To honor.     Diet Presciption: Diet, Regular:   Supplement Feeding Modality:  Oral  Ensure Enlive Cans or Servings Per Day:  1       Frequency:  Two Times a day (10-23-22 @ 19:14)    Intake: inadequate     Current Weight: none   % Weight Change    Pertinent Medications: MEDICATIONS  (STANDING):  acetylcysteine IVPB 8 Gram(s) IV Intermittent once  dextrose 5%. 1000 milliLiter(s) (50 mL/Hr) IV Continuous <Continuous>  dextrose 5%. 1000 milliLiter(s) (100 mL/Hr) IV Continuous <Continuous>  dextrose 50% Injectable 25 Gram(s) IV Push once  dextrose 50% Injectable 12.5 Gram(s) IV Push once  dextrose 50% Injectable 25 Gram(s) IV Push once  folic acid 1 milliGRAM(s) Oral <User Schedule>  glucagon  Injectable 1 milliGRAM(s) IntraMuscular once  insulin lispro (ADMELOG) corrective regimen sliding scale   SubCutaneous Before meals and at bedtime  multivitamin 1 Tablet(s) Oral daily  nystatin Powder 1 Application(s) Topical two times a day  pantoprazole    Tablet 40 milliGRAM(s) Oral before breakfast  phytonadione   Solution 10 milliGRAM(s) Oral daily  simethicone 80 milliGRAM(s) Chew three times a day  sodium chloride 0.45% 1000 milliLiter(s) (75 mL/Hr) IV Continuous <Continuous>  thiamine Injectable 100 milliGRAM(s) IntraMuscular daily    MEDICATIONS  (PRN):  aluminum hydroxide/magnesium hydroxide/simethicone Suspension 30 milliLiter(s) Oral every 4 hours PRN Dyspepsia  dextrose Oral Gel 15 Gram(s) Oral once PRN Blood Glucose LESS THAN 70 milliGRAM(s)/deciliter  LORazepam   Injectable 2 milliGRAM(s) IV Push every 4 hours PRN CIWA-Ar score 8 or greater  morphine  - Injectable 2 milliGRAM(s) IV Push every 8 hours PRN Severe Pain (7 - 10)  ondansetron Injectable 4 milliGRAM(s) IV Push every 6 hours PRN Nausea and/or Vomiting    Pertinent Labs: 10-25 Na136 mmol/L Glu 84 mg/dL K+ 3.5 mmol/L Cr  0.59 mg/dL BUN 9 mg/dL 10-20 Phos 2.8 mg/dL 10-24 Alb 1.2 g/dL<L> 10-02 Chol 363 mg/dL<H> LDL --    HDL 14 mg/dL<L> Trig 319 mg/dL<H>     CAPILLARY BLOOD GLUCOSE      POCT Blood Glucose.: 130 mg/dL (25 Oct 2022 16:48)  POCT Blood Glucose.: 117 mg/dL (25 Oct 2022 11:42)  POCT Blood Glucose.: 100 mg/dL (25 Oct 2022 07:34)  POCT Blood Glucose.: 95 mg/dL (24 Oct 2022 21:27)    Skin: No pressure injury     Estimated Needs:   [ x] no change since previous assessment  [ ] recalculated:     Previous Nutrition Diagnosis:   [ ] Inadequate Energy Intake [ x]Inadequate Oral Intake [ ] Excessive Energy Intake   [ ] Underweight [ ] Increased Nutrient Needs [ ] Overweight/Obesity   [ ] Altered GI Function [ ] Unintended Weight Loss [ ] Food & Nutrition Related Knowledge Deficit [ ] Malnutrition     Nutrition Diagnosis is [ x] ongoing  [ ] resolved [ ] not applicable     New Nutrition Diagnosis: [ ] not applicable       Interventions:   Recommend  [ ] Change Diet To:  [ ] Nutrition Supplement  [ ] Nutrition Support  [x ] Other: provide Regular diet and ensure enlive bid , monitor for improved po intake     Monitoring and Evaluation:   [ ] PO intake [ x ] Tolerance to diet prescription [ x ] weights [ x ] labs[ x ] follow up per protocol  [ ] other:

## 2022-10-25 NOTE — PROGRESS NOTE ADULT - ASSESSMENT
Brief hospital course:  25yo Yi speaking Male from [facility], s/p Left temporal parietal craniotomy b/o TBI and alcohol abuse (reported current drinking by family, Peth 399) presented to [facility] ED on 10/2/22 with 1 day Hx of AMS, after reported witnessed seizure episodes and 1 week Hx of jaundice and was admitted to MICU with suspected EtOH withdrawal seizure, electrolyte abnormalities (Na 125, K 2.7, Mg 1.0, P 1.1), lactic acidosis (with lactate 6.4, bicarb 19), leukocytosis with neutrophilia (WBC 20.35, roderick 90%) with low grade T (100.1F), anemia without evidence of overt bleeding (Hb 6.9->8.3 s/p 1 U PRBC) and abnormal liver tests including coagulopathy (INR 2.13), hyperbilirubinemia (Se bi 9.3->10.8, direct 8.0), hyperammonemia (131), elevated liver enzymes in AST> ALT pattern (, ALT 22) and elevated ALP (669) and CT a/p w/ iv 10/2/22 suggesting diffuse hepatic steatosis, hepatomegaly with possible GB sludge and GB wall thickening, but normal bile ducts.  He was started on Alcoholic hepatitis management after infection was ruled out on admission (except that rhinovirus was pos, w/o URI symptoms) and UGIB was ruled out (EGD 10/5/22). He had LGIB, 2 rectal Dieulafoy lesions on sigmoidoscopy (10/5/22). Completed SBP ppx b/o GIB, was on ceftriaxone (10/4-10/12). His bilirubin remained unchanged after 7 days on prednisolone 40 mg (10/8-10/15) + total 3 days NAC, and developed low grade T, worsening leukocytosis up till 30-36k while on steroid (prior to steroid 17k), worsening abdominal pain,  distention and diarrhea despite being off lactulose, thus steroid was held 10/15 and septic workup was repeated. GI PCR panel showed STEC, culture was negative. BCx neg 10/16. C. diff neg 10/19. He was also started on Zosyn on 10/14, and switched to Meropenem on 10/16 and to Tigacycline on 10/18 per ID, and all abx were d/c 10/20.  Had repeat CT abd / pelvis 10/16, had only mild ascites (pelvic, perihepatosplenic), not feasible for Dx paracentesis and  prior it was none.   WBC continued to rise till 10/21 up till > 60k, thought to be possibly multifactorial.  Hematology been following since 10/16 b/o leukocytosis, pending workup.   WBC count slightly downtrending.  He had another episode of LGIB 10/21 and 10/22 but Hb remained stable and reportedly resolved.    Prior liver workup: Hep C ab neg, Hep A IgM neg, HBsAg, HBcAb IgM, HBcAb, HBsAb all neg, Hep E IgM neg, HIV neg, EBV past infection, CMV past infection, HSV PCR neg, leptospirosis neg. LUCIUS neg, SMA neg, LKM neg, AMA neg, IgG and IgM WNL. Ceruloplasmin 25. Fungitell neg. Strongyloides serology neg. Iron studies were not sent b/o multiple cofounding factors can alter (active drinking, recent bleeding, recent transfusion), and presentation not typical for HH.      # Hepatomegaly suspected die to severe alcoholic hepatitis, s/p 7 days prednisolone 10/8-10/15, and s/p total 3 days NAC, bilirubin 14.1 on 10/8 and 14.1 10/15.  MDF> 32, MELD 28-23-27-->32 (b/o worsening coagulopathy)  # Elevated transaminases in AST>ALT pattern - overall improved (-->55)  # Elevated ALP - improving (669->233)  # Hyperbilirubinemia - slight downtrend (10--->17.8->16.4)  # Coagulopathy - initially improved but also got vit K and FFP (10/5-7 and 10/5), now up-trending (3.98)  # Diarrhea off lactulose - improving?  # STEC  # Abdominal pain - improving?  # LGIB - resolved again?  # Anemia - stable  # Worsening leukocytosis from 17k to 60k, now with slight improvement to 49k  # Hx of Low grade T (T max 100.4 F 10/12 night and 100F 10/14), WNL since then     - Being monitored off steroid since 8/15, (got 7 days) for reasons as above. Resumed NAC today, consider continue for few more days.  C/w supportive measures, including nutritional optimization.  - Can add VZV PCR for completeness.   - F/u hematology workup.  - PLT WNL, only borderline splenomegaly, no or only trace ascites suggests less prominent portal hypertension.  - CT a/p w/ iv 10/2 was reviewed with IR, hepatic vasculature appeared patent. Patent vasculature reported on 10/20 US too.   - Was no biliary obstruction on CT abd, US abd. MRCP was considered on admission, has not been done yet, can consider to obtain, would assess the splanchnic circulation as well.   - Still alcoholic hepatitis remains the working Dx, and might be just poor responder to steroid, but given the rising WBC, the massive hepatomegaly, liver biopsy was considered but was deferred b/o coagulopathy.     - No EV on EGD 10/5/22, but mild portal gastropathy. S/p sigmoidoscopy 10/5 showing 2 rectal Dieulafoy lesions, s/p treatment. Had few episodes of hematochezia again Friday and Saturday but Hb remained stable and reportedly resolved. GI follow up appreciated.    - HCC status - no focal hepatic lesion    # AMS in a TBI patient, with EtOH use, initially been on CIWA on admission, also s/p seizures, and with elevated ammonia on admission thus could not r/u HE either  - Monitor mental status, overall improved compared to admission, possibly multifactorial. C/w constant observation. Aspiration, seizure, fall precautions.   - Folic, thiamine.   - Lactulose was held b/o diarrhea and was resumed with parameters per primary team on 10/16. From10/18 held again b/o > 6 BM. No need to trend ammonia. Lactulose could have worsened his abdominal bloating. Still has multiple BMs off lactulose per d/w RN at bedside. Rifaximin being held since 10/20 b/o dermatitis w/o other explanation.      # Transplant candidacy: Obstacles: only emergency medicaid per d/w SW, recent EtOH     Thank you for consult  Will continue to monitor.   Been d/w [facility] Hepatology service attending(s) throughout the course..  D/w primary team   Brief hospital course:  25yo Kiswahili speaking Male from [facility], s/p Left temporal parietal craniotomy b/o TBI and alcohol abuse (reported current drinking by family, Peth 399) presented to [facility] ED on 10/2/22 with 1 day Hx of AMS, after reported witnessed seizure episodes and 1 week Hx of jaundice and was admitted to MICU with suspected EtOH withdrawal seizure, electrolyte abnormalities (Na 125, K 2.7, Mg 1.0, P 1.1), lactic acidosis (with lactate 6.4, bicarb 19), leukocytosis with neutrophilia (WBC 20.35, roderick 90%) with low grade T (100.1F), anemia without evidence of overt bleeding (Hb 6.9->8.3 s/p 1 U PRBC) and abnormal liver tests including coagulopathy (INR 2.13), hyperbilirubinemia (Se bi 9.3->10.8, direct 8.0), hyperammonemia (131), elevated liver enzymes in AST> ALT pattern (, ALT 22) and elevated ALP (669) and CT a/p w/ iv 10/2/22 suggesting diffuse hepatic steatosis, hepatomegaly with possible GB sludge and GB wall thickening, but normal bile ducts.  He was started on Alcoholic hepatitis management after infection was ruled out on admission (except that rhinovirus was pos, w/o URI symptoms) and UGIB was ruled out (EGD 10/5/22). He had LGIB, 2 rectal Dieulafoy lesions on sigmoidoscopy (10/5/22). Completed SBP ppx b/o GIB, was on ceftriaxone (10/4-10/12). His bilirubin remained unchanged after 7 days on prednisolone 40 mg (10/8-10/15) + total 3 days NAC, and developed low grade T, worsening leukocytosis up till 30-36k while on steroid (prior to steroid 17k), worsening abdominal pain,  distention and diarrhea despite being off lactulose, thus steroid was held 10/15 and septic workup was repeated. GI PCR panel showed STEC, culture was negative. BCx neg 10/16. C. diff neg 10/19. He was also started on Zosyn on 10/14, and switched to Meropenem on 10/16 and to Tigacycline on 10/18 per ID, and all abx were d/c 10/20.  Had repeat CT abd / pelvis 10/16, had only mild ascites (pelvic, perihepatosplenic), not feasible for Dx paracentesis and  prior it was none.   WBC continued to rise till 10/21 up till > 60k, thought to be possibly multifactorial.  Hematology been following since 10/16 b/o leukocytosis, pending workup.   WBC count slightly downtrending.  He had another episode of LGIB 10/21 and 10/22 but Hb remained stable and reportedly resolved.    Prior liver workup: Hep C ab neg, Hep A IgM neg, HBsAg, HBcAb IgM, HBcAb, HBsAb all neg, Hep E IgM neg, HIV neg, EBV past infection, CMV past infection, HSV PCR neg, leptospirosis neg. LUCIUS neg, SMA neg, LKM neg, AMA neg, IgG and IgM WNL. Ceruloplasmin 25. Fungitell neg. Strongyloides serology neg. Iron studies were not sent b/o multiple cofounding factors can alter (active drinking, recent bleeding, recent transfusion), and presentation not typical for HH.      # Hepatomegaly suspected die to severe alcoholic hepatitis, s/p 7 days prednisolone 10/8-10/15, and s/p total 3 days NAC, bilirubin 14.1 on 10/8 and 14.1 10/15.  MDF> 32, MELD 28-23-27-->32 (b/o worsening coagulopathy)  # Elevated transaminases in AST>ALT pattern - overall improved (-->55)  # Elevated ALP - improving (669->233)  # Hyperbilirubinemia - slight downtrend (10--->17.8->16.4)  # Coagulopathy - initially improved but also got vit K and FFP (10/5-7 and 10/5), now up-trending (3.98)  # Diarrhea off lactulose - improving?  # STEC  # Abdominal pain - improving?  # LGIB - resolved again?  # Anemia - stable  # Worsening leukocytosis from 17k to 60k, now with slight improvement to 49k  # Hx of Low grade T (T max 100.4 F 10/12 night and 100F 10/14), WNL since then     - Being monitored off steroid since 8/15, (got 7 days) for reasons as above. Resumed NAC today, consider continue for few more days.  C/w supportive measures, including nutritional optimization. Agree with another trial of vitamin K. Consider checking zinc level.   - Can add VZV PCR for completeness.   - C/w monitoring LFTs, including INR.  - F/u hematology workup.  - PLT WNL, only borderline splenomegaly, no or only trace ascites suggests less prominent portal hypertension.  - CT a/p w/ iv 10/2 was reviewed with IR, hepatic vasculature appeared patent. Patent vasculature reported on 10/20 US too.   - Was no biliary obstruction on CT abd, US abd. MRCP was considered on admission, has not been done yet, can consider to obtain, would assess the splanchnic circulation as well.   - Still alcoholic hepatitis remains the working Dx, and might be just poor responder to steroid, but given the rising WBC, the massive hepatomegaly, liver biopsy was considered but was deferred b/o coagulopathy.     - No EV on EGD 10/5/22, but mild portal gastropathy. S/p sigmoidoscopy 10/5 showing 2 rectal Dieulafoy lesions, s/p treatment. Had few episodes of hematochezia again Friday and Saturday but Hb remained stable and reportedly resolved. GI follow up appreciated.    - HCC status - no focal hepatic lesion    # AMS in a TBI patient, with EtOH use, initially been on CIWA on admission, also s/p seizures, and with elevated ammonia on admission thus could not r/u HE either  - Monitor mental status, overall improved compared to admission, possibly multifactorial. C/w constant observation. Aspiration, seizure, fall precautions.   - Folic, thiamine.   - Lactulose was held b/o diarrhea and was resumed with parameters per primary team on 10/16. From10/18 held again b/o > 6 BM. No need to trend ammonia. Lactulose could have worsened his abdominal bloating. Still has multiple BMs off lactulose per d/w RN at bedside. Rifaximin being held since 10/20 b/o dermatitis w/o other explanation.      # Transplant candidacy: Obstacles: only emergency medicaid per d/w SW, recent EtOH     Thank you for consult  Will continue to monitor.   Been d/w [facility] Hepatology service attending(s) throughout the course..  D/w primary team

## 2022-10-25 NOTE — PROGRESS NOTE ADULT - SUBJECTIVE AND OBJECTIVE BOX
Chief Complaint:  Patient is a 27y old  Male who presents with a chief complaint of Altered mental status (25 Oct 2022 10:18)      Reason for consult:    Interval Events:     Hospital Medications:  acetylcysteine IVPB 8 Gram(s) IV Intermittent once  aluminum hydroxide/magnesium hydroxide/simethicone Suspension 30 milliLiter(s) Oral every 4 hours PRN  dextrose 5%. 1000 milliLiter(s) IV Continuous <Continuous>  dextrose 5%. 1000 milliLiter(s) IV Continuous <Continuous>  dextrose 50% Injectable 25 Gram(s) IV Push once  dextrose 50% Injectable 12.5 Gram(s) IV Push once  dextrose 50% Injectable 25 Gram(s) IV Push once  dextrose Oral Gel 15 Gram(s) Oral once PRN  folic acid 1 milliGRAM(s) Oral <User Schedule>  glucagon  Injectable 1 milliGRAM(s) IntraMuscular once  insulin lispro (ADMELOG) corrective regimen sliding scale   SubCutaneous Before meals and at bedtime  LORazepam   Injectable 2 milliGRAM(s) IV Push every 4 hours PRN  morphine  - Injectable 2 milliGRAM(s) IV Push every 8 hours PRN  multivitamin 1 Tablet(s) Oral daily  nystatin Powder 1 Application(s) Topical two times a day  ondansetron Injectable 4 milliGRAM(s) IV Push every 6 hours PRN  pantoprazole    Tablet 40 milliGRAM(s) Oral before breakfast  phytonadione   Solution 10 milliGRAM(s) Oral daily  simethicone 80 milliGRAM(s) Chew three times a day  sodium chloride 0.45% 1000 milliLiter(s) IV Continuous <Continuous>  thiamine Injectable 100 milliGRAM(s) IntraMuscular daily      ROS:   General:  No  fevers, chills, night sweats, fatigue  Eyes:  Good vision, no reported pain  ENT:  No sore throat, pain, runny nose  CV:  No pain, palpitations  Pulm:  No dyspnea, cough  GI:  See HPI, otherwise negative  :  No  incontinence, nocturia  Muscle:  No pain, weakness  Neuro:  No memory problems  Psych:  No insomnia, mood problems, depression  Endocrine:  No polyuria, polydipsia, cold/heat intolerance  Heme:  No petechiae, ecchymosis, easy bruisability  Skin:  No rash    PHYSICAL EXAM:   Vital Signs:  Vital Signs Last 24 Hrs  T(C): 36.3 (25 Oct 2022 13:16), Max: 37 (24 Oct 2022 21:21)  T(F): 97.3 (25 Oct 2022 13:16), Max: 98.6 (24 Oct 2022 21:21)  HR: 109 (25 Oct 2022 13:16) (101 - 115)  BP: 120/69 (25 Oct 2022 13:16) (112/54 - 127/71)  BP(mean): --  RR: 18 (25 Oct 2022 13:16) (18 - 19)  SpO2: 99% (25 Oct 2022 13:16) (97% - 100%)    Parameters below as of 25 Oct 2022 13:16  Patient On (Oxygen Delivery Method): room air      Daily     Daily     GENERAL: no acute distress  NEURO: alert, no asterixis  HEENT: anicteric sclera, no conjunctival pallor appreciated  CHEST: no respiratory distress, no accessory muscle use  CARDIAC: regular rate, rhythm  ABDOMEN: soft, non-tender, non-distended, no rebound or guarding  EXTREMITIES: warm, well perfused, no edema  SKIN: no lesions noted    LABS: reviewed                        8.6    49.50 )-----------( 360      ( 25 Oct 2022 07:54 )             25.1     10-25    136  |  107  |  9   ----------------------------<  84  3.5   |  17<L>  |  0.59    Ca    8.0<L>      25 Oct 2022 07:54    TPro  5.0<L>  /  Alb  1.2<L>  /  TBili  16.4<H>  /  DBili  13.0<H>  /  AST  55<H>  /  ALT  13  /  AlkPhos  233<H>  10-24    LIVER FUNCTIONS - ( 24 Oct 2022 07:02 )  Alb: 1.2 g/dL / Pro: 5.0 g/dL / ALK PHOS: 233 U/L / ALT: 13 U/L DA / AST: 55 U/L / GGT: x             Interval Diagnostic Studies: see sunrise for full report   Chief Complaint:  Patient is a 27y old  Male who presents with a chief complaint of Altered mental status (25 Oct 2022 10:18)      Reason for consult: SYEDA (ACLF)    Interval Events: Patient was seen and examined at bedside.  ID [id number]. Mental status unchanged, remains AAOX2, not in time, but able to have more consistent discussion with him. Told a story that he came to [place] at age 12, and his brother followed him, they liver with uncle. Reports that been drinking "large bottle of vodka" daily. Labs w/o significant change.     Hospital Medications:  acetylcysteine IVPB 8 Gram(s) IV Intermittent once  aluminum hydroxide/magnesium hydroxide/simethicone Suspension 30 milliLiter(s) Oral every 4 hours PRN  dextrose 5%. 1000 milliLiter(s) IV Continuous <Continuous>  dextrose 5%. 1000 milliLiter(s) IV Continuous <Continuous>  dextrose 50% Injectable 25 Gram(s) IV Push once  dextrose 50% Injectable 12.5 Gram(s) IV Push once  dextrose 50% Injectable 25 Gram(s) IV Push once  dextrose Oral Gel 15 Gram(s) Oral once PRN  folic acid 1 milliGRAM(s) Oral <User Schedule>  glucagon  Injectable 1 milliGRAM(s) IntraMuscular once  insulin lispro (ADMELOG) corrective regimen sliding scale   SubCutaneous Before meals and at bedtime  LORazepam   Injectable 2 milliGRAM(s) IV Push every 4 hours PRN  morphine  - Injectable 2 milliGRAM(s) IV Push every 8 hours PRN  multivitamin 1 Tablet(s) Oral daily  nystatin Powder 1 Application(s) Topical two times a day  ondansetron Injectable 4 milliGRAM(s) IV Push every 6 hours PRN  pantoprazole    Tablet 40 milliGRAM(s) Oral before breakfast  phytonadione   Solution 10 milliGRAM(s) Oral daily  simethicone 80 milliGRAM(s) Chew three times a day  sodium chloride 0.45% 1000 milliLiter(s) IV Continuous <Continuous>  thiamine Injectable 100 milliGRAM(s) IntraMuscular daily      ROS:   General:  No  fevers, chills  CV:  No pain  Pulm:  No dyspnea, cough  GI: Still reports abdominal distention, but reports only mild pain, no N/V, per RN at bedside, had multiple episodes of small BMs, no further bleeding reported since 10/23  :  No  dysuria  Muscle:  No pain, or focal weakness  Neuro: AAOx2, no asterixis  Skin:  Jaundice    PHYSICAL EXAM:   Vital Signs:  Vital Signs Last 24 Hrs  T(C): 36.3 (25 Oct 2022 13:16), Max: 37 (24 Oct 2022 21:21)  T(F): 97.3 (25 Oct 2022 13:16), Max: 98.6 (24 Oct 2022 21:21)  HR: 109 (25 Oct 2022 13:16) (101 - 115)  BP: 120/69 (25 Oct 2022 13:16) (112/54 - 127/71)  BP(mean): --  RR: 18 (25 Oct 2022 13:16) (18 - 19)  SpO2: 99% (25 Oct 2022 13:16) (97% - 100%)    Parameters below as of 25 Oct 2022 13:16  Patient On (Oxygen Delivery Method): room air      Daily     Daily     GENERAL: no acute distress  NEURO: AAOX2 no asterixis  HEENT: icteric sclera  CHEST: no respiratory distress, no accessory muscle use  CARDIAC: mildly tachycardic  ABDOMEN: soft, non-tender, but still distended, w/ hepatomegaly, w/o fluid wave, no rebound or guarding, BS+  EXTREMITIES: worsened b/l LE edema, non tender  SKIN: jaundice; dermatitis on hand appears to be resolved    LABS: reviewed                        8.6    49.50 )-----------( 360      ( 25 Oct 2022 07:54 )             25.1     10-25    136  |  107  |  9   ----------------------------<  84  3.5   |  17<L>  |  0.59    Ca    8.0<L>      25 Oct 2022 07:54    TPro  5.0<L>  /  Alb  1.2<L>  /  TBili  16.4<H>  /  DBili  13.0<H>  /  AST  55<H>  /  ALT  13  /  AlkPhos  233<H>  10-24    LIVER FUNCTIONS - ( 24 Oct 2022 07:02 )  Alb: 1.2 g/dL / Pro: 5.0 g/dL / ALK PHOS: 233 U/L / ALT: 13 U/L DA / AST: 55 U/L / GGT: x             Interval Diagnostic Studies: see sunrise for full report

## 2022-10-26 DIAGNOSIS — E88.09 OTHER DISORDERS OF PLASMA-PROTEIN METABOLISM, NOT ELSEWHERE CLASSIFIED: ICD-10-CM

## 2022-10-26 DIAGNOSIS — E87.6 HYPOKALEMIA: ICD-10-CM

## 2022-10-26 LAB
ALBUMIN SERPL ELPH-MCNC: 1.2 G/DL — LOW (ref 3.5–5)
ALBUMIN SERPL ELPH-MCNC: 1.4 G/DL — LOW (ref 3.5–5)
ALP SERPL-CCNC: 270 U/L — HIGH (ref 40–120)
ALP SERPL-CCNC: 282 U/L — HIGH (ref 40–120)
ALT FLD-CCNC: 14 U/L DA — SIGNIFICANT CHANGE UP (ref 10–60)
ALT FLD-CCNC: 15 U/L DA — SIGNIFICANT CHANGE UP (ref 10–60)
ANION GAP SERPL CALC-SCNC: 10 MMOL/L — SIGNIFICANT CHANGE UP (ref 5–17)
ANION GAP SERPL CALC-SCNC: 15 MMOL/L — SIGNIFICANT CHANGE UP (ref 5–17)
APTT 50/50 2HOUR INCUB: 35.4 SEC — SIGNIFICANT CHANGE UP (ref 27.5–36.5)
APTT BLD: 34.7 SEC — SIGNIFICANT CHANGE UP (ref 27.5–36.5)
APTT BLD: 47.6 SEC — HIGH (ref 27.5–35.5)
APTT BLD: 49.7 SEC — HIGH (ref 27.5–35.5)
AST SERPL-CCNC: 51 U/L — HIGH (ref 10–40)
AST SERPL-CCNC: 53 U/L — HIGH (ref 10–40)
BILIRUB DIRECT SERPL-MCNC: 14.7 MG/DL — HIGH (ref 0–0.3)
BILIRUB INDIRECT FLD-MCNC: 4 MG/DL — HIGH (ref 0.2–1)
BILIRUB SERPL-MCNC: 17.1 MG/DL — HIGH (ref 0.2–1.2)
BILIRUB SERPL-MCNC: 18.7 MG/DL — HIGH (ref 0.2–1.2)
BUN SERPL-MCNC: 8 MG/DL — SIGNIFICANT CHANGE UP (ref 7–18)
BUN SERPL-MCNC: 8 MG/DL — SIGNIFICANT CHANGE UP (ref 7–18)
CALCIUM SERPL-MCNC: 8 MG/DL — LOW (ref 8.4–10.5)
CALCIUM SERPL-MCNC: 8.1 MG/DL — LOW (ref 8.4–10.5)
CHLORIDE SERPL-SCNC: 101 MMOL/L — SIGNIFICANT CHANGE UP (ref 96–108)
CHLORIDE SERPL-SCNC: 98 MMOL/L — SIGNIFICANT CHANGE UP (ref 96–108)
CO2 SERPL-SCNC: 17 MMOL/L — LOW (ref 22–31)
CO2 SERPL-SCNC: 18 MMOL/L — LOW (ref 22–31)
CREAT SERPL-MCNC: 0.52 MG/DL — SIGNIFICANT CHANGE UP (ref 0.5–1.3)
CREAT SERPL-MCNC: 0.57 MG/DL — SIGNIFICANT CHANGE UP (ref 0.5–1.3)
EGFR: 140 ML/MIN/1.73M2 — SIGNIFICANT CHANGE UP
EGFR: 143 ML/MIN/1.73M2 — SIGNIFICANT CHANGE UP
FERRITIN SERPL-MCNC: 77 NG/ML — SIGNIFICANT CHANGE UP (ref 30–400)
FIBRINOGEN PPP-MCNC: 379 MG/DL — SIGNIFICANT CHANGE UP (ref 330–520)
GLUCOSE BLDC GLUCOMTR-MCNC: 105 MG/DL — HIGH (ref 70–99)
GLUCOSE BLDC GLUCOMTR-MCNC: 114 MG/DL — HIGH (ref 70–99)
GLUCOSE BLDC GLUCOMTR-MCNC: 115 MG/DL — HIGH (ref 70–99)
GLUCOSE BLDC GLUCOMTR-MCNC: 118 MG/DL — HIGH (ref 70–99)
GLUCOSE SERPL-MCNC: 73 MG/DL — SIGNIFICANT CHANGE UP (ref 70–99)
GLUCOSE SERPL-MCNC: 90 MG/DL — SIGNIFICANT CHANGE UP (ref 70–99)
HCT VFR BLD CALC: 25.2 % — LOW (ref 39–50)
HGB BLD-MCNC: 8.7 G/DL — LOW (ref 13–17)
INR BLD: 3.35 RATIO — HIGH (ref 0.88–1.16)
MCHC RBC-ENTMCNC: 31 PG — SIGNIFICANT CHANGE UP (ref 27–34)
MCHC RBC-ENTMCNC: 34.5 GM/DL — SIGNIFICANT CHANGE UP (ref 32–36)
MCV RBC AUTO: 89.7 FL — SIGNIFICANT CHANGE UP (ref 80–100)
NRBC # BLD: 0 /100 WBCS — SIGNIFICANT CHANGE UP (ref 0–0)
PAT CTL 2H: 34.3 SEC — SIGNIFICANT CHANGE UP (ref 27.5–36.5)
PLATELET # BLD AUTO: 339 K/UL — SIGNIFICANT CHANGE UP (ref 150–400)
POTASSIUM SERPL-MCNC: 2.4 MMOL/L — CRITICAL LOW (ref 3.5–5.3)
POTASSIUM SERPL-MCNC: 3.6 MMOL/L — SIGNIFICANT CHANGE UP (ref 3.5–5.3)
POTASSIUM SERPL-SCNC: 2.4 MMOL/L — CRITICAL LOW (ref 3.5–5.3)
POTASSIUM SERPL-SCNC: 3.6 MMOL/L — SIGNIFICANT CHANGE UP (ref 3.5–5.3)
PROT SERPL-MCNC: 5.4 G/DL — LOW (ref 6–8.3)
PROT SERPL-MCNC: 5.7 G/DL — LOW (ref 6–8.3)
PROTHROM AB SERPL-ACNC: 40.3 SEC — HIGH (ref 10.5–13.4)
PT 100%: 42 SEC — HIGH (ref 10.5–13.4)
PT 50/50: 13.4 SEC — SIGNIFICANT CHANGE UP (ref 10.5–14.5)
RBC # BLD: 2.81 M/UL — LOW (ref 4.2–5.8)
RBC # FLD: 22.6 % — HIGH (ref 10.3–14.5)
SODIUM SERPL-SCNC: 129 MMOL/L — LOW (ref 135–145)
SODIUM SERPL-SCNC: 130 MMOL/L — LOW (ref 135–145)
WBC # BLD: 48.06 K/UL — CRITICAL HIGH (ref 3.8–10.5)
WBC # FLD AUTO: 48.06 K/UL — CRITICAL HIGH (ref 3.8–10.5)

## 2022-10-26 PROCEDURE — 99233 SBSQ HOSP IP/OBS HIGH 50: CPT

## 2022-10-26 RX ORDER — POTASSIUM CHLORIDE 20 MEQ
40 PACKET (EA) ORAL EVERY 4 HOURS
Refills: 0 | Status: COMPLETED | OUTPATIENT
Start: 2022-10-26 | End: 2022-10-26

## 2022-10-26 RX ORDER — POTASSIUM CHLORIDE 20 MEQ
10 PACKET (EA) ORAL
Refills: 0 | Status: COMPLETED | OUTPATIENT
Start: 2022-10-26 | End: 2022-10-26

## 2022-10-26 RX ADMIN — SIMETHICONE 80 MILLIGRAM(S): 80 TABLET, CHEWABLE ORAL at 21:43

## 2022-10-26 RX ADMIN — SIMETHICONE 80 MILLIGRAM(S): 80 TABLET, CHEWABLE ORAL at 13:04

## 2022-10-26 RX ADMIN — Medication 40 MILLIEQUIVALENT(S): at 09:18

## 2022-10-26 RX ADMIN — Medication 100 MILLIGRAM(S): at 11:30

## 2022-10-26 RX ADMIN — Medication 1 MILLIGRAM(S): at 17:03

## 2022-10-26 RX ADMIN — Medication 1 MILLIGRAM(S): at 07:47

## 2022-10-26 RX ADMIN — Medication 100 MILLIEQUIVALENT(S): at 09:28

## 2022-10-26 RX ADMIN — Medication 100 MILLIEQUIVALENT(S): at 10:28

## 2022-10-26 RX ADMIN — PANTOPRAZOLE SODIUM 40 MILLIGRAM(S): 20 TABLET, DELAYED RELEASE ORAL at 05:49

## 2022-10-26 RX ADMIN — SIMETHICONE 80 MILLIGRAM(S): 80 TABLET, CHEWABLE ORAL at 05:49

## 2022-10-26 RX ADMIN — NYSTATIN CREAM 1 APPLICATION(S): 100000 CREAM TOPICAL at 06:54

## 2022-10-26 RX ADMIN — Medication 100 MILLIEQUIVALENT(S): at 12:09

## 2022-10-26 RX ADMIN — Medication 1 TABLET(S): at 11:31

## 2022-10-26 RX ADMIN — Medication 10 MILLIGRAM(S): at 11:30

## 2022-10-26 RX ADMIN — Medication 40 MILLIEQUIVALENT(S): at 11:30

## 2022-10-26 NOTE — PROGRESS NOTE ADULT - PROBLEM SELECTOR PLAN 1
- WBC trending down 60k-->48-->49.5-->48.06  - Leukemoid reaction vs steroid induced vs GI infection   - C. diff negative   - completed multiple courses of antibiotics   - Acetylserine started by attending   -  flow cytometry, AFP, BCR/ ABL  -  CEA normal  -  heme/onc following QMA   -  ID:  Dr Puentes

## 2022-10-26 NOTE — PROGRESS NOTE ADULT - PROBLEM SELECTOR PLAN 5
likely due to liver disease  -Trending down, admitted with Serum Albumin 1.9, now 1.2  -Nutrition consult

## 2022-10-26 NOTE — PROGRESS NOTE ADULT - NS ATTEND AMEND GEN_ALL_CORE FT
CC: Luekocytosis and coaguloapthy    INTERVAL HPI: Patient seen and examined at bedside; Events noted; Patient w/o new complaint    MEDICATIONS  (STANDING):  dextrose 5%. 1000 milliLiter(s) (50 mL/Hr) IV Continuous <Continuous>  dextrose 5%. 1000 milliLiter(s) (100 mL/Hr) IV Continuous <Continuous>  dextrose 50% Injectable 25 Gram(s) IV Push once  dextrose 50% Injectable 12.5 Gram(s) IV Push once  dextrose 50% Injectable 25 Gram(s) IV Push once  folic acid 1 milliGRAM(s) Oral <User Schedule>  glucagon  Injectable 1 milliGRAM(s) IntraMuscular once  insulin lispro (ADMELOG) corrective regimen sliding scale   SubCutaneous Before meals and at bedtime  multivitamin 1 Tablet(s) Oral daily  nystatin Powder 1 Application(s) Topical two times a day  pantoprazole    Tablet 40 milliGRAM(s) Oral before breakfast  phytonadione   Solution 10 milliGRAM(s) Oral daily  simethicone 80 milliGRAM(s) Chew three times a day  sodium chloride 0.45% 1000 milliLiter(s) (75 mL/Hr) IV Continuous <Continuous>  thiamine Injectable 100 milliGRAM(s) IntraMuscular daily    MEDICATIONS  (PRN):  aluminum hydroxide/magnesium hydroxide/simethicone Suspension 30 milliLiter(s) Oral every 4 hours PRN Dyspepsia  dextrose Oral Gel 15 Gram(s) Oral once PRN Blood Glucose LESS THAN 70 milliGRAM(s)/deciliter  LORazepam   Injectable 2 milliGRAM(s) IV Push every 4 hours PRN CIWA-Ar score 8 or greater  morphine  - Injectable 2 milliGRAM(s) IV Push every 8 hours PRN Severe Pain (7 - 10)  ondansetron Injectable 4 milliGRAM(s) IV Push every 6 hours PRN Nausea and/or Vomiting      Vital Signs Last 24 Hrs  T(C): 36.4 (26 Oct 2022 11:18), Max: 36.8 (25 Oct 2022 21:08)  T(F): 97.5 (26 Oct 2022 11:18), Max: 98.3 (25 Oct 2022 21:08)  HR: 109 (26 Oct 2022 11:18) (109 - 118)  BP: 129/80 (26 Oct 2022 11:18) (109/59 - 131/66)  BP(mean): --  RR: 18 (26 Oct 2022 11:18) (17 - 20)  SpO2: 97% (26 Oct 2022 11:18) (94% - 98%)    Parameters below as of 26 Oct 2022 11:18  Patient On (Oxygen Delivery Method): room air      _________________  PHYSICAL EXAM:  ---------------------------  GEN: NAD; NC/AT; A and O x 2  HEENT: icteric conjunctivae; EOMI  LUNGS: no wheezing; decreased bilateral air entry; no use of accessory muscles for breathing  HEART: Nl S1 S2; no M   ABDOMEN: Soft, Nontender, non distended  EXTREMITIES: no cyanosis; no edema; warm and dry  NERVOUS SYSTEM:  Awake and alert; no focal neuro  deficits    _________________________________________________  LABS:                        8.7    48.06 )-----------( 339      ( 26 Oct 2022 06:36 )             25.2     10-26    129<L>  |  101  |  8   ----------------------------<  90  3.6   |  18<L>  |  0.57    Ca    8.1<L>      26 Oct 2022 16:07    TPro  5.7<L>  /  Alb  1.4<L>  /  TBili  18.7<H>  /  DBili  14.7<H>  /  AST  53<H>  /  ALT  15  /  AlkPhos  282<H>  10-26    PT/INR - ( 26 Oct 2022 06:36 )   PT: 40.3 sec;   INR: 3.35 ratio         PTT - ( 26 Oct 2022 06:36 )  PTT:47.6 sec  CAPILLARY BLOOD GLUCOSE    A/P    Problem #1 Leukocytosis - BCR/ABL negative  -leukemoid reaction of mostly neutrophilia  -reactive but obtain CSF3R mutation to rule out chronic neutrophilic leukemia    Problem #2 Coagulopathy - corrects on mixing study; doubt chronic DIC and likely hepatic dysfunction  -can transfuse to bring PT/INR ;and aPTT to acceptable level for surgical intervention  -there is no specific INR level that correlates with lower risk of bleeding in procedures such as liver biopsy in this coagulopathic patient; pt will be at elevated risk for bleeding complication and risks and benefits should be discussed w/ pt clearly prior to proceeding with surgical procedure even with FFP/vit K support    I have examined the patient at bedside and reviewed patient's data and participated in the management of the patient along with Lauren GAYLE as well as hemotology/med oncology faculty consisting of Dr. Jerrell Harvey, Dr. MALDONADO Tobias, Dr. MALDONADO Loza, Dr. Rose Skinner, Dr. Felecia Patrick, Dr. Jayden Márquez as well as myself during the daily heme/onc case review. I reviewed pertinent clinical information, PE,  labs as well as A/P as outline above.     Pablo Lambert MD

## 2022-10-26 NOTE — PROGRESS NOTE ADULT - SUBJECTIVE AND OBJECTIVE BOX
NP Note discussed with  Primary Attending    Patient is a 27y old  Male who presents with a chief complaint of Altered mental status (25 Oct 2022 15:00)      INTERVAL HPI/OVERNIGHT EVENTS: no new complaints    MEDICATIONS  (STANDING):  dextrose 5%. 1000 milliLiter(s) (50 mL/Hr) IV Continuous <Continuous>  dextrose 5%. 1000 milliLiter(s) (100 mL/Hr) IV Continuous <Continuous>  dextrose 50% Injectable 25 Gram(s) IV Push once  dextrose 50% Injectable 12.5 Gram(s) IV Push once  dextrose 50% Injectable 25 Gram(s) IV Push once  folic acid 1 milliGRAM(s) Oral <User Schedule>  glucagon  Injectable 1 milliGRAM(s) IntraMuscular once  insulin lispro (ADMELOG) corrective regimen sliding scale   SubCutaneous Before meals and at bedtime  multivitamin 1 Tablet(s) Oral daily  nystatin Powder 1 Application(s) Topical two times a day  pantoprazole    Tablet 40 milliGRAM(s) Oral before breakfast  phytonadione   Solution 10 milliGRAM(s) Oral daily  potassium chloride    Tablet ER 40 milliEquivalent(s) Oral every 4 hours  potassium chloride  10 mEq/100 mL IVPB 10 milliEquivalent(s) IV Intermittent every 1 hour  simethicone 80 milliGRAM(s) Chew three times a day  sodium chloride 0.45% 1000 milliLiter(s) (75 mL/Hr) IV Continuous <Continuous>  thiamine Injectable 100 milliGRAM(s) IntraMuscular daily    MEDICATIONS  (PRN):  aluminum hydroxide/magnesium hydroxide/simethicone Suspension 30 milliLiter(s) Oral every 4 hours PRN Dyspepsia  dextrose Oral Gel 15 Gram(s) Oral once PRN Blood Glucose LESS THAN 70 milliGRAM(s)/deciliter  LORazepam   Injectable 2 milliGRAM(s) IV Push every 4 hours PRN CIWA-Ar score 8 or greater  morphine  - Injectable 2 milliGRAM(s) IV Push every 8 hours PRN Severe Pain (7 - 10)  ondansetron Injectable 4 milliGRAM(s) IV Push every 6 hours PRN Nausea and/or Vomiting      __________________________________________________  REVIEW OF SYSTEMS:    CONSTITUTIONAL: No fever,   EYES: no acute visual disturbances  NECK: No pain or stiffness  RESPIRATORY: No cough; No shortness of breath  CARDIOVASCULAR: No chest pain, no palpitations  GASTROINTESTINAL: No pain. No nausea or vomiting; No diarrhea   NEUROLOGICAL: No headache or numbness, no tremors  MUSCULOSKELETAL: No joint pain, no muscle pain  GENITOURINARY: no dysuria, no frequency, no hesitancy  PSYCHIATRY: no depression , no anxiety  ALL OTHER  ROS negative        Vital Signs Last 24 Hrs  T(C): 36.7 (26 Oct 2022 05:20), Max: 36.8 (25 Oct 2022 21:08)  T(F): 98 (26 Oct 2022 05:20), Max: 98.3 (25 Oct 2022 21:08)  HR: 115 (26 Oct 2022 05:20) (101 - 118)  BP: 131/66 (26 Oct 2022 05:20) (109/59 - 131/66)  BP(mean): --  RR: 20 (26 Oct 2022 05:20) (17 - 20)  SpO2: 98% (26 Oct 2022 05:20) (94% - 100%)    Parameters below as of 26 Oct 2022 05:20  Patient On (Oxygen Delivery Method): room air        ________________________________________________  PHYSICAL EXAM: Confused, talkative but mostly jibberish  GENERAL: NAD  HEENT: Normocephalic;  conjunctivae and sclerae clear; moist mucous membranes;   NECK : supple  CHEST/LUNG: Clear to auscultation bilaterally with good air entry   HEART: S1 S2  regular; no murmurs, gallops or rubs  ABDOMEN: Large, distended, Soft, mildly tender  EXTREMITIES: no cyanosis; no edema; no calf tenderness  SKIN: Generalized jaundice, warm and dry; no rash  NERVOUS SYSTEM:  Awake and alert; Confused    _________________________________________________  LABS:                        8.7    48.06 )-----------( 339      ( 26 Oct 2022 06:36 )             25.2     10-26    130<L>  |  98  |  8   ----------------------------<  73  2.4<LL>   |  17<L>  |  0.52    Ca    8.0<L>      26 Oct 2022 06:36    TPro  5.4<L>  /  Alb  1.2<L>  /  TBili  17.1<H>  /  DBili  x   /  AST  51<H>  /  ALT  14  /  AlkPhos  270<H>  10-26    PT/INR - ( 26 Oct 2022 06:36 )   PT: 40.3 sec;   INR: 3.35 ratio         PTT - ( 26 Oct 2022 06:36 )  PTT:47.6 sec    CAPILLARY BLOOD GLUCOSE      POCT Blood Glucose.: 115 mg/dL (26 Oct 2022 07:32)  POCT Blood Glucose.: 115 mg/dL (25 Oct 2022 21:15)  POCT Blood Glucose.: 130 mg/dL (25 Oct 2022 16:48)  POCT Blood Glucose.: 117 mg/dL (25 Oct 2022 11:42)    RADIOLOGY & ADDITIONAL TESTS:    < from: US Abdomen Limited (10.20.22 @ 13:05) >    ACC: [id number] EXAM:  US ABDOMEN LIMITED                          PROCEDURE DATE:  10/20/2022          INTERPRETATION:  CLINICAL INFORMATION: Severe abdominal distention.   Alcoholic hepatitis. Concern for Budd-Chiari.    COMPARISON: None available.    TECHNIQUE:  Sonography of the liver and ascites.    FINDINGS: There is severe enlargement of the liver with right lobe   measuring approximately 31 cm. Diffusely increased hepatic echogenicity   is seen consistent with a fatty liver. No focal hepatic mass lesion is   identified.    Main portal vein demonstrates normal flow. Hepatic veins are partially   delineated with color Doppler.    There is evidence of mild ascites.    IMPRESSION:  Severe hepatomegaly and steatosis.  Mild ascites.    < end of copied text >      < from: Xray Knee 1 or 2 Views, Bilateral (10.19.22 @ 10:50) >    ACC: [id number] EXAM:  XR ABDOMEN PORTABLE ROUTINE 1V                        ACC: [id number] EXAM:  XR SHOULDER COMP MIN 2V BI                        ACC: [id number] EXAM:  XR KNEE 1-2 VIEWS  BI                        ACC: [id number] EXAM:  XR HUMERUS MIN 2 VIEWS BI                          PROCEDURE DATE:  10/19/2022          INTERPRETATION:  XR HUMERUS BILATERAL, XR KNEE 1 OR 2 VIEWS BILATERAL, XR   SHOULDER 2 VIEWS BILATERAL, XR ABDOMEN    Clinical History: Status post fall    Right humerus 2 view. Left humerus 2 view. Right shoulder 3 view. Left   shoulder 3 view. Right knee 3 view. Left knee 3 view.      FINDINGS:    There is no fracture, dislocation, soft tissue swelling or joint effusion.  The bilateral acromioclavicular joints are intact.  No degenerative changes.  No radiopaque foreign body.    Abdomen one view    FINDINGS: The lower pelvis and right flank is not completely included.   There is no evidence of bowel obstruction, free air or abdominal   calcifications. There are no acutefractures.    IMPRESSION:  1.  No fractures.  2.  No evidence of bowel obstruction however if intra-abdominal injury is   suspected recommend CT.    --- End of Report ---    < end of copied text >    < from: CT Abdomen and Pelvis w/ IV Cont (10.02.22 @ 16:56) >    ACC: [id number] EXAM:  CT ABDOMEN AND PELVIS IC                          PROCEDURE DATE:  10/02/2022          INTERPRETATION:  CLINICAL INFORMATION: Jaundice and hepatomegaly    COMPARISON: None.    CONTRAST/COMPLICATIONS:  IV Contrast: Omnipaque 350 90 cc administered   10 cc discarded  Oral Contrast: NONE  Complications: None reported at time of study completion    PROCEDURE:  CT of the Abdomen and Pelvis was performed.  Sagittal and coronal reformats were performed.    FINDINGS:  LOWER CHEST:Within normal limits.    LIVER: Severe hepatomegaly and diffuse steatosis.  BILE DUCTS: Normal caliber.  GALLBLADDER: Partially contracted with high attenuation related to   enhancement and/or sludge. Nonspecific wall thickening, likely   secondary/reactive.  SPLEEN: Within normal limits.  PANCREAS: Within normal limits.  ADRENALS: Within normal limits.  KIDNEYS/URETERS: Too small to characterize low attenuating focus in the   left kidney.    BLADDER: Within normal limits.  REPRODUCTIVE ORGANS: Within normal limits.    BOWEL: No bowel obstruction. Appendix is normal.  PERITONEUM: Mild ascites and infiltration of the central mesenteric fat.  VESSELS: Within normal limits.  RETROPERITONEUM/LYMPH NODES: No lymphadenopathy.  ABDOMINAL WALL: Tiny fat-containing inguinal hernias.  BONES: Bilateral L5-S1 pars defects. Old fractures of the 6th and 8th-9th   left ribs.    IMPRESSION:  Severe hepatomegaly and diffuse steatosis.  Mild ascites and infiltration of the central mesenteric fat.  Possiblegallbladder sludge and likely secondary/reactive wall thickening.    < end of copied text >    Imaging Personally Reviewed:  YES/NO    Consultant(s) Notes Reviewed:   YES/ No    Care Discussed with Consultants :     Plan of care was discussed with patient and /or primary care giver; all questions and concerns were addressed and care was aligned with patient's wishes.

## 2022-10-26 NOTE — PROGRESS NOTE ADULT - ASSESSMENT
complete note to follow    BCR/ABL (-)  Mixing study shows correction  factor deficiency d/t liver disease  can try giving FFP x 2 q 6-8 hours and repeat PT/INR 24 hours prior to liver biopsy, please coordinate with IR     Assessment and Plan:   · Assessment	    Problem #1 Leukocytosis - mostly neutrophilia  -work up so far negative  -BCR/ABL negative, will need to obtain screening for chronic neutrophilic leukemia w/ CSF3R mutations  -no need for cytoreductive therapy at this time  trending now, now 48    Problem #2 Anemia - asymptomatic; on need for PRBC support unless Hg < 7 g/dL  H/H stable  Ferritin=77    #3 Liver failure  d/t alcoholic hepatitis  plan for Bx pending INR  vitamin K given, with no improvement  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 Will continue to monitor the patient.  Please call with any questions [phone]  Above reviewed with Attending Dr. Fausto MICHAEL/[facility]  [address] [facility], [address], [place], [place]  [phone]  *Note not finalized until signed by Attending Physician

## 2022-10-26 NOTE — PROGRESS NOTE ADULT - PROBLEM SELECTOR PLAN 3
-  Secondary Alcohol Hepatitis   -  U-tox negative for salicylate and acetaminophen levels  -  Hepatitis panel negative  -  bili - elevated  -  Elevated INR and hypoalbuminemia in the setting of liver dysfunction  -  INR 3.3 today (10/25)  -  Vit K 10 mg PO x 3 days started 10/25  -  INR remains elevated at 3.35 10/26  -  Hepatology Dr. Gonzalez

## 2022-10-26 NOTE — PROGRESS NOTE ADULT - ASSESSMENT
28 yo [place] speaking Male, with PMH alcohol abuse, s/p Left temporal parietal craniotomy noted in imaging, presented  with altered mental status and seizure that lasted about 2-4 minutes.  In the ED, pt with low grade fever, tachycardic   Admitted to ICU for severe metabolic derangements, CIWA protocol with PRN ativan. Pt was started on antibiotics and IV fluids  Started on lactulose for elevated amonnia level.  Lactate trended down to normal levels. GI and Hepatology was consulted for transaminitis and hyperbilirubinemia. Patient was started on zosyn for possible cholangitis. CT abd showed hepatomegaly with biliary sludge and mild ascites. MRCP was recommended.  Noted with  Hb  7.3,  started on protonix drip and octreotide drip. Patient received one unit PRBC and one unit FFP.  GI on board recommending EGD which did not show any pathology. Flexible sigmoidoscopy showed rectal varices which was cauterized.  switched to ceftriaxone for SBP prophylaxis per ID.  Vit K for 3 days for elevated INR. He was started on prednisone 40mg daily for 28 days for alcoholic hepatitis.   Patient hospital course further complicated by fever, and rising leucocytosis. ID consulted.  blood culture 10/16 NGTD, stool studies no pathogens, Cdfif negative. CT abdomen and pelvis- 1. Marked hepatomegaly/steatosis, unchanged. 2.  Ascites/mesenteric edema and subcutaneous edema, minimally progressed. 3.  Equivocal ascending colonic mural prominence raises the possibility of portal colopathy or inflammation   BCR/ ABL sent pending results. INR 3.16,  liver biopsy plan for biopsy cancelled due to elevated INT. Vit K po continued   Pt seen at bedside, alert, awake, confused to person and place. wbc slowly downtrending, 48 today. f/u INR in am   10/25-Awake and alert, confused x 3, talkative but does not make sense.  Leukocytosis persists and slightly uptrending, started Acetylcysteine by attending.  Pt. will need safe discharge planning, disposition currently unknown, CM previously consulted.  10/26-Overall clinical presentation unchanged.

## 2022-10-26 NOTE — PROGRESS NOTE ADULT - SUBJECTIVE AND OBJECTIVE BOX
Patient is a 27y old  Male who presents with a chief complaint of Altered mental status       SUBJECTIVE / OVERNIGHT EVENTS:        MEDICATIONS  (STANDING):  dextrose 5%. 1000 milliLiter(s) (50 mL/Hr) IV Continuous <Continuous>  dextrose 5%. 1000 milliLiter(s) (100 mL/Hr) IV Continuous <Continuous>  dextrose 50% Injectable 25 Gram(s) IV Push once  dextrose 50% Injectable 12.5 Gram(s) IV Push once  dextrose 50% Injectable 25 Gram(s) IV Push once  folic acid 1 milliGRAM(s) Oral <User Schedule>  glucagon  Injectable 1 milliGRAM(s) IntraMuscular once  insulin lispro (ADMELOG) corrective regimen sliding scale   SubCutaneous Before meals and at bedtime  multivitamin 1 Tablet(s) Oral daily  nystatin Powder 1 Application(s) Topical two times a day  pantoprazole    Tablet 40 milliGRAM(s) Oral before breakfast  phytonadione   Solution 10 milliGRAM(s) Oral daily  simethicone 80 milliGRAM(s) Chew three times a day  sodium chloride 0.45% 1000 milliLiter(s) (75 mL/Hr) IV Continuous <Continuous>  thiamine Injectable 100 milliGRAM(s) IntraMuscular daily    MEDICATIONS  (PRN):  aluminum hydroxide/magnesium hydroxide/simethicone Suspension 30 milliLiter(s) Oral every 4 hours PRN Dyspepsia  dextrose Oral Gel 15 Gram(s) Oral once PRN Blood Glucose LESS THAN 70 milliGRAM(s)/deciliter  LORazepam   Injectable 2 milliGRAM(s) IV Push every 4 hours PRN CIWA-Ar score 8 or greater  morphine  - Injectable 2 milliGRAM(s) IV Push every 8 hours PRN Severe Pain (7 - 10)  ondansetron Injectable 4 milliGRAM(s) IV Push every 6 hours PRN Nausea and/or Vomiting    CAPILLARY BLOOD GLUCOSE      POCT Blood Glucose.: 118 mg/dL (26 Oct 2022 11:08)  POCT Blood Glucose.: 115 mg/dL (26 Oct 2022 07:32)  POCT Blood Glucose.: 115 mg/dL (25 Oct 2022 21:15)  POCT Blood Glucose.: 130 mg/dL (25 Oct 2022 16:48)    I&O's Summary      PHYSICAL EXAM:  Vital Signs Last 24 Hrs  T(C): 36.4 (26 Oct 2022 11:18), Max: 36.8 (25 Oct 2022 21:08)  T(F): 97.5 (26 Oct 2022 11:18), Max: 98.3 (25 Oct 2022 21:08)  HR: 109 (26 Oct 2022 11:18) (109 - 118)  BP: 129/80 (26 Oct 2022 11:18) (109/59 - 131/66)  BP(mean): --  RR: 18 (26 Oct 2022 11:18) (17 - 20)  SpO2: 97% (26 Oct 2022 11:18) (94% - 98%)    Parameters below as of 26 Oct 2022 11:18  Patient On (Oxygen Delivery Method): room air        LABS:                        8.7    48.06 )-----------( 339      ( 26 Oct 2022 06:36 )             25.2     10-26    130<L>  |  98  |  8   ----------------------------<  73  2.4<LL>   |  17<L>  |  0.52    Ca    8.0<L>      26 Oct 2022 06:36    TPro  5.4<L>  /  Alb  1.2<L>  /  TBili  17.1<H>  /  DBili  x   /  AST  51<H>  /  ALT  14  /  AlkPhos  270<H>  10-26    PT/INR - ( 26 Oct 2022 06:36 )   PT: 40.3 sec;   INR: 3.35 ratio         PTT - ( 26 Oct 2022 06:36 )  PTT:47.6 sec          COVID-19 PCR: NotDetec (21 Oct 2022 06:30)  COVID-19 PCR: NotDetec (12 Oct 2022 05:35)  COVID-19 PCR: NotDetec (05 Oct 2022 11:00)  SARS-CoV-2: NotDetec (02 Oct 2022 16:00)      RADIOLOGY & ADDITIONAL TESTS:  BCR/ABL By RT - PCR Quantitative (10.20.22 @ 18:20)   BCR/ABL By RT - PCR Quantitative:   BCR Final Report   Accession Number: [id number]   Date Collected: 10/20/2022 18:20   Date Received: 10/21/2022 9:39   Date Reported: 10/25/2022 14:57   Specimen: .Blood   Indication: CML/ALL   Test Performed: Quantitative BCR-ABL by real time RT-PCR   ________________________________________________________________   RESULTS:   p210: Not Detected   p190: Not Detected   INTERPRETATION:   The BCR-ABL1 fusion transcripts (p210 and p190) associated with Chronic Myelogenous leukemiaand   Acute Lymphoblastic leukemia were not detected in this specimen. Clinical correlation is   recommended.    Patient is a 27y old  Male who presents with a chief complaint of Altered mental status     SUBJECTIVE / OVERNIGHT EVENTS: events noted. Pt still confused, anorexia    MEDICATIONS  (STANDING):  dextrose 5%. 1000 milliLiter(s) (50 mL/Hr) IV Continuous <Continuous>  dextrose 5%. 1000 milliLiter(s) (100 mL/Hr) IV Continuous <Continuous>  dextrose 50% Injectable 25 Gram(s) IV Push once  dextrose 50% Injectable 12.5 Gram(s) IV Push once  dextrose 50% Injectable 25 Gram(s) IV Push once  folic acid 1 milliGRAM(s) Oral <User Schedule>  glucagon  Injectable 1 milliGRAM(s) IntraMuscular once  insulin lispro (ADMELOG) corrective regimen sliding scale   SubCutaneous Before meals and at bedtime  multivitamin 1 Tablet(s) Oral daily  nystatin Powder 1 Application(s) Topical two times a day  pantoprazole    Tablet 40 milliGRAM(s) Oral before breakfast  phytonadione   Solution 10 milliGRAM(s) Oral daily  simethicone 80 milliGRAM(s) Chew three times a day  sodium chloride 0.45% 1000 milliLiter(s) (75 mL/Hr) IV Continuous <Continuous>  thiamine Injectable 100 milliGRAM(s) IntraMuscular daily    MEDICATIONS  (PRN):  aluminum hydroxide/magnesium hydroxide/simethicone Suspension 30 milliLiter(s) Oral every 4 hours PRN Dyspepsia  dextrose Oral Gel 15 Gram(s) Oral once PRN Blood Glucose LESS THAN 70 milliGRAM(s)/deciliter  LORazepam   Injectable 2 milliGRAM(s) IV Push every 4 hours PRN CIWA-Ar score 8 or greater  morphine  - Injectable 2 milliGRAM(s) IV Push every 8 hours PRN Severe Pain (7 - 10)  ondansetron Injectable 4 milliGRAM(s) IV Push every 6 hours PRN Nausea and/or Vomiting    CAPILLARY BLOOD GLUCOSE      POCT Blood Glucose.: 118 mg/dL (26 Oct 2022 11:08)  POCT Blood Glucose.: 115 mg/dL (26 Oct 2022 07:32)  POCT Blood Glucose.: 115 mg/dL (25 Oct 2022 21:15)  POCT Blood Glucose.: 130 mg/dL (25 Oct 2022 16:48)    I&O's Summary      PHYSICAL EXAM:  Vital Signs Last 24 Hrs  T(C): 36.4 (26 Oct 2022 11:18), Max: 36.8 (25 Oct 2022 21:08)  T(F): 97.5 (26 Oct 2022 11:18), Max: 98.3 (25 Oct 2022 21:08)  HR: 109 (26 Oct 2022 11:18) (109 - 118)  BP: 129/80 (26 Oct 2022 11:18) (109/59 - 131/66)  BP(mean): --  RR: 18 (26 Oct 2022 11:18) (17 - 20)  SpO2: 97% (26 Oct 2022 11:18) (94% - 98%)    Parameters below as of 26 Oct 2022 11:18  Patient On (Oxygen Delivery Method): room air      PE:  general AAOX2 NAD, poor oral hygeine  HEENT icteric and jaundice   abd distended, tender  extrem no edema   neurology grossly intact without significant focal deficit        LABS:                        8.7    48.06 )-----------( 339      ( 26 Oct 2022 06:36 )             25.2     10-26    130<L>  |  98  |  8   ----------------------------<  73  2.4<LL>   |  17<L>  |  0.52    Ca    8.0<L>      26 Oct 2022 06:36    TPro  5.4<L>  /  Alb  1.2<L>  /  TBili  17.1<H>  /  DBili  x   /  AST  51<H>  /  ALT  14  /  AlkPhos  270<H>  10-26    PT/INR - ( 26 Oct 2022 06:36 )   PT: 40.3 sec;   INR: 3.35 ratio         PTT - ( 26 Oct 2022 06:36 )  PTT:47.6 sec          COVID-19 PCR: NotDetec (21 Oct 2022 06:30)  COVID-19 PCR: NotDetec (12 Oct 2022 05:35)  COVID-19 PCR: NotDetec (05 Oct 2022 11:00)  SARS-CoV-2: NotDetec (02 Oct 2022 16:00)      RADIOLOGY & ADDITIONAL TESTS:  BCR/ABL By RT - PCR Quantitative (10.20.22 @ 18:20)   BCR/ABL By RT - PCR Quantitative:   BCR Final Report   Accession Number: [id number]   Date Collected: 10/20/2022 18:20   Date Received: 10/21/2022 9:39   Date Reported: 10/25/2022 14:57   Specimen: .Blood   Indication: CML/ALL   Test Performed: Quantitative BCR-ABL by real time RT-PCR   ________________________________________________________________   RESULTS:   p210: Not Detected   p190: Not Detected   INTERPRETATION:   The BCR-ABL1 fusion transcripts (p210 and p190) associated with Chronic Myelogenous leukemiaand   Acute Lymphoblastic leukemia were not detected in this specimen. Clinical correlation is   recommended.

## 2022-10-26 NOTE — PROGRESS NOTE ADULT - NS ATTEND AMEND GEN_ALL_CORE FT
Patient is alert, cooperative, eating well.   Responds to questions, but not with appropriate answers to questions, both in English and in German.   Friend is visiting.   Jaundice is easily visible.  Craniotomy scar is well-healed.  Vital Signs Last 24 Hrs  T(C): 36.4 (26 Oct 2022 11:18), Max: 36.8 (25 Oct 2022 21:08)  T(F): 97.5 (26 Oct 2022 11:18), Max: 98.3 (25 Oct 2022 21:08)  HR: 109 (26 Oct 2022 11:18) (109 - 118)  BP: 129/80 (26 Oct 2022 11:18) (109/59 - 131/66)  BP(mean): --  RR: 18 (26 Oct 2022 11:18) (17 - 20)  SpO2: 97% (26 Oct 2022 11:18) (94% - 98%)    Parameters below as of 26 Oct 2022 11:18  Patient On (Oxygen Delivery Method): room air    Lungs, clear  Cor, RRR  abdomen, massive hepatosplenomegaly  Neurological, inappropriate answers to questions, socially appropriate, non-focal                          8.7    48.06 )-----------( 339      ( 26 Oct 2022 06:36 )             25.2   10-26    129<L>  |  101  |  8   ----------------------------<  90  3.6   |  18<L>  |  0.57    Ca    8.1<L>      26 Oct 2022 16:07    TPro  5.7<L>  /  Alb  1.4<L>  /  TBili  18.7<H>  /  DBili  14.7<H>  /  AST  53<H>  /  ALT  15  /  AlkPhos  282<H>  10-26  Ferritin, Serum: 77 ng/mL (10.26.22 @ 06:36)     IMP: Encephalopathy is improved, but there is a disconnect in his responses, possibly related to his antecedent head trauma/craniotomy         Hyperbilirubinemia, coagulapathy, hypoalbuminemia, all related to underlying liver disease, which is likely alcoholic hepatitis         Leukocytosis, likely reactive.          Patient has not had hyperglycemia         Low ferritin level is not consistent with hemochromatosis         Hematology note, seen and appreciated: " Problem #2 Coagulopathy - corrects on mixing study; doubt chronic DIC and likely hepatic dysfunction  -can transfuse to bring PT/INR ;and aPTT to acceptable level for surgical intervention  -there is no specific INR level that correlates with lower risk of bleeding in procedures such as liver biopsy in this coagulopathic patient; pt will be at elevated risk for bleeding complication and risks and benefits should be discussed w/ pt clearly prior to proceeding with surgical procedure even with FFP/vit K support"  Plan: Will give an additional course of N-acetylcysteine and follow clinically.           Discharge planning. Patient is alert, cooperative, eating well.   Responds to questions, but not with appropriate answers to questions, both in English and in Surinamese.   Friend is visiting.   Jaundice is easily visible.  Craniotomy scar is well-healed.  Vital Signs Last 24 Hrs  T(C): 36.4 (26 Oct 2022 11:18), Max: 36.8 (25 Oct 2022 21:08)  T(F): 97.5 (26 Oct 2022 11:18), Max: 98.3 (25 Oct 2022 21:08)  HR: 109 (26 Oct 2022 11:18) (109 - 118)  BP: 129/80 (26 Oct 2022 11:18) (109/59 - 131/66)  BP(mean): --  RR: 18 (26 Oct 2022 11:18) (17 - 20)  SpO2: 97% (26 Oct 2022 11:18) (94% - 98%)    Parameters below as of 26 Oct 2022 11:18  Patient On (Oxygen Delivery Method): room air    Lungs, clear  Cor, RRR  abdomen, massive hepatosplenomegaly  Neurological, inappropriate answers to questions, socially appropriate, non-focal                          8.7    48.06 )-----------( 339      ( 26 Oct 2022 06:36 )             25.2   10-26    129<L>  |  101  |  8   ----------------------------<  90   After potassium supplementation  3.6   |  18<L>  |  0.57    Ca    8.1<L>      26 Oct 2022 16:07    TPro  5.7<L>  /  Alb  1.4<L>  /  TBili  18.7<H>  /  DBili  14.7<H>  /  AST  53<H>  /  ALT  15  /  AlkPhos  282<H>  10-26  Ferritin, Serum: 77 ng/mL (10.26.22 @ 06:36)     IMP: Encephalopathy is improved, but there is a disconnect in his responses, possibly related to his antecedent head trauma/craniotomy         Hyperbilirubinemia, coagulapathy, hypoalbuminemia, all related to underlying liver disease, which is likely alcoholic hepatitis         Leukocytosis, likely reactive.          Patient has not had hyperglycemia         Low ferritin level is not consistent with hemochromatosis         hypokalemia         Hematology note, seen and appreciated: " Problem #2 Coagulopathy - corrects on mixing study; doubt chronic DIC and likely hepatic dysfunction  -can transfuse to bring PT/INR ;and aPTT to acceptable level for surgical intervention  -there is no specific INR level that correlates with lower risk of bleeding in procedures such as liver biopsy in this coagulopathic patient; pt will be at elevated risk for bleeding complication and risks and benefits should be discussed w/ pt clearly prior to proceeding with surgical procedure even with FFP/vit K support"  Plan: Will give an additional course of N-acetylcysteine and follow clinically.           Discharge planning.

## 2022-10-26 NOTE — PROGRESS NOTE ADULT - SUBJECTIVE AND OBJECTIVE BOX
Chief Complaint:  Patient is a 27y old  Male who presents with a chief complaint of Altered mental status (26 Oct 2022 13:28)      Reason for consult:    Interval Events:     Hospital Medications:  aluminum hydroxide/magnesium hydroxide/simethicone Suspension 30 milliLiter(s) Oral every 4 hours PRN  dextrose 5%. 1000 milliLiter(s) IV Continuous <Continuous>  dextrose 5%. 1000 milliLiter(s) IV Continuous <Continuous>  dextrose 50% Injectable 25 Gram(s) IV Push once  dextrose 50% Injectable 12.5 Gram(s) IV Push once  dextrose 50% Injectable 25 Gram(s) IV Push once  dextrose Oral Gel 15 Gram(s) Oral once PRN  folic acid 1 milliGRAM(s) Oral <User Schedule>  glucagon  Injectable 1 milliGRAM(s) IntraMuscular once  insulin lispro (ADMELOG) corrective regimen sliding scale   SubCutaneous Before meals and at bedtime  LORazepam   Injectable 2 milliGRAM(s) IV Push every 4 hours PRN  morphine  - Injectable 2 milliGRAM(s) IV Push every 8 hours PRN  multivitamin 1 Tablet(s) Oral daily  nystatin Powder 1 Application(s) Topical two times a day  ondansetron Injectable 4 milliGRAM(s) IV Push every 6 hours PRN  pantoprazole    Tablet 40 milliGRAM(s) Oral before breakfast  phytonadione   Solution 10 milliGRAM(s) Oral daily  simethicone 80 milliGRAM(s) Chew three times a day  sodium chloride 0.45% 1000 milliLiter(s) IV Continuous <Continuous>  thiamine Injectable 100 milliGRAM(s) IntraMuscular daily      ROS:   General:  No  fevers, chills, night sweats, fatigue  Eyes:  Good vision, no reported pain  ENT:  No sore throat, pain, runny nose  CV:  No pain, palpitations  Pulm:  No dyspnea, cough  GI:  See HPI, otherwise negative  :  No  incontinence, nocturia  Muscle:  No pain, weakness  Neuro:  No memory problems  Psych:  No insomnia, mood problems, depression  Endocrine:  No polyuria, polydipsia, cold/heat intolerance  Heme:  No petechiae, ecchymosis, easy bruisability  Skin:  No rash    PHYSICAL EXAM:   Vital Signs:  Vital Signs Last 24 Hrs  T(C): 36.4 (26 Oct 2022 11:18), Max: 36.8 (25 Oct 2022 21:08)  T(F): 97.5 (26 Oct 2022 11:18), Max: 98.3 (25 Oct 2022 21:08)  HR: 109 (26 Oct 2022 11:18) (109 - 118)  BP: 129/80 (26 Oct 2022 11:18) (109/59 - 131/66)  BP(mean): --  RR: 18 (26 Oct 2022 11:18) (17 - 20)  SpO2: 97% (26 Oct 2022 11:18) (94% - 98%)    Parameters below as of 26 Oct 2022 11:18  Patient On (Oxygen Delivery Method): room air      Daily     Daily     GENERAL: no acute distress  NEURO: alert, no asterixis  HEENT: anicteric sclera, no conjunctival pallor appreciated  CHEST: no respiratory distress, no accessory muscle use  CARDIAC: regular rate, rhythm  ABDOMEN: soft, non-tender, non-distended, no rebound or guarding  EXTREMITIES: warm, well perfused, no edema  SKIN: no lesions noted    LABS: reviewed                        8.7    48.06 )-----------( 339      ( 26 Oct 2022 06:36 )             25.2     10-26    130<L>  |  98  |  8   ----------------------------<  73  2.4<LL>   |  17<L>  |  0.52    Ca    8.0<L>      26 Oct 2022 06:36    TPro  5.4<L>  /  Alb  1.2<L>  /  TBili  17.1<H>  /  DBili  x   /  AST  51<H>  /  ALT  14  /  AlkPhos  270<H>  10-26    LIVER FUNCTIONS - ( 26 Oct 2022 06:36 )  Alb: 1.2 g/dL / Pro: 5.4 g/dL / ALK PHOS: 270 U/L / ALT: 14 U/L DA / AST: 51 U/L / GGT: x             Interval Diagnostic Studies: see sunrise for full report   Chief Complaint:  Patient is a 27y old  Male who presents with a chief complaint of Altered mental status (26 Oct 2022 13:28)      Reason for consult: SYEDA    Interval Events:  Patient was seen and examined at bedside.  ID [id number]. He was seen walking on the corridor. His mental status remains unchanged. Per PCA and patient had >5-6xBMs, but all small amount, and no bleeding reported. Hb stable. Liver tests still without significant improvement. Noted hypokalemia, hyponatremia.     Hospital Medications:  aluminum hydroxide/magnesium hydroxide/simethicone Suspension 30 milliLiter(s) Oral every 4 hours PRN  dextrose 5%. 1000 milliLiter(s) IV Continuous <Continuous>  dextrose 5%. 1000 milliLiter(s) IV Continuous <Continuous>  dextrose 50% Injectable 25 Gram(s) IV Push once  dextrose 50% Injectable 12.5 Gram(s) IV Push once  dextrose 50% Injectable 25 Gram(s) IV Push once  dextrose Oral Gel 15 Gram(s) Oral once PRN  folic acid 1 milliGRAM(s) Oral <User Schedule>  glucagon  Injectable 1 milliGRAM(s) IntraMuscular once  insulin lispro (ADMELOG) corrective regimen sliding scale   SubCutaneous Before meals and at bedtime  LORazepam   Injectable 2 milliGRAM(s) IV Push every 4 hours PRN  morphine  - Injectable 2 milliGRAM(s) IV Push every 8 hours PRN  multivitamin 1 Tablet(s) Oral daily  nystatin Powder 1 Application(s) Topical two times a day  ondansetron Injectable 4 milliGRAM(s) IV Push every 6 hours PRN  pantoprazole    Tablet 40 milliGRAM(s) Oral before breakfast  phytonadione   Solution 10 milliGRAM(s) Oral daily  simethicone 80 milliGRAM(s) Chew three times a day  sodium chloride 0.45% 1000 milliLiter(s) IV Continuous <Continuous>  thiamine Injectable 100 milliGRAM(s) IntraMuscular daily      ROS: Still limited, not always answers the questions, sometimes difficult to direct the discussion and keeps talking about the circumstances coming to the [place]  General:  No  fevers, chills  ENT:  No sore throat, pain, runny nose  CV:  No pain  Pulm:  No dyspnea, cough  GI:  Still denies pain, although appears comfortable and abdomen non tender, per PCS at bedside >5-6xBMs, but all small amount, and no bleeding reported  : did not answer question  Muscle:  No pain, or focal weakness  Neuro:  AAOx2  Skin:  jaundice    PHYSICAL EXAM:   Vital Signs:  Vital Signs Last 24 Hrs  T(C): 36.4 (26 Oct 2022 11:18), Max: 36.8 (25 Oct 2022 21:08)  T(F): 97.5 (26 Oct 2022 11:18), Max: 98.3 (25 Oct 2022 21:08)  HR: 109 (26 Oct 2022 11:18) (109 - 118)  BP: 129/80 (26 Oct 2022 11:18) (109/59 - 131/66)  BP(mean): --  RR: 18 (26 Oct 2022 11:18) (17 - 20)  SpO2: 97% (26 Oct 2022 11:18) (94% - 98%)    Parameters below as of 26 Oct 2022 11:18  Patient On (Oxygen Delivery Method): room air      Daily     Daily     GENERAL: no acute distress  NEURO: AAOx2, no asterixis  HEENT: icteric sclera  CHEST: no respiratory distress, no accessory muscle use  CARDIAC: regular rate, rhythm  ABDOMEN: soft, non-tender, non-distended, no rebound or guarding  EXTREMITIES: b/l LE edema  SKIN: jaundice. Dermatitis resolved on hand.    LABS: reviewed                        8.7    48.06 )-----------( 339      ( 26 Oct 2022 06:36 )             25.2     10-26    130<L>  |  98  |  8   ----------------------------<  73  2.4<LL>   |  17<L>  |  0.52    Ca    8.0<L>      26 Oct 2022 06:36    TPro  5.4<L>  /  Alb  1.2<L>  /  TBili  17.1<H>  /  DBili  x   /  AST  51<H>  /  ALT  14  /  AlkPhos  270<H>  10-26    LIVER FUNCTIONS - ( 26 Oct 2022 06:36 )  Alb: 1.2 g/dL / Pro: 5.4 g/dL / ALK PHOS: 270 U/L / ALT: 14 U/L DA / AST: 51 U/L / GGT: x             Interval Diagnostic Studies: see sunrise for full report

## 2022-10-26 NOTE — PROGRESS NOTE ADULT - ASSESSMENT
Brief hospital course:  27yo Pashto speaking Male from [facility], s/p Left temporal parietal craniotomy b/o TBI and alcohol abuse (reported current drinking by family, [address] [phone]) presented to [facility] ED on 10/2/22 with 1 day Hx of AMS, after reported witnessed seizure episodes and 1 week Hx of jaundice and was admitted to MICU with suspected EtOH withdrawal seizure, electrolyte abnormalities (Na 125, K 2.7, Mg 1.0, P 1.1), lactic acidosis (with lactate 6.4, bicarb 19), leukocytosis with neutrophilia (WBC 20.35, roderick 90%) with low grade T (100.1F), anemia without evidence of overt bleeding (Hb 6.9->8.3 s/p 1 U PRBC) and abnormal liver tests including coagulopathy (INR 2.13), hyperbilirubinemia (Se bi 9.3->10.8, direct 8.0), hyperammonemia (131), elevated liver enzymes in AST> ALT pattern (, ALT 22) and elevated ALP (669) and CT a/p w/ iv 10/2/22 suggesting diffuse hepatic steatosis, hepatomegaly with possible GB sludge and GB wall thickening, but normal bile ducts.  He was started on Alcoholic hepatitis management after infection was ruled out on admission (except that rhinovirus was pos, w/o URI symptoms) and UGIB was ruled out (EGD 10/5/22). He had LGIB, 2 rectal Dieulafoy lesions on sigmoidoscopy (10/5/22). Completed SBP ppx b/o GIB, was on ceftriaxone (10/4-10/12). His bilirubin remained unchanged after 7 days on prednisolone 40 mg (10/8-10/15) + total 3 days NAC, and developed low grade T, worsening leukocytosis up till 30-36k while on steroid (prior to steroid 17k), worsening abdominal pain,  distention and diarrhea despite being off lactulose, thus steroid was held 10/15 and septic workup was repeated. GI PCR panel showed STEC, culture was negative. BCx neg 10/16. C. diff neg 10/19. He was also started on Zosyn on 10/14, and switched to Meropenem on 10/16 and to Tigacycline on 10/18 per ID, and all abx were d/c 10/20.  Had repeat CT abd / pelvis 10/16, had only mild ascites (pelvic, perihepatosplenic), not feasible for Dx paracentesis and  prior it was none.   WBC continued to rise till 10/21 up till > 60k, thought to be possibly multifactorial.  Hematology been following since 10/16 b/o leukocytosis, pending workup.   WBC count slightly downtrending.  He had another episode of LGIB 10/21 and 10/22 but Hb remained stable and reportedly resolved.    Prior liver workup: Hep C ab neg, Hep A IgM neg, HBsAg, HBcAb IgM, HBcAb, HBsAb all neg, Hep E IgM neg, HIV neg, EBV past infection, CMV past infection, HSV PCR neg, leptospirosis neg. LUCIUS neg, SMA neg, LKM neg, AMA neg, IgG and IgM WNL. Ceruloplasmin 25. Fungitell neg. Strongyloides serology neg. Iron studies were not sent b/o multiple cofounding factors can alter (active drinking, recent bleeding, recent transfusion), and presentation not typical for HH.      # Hepatomegaly suspected die to severe alcoholic hepatitis, s/p 7 days prednisolone 10/8-10/15, and s/p total 3 days NAC, bilirubin 14.1 on 10/8 and 14.1 10/15.  MDF> 32, MELD 28-23-27-->32   # Elevated transaminases in AST>ALT pattern - overall improved (-->51)  # Elevated ALP - improved (669->282)  # Hyperbilirubinemia - fluctuates (10--->17.8->16.4->17.1)  # Coagulopathy - initially improved but also got vit K and FFP (10/5-7 and 10/5), now around 3-4  # Diarrhea off lactulose - improved, but still multiple small BMs  # STEC  # Abdominal pain - improved? Less distended.  # LGIB - resolved again?  # Anemia - stable  # Worsening leukocytosis from 17k to 60k, now with slight improvement to 48k  # Hx of Low grade T (T max 100.4 F 10/12 night and 100F 10/14), WNL since then     - Being monitored off steroid since 8/15, (got 7 days) for reasons as above. Resumed NAC 10/25, consider continue for few more days.  C/w supportive measures, including nutritional optimization. Agree with another trial of vitamin K. Consider checking zinc level.   - Can add VZV PCR for completeness.   - C/w monitoring LFTs, including INR.  - F/u hematology workup.  - PLT WNL, only borderline splenomegaly, no or only trace ascites suggests less prominent portal hypertension.  - CT a/p w/ iv 10/2 was reviewed with IR, hepatic vasculature appeared patent. Patent vasculature reported on 10/20 US too.   - Was no biliary obstruction on CT abd, US abd. MRCP was considered on admission, has not been done yet, can consider to obtain, would assess the splanchnic circulation as well.   - Still alcoholic hepatitis remains the working Dx, and might be just poor responder to steroid, but given the rising WBC, the massive hepatomegaly, liver biopsy was considered but was deferred b/o coagulopathy. Will re-discuss with IR after hematology recommendations.     # Hyponatremia  # Hypokalemia  # LE edema  # Hypoalbuminemia  - Renal function remains WNL.   - Replace electrolytes  - Consider 25% albumin  - Monitor P, Mg too    - No EV on EGD 10/5/22, but mild portal gastropathy. S/p sigmoidoscopy 10/5 showing 2 rectal Dieulafoy lesions, s/p treatment. Had few episodes of hematochezia again Friday and Saturday but Hb remained stable and reportedly resolved. GI follow up appreciated.    - HCC status - no focal hepatic lesion    # AMS in a TBI patient, with EtOH use, initially been on CIWA on admission, also s/p seizures, and with elevated ammonia on admission, could not r/u HE either  - Monitor mental status, overall improved compared to admission, remains AAOX2 (not oriented to time), possibly multifactorial. Constant observation per primary team. Aspiration, seizure, fall precautions.   - C/w Folic, thiamine.   - Lactulose been held b/o diarrhea since 10/14 and 10/18, and rifaximin being held since 10/20 b/o dermatitis w/o other explanation, no resolution.        # Transplant candidacy: Obstacles: only emergency medicaid per d/w SW, recent EtOH, poor insight possibly due to prior TBI too     Thank you for consult  Will continue to monitor.   Been d/w [facility] Hepatology service attending(s) throughout the course.  D/w primary team

## 2022-10-27 LAB
ANION GAP SERPL CALC-SCNC: 16 MMOL/L — SIGNIFICANT CHANGE UP (ref 5–17)
BUN SERPL-MCNC: 10 MG/DL — SIGNIFICANT CHANGE UP (ref 7–18)
CALCIUM SERPL-MCNC: 7.9 MG/DL — LOW (ref 8.4–10.5)
CHLORIDE SERPL-SCNC: 101 MMOL/L — SIGNIFICANT CHANGE UP (ref 96–108)
CO2 SERPL-SCNC: 16 MMOL/L — LOW (ref 22–31)
CREAT SERPL-MCNC: 0.6 MG/DL — SIGNIFICANT CHANGE UP (ref 0.5–1.3)
EGFR: 137 ML/MIN/1.73M2 — SIGNIFICANT CHANGE UP
GLUCOSE BLDC GLUCOMTR-MCNC: 104 MG/DL — HIGH (ref 70–99)
GLUCOSE BLDC GLUCOMTR-MCNC: 107 MG/DL — HIGH (ref 70–99)
GLUCOSE SERPL-MCNC: 79 MG/DL — SIGNIFICANT CHANGE UP (ref 70–99)
HCT VFR BLD CALC: 26.3 % — LOW (ref 39–50)
HCT VFR BLD CALC: 28 % — LOW (ref 39–50)
HGB BLD-MCNC: 8.8 G/DL — LOW (ref 13–17)
HGB BLD-MCNC: 9.2 G/DL — LOW (ref 13–17)
INR BLD: 3.16 RATIO — HIGH (ref 0.88–1.16)
IRON SATN MFR SERPL: 21 % — SIGNIFICANT CHANGE UP (ref 20–55)
IRON SATN MFR SERPL: 30 UG/DL — LOW (ref 65–170)
MAGNESIUM SERPL-MCNC: 1.9 MG/DL — SIGNIFICANT CHANGE UP (ref 1.6–2.6)
MCHC RBC-ENTMCNC: 31.1 PG — SIGNIFICANT CHANGE UP (ref 27–34)
MCHC RBC-ENTMCNC: 31.2 PG — SIGNIFICANT CHANGE UP (ref 27–34)
MCHC RBC-ENTMCNC: 32.9 GM/DL — SIGNIFICANT CHANGE UP (ref 32–36)
MCHC RBC-ENTMCNC: 33.5 GM/DL — SIGNIFICANT CHANGE UP (ref 32–36)
MCV RBC AUTO: 92.9 FL — SIGNIFICANT CHANGE UP (ref 80–100)
MCV RBC AUTO: 94.9 FL — SIGNIFICANT CHANGE UP (ref 80–100)
NRBC # BLD: 0 /100 WBCS — SIGNIFICANT CHANGE UP (ref 0–0)
NRBC # BLD: 0 /100 WBCS — SIGNIFICANT CHANGE UP (ref 0–0)
PLATELET # BLD AUTO: 315 K/UL — SIGNIFICANT CHANGE UP (ref 150–400)
PLATELET # BLD AUTO: 370 K/UL — SIGNIFICANT CHANGE UP (ref 150–400)
POTASSIUM SERPL-MCNC: 3.4 MMOL/L — LOW (ref 3.5–5.3)
POTASSIUM SERPL-SCNC: 3.4 MMOL/L — LOW (ref 3.5–5.3)
PROTHROM AB SERPL-ACNC: 38.1 SEC — HIGH (ref 10.5–13.4)
RBC # BLD: 2.83 M/UL — LOW (ref 4.2–5.8)
RBC # BLD: 2.95 M/UL — LOW (ref 4.2–5.8)
RBC # FLD: 22.8 % — HIGH (ref 10.3–14.5)
RBC # FLD: 23.4 % — HIGH (ref 10.3–14.5)
SARS-COV-2 RNA SPEC QL NAA+PROBE: SIGNIFICANT CHANGE UP
SODIUM SERPL-SCNC: 133 MMOL/L — LOW (ref 135–145)
TIBC SERPL-MCNC: 145 UG/DL — LOW (ref 250–450)
UIBC SERPL-MCNC: 115 UG/DL — SIGNIFICANT CHANGE UP (ref 110–370)
WBC # BLD: 53.28 K/UL — CRITICAL HIGH (ref 3.8–10.5)
WBC # BLD: 54.2 K/UL — CRITICAL HIGH (ref 3.8–10.5)
WBC # FLD AUTO: 53.28 K/UL — CRITICAL HIGH (ref 3.8–10.5)
WBC # FLD AUTO: 54.2 K/UL — CRITICAL HIGH (ref 3.8–10.5)

## 2022-10-27 PROCEDURE — 99233 SBSQ HOSP IP/OBS HIGH 50: CPT

## 2022-10-27 PROCEDURE — 99232 SBSQ HOSP IP/OBS MODERATE 35: CPT

## 2022-10-27 RX ORDER — SOD SULF/SODIUM/NAHCO3/KCL/PEG
4000 SOLUTION, RECONSTITUTED, ORAL ORAL ONCE
Refills: 0 | Status: COMPLETED | OUTPATIENT
Start: 2022-10-27 | End: 2022-10-27

## 2022-10-27 RX ORDER — POTASSIUM CHLORIDE 20 MEQ
40 PACKET (EA) ORAL ONCE
Refills: 0 | Status: COMPLETED | OUTPATIENT
Start: 2022-10-27 | End: 2022-10-27

## 2022-10-27 RX ORDER — SODIUM CHLORIDE 9 MG/ML
1000 INJECTION INTRAMUSCULAR; INTRAVENOUS; SUBCUTANEOUS
Refills: 0 | Status: DISCONTINUED | OUTPATIENT
Start: 2022-10-27 | End: 2022-10-30

## 2022-10-27 RX ADMIN — Medication 1 TABLET(S): at 11:32

## 2022-10-27 RX ADMIN — Medication 10 MILLIGRAM(S): at 11:32

## 2022-10-27 RX ADMIN — Medication 40 MILLIEQUIVALENT(S): at 09:43

## 2022-10-27 RX ADMIN — SIMETHICONE 80 MILLIGRAM(S): 80 TABLET, CHEWABLE ORAL at 05:12

## 2022-10-27 RX ADMIN — Medication 100 MILLIGRAM(S): at 11:31

## 2022-10-27 RX ADMIN — Medication 4000 MILLILITER(S): at 18:01

## 2022-10-27 RX ADMIN — Medication 1 MILLIGRAM(S): at 17:39

## 2022-10-27 RX ADMIN — SIMETHICONE 80 MILLIGRAM(S): 80 TABLET, CHEWABLE ORAL at 13:46

## 2022-10-27 RX ADMIN — SODIUM CHLORIDE 75 MILLILITER(S): 9 INJECTION INTRAMUSCULAR; INTRAVENOUS; SUBCUTANEOUS at 13:44

## 2022-10-27 RX ADMIN — PANTOPRAZOLE SODIUM 40 MILLIGRAM(S): 20 TABLET, DELAYED RELEASE ORAL at 06:33

## 2022-10-27 RX ADMIN — NYSTATIN CREAM 1 APPLICATION(S): 100000 CREAM TOPICAL at 17:39

## 2022-10-27 RX ADMIN — SIMETHICONE 80 MILLIGRAM(S): 80 TABLET, CHEWABLE ORAL at 21:14

## 2022-10-27 RX ADMIN — Medication 1 MILLIGRAM(S): at 08:18

## 2022-10-27 RX ADMIN — NYSTATIN CREAM 1 APPLICATION(S): 100000 CREAM TOPICAL at 05:12

## 2022-10-27 NOTE — CHART NOTE - NSCHARTNOTEFT_GEN_A_CORE
Spoke with brother Rosalio [phone] with  ([facility], # [id number]), to update pt current medical condition, plan of care. GI consult for GI bleed. plan for colonoscopy tomorrow. discussed with risks/benefits for colonoscopy. understands verbally and agreeable. started Golytely per GI reccs. NPO after midnight.

## 2022-10-27 NOTE — PROGRESS NOTE ADULT - SUBJECTIVE AND OBJECTIVE BOX
Chief Complaint:  Patient is a 27y old  Male who presents with a chief complaint of Altered mental status (27 Oct 2022 15:21)      Reason for consult:    Interval Events:     Hospital Medications:  aluminum hydroxide/magnesium hydroxide/simethicone Suspension 30 milliLiter(s) Oral every 4 hours PRN  dextrose 5%. 1000 milliLiter(s) IV Continuous <Continuous>  dextrose 5%. 1000 milliLiter(s) IV Continuous <Continuous>  dextrose 50% Injectable 25 Gram(s) IV Push once  dextrose 50% Injectable 12.5 Gram(s) IV Push once  dextrose 50% Injectable 25 Gram(s) IV Push once  dextrose Oral Gel 15 Gram(s) Oral once PRN  folic acid 1 milliGRAM(s) Oral <User Schedule>  glucagon  Injectable 1 milliGRAM(s) IntraMuscular once  insulin lispro (ADMELOG) corrective regimen sliding scale   SubCutaneous Before meals and at bedtime  multivitamin 1 Tablet(s) Oral daily  nystatin Powder 1 Application(s) Topical two times a day  pantoprazole    Tablet 40 milliGRAM(s) Oral before breakfast  simethicone 80 milliGRAM(s) Chew three times a day  sodium chloride 0.45% 1000 milliLiter(s) IV Continuous <Continuous>  sodium chloride 0.9%. 1000 milliLiter(s) IV Continuous <Continuous>  thiamine Injectable 100 milliGRAM(s) IntraMuscular daily      ROS:   General:  No  fevers, chills, night sweats, fatigue  Eyes:  Good vision, no reported pain  ENT:  No sore throat, pain, runny nose  CV:  No pain, palpitations  Pulm:  No dyspnea, cough  GI:  See HPI, otherwise negative  :  No  incontinence, nocturia  Muscle:  No pain, weakness  Neuro:  No memory problems  Psych:  No insomnia, mood problems, depression  Endocrine:  No polyuria, polydipsia, cold/heat intolerance  Heme:  No petechiae, ecchymosis, easy bruisability  Skin:  No rash    PHYSICAL EXAM:   Vital Signs:  Vital Signs Last 24 Hrs  T(C): 36.6 (27 Oct 2022 12:05), Max: 36.7 (26 Oct 2022 20:40)  T(F): 97.8 (27 Oct 2022 12:05), Max: 98.1 (26 Oct 2022 20:40)  HR: 107 (27 Oct 2022 12:05) (107 - 120)  BP: 118/75 (27 Oct 2022 12:05) (112/63 - 130/70)  BP(mean): --  RR: 17 (27 Oct 2022 12:05) (17 - 18)  SpO2: 97% (27 Oct 2022 12:05) (97% - 99%)    Parameters below as of 27 Oct 2022 12:05  Patient On (Oxygen Delivery Method): room air      Daily     Daily     GENERAL: no acute distress  NEURO: alert, no asterixis  HEENT: anicteric sclera, no conjunctival pallor appreciated  CHEST: no respiratory distress, no accessory muscle use  CARDIAC: regular rate, rhythm  ABDOMEN: soft, non-tender, non-distended, no rebound or guarding  EXTREMITIES: warm, well perfused, no edema  SKIN: no lesions noted    LABS: reviewed                        8.8    53.28 )-----------( 370      ( 27 Oct 2022 07:08 )             26.3     10-27    133<L>  |  101  |  10  ----------------------------<  79  3.4<L>   |  16<L>  |  0.60    Ca    7.9<L>      27 Oct 2022 07:08  Mg     1.9     10-27    TPro  5.7<L>  /  Alb  1.4<L>  /  TBili  18.7<H>  /  DBili  14.7<H>  /  AST  53<H>  /  ALT  15  /  AlkPhos  282<H>  10-26    LIVER FUNCTIONS - ( 26 Oct 2022 16:07 )  Alb: 1.4 g/dL / Pro: 5.7 g/dL / ALK PHOS: 282 U/L / ALT: 15 U/L DA / AST: 53 U/L / GGT: x             Interval Diagnostic Studies: see sunrise for full report   Chief Complaint:  Patient is a 27y old  Male who presents with a chief complaint of Altered mental status (27 Oct 2022 15:21)      Reason for consult: SYEDA    Interval Events: Patient was seen and examined at bedside with . Patient had again LGIB reported and tentatively scheduled for colonoscopy for tomorrow per GI. Hb remains stable. Abdomen appears more distended. No other new complaints, but WBC rising again.     Hospital Medications:  aluminum hydroxide/magnesium hydroxide/simethicone Suspension 30 milliLiter(s) Oral every 4 hours PRN  dextrose 5%. 1000 milliLiter(s) IV Continuous <Continuous>  dextrose 5%. 1000 milliLiter(s) IV Continuous <Continuous>  dextrose 50% Injectable 25 Gram(s) IV Push once  dextrose 50% Injectable 12.5 Gram(s) IV Push once  dextrose 50% Injectable 25 Gram(s) IV Push once  dextrose Oral Gel 15 Gram(s) Oral once PRN  folic acid 1 milliGRAM(s) Oral <User Schedule>  glucagon  Injectable 1 milliGRAM(s) IntraMuscular once  insulin lispro (ADMELOG) corrective regimen sliding scale   SubCutaneous Before meals and at bedtime  multivitamin 1 Tablet(s) Oral daily  nystatin Powder 1 Application(s) Topical two times a day  pantoprazole    Tablet 40 milliGRAM(s) Oral before breakfast  simethicone 80 milliGRAM(s) Chew three times a day  sodium chloride 0.45% 1000 milliLiter(s) IV Continuous <Continuous>  sodium chloride 0.9%. 1000 milliLiter(s) IV Continuous <Continuous>  thiamine Injectable 100 milliGRAM(s) IntraMuscular daily      ROS: Still limited because of lack of focus, and distracted long sentences from patient side  General:  No  fevers, chills  CV:  No pain  Pulm:  No dyspnea, cough  GI:  Abdominal distention worsened again, reports pain, but not tender on exam, no N/V, again noted BRBPR per PCA at bedside. No melena.  :  Unable to get answer from him  Muscle:  No pain, or focal weakness  Neuro:  AAOx2 only  Skin:  Jaundice    PHYSICAL EXAM:   Vital Signs:  Vital Signs Last 24 Hrs  T(C): 36.6 (27 Oct 2022 12:05), Max: 36.7 (26 Oct 2022 20:40)  T(F): 97.8 (27 Oct 2022 12:05), Max: 98.1 (26 Oct 2022 20:40)  HR: 107 (27 Oct 2022 12:05) (107 - 120)  BP: 118/75 (27 Oct 2022 12:05) (112/63 - 130/70)  BP(mean): --  RR: 17 (27 Oct 2022 12:05) (17 - 18)  SpO2: 97% (27 Oct 2022 12:05) (97% - 99%)    Parameters below as of 27 Oct 2022 12:05  Patient On (Oxygen Delivery Method): room air      Daily     Daily     GENERAL: no acute distress  NEURO: AAOx2, no asterixis  HEENT: icteric sclera  CHEST: no respiratory distress, no accessory muscle use  CARDIAC: regular rate, rhythm  ABDOMEN: soft, non-tender, but more distended, no rebound or guarding, BS+, hepatomegaly  EXTREMITIES: b/l LE edema  SKIN: jaundice    LABS: reviewed                        8.8    53.28 )-----------( 370      ( 27 Oct 2022 07:08 )             26.3     10-27    133<L>  |  101  |  10  ----------------------------<  79  3.4<L>   |  16<L>  |  0.60    Ca    7.9<L>      27 Oct 2022 07:08  Mg     1.9     10-27    TPro  5.7<L>  /  Alb  1.4<L>  /  TBili  18.7<H>  /  DBili  14.7<H>  /  AST  53<H>  /  ALT  15  /  AlkPhos  282<H>  10-26    LIVER FUNCTIONS - ( 26 Oct 2022 16:07 )  Alb: 1.4 g/dL / Pro: 5.7 g/dL / ALK PHOS: 282 U/L / ALT: 15 U/L DA / AST: 53 U/L / GGT: x             Interval Diagnostic Studies: see sunrise for full report

## 2022-10-27 NOTE — PROGRESS NOTE ADULT - PROBLEM SELECTOR PLAN 1
P/W Had hematochezia x2   Hgb fluctuating   Vitamin K for elevated INR  S/p EGD-Sigmoidoscopy 10/5 which revealed large rectal Dieulafoy lesion.  Hemostasis achieved with over-the-scope clip and cautery.  Monitor CBC and transfuse for Hgb < 7 or if symptomatic.   Continue PPI P/W Had hematochezia x2   Hgb fluctuating   Vitamin K for elevated INR  S/p EGD-Sigmoidoscopy 10/5 which revealed large rectal Dieulafoy lesion.  Hemostasis achieved with over-the-scope clip and cautery.  Monitor CBC and transfuse for Hgb < 7 or if symptomatic.   Continue PPI  Tentative colonoscopy tomorrow . Golytely 4 Liters this evening. Vitamin K and FFP for INR goal < 2.5. P/W Had hematochezia x2   Hgb fluctuating   Vitamin K for elevated INR  S/p EGD-Sigmoidoscopy 10/5 which revealed large rectal Dieulafoy lesion.  Hemostasis achieved with over-the-scope clip and cautery.  Monitor CBC and transfuse for Hgb < 7 or if symptomatic.   Continue PPI  Tentative colonoscopy tomorrow . Clear liquid today. NPO after midnight.  Golytely 4 Liters this evening. Vitamin K and FFP for elevated INR ,goal < 2.5. COVID test within 72 hrs.

## 2022-10-27 NOTE — PROGRESS NOTE ADULT - ASSESSMENT
28 yo [place] speaking Male, with no known past medical history, s/p Left temporal parietal craniotomy noted in imaging, presenting to the ED with altered mental status . GI consult for transaminitis. CT showed  Severe hepatomegaly and diffuse steatosis. Mild ascites and infiltration of the central mesenteric fat. Possible gallbladder sludge and likely secondary/reactive wall thickening.  Left temporal parietal craniotomy. Encephalomalacia subjacent to the craniotomy. Ex vacuo dilatation of the left lateral ventricle. No intracranial hemorrhage. No mass lesion. Labs significant for MWC 16 HH 8.3/26.7 MCV 87  INR 2 Tbili 8 ALk 600   ammonia 131 lactate 3 albumin 1.8. Patient is non verbal. Most information obtained from primary team and medical records. Pt moaning, not able to provide history and pt's brother's contact information not in chart as family left ED prior to phone number being collected. As per EMS charts, pt was found to be 'asleep' when EMS arrived. As per family, pt has a seizure that lasted about 2-4 minutes. Family states pt has a seizure earlier in the day as well. Pt family denies hx of seizures. They stated pt drinks alcohol on a daily basis and hasn't drank any alcohol in the past 2 days. Family denies any recent trauma related injuries to pt.' Pt's brother told ED attending that the jaundice developed acutely over the last 1 week

## 2022-10-27 NOTE — PROGRESS NOTE ADULT - PROBLEM SELECTOR PLAN 8
-  Due to liver failure  -  No overt s/s of bleeding   -  Monitor CBC daily -  Due to liver failure  -  No overt s/s of bleeding   -  Monitor CBC daily  - noted bloody BM today, give IVF, repeat CBC 4pm -  Due to liver failure  -  No overt s/s of bleeding   -  Monitor CBC daily  - noted bloody BM today, give IVF, repeat CBC T/S at 4pm  -GI dr. Clarke consulted

## 2022-10-27 NOTE — PROGRESS NOTE ADULT - PROBLEM SELECTOR PLAN 9
-clinically active with worsening leukocytosis, liver failure.   -DC plan pending.   -last COVID negative 10/26 -clinically active with worsening leukocytosis, liver failure. noted bloody BM x1, repeat CBC  -DC plan pending.   -last COVID negative 10/26

## 2022-10-27 NOTE — PROGRESS NOTE ADULT - NS ATTEND AMEND GEN_ALL_CORE FT
- Hematochezia.  - LGIB.  - Etoh hepatitis.    Patient reportedly with large episode of recurring hematochezia today. Repeat Hb pending. INR 3.1. Recommend reversal of INR, at least INR less than 2.5. Give golytely 4L tonight. If ongoing signs of bleeding despite INR reversal, plan for colonoscopy tomorrow to reevaluate rectal dieulafoy.

## 2022-10-27 NOTE — PHYSICAL THERAPY INITIAL EVALUATION ADULT - GENERAL OBSERVATIONS, REHAB EVAL
27y male patient, etoh, psh craniotomy, mild slurred speech, pt easily understood, refer for PT assessment, I bedmobility, assistance with bedside ambulation to unstable balance and prolong bedrest
follow up PT 2 weeks no consult reeval notes, significant progress, resolving speech, easily understood and independent adls including ambulation with no assistive device

## 2022-10-27 NOTE — PROGRESS NOTE ADULT - PROBLEM SELECTOR PLAN 1
- WBC trending down 60k-->48-->49.5-->48.06--> 53K  - Leukemoid reaction vs steroid induced vs GI infection   - C. diff negative   - completed multiple courses of antibiotics   - Acetylserine started by attending   -  flow cytometry, AFP, BCR/ ABL  -  CEA normal  -  heme/onc following QMA   -  ID:  Dr Puentes

## 2022-10-27 NOTE — PROGRESS NOTE ADULT - NS ATTEND AMEND GEN_ALL_CORE FT
Patient is alert, cooperative, eating well.   Responds to questions, but not with appropriate answers to questions, both in English and in Lao.   Friend is visiting.   Jaundice is easily visible.  Craniotomy scar is well-healed.  Vital Signs Last 24 Hrs  T(C): 36.6 (27 Oct 2022 12:05), Max: 36.7 (26 Oct 2022 20:40)  T(F): 97.8 (27 Oct 2022 12:05), Max: 98.1 (26 Oct 2022 20:40)  HR: 107 (27 Oct 2022 12:05) (107 - 120)  BP: 118/75 (27 Oct 2022 12:05) (112/63 - 130/70)  BP(mean): --  RR: 17 (27 Oct 2022 12:05) (17 - 18)  SpO2: 97% (27 Oct 2022 12:05) (97% - 99%)    Parameters below as of 27 Oct 2022 12:05  Patient On (Oxygen Delivery Method): room air        Lungs, clear  Cor, RRR  abdomen, massive hepatosplenomegaly  Neurological, inappropriate answers to questions, socially appropriate, non-focal                          8.7    48.06 )-----------( 339      ( 26 Oct 2022 06:36 )             25.2   10-26    129<L>  |  101  |  8   ----------------------------<  90   After potassium supplementation  3.6   |  18<L>  |  0.57    Ca    8.1<L>      26 Oct 2022 16:07    TPro  5.7<L>  /  Alb  1.4<L>  /  TBili  18.7<H>  /  DBili  14.7<H>  /  AST  53<H>  /  ALT  15  /  AlkPhos  282<H>  10-26  Ferritin, Serum: 77 ng/mL (10.26.22 @ 06:36)     IMP: Encephalopathy is improved, but there is a disconnect in his responses, possibly related to his antecedent head trauma/craniotomy         Hyperbilirubinemia, coagulapathy, hypoalbuminemia, all related to underlying liver disease, which is likely alcoholic hepatitis         Leukocytosis, likely reactive.          Patient has not had hyperglycemia         Low ferritin level is not consistent with hemochromatosis         hypokalemia         Hematology note, seen and appreciated: " Problem #2 Coagulopathy - corrects on mixing study; doubt chronic DIC and likely hepatic dysfunction  -can transfuse to bring PT/INR ;and aPTT to acceptable level for surgical intervention  -there is no specific INR level that correlates with lower risk of bleeding in procedures such as liver biopsy in this coagulopathic patient; pt will be at elevated risk for bleeding complication and risks and benefits should be discussed w/ pt clearly prior to proceeding with surgical procedure even with FFP/vit K support"  Plan: Will give an additional course of N-acetylcysteine and follow clinically.           Discharge planning. Patient is alert, more irritable today.   He was observed to have blood in toilet bowl after bowel movement this AM x 1     Friend is visiting.   Jaundice is easily visible.  Craniotomy scar is well-healed.  Vital Signs Last 24 Hrs  T(C): 36.6 (27 Oct 2022 12:05), Max: 36.7 (26 Oct 2022 20:40)  T(F): 97.8 (27 Oct 2022 12:05), Max: 98.1 (26 Oct 2022 20:40)  HR: 107 (27 Oct 2022 12:05) (107 - 120)  BP: 118/75 (27 Oct 2022 12:05) (112/63 - 130/70)  BP(mean): --  RR: 17 (27 Oct 2022 12:05) (17 - 18)  SpO2: 97% (27 Oct 2022 12:05) (97% - 99%)    Parameters below as of 27 Oct 2022 12:05  Patient On (Oxygen Delivery Method): room air    Lungs, clear  Cor, RRR  abdomen, massive hepatosplenomegaly  Neurological, alert, ambulatory irritable, asymmetrical gait                          9.2    54.20 )-----------( 315      ( 27 Oct 2022 16:10 )             28.0   10-27    133<L>  |  101  |  10  ----------------------------<  79  3.4<L>   |  16<L>  |  0.60    Ca    7.9<L>      27 Oct 2022 07:08  Mg     1.9     10-27    TPro  5.7<L>  /  Alb  1.4<L>  /  TBili  18.7<H>  /  DBili  14.7<H>  /  AST  53<H>  /  ALT  15  /  AlkPhos  282<H>  10-26    IMP: Encephalopathy is improved, but there is a disconnect in his responses, possibly related to his antecedent head trauma/craniotomy         Hyperbilirubinemia, coagulapathy, hypoalbuminemia, all related to underlying liver disease, which is likely alcoholic hepatitis         Leukocytosis, likely reactive.          Patient has not had hyperglycemia         Low ferritin level is not consistent with hemochromatosis         hypokalemia, improved after supplementation         Hematology note, seen and appreciated: " Problem #2 Coagulopathy - corrects on mixing study; doubt chronic DIC and likely hepatic dysfunction  -can transfuse to bring PT/INR ;and aPTT to acceptable level for surgical intervention  -there is no specific INR level that correlates with lower risk of bleeding in procedures such as liver biopsy in this coagulopathic patient; pt will be at elevated risk for bleeding complication and risks and benefits should be discussed w/ pt clearly prior to proceeding with surgical procedure even with FFP/vit K support"          Rectal bleeding has recurred  Plan: IV hydration.  f/u CBC.  Type and screen.  GI f/u:  plan for colonoscopy in AM.

## 2022-10-27 NOTE — PROGRESS NOTE ADULT - SUBJECTIVE AND OBJECTIVE BOX
GI Progress Note    Patient is a 27y old  Male who presents with a chief complaint of Altered mental status (27 Oct 2022 11:53)       GI INTERVAL HPI/OVERNIGHT EVENTS: GI reconsulted for episode of  hematochezia , large amt this morning.   Pt seen and examined at bedside.      MEDICATIONS  (STANDING):  dextrose 5%. 1000 milliLiter(s) (50 mL/Hr) IV Continuous <Continuous>  dextrose 5%. 1000 milliLiter(s) (100 mL/Hr) IV Continuous <Continuous>  dextrose 50% Injectable 25 Gram(s) IV Push once  dextrose 50% Injectable 12.5 Gram(s) IV Push once  dextrose 50% Injectable 25 Gram(s) IV Push once  folic acid 1 milliGRAM(s) Oral <User Schedule>  glucagon  Injectable 1 milliGRAM(s) IntraMuscular once  insulin lispro (ADMELOG) corrective regimen sliding scale   SubCutaneous Before meals and at bedtime  multivitamin 1 Tablet(s) Oral daily  nystatin Powder 1 Application(s) Topical two times a day  pantoprazole    Tablet 40 milliGRAM(s) Oral before breakfast  simethicone 80 milliGRAM(s) Chew three times a day  sodium chloride 0.45% 1000 milliLiter(s) (75 mL/Hr) IV Continuous <Continuous>  sodium chloride 0.9%. 1000 milliLiter(s) (75 mL/Hr) IV Continuous <Continuous>  thiamine Injectable 100 milliGRAM(s) IntraMuscular daily    MEDICATIONS  (PRN):  aluminum hydroxide/magnesium hydroxide/simethicone Suspension 30 milliLiter(s) Oral every 4 hours PRN Dyspepsia  dextrose Oral Gel 15 Gram(s) Oral once PRN Blood Glucose LESS THAN 70 milliGRAM(s)/deciliter      __________________________________________________  REVIEW OF SYSTEMS:            ________________________________________________  PHYSICAL EXAM    Vital Signs Last 24 Hrs  T(C): 36.6 (27 Oct 2022 12:05), Max: 36.7 (26 Oct 2022 20:40)  T(F): 97.8 (27 Oct 2022 12:05), Max: 98.1 (26 Oct 2022 20:40)  HR: 107 (27 Oct 2022 12:05) (107 - 120)  BP: 118/75 (27 Oct 2022 12:05) (112/63 - 130/70)  BP(mean): --  RR: 17 (27 Oct 2022 12:05) (17 - 18)  SpO2: 97% (27 Oct 2022 12:05) (97% - 99%)    Parameters below as of 27 Oct 2022 12:05  Patient On (Oxygen Delivery Method): room air        _________________________________________________  LABS:                        8.8    53.28 )-----------( 370      ( 27 Oct 2022 07:08 )             26.3     10-27    133<L>  |  101  |  10  ----------------------------<  79  3.4<L>   |  16<L>  |  0.60    Ca    7.9<L>      27 Oct 2022 07:08  Mg     1.9     10-27    TPro  5.7<L>  /  Alb  1.4<L>  /  TBili  18.7<H>  /  DBili  14.7<H>  /  AST  53<H>  /  ALT  15  /  AlkPhos  282<H>  10-26    PT/INR - ( 27 Oct 2022 07:08 )   PT: 38.1 sec;   INR: 3.16 ratio         PTT - ( 26 Oct 2022 06:36 )  PTT:47.6 sec    CAPILLARY BLOOD GLUCOSE      POCT Blood Glucose.: 107 mg/dL (27 Oct 2022 11:20)  POCT Blood Glucose.: 104 mg/dL (27 Oct 2022 08:12)  POCT Blood Glucose.: 105 mg/dL (26 Oct 2022 21:32)  POCT Blood Glucose.: 114 mg/dL (26 Oct 2022 16:52)    COVID-19 PCR: NotDetec (27 Oct 2022 05:10)  COVID-19 PCR: NotDetec (21 Oct 2022 06:30)  COVID-19 PCR: NotDetec (12 Oct 2022 05:35)  COVID-19 PCR: NotDetec (05 Oct 2022 11:00)  SARS-CoV-2: NotDetec (02 Oct 2022 16:00)              RADIOLOGY & ADDITIONAL TESTS:                     GI Progress Note    Patient is a 27y old  Male who presents with a chief complaint of Altered mental status (27 Oct 2022 11:53)     GI INTERVAL HPI/OVERNIGHT EVENTS: GI reconsulted for episode of  hematochezia. Per RN: blood noted in toilet , large amt this morning.   Pt seen and examined at bedside.   [id number]  Pt awake/alert , sitting up in bed, confused at times, talking . Per : unable to comprehend pt.   Pt denies N/V/abdominal discomfort, rectal discomfort.       MEDICATIONS  (STANDING):  dextrose 5%. 1000 milliLiter(s) (50 mL/Hr) IV Continuous <Continuous>  dextrose 5%. 1000 milliLiter(s) (100 mL/Hr) IV Continuous <Continuous>  dextrose 50% Injectable 25 Gram(s) IV Push once  dextrose 50% Injectable 12.5 Gram(s) IV Push once  dextrose 50% Injectable 25 Gram(s) IV Push once  folic acid 1 milliGRAM(s) Oral <User Schedule>  glucagon  Injectable 1 milliGRAM(s) IntraMuscular once  insulin lispro (ADMELOG) corrective regimen sliding scale   SubCutaneous Before meals and at bedtime  multivitamin 1 Tablet(s) Oral daily  nystatin Powder 1 Application(s) Topical two times a day  pantoprazole    Tablet 40 milliGRAM(s) Oral before breakfast  simethicone 80 milliGRAM(s) Chew three times a day  sodium chloride 0.45% 1000 milliLiter(s) (75 mL/Hr) IV Continuous <Continuous>  sodium chloride 0.9%. 1000 milliLiter(s) (75 mL/Hr) IV Continuous <Continuous>  thiamine Injectable 100 milliGRAM(s) IntraMuscular daily    MEDICATIONS  (PRN):  aluminum hydroxide/magnesium hydroxide/simethicone Suspension 30 milliLiter(s) Oral every 4 hours PRN Dyspepsia  dextrose Oral Gel 15 Gram(s) Oral once PRN Blood Glucose LESS THAN 70 milliGRAM(s)/deciliter      __________________________________________________  REVIEW OF SYSTEMS:    CONSTITUTIONAL:  No fever, chills, or weakness  HEENT:  No oral discomfort or  throat pain, no odynophagia  SKIN:  No rash or itching.  CARDIOVASCULAR:  No chest  discomfort.   RESPIRATORY: No SOB.  GASTROINTESTINAL:  SEE HPI  GENITOURINARY:  No dysuria, hematuria, urinary frequency  NEUROLOGICAL:  No headache, dizziness,  MUSCULOSKELETAL:  No muscle, or  joint pain   HEMATOLOGIC:  blood noted in toilet.   ________________________________________________  PHYSICAL EXAM    Vital Signs Last 24 Hrs  T(C): 36.6 (27 Oct 2022 12:05), Max: 36.7 (26 Oct 2022 20:40)  T(F): 97.8 (27 Oct 2022 12:05), Max: 98.1 (26 Oct 2022 20:40)  HR: 107 (27 Oct 2022 12:05) (107 - 120)  BP: 118/75 (27 Oct 2022 12:05) (112/63 - 130/70)  BP(mean): --  RR: 17 (27 Oct 2022 12:05) (17 - 18)  SpO2: 97% (27 Oct 2022 12:05) (97% - 99%)    Parameters below as of 27 Oct 2022 12:05  Patient On (Oxygen Delivery Method): room air    Gen: NAD  HEENT: PERRL, icteric sclera, no oral mucositis  Resp: Clear breath sounds on auscultation. No rales, no wheezing  CVS: S1S2 present, regular  GI: BS(+), Softly distended.  NT  Extremities : BLE No edema or calf tenderness. PPP  Neuro: Awake/alert.  no new neuro deficits  Skin: warm,dry,no rash, jaundice      _________________________________________________  LABS:                        8.8    53.28 )-----------( 370      ( 27 Oct 2022 07:08 )             26.3     10-27    133<L>  |  101  |  10  ----------------------------<  79  3.4<L>   |  16<L>  |  0.60    Ca    7.9<L>      27 Oct 2022 07:08  Mg     1.9     10-27    TPro  5.7<L>  /  Alb  1.4<L>  /  TBili  18.7<H>  /  DBili  14.7<H>  /  AST  53<H>  /  ALT  15  /  AlkPhos  282<H>  10-26    PT/INR - ( 27 Oct 2022 07:08 )   PT: 38.1 sec;   INR: 3.16 ratio         PTT - ( 26 Oct 2022 06:36 )  PTT:47.6 sec    CAPILLARY BLOOD GLUCOSE      POCT Blood Glucose.: 107 mg/dL (27 Oct 2022 11:20)  POCT Blood Glucose.: 104 mg/dL (27 Oct 2022 08:12)  POCT Blood Glucose.: 105 mg/dL (26 Oct 2022 21:32)  POCT Blood Glucose.: 114 mg/dL (26 Oct 2022 16:52)    COVID-19 PCR: NotDetec (27 Oct 2022 05:10)  COVID-19 PCR: NotDetec (21 Oct 2022 06:30)  COVID-19 PCR: NotDetec (12 Oct 2022 05:35)  COVID-19 PCR: NotDetec (05 Oct 2022 11:00)  SARS-CoV-2: NotDetec (02 Oct 2022 16:00)    RADIOLOGY & ADDITIONAL TESTS:    < from: Xray Abdomen 2 Views (10.23.22 @ 20:58) >  PROCEDURE DATE:  10/23/2022          INTERPRETATION:  HISTORY:  Distended, painful abdomen    TECHNIQUE:  Supine views of the abdomen and pelvis were obtained.    FINDINGS:  Bibasilar atelectasis is noted at the lung bases. There is no   evidence of bowel obstruction. No gross free intraperitoneal air. There   is probable hepatomegaly displacing bowel loops in the left and   inferiorly. A metallic clip overlies the pubic symphysis.    IMPRESSION:  No evidence of bowel obstruction.  Probable hepatomegaly.    < end of copied text >  < from: US Abdomen Limited (10.20.22 @ 13:05) >  PROCEDURE DATE:  10/20/2022          INTERPRETATION:  CLINICAL INFORMATION: Severe abdominal distention.   Alcoholic hepatitis. Concern for Budd-Chiari.    COMPARISON: None available.    TECHNIQUE:  Sonography of the liver and ascites.    FINDINGS: There is severe enlargement of the liver with right lobe   measuring approximately 31 cm. Diffusely increased hepatic echogenicity   is seen consistent with a fatty liver. No focal hepatic mass lesion is   identified.    Main portal vein demonstrates normal flow. Hepatic veins are partially   delineated with color Doppler.    There is evidence of mild ascites.    IMPRESSION:  Severe hepatomegaly and steatosis.  Mild ascites.      < end of copied text >  < from: EGD-Sigmoidoscopy (10.05.22 @ 17:01) >  EGD Findings:        Esophagus Normal esophagus.        Stomach Mucosa Mild portal gastropathy.        Duodenum Normal duodenum.        Sigmoidoscopy Findings:        Contents Melenic stool throughout.        Mucosa A large pulsating vessel with adherent clot was seen in the rectum    suspicious for a Dieulafoy lesion. The clot was removed using a cold snare with    oozing bleeding from the vessel. One 12 mm/a over-the-scope clip was placed over    the vessel. The clip was in good position. There was mild oozing persistently    from the vessel. This was then cauterized with a 4 mm coagrasper successfully.    There was no bleeding at conclusion.        Protruding lesions Small internal and external hemorrhoids were noted.                EGD Impressions:        Normal esophagus.        Normal duodenum.        Mild portal gastropathy.        Sigmoidoscopy Impressions:        Large rectal Dieulafoy lesion. Hemostasis achieved with over-the-scope clip    and cautery.        Plan:        Return to ICU care        Monitor for recurring GI bleeding        [employer]        < end of copied text >                   GI Progress Note discussed with attending    Patient is a 27y old  Male who presents with a chief complaint of Altered mental status (27 Oct 2022 11:53)     GI INTERVAL HPI/OVERNIGHT EVENTS: GI reconsulted for episode of  hematochezia. Per RN: blood noted in toilet , large amt this morning.   Pt seen and examined at bedside.   [id number]  Pt awake/alert , sitting up in bed, confused at times, talking . Per : unable to comprehend pt.   Pt denies N/V/abdominal discomfort, rectal discomfort.       MEDICATIONS  (STANDING):  dextrose 5%. 1000 milliLiter(s) (50 mL/Hr) IV Continuous <Continuous>  dextrose 5%. 1000 milliLiter(s) (100 mL/Hr) IV Continuous <Continuous>  dextrose 50% Injectable 25 Gram(s) IV Push once  dextrose 50% Injectable 12.5 Gram(s) IV Push once  dextrose 50% Injectable 25 Gram(s) IV Push once  folic acid 1 milliGRAM(s) Oral <User Schedule>  glucagon  Injectable 1 milliGRAM(s) IntraMuscular once  insulin lispro (ADMELOG) corrective regimen sliding scale   SubCutaneous Before meals and at bedtime  multivitamin 1 Tablet(s) Oral daily  nystatin Powder 1 Application(s) Topical two times a day  pantoprazole    Tablet 40 milliGRAM(s) Oral before breakfast  simethicone 80 milliGRAM(s) Chew three times a day  sodium chloride 0.45% 1000 milliLiter(s) (75 mL/Hr) IV Continuous <Continuous>  sodium chloride 0.9%. 1000 milliLiter(s) (75 mL/Hr) IV Continuous <Continuous>  thiamine Injectable 100 milliGRAM(s) IntraMuscular daily    MEDICATIONS  (PRN):  aluminum hydroxide/magnesium hydroxide/simethicone Suspension 30 milliLiter(s) Oral every 4 hours PRN Dyspepsia  dextrose Oral Gel 15 Gram(s) Oral once PRN Blood Glucose LESS THAN 70 milliGRAM(s)/deciliter      __________________________________________________  REVIEW OF SYSTEMS:    CONSTITUTIONAL:  No fever, chills, or weakness  HEENT:  No oral discomfort or  throat pain, no odynophagia  SKIN:  No rash or itching.  CARDIOVASCULAR:  No chest  discomfort.   RESPIRATORY: No SOB.  GASTROINTESTINAL:  SEE HPI  GENITOURINARY:  No dysuria, hematuria, urinary frequency  NEUROLOGICAL:  No headache, dizziness,  MUSCULOSKELETAL:  No muscle, or  joint pain   HEMATOLOGIC:  blood noted in toilet.   ________________________________________________  PHYSICAL EXAM    Vital Signs Last 24 Hrs  T(C): 36.6 (27 Oct 2022 12:05), Max: 36.7 (26 Oct 2022 20:40)  T(F): 97.8 (27 Oct 2022 12:05), Max: 98.1 (26 Oct 2022 20:40)  HR: 107 (27 Oct 2022 12:05) (107 - 120)  BP: 118/75 (27 Oct 2022 12:05) (112/63 - 130/70)  BP(mean): --  RR: 17 (27 Oct 2022 12:05) (17 - 18)  SpO2: 97% (27 Oct 2022 12:05) (97% - 99%)    Parameters below as of 27 Oct 2022 12:05  Patient On (Oxygen Delivery Method): room air    Gen: NAD  HEENT: PERRL, icteric sclera, no oral mucositis  Resp: Clear breath sounds on auscultation. No rales, no wheezing  CVS: S1S2 present, regular  GI: BS(+), Softly distended.  NT  Extremities : BLE No edema or calf tenderness. PPP  Neuro: Awake/alert.  no new neuro deficits  Skin: warm,dry,no rash, jaundice      _________________________________________________  LABS:                        8.8    53.28 )-----------( 370      ( 27 Oct 2022 07:08 )             26.3     10-27    133<L>  |  101  |  10  ----------------------------<  79  3.4<L>   |  16<L>  |  0.60    Ca    7.9<L>      27 Oct 2022 07:08  Mg     1.9     10-27    TPro  5.7<L>  /  Alb  1.4<L>  /  TBili  18.7<H>  /  DBili  14.7<H>  /  AST  53<H>  /  ALT  15  /  AlkPhos  282<H>  10-26    PT/INR - ( 27 Oct 2022 07:08 )   PT: 38.1 sec;   INR: 3.16 ratio         PTT - ( 26 Oct 2022 06:36 )  PTT:47.6 sec    CAPILLARY BLOOD GLUCOSE      POCT Blood Glucose.: 107 mg/dL (27 Oct 2022 11:20)  POCT Blood Glucose.: 104 mg/dL (27 Oct 2022 08:12)  POCT Blood Glucose.: 105 mg/dL (26 Oct 2022 21:32)  POCT Blood Glucose.: 114 mg/dL (26 Oct 2022 16:52)    COVID-19 PCR: NotDetec (27 Oct 2022 05:10)  COVID-19 PCR: NotDetec (21 Oct 2022 06:30)  COVID-19 PCR: NotDetec (12 Oct 2022 05:35)  COVID-19 PCR: NotDetec (05 Oct 2022 11:00)  SARS-CoV-2: NotDetec (02 Oct 2022 16:00)    RADIOLOGY & ADDITIONAL TESTS:    < from: Xray Abdomen 2 Views (10.23.22 @ 20:58) >  PROCEDURE DATE:  10/23/2022          INTERPRETATION:  HISTORY:  Distended, painful abdomen    TECHNIQUE:  Supine views of the abdomen and pelvis were obtained.    FINDINGS:  Bibasilar atelectasis is noted at the lung bases. There is no   evidence of bowel obstruction. No gross free intraperitoneal air. There   is probable hepatomegaly displacing bowel loops in the left and   inferiorly. A metallic clip overlies the pubic symphysis.    IMPRESSION:  No evidence of bowel obstruction.  Probable hepatomegaly.    < end of copied text >  < from: US Abdomen Limited (10.20.22 @ 13:05) >  PROCEDURE DATE:  10/20/2022          INTERPRETATION:  CLINICAL INFORMATION: Severe abdominal distention.   Alcoholic hepatitis. Concern for Budd-Chiari.    COMPARISON: None available.    TECHNIQUE:  Sonography of the liver and ascites.    FINDINGS: There is severe enlargement of the liver with right lobe   measuring approximately 31 cm. Diffusely increased hepatic echogenicity   is seen consistent with a fatty liver. No focal hepatic mass lesion is   identified.    Main portal vein demonstrates normal flow. Hepatic veins are partially   delineated with color Doppler.    There is evidence of mild ascites.    IMPRESSION:  Severe hepatomegaly and steatosis.  Mild ascites.      < end of copied text >  < from: EGD-Sigmoidoscopy (10.05.22 @ 17:01) >  EGD Findings:        Esophagus Normal esophagus.        Stomach Mucosa Mild portal gastropathy.        Duodenum Normal duodenum.        Sigmoidoscopy Findings:        Contents Melenic stool throughout.        Mucosa A large pulsating vessel with adherent clot was seen in the rectum    suspicious for a Dieulafoy lesion. The clot was removed using a cold snare with    oozing bleeding from the vessel. One 12 mm/a over-the-scope clip was placed over    the vessel. The clip was in good position. There was mild oozing persistently    from the vessel. This was then cauterized with a 4 mm coagrasper successfully.    There was no bleeding at conclusion.        Protruding lesions Small internal and external hemorrhoids were noted.                EGD Impressions:        Normal esophagus.        Normal duodenum.        Mild portal gastropathy.        Sigmoidoscopy Impressions:        Large rectal Dieulafoy lesion. Hemostasis achieved with over-the-scope clip    and cautery.        Plan:        Return to ICU care        Monitor for recurring GI bleeding        [employer]        < end of copied text >

## 2022-10-27 NOTE — PHYSICAL THERAPY INITIAL EVALUATION ADULT - CRITERIA FOR SKILLED THERAPEUTIC INTERVENTIONS
risk reduction/prevention
impairments found/functional limitations in following categories/risk reduction/prevention

## 2022-10-27 NOTE — PHYSICAL THERAPY INITIAL EVALUATION ADULT - PLANNED THERAPY INTERVENTIONS, PT EVAL
stair training/bed mobility training/gait training/strengthening/transfer training
bed mobility training/gait training/transfer training

## 2022-10-27 NOTE — PROGRESS NOTE ADULT - ASSESSMENT
26 yo American speaking Male, with PMH alcohol abuse, s/p Left temporal parietal craniotomy noted in imaging, presented  with altered mental status and seizure that lasted about 2-4 minutes.  In the ED, pt with low grade fever, tachycardic   Admitted to ICU for severe metabolic derangements, CIWA protocol with PRN ativan. Pt was started on antibiotics and IV fluids  Started on lactulose for elevated amonnia level.  Lactate trended down to normal levels. GI and Hepatology was consulted for transaminitis and hyperbilirubinemia. Patient was started on zosyn for possible cholangitis. CT abd showed hepatomegaly with biliary sludge and mild ascites. MRCP was recommended.  Noted with  Hb  7.3,  started on protonix drip and octreotide drip. Patient received one unit PRBC and one unit FFP.  GI on board recommending EGD which did not show any pathology. Flexible sigmoidoscopy showed rectal varices which was cauterized.  switched to ceftriaxone for SBP prophylaxis per ID.  Vit K for 3 days for elevated INR. He was started on prednisone 40mg daily for 28 days for alcoholic hepatitis.   Patient hospital course further complicated by fever, and rising leucocytosis. ID consulted.  blood culture 10/16 NGTD, stool studies no pathogens, Cdfif negative. CT abdomen and pelvis- 1. Marked hepatomegaly/steatosis, unchanged. 2.  Ascites/mesenteric edema and subcutaneous edema, minimally progressed. 3.  Equivocal ascending colonic mural prominence raises the possibility of portal colopathy or inflammation   BCR/ ABL sent pending results. INR 3.16,  liver biopsy plan for biopsy cancelled due to elevated INR. Vit K po continued   Pt seen at bedside, alert, awake, confused to person and place.      10/25-Awake and alert, confused x 3, talkative but does not make sense.  Leukocytosis persists and slightly uptrending, started Acetylcysteine by attending.  Pt. will need safe discharge planning, disposition currently unknown, CM previously consulted.  10/27 Pt is noted with worsening leukocytosis, heme/onc plan for malignancy work up. noted blood in stool, no diarrhea. on Vt k for elevated INR.  28 yo Indonesian speaking Male, with PMH alcohol abuse, s/p Left temporal parietal craniotomy noted in imaging, presented  with altered mental status and seizure that lasted about 2-4 minutes.  In the ED, pt with low grade fever, tachycardic   Admitted to ICU for severe metabolic derangements, CIWA protocol with PRN ativan. Pt was started on antibiotics and IV fluids  Started on lactulose for elevated amonnia level.  Lactate trended down to normal levels. GI and Hepatology was consulted for transaminitis and hyperbilirubinemia. Patient was started on zosyn for possible cholangitis. CT abd showed hepatomegaly with biliary sludge and mild ascites. MRCP was recommended.  Noted with  Hb  7.3,  started on protonix drip and octreotide drip. Patient received one unit PRBC and one unit FFP.  GI on board recommending EGD which did not show any pathology. Flexible sigmoidoscopy showed rectal varices which was cauterized.  switched to ceftriaxone for SBP prophylaxis per ID.  Vit K for 3 days for elevated INR. He was started on prednisone 40mg daily for 28 days for alcoholic hepatitis.   Patient hospital course further complicated by fever, and rising leucocytosis. ID consulted.  blood culture 10/16 NGTD, stool studies no pathogens, Cdfif negative. CT abdomen and pelvis- 1. Marked hepatomegaly/steatosis, unchanged. 2.  Ascites/mesenteric edema and subcutaneous edema, minimally progressed. 3.  Equivocal ascending colonic mural prominence raises the possibility of portal colopathy or inflammation   BCR/ ABL sent pending results. INR 3.16,  liver biopsy plan for biopsy cancelled due to elevated INR. Vit K po continued   Pt seen at bedside, alert, awake, confused to person and place.      10/25-Awake and alert, confused x 3, talkative but does not make sense.  Leukocytosis persists and slightly uptrending, started Acetylcysteine by attending.  Pt. will need safe discharge planning, disposition currently unknown, CM previously consulted.  10/27 Pt is noted with worsening leukocytosis, heme/onc plan for malignancy work up. noted blood in stool, no diarrhea. on Vt k for elevated INR.  GI consulted.

## 2022-10-27 NOTE — PROGRESS NOTE ADULT - SUBJECTIVE AND OBJECTIVE BOX
NP Note discussed with  Primary Attending    INTERVAL HPI/OVERNIGHT EVENTS: no new complaints    MEDICATIONS  (STANDING):  dextrose 5%. 1000 milliLiter(s) (50 mL/Hr) IV Continuous <Continuous>  dextrose 5%. 1000 milliLiter(s) (100 mL/Hr) IV Continuous <Continuous>  dextrose 50% Injectable 25 Gram(s) IV Push once  dextrose 50% Injectable 12.5 Gram(s) IV Push once  dextrose 50% Injectable 25 Gram(s) IV Push once  folic acid 1 milliGRAM(s) Oral <User Schedule>  glucagon  Injectable 1 milliGRAM(s) IntraMuscular once  insulin lispro (ADMELOG) corrective regimen sliding scale   SubCutaneous Before meals and at bedtime  multivitamin 1 Tablet(s) Oral daily  nystatin Powder 1 Application(s) Topical two times a day  pantoprazole    Tablet 40 milliGRAM(s) Oral before breakfast  simethicone 80 milliGRAM(s) Chew three times a day  sodium chloride 0.45% 1000 milliLiter(s) (75 mL/Hr) IV Continuous <Continuous>  thiamine Injectable 100 milliGRAM(s) IntraMuscular daily    MEDICATIONS  (PRN):  aluminum hydroxide/magnesium hydroxide/simethicone Suspension 30 milliLiter(s) Oral every 4 hours PRN Dyspepsia  dextrose Oral Gel 15 Gram(s) Oral once PRN Blood Glucose LESS THAN 70 milliGRAM(s)/deciliter      __________________________________________________  REVIEW OF SYSTEMS:  unable to assess ROS due to confusion.    Vital Signs Last 24 Hrs  T(C): 36.7 (27 Oct 2022 05:03), Max: 36.7 (26 Oct 2022 20:40)  T(F): 98 (27 Oct 2022 05:03), Max: 98.1 (26 Oct 2022 20:40)  HR: 111 (27 Oct 2022 05:03) (111 - 120)  BP: 112/63 (27 Oct 2022 05:03) (112/63 - 130/70)  BP(mean): --  RR: 18 (27 Oct 2022 05:03) (18 - 18)  SpO2: 99% (27 Oct 2022 05:03) (97% - 99%)    Parameters below as of 27 Oct 2022 05:03  Patient On (Oxygen Delivery Method): room air    ________________________________________________  PHYSICAL EXAM:  GENERAL: NAD, confused, unable to communicate    HEENT: Normocephalic;    sclerae Jaundice ; moist mucous membranes;   NECK : supple  CHEST/LUNG: Clear to auscultation bilaterally with good air entry   HEART: S1 S2  regular; no murmurs, gallops or rubs  ABDOMEN: Soft, Nontender,+ distended; Bowel sounds present  EXTREMITIES: no cyanosis; no edema; no calf tenderness  SKIN: warm and dry; no rash  NERVOUS SYSTEM:  Awake and alert; Oriented  to self    _________________________________________________  LABS:                        8.8    53.28 )-----------( 370      ( 27 Oct 2022 07:08 )             26.3     10-27    133<L>  |  101  |  10  ----------------------------<  79  3.4<L>   |  16<L>  |  0.60    Ca    7.9<L>      27 Oct 2022 07:08  Mg     1.9     10-27    TPro  5.7<L>  /  Alb  1.4<L>  /  TBili  18.7<H>  /  DBili  14.7<H>  /  AST  53<H>  /  ALT  15  /  AlkPhos  282<H>  10-26    PT/INR - ( 27 Oct 2022 07:08 )   PT: 38.1 sec;   INR: 3.16 ratio         PTT - ( 26 Oct 2022 06:36 )  PTT:47.6 sec    CAPILLARY BLOOD GLUCOSE      POCT Blood Glucose.: 107 mg/dL (27 Oct 2022 11:20)  POCT Blood Glucose.: 104 mg/dL (27 Oct 2022 08:12)  POCT Blood Glucose.: 105 mg/dL (26 Oct 2022 21:32)  POCT Blood Glucose.: 114 mg/dL (26 Oct 2022 16:52)        RADIOLOGY & ADDITIONAL TESTS:    Imaging  Reviewed:  YES  < from: Xray Abdomen 2 Views (10.23.22 @ 20:58) >    ACC: [id number] EXAM:  XR ABDOMEN 2V                          PROCEDURE DATE:  10/23/2022          INTERPRETATION:  HISTORY:  Distended, painful abdomen    TECHNIQUE:  Supine views of the abdomen and pelvis were obtained.    FINDINGS:  Bibasilar atelectasis is noted at the lung bases. There is no   evidence of bowel obstruction. No gross free intraperitoneal air. There   is probable hepatomegaly displacing bowel loops in the left and   inferiorly. A metallic clip overlies the pubic symphysis.    IMPRESSION:  No evidence of bowel obstruction.  Probable hepatomegaly.    --- End of Report ---      NÉSTOR LYONS MD; Attending Radiologist  This document has been electronically signed. Oct 26 2022  5:31PM    < end of copied text >    < from: CT Abdomen and Pelvis No Cont (10.16.22 @ 11:03) >  IMPRESSION:  1.  Marked hepatomegaly/steatosis, unchanged.  2.  Ascites/mesenteric edema and subcutaneous edema, minimally progressed.  3.  Equivocal ascending colonic mural prominence raises the possibility   of portal colopathy or inflammation  4.  Underdistended duodenum with increased fat stranding about distal   duodenal sweep, nonspecific (coronal 4/68). Clinical correlation with   regard to duodenal inflammation recommended.  5.  Undetermined rectosigmoid foreign body/medical hardware (2/54, CT    image)-(this finding discussed with Iliana Keys 10/16/2022 11:25   AM).      < end of copied text >      Consultant(s) Notes Reviewed:   YES      Plan of care was discussed with patient and /or primary care giver; all questions and concerns were addressed

## 2022-10-27 NOTE — PROGRESS NOTE ADULT - ASSESSMENT
Brief hospital course:  25yo Faroese speaking Male from [facility], s/p Left temporal parietal craniotomy b/o TBI and alcohol abuse (reported current drinking by family, Peth 399) presented to [facility] ED on 10/2/22 with 1 day Hx of AMS, after reported witnessed seizure episodes and 1 week Hx of jaundice and was admitted to MICU with suspected EtOH withdrawal seizure, electrolyte abnormalities (Na 125, K 2.7, Mg 1.0, P 1.1), lactic acidosis (with lactate 6.4, bicarb 19), leukocytosis with neutrophilia (WBC 20.35, roderick 90%) with low grade T (100.1F), anemia without evidence of overt bleeding (Hb 6.9->8.3 s/p 1 U PRBC) and abnormal liver tests including coagulopathy (INR 2.13), hyperbilirubinemia (Se bi 9.3->10.8, direct 8.0), hyperammonemia (131), elevated liver enzymes in AST> ALT pattern (, ALT 22) and elevated ALP (669) and CT a/p w/ iv 10/2/22 suggesting diffuse hepatic steatosis, hepatomegaly with possible GB sludge and GB wall thickening, but normal bile ducts.  He was started on Alcoholic hepatitis management after infection was ruled out on admission (except that rhinovirus was pos, w/o URI symptoms) and UGIB was ruled out (EGD 10/5/22). He had LGIB, 2 rectal Dieulafoy lesions on sigmoidoscopy (10/5/22). Completed SBP ppx b/o GIB, was on ceftriaxone (10/4-10/12). His bilirubin remained unchanged after 7 days on prednisolone 40 mg (10/8-10/15) + total 3 days NAC, and developed low grade T, worsening leukocytosis up till 30-36k while on steroid (prior to steroid 17k), worsening abdominal pain,  distention and diarrhea despite being off lactulose, thus steroid was held 10/15 and septic workup was repeated. GI PCR panel showed STEC, culture was negative. BCx neg 10/16. C. diff neg 10/19. He was also started on Zosyn on 10/14, and switched to Meropenem on 10/16 and to Tigacycline on 10/18 per ID, and all abx were d/c 10/20.  Had repeat CT abd / pelvis 10/16, had only mild ascites (pelvic, perihepatosplenic), not feasible for Dx paracentesis and  prior it was none.   WBC continued to rise till 10/21 up till > 60k, thought to be possibly multifactorial.  Hematology been following since 10/16 b/o leukocytosis, pending workup.   WBC count slightly downtrending.  He had another episode of LGIB 10/21 and 10/22 but Hb remained stable and reportedly resolved. However, today again noted blood in stool.     Prior liver workup: Hep C ab neg, Hep A IgM neg, HBsAg, HBcAb IgM, HBcAb, HBsAb all neg, Hep E IgM neg, HIV neg, EBV past infection, CMV past infection, HSV PCR neg, leptospirosis neg. LUCIUS neg, SMA neg, LKM neg, AMA neg, IgG and IgM WNL. Ceruloplasmin 25. Fungitell neg. Strongyloides serology neg. Iron studies might not be informative b/o active drinking, recent bleeding, recent transfusion, but ferritin was WNL.     # Hepatomegaly suspected due to severe alcoholic hepatitis, s/p 7 days prednisolone 10/8-10/15, and s/p total 3 days NAC, bilirubin 14.1 on 10/8 and 14.1 10/15.  MDF> 32, MELD 28-23-27-->32   # Elevated transaminases in AST>ALT pattern - overall improved (-->51)  # Elevated ALP - improved (669->282)  # Hyperbilirubinemia - fluctuates (10--->17.8->16.4->18.7)  # Coagulopathy - initially improved but also got vit K and FFP (10/5-7 and 10/5), now around 3-4  # Diarrhea off lactulose - improved, but still multiple small BMs  # STEC  # Abdominal pain - improved? More distended.  # LGIB - again noted today  # Anemia - stable  # Worsening leukocytosis from 17k to 60k, with slight improvement to 48k, now rising again  # Hx of Low grade T (T max 100.4 F 10/12 night and 100F 10/14), WNL since then     - Being monitored off steroid since 8/15, (got 7 days) for reasons as above. Resumed NAC 10/25, consider continue for few more days (3rd dose).  C/w supportive measures, including nutritional optimization. Agree with another trial of vitamin K. Consider checking zinc level.   - Can add VZV PCR, HCV PCR, Schistosomiasis serology for completeness.   - C/w monitoring LFTs, including INR.  - F/u hematology recs.  - PLT WNL, only borderline splenomegaly, no or only trace ascites suggests less prominent portal hypertension.  - CT a/p w/ iv 10/2 was reviewed with IR, hepatic vasculature appeared patent. Patent vasculature reported on 10/20 US too.   - Was no biliary obstruction on CT abd, US abd. MRCP was considered on admission, has not been done yet, can consider to obtain, would assess the splanchnic / mesenteric circulation as well. Worsening abdominal distention noted, aslo to assess for ascites pocket.    - Still alcoholic hepatitis remains the working Dx, and might be just poor responder to steroid, but given the rising WBC, the massive hepatomegaly, liver biopsy was considered but was deferred b/o coagulopathy.     # Hyponatremia  # Hypokalemia  # LE edema  # Hypoalbuminemia  - Renal function remains WNL.   - Replace electrolytes  - Consider 25% albumin  - Monitor P, Mg too    - No EV on EGD 10/5/22, but mild portal gastropathy. S/p sigmoidoscopy 10/5 showing 2 rectal Dieulafoy lesions, s/p treatment. Had few episodes of hematochezia again Friday and Saturday but Hb remained stable and reportedly resolved. Today noted again LGIB. GI follow up appreciated.    - HCC status - no focal hepatic lesion    # AMS in a TBI patient, with EtOH use, initially been on CIWA on admission, also s/p seizures, and with elevated ammonia on admission, could not r/u HE either  - Monitor mental status, overall improved compared to admission, remains AAOX2 (not oriented to time), possibly multifactorial. Constant observation per primary team. Aspiration, seizure, fall precautions.   - C/w Folic, thiamine.   - Lactulose been held b/o diarrhea since 10/14 and 10/18, and rifaximin being held since 10/20 b/o dermatitis w/o other explanation, no resolution.        # Transplant candidacy: Obstacles: only emergency medicaid per d/w SW, recent EtOH, poor insight possibly due to prior TBI too     Thank you for consult  Will continue to monitor.   Been d/w [facility] Transplant Hepatology service attending(s) throughout the course.  D/w primary team

## 2022-10-28 LAB
ALBUMIN SERPL ELPH-MCNC: 1.3 G/DL — LOW (ref 3.5–5)
ALP SERPL-CCNC: 278 U/L — HIGH (ref 40–120)
ALT FLD-CCNC: 13 U/L DA — SIGNIFICANT CHANGE UP (ref 10–60)
AMMONIA BLD-MCNC: 55 UMOL/L — HIGH (ref 11–32)
ANION GAP SERPL CALC-SCNC: 14 MMOL/L — SIGNIFICANT CHANGE UP (ref 5–17)
ANISOCYTOSIS BLD QL: SLIGHT — SIGNIFICANT CHANGE UP
APTT BLD: 48.2 SEC — HIGH (ref 27.5–35.5)
AST SERPL-CCNC: 61 U/L — HIGH (ref 10–40)
BASOPHILS # BLD AUTO: 0 K/UL — SIGNIFICANT CHANGE UP (ref 0–0.2)
BASOPHILS NFR BLD AUTO: 0 % — SIGNIFICANT CHANGE UP (ref 0–2)
BILIRUB SERPL-MCNC: 17.8 MG/DL — HIGH (ref 0.2–1.2)
BLD GP AB SCN SERPL QL: SIGNIFICANT CHANGE UP
BUN SERPL-MCNC: 9 MG/DL — SIGNIFICANT CHANGE UP (ref 7–18)
BURR CELLS BLD QL SMEAR: SLIGHT — SIGNIFICANT CHANGE UP
CALCIUM SERPL-MCNC: 8.1 MG/DL — LOW (ref 8.4–10.5)
CHLORIDE SERPL-SCNC: 102 MMOL/L — SIGNIFICANT CHANGE UP (ref 96–108)
CO2 SERPL-SCNC: 17 MMOL/L — LOW (ref 22–31)
CREAT SERPL-MCNC: 0.64 MG/DL — SIGNIFICANT CHANGE UP (ref 0.5–1.3)
DIR ANTIGLOB POLYSPECIFIC INTERPRETATION: SIGNIFICANT CHANGE UP
EGFR: 135 ML/MIN/1.73M2 — SIGNIFICANT CHANGE UP
EOSINOPHIL # BLD AUTO: 0 K/UL — SIGNIFICANT CHANGE UP (ref 0–0.5)
EOSINOPHIL NFR BLD AUTO: 0 % — SIGNIFICANT CHANGE UP (ref 0–6)
GLUCOSE BLDC GLUCOMTR-MCNC: 109 MG/DL — HIGH (ref 70–99)
GLUCOSE BLDC GLUCOMTR-MCNC: 121 MG/DL — HIGH (ref 70–99)
GLUCOSE BLDC GLUCOMTR-MCNC: 92 MG/DL — SIGNIFICANT CHANGE UP (ref 70–99)
GLUCOSE BLDC GLUCOMTR-MCNC: 95 MG/DL — SIGNIFICANT CHANGE UP (ref 70–99)
GLUCOSE SERPL-MCNC: 77 MG/DL — SIGNIFICANT CHANGE UP (ref 70–99)
HCT VFR BLD CALC: 27.5 % — LOW (ref 39–50)
HGB BLD-MCNC: 9.2 G/DL — LOW (ref 13–17)
HYPOCHROMIA BLD QL: SLIGHT — SIGNIFICANT CHANGE UP
INR BLD: 2.81 RATIO — HIGH (ref 0.88–1.16)
LG PLATELETS BLD QL AUTO: SLIGHT — SIGNIFICANT CHANGE UP
LYMPHOCYTES # BLD AUTO: 0.53 K/UL — LOW (ref 1–3.3)
LYMPHOCYTES # BLD AUTO: 1 % — LOW (ref 13–44)
MACROCYTES BLD QL: SLIGHT — SIGNIFICANT CHANGE UP
MAGNESIUM SERPL-MCNC: 1.9 MG/DL — SIGNIFICANT CHANGE UP (ref 1.6–2.6)
MANUAL SMEAR VERIFICATION: SIGNIFICANT CHANGE UP
MCHC RBC-ENTMCNC: 31.3 PG — SIGNIFICANT CHANGE UP (ref 27–34)
MCHC RBC-ENTMCNC: 33.5 GM/DL — SIGNIFICANT CHANGE UP (ref 32–36)
MCV RBC AUTO: 93.5 FL — SIGNIFICANT CHANGE UP (ref 80–100)
MONOCYTES # BLD AUTO: 3.71 K/UL — HIGH (ref 0–0.9)
MONOCYTES NFR BLD AUTO: 7 % — SIGNIFICANT CHANGE UP (ref 2–14)
NEUTROPHILS # BLD AUTO: 48.73 K/UL — HIGH (ref 1.8–7.4)
NEUTROPHILS NFR BLD AUTO: 92 % — HIGH (ref 43–77)
NRBC # BLD: 0 /100 — SIGNIFICANT CHANGE UP (ref 0–0)
PHOSPHATE SERPL-MCNC: 3.9 MG/DL — SIGNIFICANT CHANGE UP (ref 2.5–4.5)
PLAT MORPH BLD: NORMAL — SIGNIFICANT CHANGE UP
PLATELET # BLD AUTO: 355 K/UL — SIGNIFICANT CHANGE UP (ref 150–400)
PLATELET COUNT - ESTIMATE: NORMAL — SIGNIFICANT CHANGE UP
POIKILOCYTOSIS BLD QL AUTO: SLIGHT — SIGNIFICANT CHANGE UP
POLYCHROMASIA BLD QL SMEAR: SLIGHT — SIGNIFICANT CHANGE UP
POTASSIUM SERPL-MCNC: 3.8 MMOL/L — SIGNIFICANT CHANGE UP (ref 3.5–5.3)
POTASSIUM SERPL-SCNC: 3.8 MMOL/L — SIGNIFICANT CHANGE UP (ref 3.5–5.3)
PROT SERPL-MCNC: 5.4 G/DL — LOW (ref 6–8.3)
PROTHROM AB SERPL-ACNC: 33.8 SEC — HIGH (ref 10.5–13.4)
RBC # BLD: 2.94 M/UL — LOW (ref 4.2–5.8)
RBC # FLD: 22.9 % — HIGH (ref 10.3–14.5)
RBC BLD AUTO: ABNORMAL
SODIUM SERPL-SCNC: 133 MMOL/L — LOW (ref 135–145)
TARGETS BLD QL SMEAR: SLIGHT — SIGNIFICANT CHANGE UP
WBC # BLD: 52.97 K/UL — CRITICAL HIGH (ref 3.8–10.5)
WBC # FLD AUTO: 52.97 K/UL — CRITICAL HIGH (ref 3.8–10.5)

## 2022-10-28 PROCEDURE — 99233 SBSQ HOSP IP/OBS HIGH 50: CPT

## 2022-10-28 PROCEDURE — 76705 ECHO EXAM OF ABDOMEN: CPT | Mod: 26

## 2022-10-28 PROCEDURE — 99232 SBSQ HOSP IP/OBS MODERATE 35: CPT

## 2022-10-28 PROCEDURE — 74177 CT ABD & PELVIS W/CONTRAST: CPT | Mod: 26

## 2022-10-28 RX ORDER — LACTULOSE 10 G/15ML
20 SOLUTION ORAL EVERY 8 HOURS
Refills: 0 | Status: DISCONTINUED | OUTPATIENT
Start: 2022-10-28 | End: 2022-11-04

## 2022-10-28 RX ORDER — ALBUMIN HUMAN 25 %
100 VIAL (ML) INTRAVENOUS EVERY 8 HOURS
Refills: 0 | Status: COMPLETED | OUTPATIENT
Start: 2022-10-28 | End: 2022-10-29

## 2022-10-28 RX ADMIN — NYSTATIN CREAM 1 APPLICATION(S): 100000 CREAM TOPICAL at 18:12

## 2022-10-28 RX ADMIN — Medication 50 MILLILITER(S): at 21:29

## 2022-10-28 RX ADMIN — SIMETHICONE 80 MILLIGRAM(S): 80 TABLET, CHEWABLE ORAL at 14:11

## 2022-10-28 RX ADMIN — LACTULOSE 20 GRAM(S): 10 SOLUTION ORAL at 21:29

## 2022-10-28 RX ADMIN — Medication 1 MILLIGRAM(S): at 14:11

## 2022-10-28 RX ADMIN — NYSTATIN CREAM 1 APPLICATION(S): 100000 CREAM TOPICAL at 06:24

## 2022-10-28 RX ADMIN — Medication 1 TABLET(S): at 12:08

## 2022-10-28 RX ADMIN — PANTOPRAZOLE SODIUM 40 MILLIGRAM(S): 20 TABLET, DELAYED RELEASE ORAL at 06:24

## 2022-10-28 RX ADMIN — Medication 100 MILLIGRAM(S): at 12:07

## 2022-10-28 RX ADMIN — Medication 1 MILLIGRAM(S): at 18:12

## 2022-10-28 RX ADMIN — SIMETHICONE 80 MILLIGRAM(S): 80 TABLET, CHEWABLE ORAL at 06:24

## 2022-10-28 RX ADMIN — LACTULOSE 20 GRAM(S): 10 SOLUTION ORAL at 16:18

## 2022-10-28 RX ADMIN — SIMETHICONE 80 MILLIGRAM(S): 80 TABLET, CHEWABLE ORAL at 21:28

## 2022-10-28 NOTE — PROGRESS NOTE ADULT - PROBLEM SELECTOR PLAN 3
-  Secondary Alcohol Hepatitis   -  U-tox negative for salicylate and acetaminophen levels  -  Hepatitis panel negative  -  bili - elevated  -  Elevated INR and hypoalbuminemia in the setting of liver dysfunction  -  INR 3.3 today (10/25)  -  Vit K 10 mg PO x 3 days 10/25-10/27  -  INR remains elevated at 3.1  -  Hepatology Dr. Gonzalez -  Secondary Alcohol Hepatitis   -  U-tox negative for salicylate and acetaminophen levels  -  Hepatitis panel negative  -  bili - elevated  -  Elevated INR and hypoalbuminemia in the setting of liver dysfunction  -  INR 3.3 today (10/25)  -  Vit K 10 mg PO x 3 days 10/25-10/27  -  INR remains elevated at 3.1  -  Hepatology Dr. Gonzalez  - f/u CT abd/pelvis to eval ascites, worsening abdominal distension. will likely need paracentesis. -  Secondary Alcohol Hepatitis   -  U-tox negative for salicylate and acetaminophen levels  -  Hepatitis panel negative  -  bili - elevated  -  Elevated INR and hypoalbuminemia in the setting of liver dysfunction  -  INR 3.3 today (10/25)  -  Vit K 10 mg PO x 3 days 10/25-10/27  -  INR remains elevated at 3.1  -  Hepatology Dr. Gonzalez  - f/u CT abd/pelvis and US to eval ascites, worsening abdominal distension. will likely need paracentesis.

## 2022-10-28 NOTE — PROGRESS NOTE ADULT - NS ATTEND AMEND GEN_ALL_CORE FT
Patient now has increased abdominal girth.  Fluid wave is present on exam.   Bedside ultrasound shows ascites.   Colonoscopy was cancelled because patient declined to drink prep.  GI does not plan to reschedule unless patient has active bleeding.     Plan: Diagnostic and therapeutic ultrasound in AM.   IV albumin, as recommended by hepatology.

## 2022-10-28 NOTE — PROGRESS NOTE ADULT - PROBLEM SELECTOR PLAN 5
likely due to liver disease  -Trending down, admitted with Serum Albumin 1.9, now 1.3  -Nutrition consult

## 2022-10-28 NOTE — PROGRESS NOTE ADULT - PROBLEM SELECTOR PLAN 8
-  Due to liver failure  -  No overt s/s of bleeding   -  Monitor CBC daily  - noted bloody BM 10/27, and previous week  - s/p IVF for tachycardia  - Hgb 9.2  -GI dr. Clarke/Elizabeth consulted  - plan for colonoscopy 10/28  - cancelled as failed prep, unable NGT placement due to combative behavior -  Due to liver failure  -  No overt s/s of bleeding   -  Monitor CBC daily  - noted bloody BM 10/27, and previous week  - s/p IVF for tachycardia  - Hgb 9.2  -GI dr. Clarke/Elizabeth consulted  - plan for colonoscopy 10/28  - cancelled as failed prep, unable NGT placement due to combative behavior  - s/p Vt K x 3 days for elevated INR, now trending down to 2.81

## 2022-10-28 NOTE — CHART NOTE - NSCHARTNOTEFT_GEN_A_CORE
Pt pending CT abdomen today. Added US abdomen. plan for paracentesis. likely tomorrow by resident/ICU NP as discussed with attending. will endorse to incoming NP Pt pending CT abdomen today. Added US abdomen. plan for paracentesis. likely tomorrow by resident/ICU NP as discussed with attending. will endorse to incoming NP.  Spoke with brother Rosalio [phone] with  (# [phone], Ms. Gricel ), to update pt condition. understands and agreeable for paracentesis. all questions addressed and answered. no further concern voiced. Pt pending CT abdomen today. Added US abdomen. plan for paracentesis. likely tomorrow by resident/ICU NP as discussed with attending. will endorse to incoming NP. IV albumin started.   Spoke with brother Rosalio [phone] with  (# [phone], Ms. Gricel ), to update pt condition. understands and agreeable for paracentesis. all questions addressed and answered. no further concern voiced.

## 2022-10-28 NOTE — PROGRESS NOTE ADULT - ASSESSMENT
Brief hospital course:  25yo Moh[place] speaking Male from [facility], s/p Left temporal parietal craniotomy b/o TBI and alcohol abuse (reported current drinking by family, Peth 399) presented to [facility] ED on 10/2/22 with 1 day Hx of AMS, after reported witnessed seizure episodes and 1 week Hx of jaundice and was admitted to MICU with suspected EtOH withdrawal seizure, electrolyte abnormalities (Na 125, K 2.7, Mg 1.0, P 1.1), lactic acidosis (with lactate 6.4, bicarb 19), leukocytosis with neutrophilia (WBC 20.35, roderick 90%) with low grade T (100.1F), anemia without evidence of overt bleeding (Hb 6.9->8.3 s/p 1 U PRBC) and abnormal liver tests including coagulopathy (INR 2.13), hyperbilirubinemia (Se bi 9.3->10.8, direct 8.0), hyperammonemia (131), elevated liver enzymes in AST> ALT pattern (, ALT 22) and elevated ALP (669) and CT a/p w/ iv 10/2/22 suggesting diffuse hepatic steatosis, hepatomegaly with possible GB sludge and GB wall thickening, but normal bile ducts.  He was started on Alcoholic hepatitis management after infection was ruled out on admission (except that rhinovirus was pos, w/o URI symptoms) and UGIB was ruled out (EGD 10/5/22). He had LGIB, 2 rectal Dieulafoy lesions on sigmoidoscopy (10/5/22). Completed SBP ppx b/o GIB, was on ceftriaxone (10/4-10/12). His bilirubin remained unchanged after 7 days on prednisolone 40 mg (10/8-10/15) + total 3 days NAC, and developed low grade T, worsening leukocytosis up till 30-36k while on steroid (prior to steroid 17k), worsening abdominal pain,  distention and diarrhea despite being off lactulose, thus steroid was held 10/15 and septic workup was repeated. GI PCR panel showed STEC, culture was negative. BCx neg 10/16. C. diff neg 10/19. He was also started on Zosyn on 10/14, and switched to Meropenem on 10/16 and to Tigacycline on 10/18 per ID, and all abx were d/c 10/20.  Had repeat CT abd / pelvis 10/16, had only mild ascites (pelvic, perihepatosplenic), not feasible for Dx paracentesis and  prior it was none.   WBC continued to rise till 10/21 up till > 60k, thought to be possibly multifactorial.  Hematology been following since 10/16 b/o leukocytosis, pending workup.   WBC count slightly downtrending.  He had another episode of LGIB 10/21 and 10/22 but Hb remained stable and reportedly resolved. However, yesterday again noted blood in stool.     Prior liver workup: Hep C ab neg, Hep A IgM neg, HBsAg, HBcAb IgM, HBcAb, HBsAb all neg, Hep E IgM neg, HIV neg, EBV past infection, CMV past infection, HSV PCR neg, leptospirosis neg. LUCIUS neg, SMA neg, LKM neg, AMA neg, IgG and IgM WNL. Ceruloplasmin 25. Fungitell neg. Strongyloides serology neg. Iron studies might not be informative b/o active drinking, recent bleeding, recent transfusion, but ferritin was WNL.     # Hepatomegaly suspected due to severe alcoholic hepatitis, s/p 7 days prednisolone 10/8-10/15, and s/p total 3 days NAC, bilirubin 14.1 on 10/8 and 14.1 10/15.  MDF> 32, MELD 28-23-27-->32   # Elevated transaminases in AST>ALT pattern - overall improved (-->51)  # Elevated ALP - improved (669->282)  # Hyperbilirubinemia - fluctuates (10--->17.8->16.4->18.7)  # Coagulopathy - initially improved but also got vit K and FFP (10/5-7 and 10/5), now around 3  # Diarrhea off lactulose - improved, but still multiple small BMs  # STEC  # Abdominal pain - improved? More distended.  # LGIB  # Anemia - stable  # Worsening leukocytosis from 17k to 60k, with slight improvement to 48k, now rising again  # Hx of Low grade T (T max 100.4 F 10/12 night and 100F 10/14), WNL since then     - Being monitored off steroid since 8/15, (got 7 days) for reasons as above. Resumed NAC 10/25, consider continue for few more days (3rd dose).  C/w supportive measures, including nutritional optimization. Consider checking zinc level.   - Can add VZV PCR, HCV PCR, Schistosomiasis serology for completeness.   - C/w monitoring LFTs, including INR.  - F/u hematology recs.  - PLT WNL, only borderline splenomegaly, no or only trace ascites suggests less prominent portal hypertension.  - CT a/p w/ iv 10/2 was reviewed with IR, hepatic vasculature appeared patent. Patent vasculature reported on 10/20 US too.   - Was no biliary obstruction on CT abd, US abd. MRCP was considered on admission, has not been done yet, can consider to obtain, would assess the splanchnic / mesenteric circulation as well. Worsening abdominal distention noted, also to assess for ascites pocket.  Bedside US shows ascites pocket, pending Dx and therapeutic paracentesis.   - Still alcoholic hepatitis remains the working Dx, and might be just poor responder to steroid, but given the rising WBC, the massive hepatomegaly, liver biopsy was considered but was deferred b/o coagulopathy.     # Hyponatremia - improved  # Hypokalemia - improved  # LE edema  # Hypoalbuminemia  - Renal function remains WNL.   - Replace electrolytes as needed. Monitor P, Mg too  - Consider 25% albumin (severely hypoalbuminemic, Cr > 0.3 above his lowest baseline)      - No EV on EGD 10/5/22, but mild portal gastropathy. S/p sigmoidoscopy 10/5 showing 2 rectal Dieulafoy lesions, s/p treatment. Had few episodes of hematochezia again 10/21 and 10/22 but Hb remained stable and reportedly resolved. 10/27 noted again LGIB. GI follow up appreciated. Colonoscopy was cancelled because patient refused bowel prep.     - HCC status - no focal hepatic lesion    # AMS in a TBI patient, with EtOH use, initially been on CIWA on admission, also s/p seizures, and with elevated ammonia on admission, could not r/u HE either (Gr I-II)  - Monitor mental status, overall improved compared to admission, but remains AAOX2 (not oriented to time), possibly multifactorial. Constant observation per primary team. Aspiration, seizure, fall precautions.   - C/w Folic, thiamine.   - Lactulose been held b/o diarrhea since 10/14 and 10/18, and rifaximin being held since 10/20 b/o dermatitis w/o other explanation, no resolution. Judie has multiple BMs per d/w primary team.        # Transplant candidacy: Obstacles: only emergency medicaid per d/w SW, recent EtOH, poor insight possibly due to prior TBI too     Thank you for consult  Will continue to monitor. Hepatology returns Monday. Please, consult GI on call if change in status, questions, concerns.  Been d/w [facility] Transplant Hepatology service attending(s) throughout the course.  D/w primary team

## 2022-10-28 NOTE — PROGRESS NOTE ADULT - PROBLEM SELECTOR PLAN 1
P/W Had hematochezia x2 .   Hgb fluctuating   Vitamin K for elevated INR  S/p EGD-Sigmoidoscopy 10/5 which revealed large rectal Dieulafoy lesion.  Hemostasis achieved with over-the-scope clip and cautery.  Monitor CBC and transfuse for Hgb < 7 or if symptomatic.   Continue PPI  Colonoscopy to reevaluate rectal dieulafoy canceled due to patient not being prepped. HH Stable at 9.2. No BM overnight. No overt signs of bleeding noted. P/W Had hematochezia x2 .   Hgb fluctuating   Vitamin K for elevated INR  S/p EGD-Sigmoidoscopy 10/5 which revealed large rectal Dieulafoy lesion.  Hemostasis achieved with over-the-scope clip and cautery.  Monitor CBC and transfuse for Hgb < 7 or if symptomatic.   Continue PPI  Colonoscopy to reevaluate rectal dieulafoy canceled due to patient not being prepped. refusing IV access. HH Stable at 9.2. No BM overnight. No overt signs of bleeding noted.

## 2022-10-28 NOTE — PROGRESS NOTE ADULT - SUBJECTIVE AND OBJECTIVE BOX
NP Note discussed with  Primary Attending    INTERVAL HPI/OVERNIGHT EVENTS: Pt is combative with blood draw, bowel prep.     MEDICATIONS  (STANDING):  dextrose 5%. 1000 milliLiter(s) (50 mL/Hr) IV Continuous <Continuous>  dextrose 5%. 1000 milliLiter(s) (100 mL/Hr) IV Continuous <Continuous>  dextrose 50% Injectable 25 Gram(s) IV Push once  dextrose 50% Injectable 12.5 Gram(s) IV Push once  dextrose 50% Injectable 25 Gram(s) IV Push once  folic acid 1 milliGRAM(s) Oral <User Schedule>  glucagon  Injectable 1 milliGRAM(s) IntraMuscular once  insulin lispro (ADMELOG) corrective regimen sliding scale   SubCutaneous Before meals and at bedtime  multivitamin 1 Tablet(s) Oral daily  nystatin Powder 1 Application(s) Topical two times a day  pantoprazole    Tablet 40 milliGRAM(s) Oral before breakfast  simethicone 80 milliGRAM(s) Chew three times a day  sodium chloride 0.45% 1000 milliLiter(s) (75 mL/Hr) IV Continuous <Continuous>  sodium chloride 0.9%. 1000 milliLiter(s) (75 mL/Hr) IV Continuous <Continuous>  thiamine Injectable 100 milliGRAM(s) IntraMuscular daily    MEDICATIONS  (PRN):  aluminum hydroxide/magnesium hydroxide/simethicone Suspension 30 milliLiter(s) Oral every 4 hours PRN Dyspepsia  dextrose Oral Gel 15 Gram(s) Oral once PRN Blood Glucose LESS THAN 70 milliGRAM(s)/deciliter      __________________________________________________  REVIEW OF SYSTEMS:  unable as pt is very confused      Vital Signs Last 24 Hrs  T(C): 36.7 (28 Oct 2022 05:01), Max: 36.7 (28 Oct 2022 05:01)  T(F): 98 (28 Oct 2022 05:01), Max: 98 (28 Oct 2022 05:01)  HR: 107 (28 Oct 2022 05:01) (107 - 107)  BP: 123/65 (28 Oct 2022 05:01) (113/63 - 123/65)  BP(mean): --  RR: 19 (28 Oct 2022 05:01) (17 - 19)  SpO2: 98% (28 Oct 2022 05:01) (97% - 98%)    Parameters below as of 28 Oct 2022 05:01  Patient On (Oxygen Delivery Method): room air        ________________________________________________  PHYSICAL EXAM:  GENERAL: NAD, not following commands  HEENT: Normocephalic;  jaundiced scleraer; moist mucous membranes;   NECK : supple  CHEST/LUNG: Clear to auscultation bilaterally with good air entry   HEART: S1 S2  regular; no murmurs, gallops or rubs  ABDOMEN: Soft, Nontender, distended,  Bowel sounds present  EXTREMITIES: no cyanosis; no edema; no calf tenderness  SKIN: warm and dry; no rash, Jaundiced skin  NERVOUS SYSTEM:  Awake and alert; Oriented  to self with confusion, aggressive during exam    _________________________________________________  LABS:                        9.2    52.97 )-----------( 355      ( 28 Oct 2022 06:35 )             27.5     10-28    133<L>  |  102  |  9   ----------------------------<  77  3.8   |  17<L>  |  0.64    Ca    8.1<L>      28 Oct 2022 06:35  Phos  3.9     10-28  Mg     1.9     10-28    TPro  5.4<L>  /  Alb  1.3<L>  /  TBili  17.8<H>  /  DBili  x   /  AST  61<H>  /  ALT  13  /  AlkPhos  278<H>  10-28    PT/INR - ( 28 Oct 2022 06:35 )   PT: 33.8 sec;   INR: 2.81 ratio         PTT - ( 28 Oct 2022 06:35 )  PTT:48.2 sec    CAPILLARY BLOOD GLUCOSE      POCT Blood Glucose.: 95 mg/dL (28 Oct 2022 07:35)  POCT Blood Glucose.: 107 mg/dL (27 Oct 2022 11:20)        RADIOLOGY & ADDITIONAL TESTS:    Imaging  Reviewed:  YES  < from: Xray Abdomen 2 Views (10.23.22 @ 20:58) >    ACC: [id number] EXAM:  XR ABDOMEN 2V                          PROCEDURE DATE:  10/23/2022          INTERPRETATION:  HISTORY:  Distended, painful abdomen    TECHNIQUE:  Supine views of the abdomen and pelvis were obtained.    FINDINGS:  Bibasilar atelectasis is noted at the lung bases. There is no   evidence of bowel obstruction. No gross free intraperitoneal air. There   is probable hepatomegaly displacing bowel loops in the left and   inferiorly. A metallic clip overlies the pubic symphysis.    IMPRESSION:  No evidence of bowel obstruction.  Probable hepatomegaly.    --- End of Report ---      NÉSTOR LYONS MD; Attending Radiologist  This document has been electronically signed. Oct 26 2022  5:31PM    < end of copied text >    < from: CT Abdomen and Pelvis No Cont (10.16.22 @ 11:03) >    ACC: [id number] EXAM:  CT ABDOMEN AND PELVIS                          PROCEDURE DATE:  10/16/2022          INTERPRETATION:  CLINICAL INFORMATION: Hepatomegaly, liver failure.   Leukocytosis    COMPARISON: 10/2/2022    CONTRAST/COMPLICATIONS:  IV Contrast: None.  Oral Contrast: None.  Complications: None.    PROCEDURE:  CT of the Abdomen and Pelvis was performed. Sagittal and coronal   reformats were performed. The lack of intravenous contrast material   limits diagnostic sensitivity in evaluatingthe solid organs/vasculature.    FINDINGS:  LOWER CHEST: Bibasilar platelike atelectasis    LIVER: Marked hepatomegaly (28 cm max sagittal)/hepatic steatosis, not   significantly changed from 10/2/2022  BILE DUCTS: Normal caliber.  GALLBLADDER: Contracted gallbladder with stone/coalescent sludge.  SPLEEN: Borderline splenomegaly (13.5 cm max sagittal), unchanged.  PANCREAS: Within normal limits.  ADRENALS: Within normal limits.  KIDNEYS/URETERS: No renal stones or hydronephrosis.    BLADDER: Within normal limits.  REPRODUCTIVE ORGANS: Normal prostate.    BOWEL: Underdistended duodenum with increased fat stranding about the   distal duodenal sweep, nonspecific (coronal 4/68). No bowel obstruction.   Normal appendix. Equivocal ascending colonicmural prominence .   Undetermined rectosigmoid foreign body/medical hardware (2/54, CT    image).  PERITONEUM: Mild pelvic >>perihepatosplenic ascites, progressed. Central   mesenteric root edema, progressed.  VESSELS: Within normal limits.  RETROPERITONEUM/LYMPH NODES: No lymphadenopathy.  ABDOMINAL WALL: Fat-containing inguinal hernias, unchanged. Dependent   subcutaneous edema, progressed  BONES: L5 spondylolysis. Healed lower anterolateral LEFT rib fractures    IMPRESSION:  1.  Marked hepatomegaly/steatosis, unchanged.  2.  Ascites/mesenteric edema and subcutaneous edema, minimally progressed.  3.  Equivocal ascending colonic mural prominence raises the possibility   of portal colopathy or inflammation  4.  Underdistended duodenum with increased fat stranding about distal   duodenal sweep, nonspecific (coronal 4/68). Clinical correlation with   regard to duodenal inflammation recommended.  5.  Undetermined rectosigmoid foreign body/medical hardware (2/54, CT    image)-(this finding discussed with Iliana Keys 10/16/2022 11:25   AM).    --- End of Report ---            TIFF ACE MD; Attending Radiologist  This document has been electronically signed. Oct 16 2022 11:28AM    < end of copied text >      Consultant(s) Notes Reviewed:   YES      Plan of care was discussed with patient and /or primary care giver; all questions and concerns were addressed

## 2022-10-28 NOTE — PROGRESS NOTE ADULT - ASSESSMENT
28 yo Russian speaking Male, with no known past medical history, s/p Left temporal parietal craniotomy noted in imaging, presenting to the ED with altered mental status . GI consult for transaminitis. CT showed  Severe hepatomegaly and diffuse steatosis. Mild ascites and infiltration of the central mesenteric fat. Possible gallbladder sludge and likely secondary/reactive wall thickening.  Left temporal parietal craniotomy. Encephalomalacia subjacent to the craniotomy. Ex vacuo dilatation of the left lateral ventricle. No intracranial hemorrhage. No mass lesion. Labs significant for MWC 16 HH 8.3/26.7 MCV 87  INR 2 Tbili 8 ALk 600   ammonia 131 lactate 3 albumin 1.8. Patient is non verbal. Most information obtained from primary team and medical records. Pt moaning, not able to provide history and pt's brother's contact information not in chart as family left ED prior to phone number being collected. As per EMS charts, pt was found to be 'asleep' when EMS arrived. As per family, pt has a seizure that lasted about 2-4 minutes. Family states pt has a seizure earlier in the day as well. Pt family denies hx of seizures. They stated pt drinks alcohol on a daily basis and hasn't drank any alcohol in the past 2 days. Family denies any recent trauma related injuries to pt.' Pt's brother told ED attending that the jaundice developed acutely over the last 1 week

## 2022-10-28 NOTE — PROGRESS NOTE ADULT - ASSESSMENT
28 yo [place]ian speaking Male, with PMH alcohol abuse, s/p Left temporal parietal craniotomy noted in imaging, presented  with altered mental status and seizure that lasted about 2-4 minutes.  In the ED, pt with low grade fever, tachycardic   Admitted to ICU for severe metabolic derangements, CIWA protocol with PRN ativan. Pt was started on antibiotics and IV fluids  Started on lactulose for elevated amonnia level.  Lactate trended down to normal levels. GI and Hepatology was consulted for transaminitis and hyperbilirubinemia. Patient was started on zosyn for possible cholangitis. CT abd showed hepatomegaly with biliary sludge and mild ascites. MRCP was recommended.  Noted with  Hb  7.3,  started on protonix drip and octreotide drip. Patient received one unit PRBC and one unit FFP.  GI on board recommending EGD which did not show any pathology. Flexible sigmoidoscopy showed rectal varices which was cauterized.  switched to ceftriaxone for SBP prophylaxis per ID.  Vit K for 3 days for elevated INR. He was started on prednisone 40mg daily for 28 days for alcoholic hepatitis.   Patient hospital course further complicated by fever, and rising leucocytosis. ID consulted.  blood culture 10/16 NGTD, stool studies no pathogens, Cdfif negative. CT abdomen and pelvis- 1. Marked hepatomegaly/steatosis, unchanged. 2.  Ascites/mesenteric edema and subcutaneous edema, minimally progressed. 3.  Equivocal ascending colonic mural prominence raises the possibility of portal colopathy or inflammation   BCR/ ABL sent pending results. INR 3.16,  liver biopsy plan for biopsy cancelled due to elevated INR. Vit K po continued   Pt seen at bedside, alert, awake, confused to person and place.      10/25-Awake and alert, confused x 3, talkative but does not make sense.  Leukocytosis persists and slightly uptrending, started Acetylcysteine by attending.  Pt. will need safe discharge planning, disposition currently unknown, CM previously consulted.  10/27 Pt is noted with worsening leukocytosis, heme/onc plan for malignancy work up. noted blood in stool, no diarrhea. on Vt k for elevated INR.  GI consulted. plan for colonoscopy next day.  10/28 pt failed prep, non compliant, unable to put NGT for prep due to combative behavior, pulled IV access. Hgb stable. cancelled colonoscopy.  26 yo Montenegrin speaking Male, with PMH alcohol abuse, s/p Left temporal parietal craniotomy noted in imaging, presented  with altered mental status and seizure that lasted about 2-4 minutes.  In the ED, pt with low grade fever, tachycardic   Admitted to ICU for severe metabolic derangements, CIWA protocol with PRN ativan. Pt was started on antibiotics and IV fluids  Started on lactulose for elevated amonnia level.  Lactate trended down to normal levels. GI and Hepatology was consulted for transaminitis and hyperbilirubinemia. Patient was started on zosyn for possible cholangitis. CT abd showed hepatomegaly with biliary sludge and mild ascites. MRCP was recommended.  Noted with  Hb  7.3,  started on protonix drip and octreotide drip. Patient received one unit PRBC and one unit FFP.  GI on board recommending EGD which did not show any pathology. Flexible sigmoidoscopy showed rectal varices which was cauterized.  switched to ceftriaxone for SBP prophylaxis per ID.  Vit K for 3 days for elevated INR. He was started on prednisone 40mg daily for 28 days for alcoholic hepatitis.   Patient hospital course further complicated by fever, and rising leucocytosis. ID consulted.  blood culture 10/16 NGTD, stool studies no pathogens, Cdfif negative. CT abdomen and pelvis- 1. Marked hepatomegaly/steatosis, unchanged. 2.  Ascites/mesenteric edema and subcutaneous edema, minimally progressed. 3.  Equivocal ascending colonic mural prominence raises the possibility of portal colopathy or inflammation   BCR/ ABL sent pending results. INR 3.16,  liver biopsy plan for biopsy cancelled due to elevated INR. Vit K po continued   Pt seen at bedside, alert, awake, confused to person and place.      10/25-Awake and alert, confused x 3, talkative but does not make sense.  Leukocytosis persists and slightly uptrending, started Acetylcysteine by attending.  Pt. will need safe discharge planning, disposition currently unknown, CM previously consulted.  10/27 Pt is noted with worsening leukocytosis, heme/onc plan for malignancy work up. noted blood in stool, no diarrhea. on Vt k for elevated INR.  GI consulted. plan for colonoscopy next day.  10/28 pt failed prep, non compliant, unable to put NGT for prep due to combative behavior, pulled IV access. Hgb stable. cancelled colonoscopy. Noted worsening abdominal distention. plan CT abd/pelvis to evaluation ascites.  28 yo [place] speaking Male, with PMH alcohol abuse, s/p Left temporal parietal craniotomy noted in imaging, presented  with altered mental status and seizure that lasted about 2-4 minutes.  In the ED, pt with low grade fever, tachycardic   Admitted to ICU for severe metabolic derangements, CIWA protocol with PRN ativan. Pt was started on antibiotics and IV fluids  Started on lactulose for elevated amonnia level.  Lactate trended down to normal levels. GI and Hepatology was consulted for transaminitis and hyperbilirubinemia. Patient was started on zosyn for possible cholangitis. CT abd showed hepatomegaly with biliary sludge and mild ascites. MRCP was recommended.  Noted with  Hb  7.3,  started on protonix drip and octreotide drip. Patient received one unit PRBC and one unit FFP.  GI on board recommending EGD which did not show any pathology. Flexible sigmoidoscopy showed rectal varices which was cauterized.  switched to ceftriaxone for SBP prophylaxis per ID.  Vit K for 3 days for elevated INR. He was started on prednisone 40mg daily for 28 days for alcoholic hepatitis.   Patient hospital course further complicated by fever, and rising leucocytosis. ID consulted.  blood culture 10/16 NGTD, stool studies no pathogens, Cdfif negative. CT abdomen and pelvis- 1. Marked hepatomegaly/steatosis, unchanged. 2.  Ascites/mesenteric edema and subcutaneous edema, minimally progressed. 3.  Equivocal ascending colonic mural prominence raises the possibility of portal colopathy or inflammation   BCR/ ABL sent pending results. INR 3.16,  liver biopsy plan for biopsy cancelled due to elevated INR. Vit K po continued   Pt seen at bedside, alert, awake, confused to person and place.      10/25-Awake and alert, confused x 3, talkative but does not make sense.  Leukocytosis persists and slightly uptrending, started Acetylcysteine by attending.  Pt. will need safe discharge planning, disposition currently unknown, CM previously consulted.  10/27 Pt is noted with worsening leukocytosis, heme/onc plan for malignancy work up. noted blood in stool, no diarrhea. on Vt k for elevated INR.  GI consulted. plan for colonoscopy next day.  10/28 pt failed prep, non compliant, unable to put NGT for prep due to combative behavior, pulled IV access. Hgb stable. cancelled colonoscopy. Noted worsening abdominal distention. plan CT abd/pelvis and US to evaluation ascites. will need paracentesis.

## 2022-10-28 NOTE — PROGRESS NOTE ADULT - PROBLEM SELECTOR PLAN 2
-  Metabolic encephalopathy secondary to alcohol hepatitis   - liver biopsy cxd 10/21 due to elevated INR   -  discontinue lactulose - Patient numerous loose BM having diarrhea  -  Transaminitis- unchanged  -  Severe hepatomegaly w/mild ascites on CT abdomen w/gall bladder sludge  -  Lipase level normal  -  Hepatitis panel negative   -  Hepatic USG for characterization of gall bladder attempted however patient was very agitated  -  EGD on 10/5 with GI Dr. Johnson showed Large rectal Dieulafoy lesion. Hemostasis achieved with over-the-scope clip  -  Foreign body noted in abdominal xray -  clip placed by GI   -  RUQ US without ascites, large fatty liver noted on 10/10  -hepatology dr. montilla following -  Metabolic encephalopathy secondary to alcohol hepatitis   - liver biopsy cxd 10/21 due to elevated INR   -  discontinue lactulose - Patient numerous loose BM having diarrhea  -  Transaminitis- unchanged  -  Severe hepatomegaly w/mild ascites on CT abdomen w/gall bladder sludge  -  Lipase level normal  -  Hepatitis panel negative   -  Hepatic USG for characterization of gall bladder attempted however patient was very agitated  -  EGD on 10/5 with GI Dr. Johnson showed Large rectal Dieulafoy lesion. Hemostasis achieved with over-the-scope clip  -  Foreign body noted in abdominal xray -  clip placed by GI   -  RUQ US without ascites, large fatty liver noted on 10/10  -hepatology dr. montilla following  - restart lactulose

## 2022-10-28 NOTE — PROGRESS NOTE ADULT - SUBJECTIVE AND OBJECTIVE BOX
GI PROGRESS NOTE    Patient is a 27y old  Male who presents with a chief complaint of Altered mental status (27 Oct 2022 16:30)      HPI:  Patient is a 27 yo Gabonese speaking Male, with no known past medical history, s/p Left temporal parietal craniotomy noted in imaging, presenting to the ED with altered mental status since this morning. Pt moaning, not able to provide history and pt's brother's contact information not in chart as family left ED prior to phone number being collected. As per EMS charts, pt was found to be 'asleep' when EMS arrived..as per family, pt has a seizure that lasted about 2-4 minutes. Family states pt has a seizure earlier in the day as well. Pt family denies hx of seizures. They stated pt drinks alcohol on a daily basis and hasn't drank any alcohol in the past 2 days. Family denies any recent trauma related injuries to pt.' Pt's brother told ED attending that the jaundice developed acutely over the last 1 week.     In the ED, pt's vitals were  Temp 100.1, , /67, RR 18 on room air O2 sat 96%  s/p IV Vanc + Zosyn, NS 1 L bolus, Keppra 2 gm x 1, Ativan, Mg, Kcl riders, Thiamine (02 Oct 2022 20:13)    GI HPI   Patient in chair. Refused to take colon prep last evening. Primary team tried calling patients brother for encouragement but a failed attempt.       ______________________________________________________________________  PMH/PSH:  PAST MEDICAL & SURGICAL HISTORY:  No pertinent past medical history      No significant past surgical history        ______________________________________________________________________  MEDS:  MEDICATIONS  (STANDING):  dextrose 5%. 1000 milliLiter(s) (50 mL/Hr) IV Continuous <Continuous>  dextrose 5%. 1000 milliLiter(s) (100 mL/Hr) IV Continuous <Continuous>  dextrose 50% Injectable 25 Gram(s) IV Push once  dextrose 50% Injectable 12.5 Gram(s) IV Push once  dextrose 50% Injectable 25 Gram(s) IV Push once  folic acid 1 milliGRAM(s) Oral <User Schedule>  glucagon  Injectable 1 milliGRAM(s) IntraMuscular once  insulin lispro (ADMELOG) corrective regimen sliding scale   SubCutaneous Before meals and at bedtime  multivitamin 1 Tablet(s) Oral daily  nystatin Powder 1 Application(s) Topical two times a day  pantoprazole    Tablet 40 milliGRAM(s) Oral before breakfast  simethicone 80 milliGRAM(s) Chew three times a day  sodium chloride 0.45% 1000 milliLiter(s) (75 mL/Hr) IV Continuous <Continuous>  sodium chloride 0.9%. 1000 milliLiter(s) (75 mL/Hr) IV Continuous <Continuous>  thiamine Injectable 100 milliGRAM(s) IntraMuscular daily    MEDICATIONS  (PRN):  aluminum hydroxide/magnesium hydroxide/simethicone Suspension 30 milliLiter(s) Oral every 4 hours PRN Dyspepsia  dextrose Oral Gel 15 Gram(s) Oral once PRN Blood Glucose LESS THAN 70 milliGRAM(s)/deciliter    ______________________________________________________________________  ALL:   Allergies    No Known Allergies    Intolerances      ______________________________________________________________________  SH: Social History:  From home  Hx of Alcohol abuse from family  Unknown drug hx/smoking hx (02 Oct 2022 20:13)    ______________________________________________________________________  FH:  FAMILY HISTORY:  No pertinent family history in first degree relatives      ______________________________________________________________________  ROS:    Unable to assess due to mental status   ______________________________________________________________________  PHYSICAL EXAM:  T(C): 36.7 (10-28-22 @ 05:01), Max: 36.7 (10-28-22 @ 05:01)  HR: 107 (10-28-22 @ 05:01)  BP: 123/65 (10-28-22 @ 05:01)  RR: 19 (10-28-22 @ 05:01)  SpO2: 98% (10-28-22 @ 05:01)  Wt(kg): --    Gen: NAD  HEENT: PERRL, icteric sclera, no oral mucositis  Resp: Clear breath sounds on auscultation. No rales, no wheezing  CVS: S1S2 present, regular  GI: BS(+), Softly distended.  NT  Extremities : BLE No edema or calf tenderness. PPP  Neuro: Awake/alert but not oriented .  no new neuro deficits  Skin: warm,dry,no rash, jaundice      ______________________________________________________________________  LABS:                        9.2    52.97 )-----------( 355      ( 28 Oct 2022 06:35 )             27.5     10-27    133<L>  |  101  |  10  ----------------------------<  79  3.4<L>   |  16<L>  |  0.60    Ca    7.9<L>      27 Oct 2022 07:08  Mg     1.9     10-27    TPro  5.7<L>  /  Alb  1.4<L>  /  TBili  18.7<H>  /  DBili  14.7<H>  /  AST  53<H>  /  ALT  15  /  AlkPhos  282<H>  10-26    LIVER FUNCTIONS - ( 26 Oct 2022 16:07 )  Alb: 1.4 g/dL / Pro: 5.7 g/dL / ALK PHOS: 282 U/L / ALT: 15 U/L DA / AST: 53 U/L / GGT: x           ______________________________________________________________________  IMAGING:    ______________________________________________________________________  < from: US Abdomen Limited (10.20.22 @ 13:05) >    ACC: [id number] EXAM:  US ABDOMEN LIMITED                          PROCEDURE DATE:  10/20/2022          INTERPRETATION:  CLINICAL INFORMATION: Severe abdominal distention.   Alcoholic hepatitis. Concern for Budd-Chiari.    COMPARISON: None available.    TECHNIQUE:  Sonography of the liver and ascites.    FINDINGS: There is severe enlargement of the liver with right lobe   measuring approximately 31 cm. Diffusely increased hepatic echogenicity   is seen consistent with a fatty liver. No focal hepatic mass lesion is   identified.    Main portal vein demonstrates normal flow. Hepatic veins are partially   delineated with color Doppler.    There is evidence of mild ascites.    IMPRESSION:  Severe hepatomegaly and steatosis.  Mild ascites.          < end of copied text >  < from: CT Abdomen and Pelvis No Cont (10.16.22 @ 11:03) >    ACC: [id number] EXAM:  CT ABDOMEN AND PELVIS                          PROCEDURE DATE:  10/16/2022          INTERPRETATION:  CLINICAL INFORMATION: Hepatomegaly, liver failure.   Leukocytosis    COMPARISON: 10/2/2022    CONTRAST/COMPLICATIONS:  IV Contrast: None.  Oral Contrast: None.  Complications: None.    PROCEDURE:  CT of the Abdomen and Pelvis was performed. Sagittal and coronal   reformats were performed. The lack of intravenous contrast material   limits diagnostic sensitivity in evaluatingthe solid organs/vasculature.    FINDINGS:  LOWER CHEST: Bibasilar platelike atelectasis    LIVER: Marked hepatomegaly (28 cm max sagittal)/hepatic steatosis, not   significantly changed from 10/2/2022  BILE DUCTS: Normal caliber.  GALLBLADDER: Contracted gallbladder with stone/coalescent sludge.  SPLEEN: Borderline splenomegaly (13.5 cm max sagittal), unchanged.  PANCREAS: Within normal limits.  ADRENALS: Within normal limits.  KIDNEYS/URETERS: No renal stones or hydronephrosis.    BLADDER: Within normal limits.  REPRODUCTIVE ORGANS: Normal prostate.    BOWEL: Underdistended duodenum with increased fat stranding about the   distal duodenal sweep, nonspecific (coronal 4/68). No bowel obstruction.   Normal appendix. Equivocal ascending colonicmural prominence .   Undetermined rectosigmoid foreign body/medical hardware (2/54, CT    image).  PERITONEUM: Mild pelvic >>perihepatosplenic ascites, progressed. Central   mesenteric root edema, progressed.  VESSELS: Within normal limits.  RETROPERITONEUM/LYMPH NODES: No lymphadenopathy.  ABDOMINAL WALL: Fat-containing inguinal hernias, unchanged. Dependent   subcutaneous edema, progressed  BONES: L5 spondylolysis. Healed lower anterolateral LEFT rib fractures    IMPRESSION:  1.  Marked hepatomegaly/steatosis, unchanged.  2.  Ascites/mesenteric edema and subcutaneous edema, minimally progressed.  3.  Equivocal ascending colonic mural prominence raises the possibility   of portal colopathy or inflammation  4.  Underdistended duodenum with increased fat stranding about distal   duodenal sweep, nonspecific (coronal 4/68). Clinical correlation with   regard to duodenal inflammation recommended.  5.  Undetermined rectosigmoid foreign body/medical hardware (2/54, CT    image)-(this finding discussed with Iliana Keys 10/16/2022 11:25   AM).    --- End of Report ---    < end of copied text >

## 2022-10-28 NOTE — PROGRESS NOTE ADULT - NS ATTEND AMEND GEN_ALL_CORE FT
- Hematochezia.  - LGIB.  - Etoh hepatitis.  - Coagulopathy.    Planned originally for colonoscopy today given hematochezia yesterday however pt refused bowel preparation. Also refusing IV access. Bleeding appears to have resolved as Hb stable and no further hematochezia, also not feasible without IV access and low yield without prep. Clarify goals/capacity. Monitor for any recurring bleed.

## 2022-10-28 NOTE — CHART NOTE - NSCHARTNOTEFT_GEN_A_CORE
Assessment:   27yMalePatient is a 27y old  Male who presents with a chief complaint of Altered mental status (28 Oct 2022 09:01)  Nutrition consult Noted. Pt Visited. Pt is In 1: 1 Observation. Pt appears jaundiced.  Abdomen distended. Pt is alert but confused. Observed Post Lunch meal. Po intake is Poor. Pt is on Ensure enlive BID. Pt is schedule for colonoscopy 10/29. Labs noted. T Bili 17.8. Nutrition Consult Noted.  Meds noted On Folic acid, Thiamine, lactulose, MVI, NH3  level 55.      Factors impacting intake: [ ] none [ ] nausea  [ ] vomiting [ ] diarrhea [ ] constipation  [ ]chewing problems [ ] swallowing issues  [ x] other:     Diet Prescription: Diet, Regular:   Supplement Feeding Modality:  Oral  Ensure Enlive Cans or Servings Per Day:  1       Frequency:  Two Times a day (10-23-22 @ 19:14)    Intake: < 50 % of meal.    Current Weight:   % Weight Change    Pertinent Medications: MEDICATIONS  (STANDING):  dextrose 5%. 1000 milliLiter(s) (100 mL/Hr) IV Continuous <Continuous>  dextrose 5%. 1000 milliLiter(s) (50 mL/Hr) IV Continuous <Continuous>  dextrose 50% Injectable 25 Gram(s) IV Push once  dextrose 50% Injectable 12.5 Gram(s) IV Push once  dextrose 50% Injectable 25 Gram(s) IV Push once  folic acid 1 milliGRAM(s) Oral <User Schedule>  glucagon  Injectable 1 milliGRAM(s) IntraMuscular once  insulin lispro (ADMELOG) corrective regimen sliding scale   SubCutaneous Before meals and at bedtime  lactulose Syrup 20 Gram(s) Oral every 8 hours  multivitamin 1 Tablet(s) Oral daily  nystatin Powder 1 Application(s) Topical two times a day  pantoprazole    Tablet 40 milliGRAM(s) Oral before breakfast  simethicone 80 milliGRAM(s) Chew three times a day  sodium chloride 0.45% 1000 milliLiter(s) (75 mL/Hr) IV Continuous <Continuous>  sodium chloride 0.9%. 1000 milliLiter(s) (75 mL/Hr) IV Continuous <Continuous>  thiamine Injectable 100 milliGRAM(s) IntraMuscular daily    MEDICATIONS  (PRN):  aluminum hydroxide/magnesium hydroxide/simethicone Suspension 30 milliLiter(s) Oral every 4 hours PRN Dyspepsia  dextrose Oral Gel 15 Gram(s) Oral once PRN Blood Glucose LESS THAN 70 milliGRAM(s)/deciliter    Pertinent Labs: 10-28 Na133 mmol/L<L> Glu 77 mg/dL K+ 3.8 mmol/L Cr  0.64 mg/dL BUN 9 mg/dL 10-28 Phos 3.9 mg/dL 10-28 Alb 1.3 g/dL<L> 10-02 Chol 363 mg/dL<H> LDL --    HDL 14 mg/dL<L> Trig 319 mg/dL<H>     CAPILLARY BLOOD GLUCOSE      POCT Blood Glucose.: 92 mg/dL (28 Oct 2022 11:09)  POCT Blood Glucose.: 95 mg/dL (28 Oct 2022 07:35)    Skin:     Estimated Needs:   [ ] no change since previous assessment  [ ] recalculated:     Previous Nutrition Diagnosis:   [ ] Inadequate Energy Intake [x ]Inadequate Oral Intake [ ] Excessive Energy Intake   [ ] Underweight [ ] Increased Nutrient Needs [ ] Overweight/Obesity   [ ] Altered GI Function [ ] Unintended Weight Loss [ ] Food & Nutrition Related Knowledge Deficit [ ] Malnutrition     Nutrition Diagnosis is [ x] ongoing  [ ] resolved [ ] not applicable     New Nutrition Diagnosis: [ ] not applicable       Interventions:   Recommend  [ ] Change Diet To:  [x ] Nutrition Supplement Continue with Ensure enlive  BID   [ ] Nutrition Support  [x ] Other: Suggest low sodium diet     Monitoring and Evaluation:   [ ] PO intake [ x ] Tolerance to diet prescription [ x ] weights [ x ] labs[ x ] follow up per protocol  [ ] other:

## 2022-10-28 NOTE — PROGRESS NOTE ADULT - PROBLEM SELECTOR PLAN 1
- WBC trending down 60k-->48-->49.5-->48.06--> 53K  - Leukemoid reaction vs steroid induced vs GI infection   - C. diff negative   - completed multiple courses of antibiotics   - Acetylserine started by attending   - flow cytometry, AFP, BCR/ ABL unreamarkable  -  CEA normal  -  heme/onc following QMA   -  ID:  Dr Puentes - WBC trending down 60k-->48-->49.5-->48.06--> 52K  - Leukemoid reaction vs steroid induced vs GI infection   - C. diff negative   - completed multiple courses of antibiotics   - Acetylserine started by attending   - flow cytometry, AFP, BCR/ ABL unreamarkable  -  CEA normal  -  heme/onc following QMA   -  ID:  Dr Puentes

## 2022-10-28 NOTE — PROGRESS NOTE ADULT - PROBLEM SELECTOR PLAN 9
-clinically active with worsening leukocytosis, liver failure  -monitor bloody BM, CBC.   -DC plan pending : GI, hepatology, heme/onc following.   -last COVID negative 10/27 -clinically active with worsening leukocytosis, liver failure  -monitor bloody BM, CBC.   -DC plan pending : GI, hepatology, heme/onc following.   -last COVID negative 10/27  -f/u CT abd/pelvis, US, will need paracentesis

## 2022-10-28 NOTE — PROGRESS NOTE ADULT - SUBJECTIVE AND OBJECTIVE BOX
Chief Complaint:  Patient is a 27y old  Male who presents with a chief complaint of Altered mental status (28 Oct 2022 09:01)      Reason for consult:    Interval Events:     Hospital Medications:  aluminum hydroxide/magnesium hydroxide/simethicone Suspension 30 milliLiter(s) Oral every 4 hours PRN  dextrose 5%. 1000 milliLiter(s) IV Continuous <Continuous>  dextrose 5%. 1000 milliLiter(s) IV Continuous <Continuous>  dextrose 50% Injectable 25 Gram(s) IV Push once  dextrose 50% Injectable 12.5 Gram(s) IV Push once  dextrose 50% Injectable 25 Gram(s) IV Push once  dextrose Oral Gel 15 Gram(s) Oral once PRN  folic acid 1 milliGRAM(s) Oral <User Schedule>  glucagon  Injectable 1 milliGRAM(s) IntraMuscular once  insulin lispro (ADMELOG) corrective regimen sliding scale   SubCutaneous Before meals and at bedtime  lactulose Syrup 20 Gram(s) Oral every 8 hours  multivitamin 1 Tablet(s) Oral daily  nystatin Powder 1 Application(s) Topical two times a day  pantoprazole    Tablet 40 milliGRAM(s) Oral before breakfast  simethicone 80 milliGRAM(s) Chew three times a day  sodium chloride 0.45% 1000 milliLiter(s) IV Continuous <Continuous>  sodium chloride 0.9%. 1000 milliLiter(s) IV Continuous <Continuous>  thiamine Injectable 100 milliGRAM(s) IntraMuscular daily      ROS:   General:  No  fevers, chills, night sweats, fatigue  Eyes:  Good vision, no reported pain  ENT:  No sore throat, pain, runny nose  CV:  No pain, palpitations  Pulm:  No dyspnea, cough  GI:  See HPI, otherwise negative  :  No  incontinence, nocturia  Muscle:  No pain, weakness  Neuro:  No memory problems  Psych:  No insomnia, mood problems, depression  Endocrine:  No polyuria, polydipsia, cold/heat intolerance  Heme:  No petechiae, ecchymosis, easy bruisability  Skin:  No rash    PHYSICAL EXAM:   Vital Signs:  Vital Signs Last 24 Hrs  T(C): 36.4 (28 Oct 2022 11:15), Max: 36.7 (28 Oct 2022 05:01)  T(F): 97.6 (28 Oct 2022 11:15), Max: 98 (28 Oct 2022 05:01)  HR: 104 (28 Oct 2022 11:15) (104 - 107)  BP: 129/69 (28 Oct 2022 11:15) (113/63 - 129/69)  BP(mean): --  RR: 19 (28 Oct 2022 11:15) (18 - 19)  SpO2: 99% (28 Oct 2022 11:15) (98% - 99%)    Parameters below as of 28 Oct 2022 11:15  Patient On (Oxygen Delivery Method): room air      Daily     Daily     GENERAL: no acute distress  NEURO: alert, no asterixis  HEENT: anicteric sclera, no conjunctival pallor appreciated  CHEST: no respiratory distress, no accessory muscle use  CARDIAC: regular rate, rhythm  ABDOMEN: soft, non-tender, non-distended, no rebound or guarding  EXTREMITIES: warm, well perfused, no edema  SKIN: no lesions noted    LABS: reviewed                        9.2    52.97 )-----------( 355      ( 28 Oct 2022 06:35 )             27.5     10-28    133<L>  |  102  |  9   ----------------------------<  77  3.8   |  17<L>  |  0.64    Ca    8.1<L>      28 Oct 2022 06:35  Phos  3.9     10-28  Mg     1.9     10-28    TPro  5.4<L>  /  Alb  1.3<L>  /  TBili  17.8<H>  /  DBili  x   /  AST  61<H>  /  ALT  13  /  AlkPhos  278<H>  10-28    LIVER FUNCTIONS - ( 28 Oct 2022 06:35 )  Alb: 1.3 g/dL / Pro: 5.4 g/dL / ALK PHOS: 278 U/L / ALT: 13 U/L DA / AST: 61 U/L / GGT: x             Interval Diagnostic Studies: see sunrise for full report   Chief Complaint:  Patient is a 27y old  Male who presents with a chief complaint of Altered mental status (28 Oct 2022 09:01)      Reason for consult: Liver failure (acute vs acute on chronic)    Interval Events: Patient was seen and examined at bedside.   206. Patient remains awake, alert, but oriented to self and place, not to time, and still lack of focus, on other hand he seems to recall events from prior days. He still keeps narrating circumstances of his arrival to US. Hb remains stable. He refused colon preparation thus colonoscopy was cancelled. Per d/w PCA no melena or large hematochezia. Abdomen appears more distended. Pending US abd to assess if developed significant ascites and pending paracentesis if he did. INR and bilirubin with mild improvement. Cr still remains WNL, but his baseline rather around 0.4, was as low as 0.3, likely sarcopenic. Nutritionist on board.     Hospital Medications:  aluminum hydroxide/magnesium hydroxide/simethicone Suspension 30 milliLiter(s) Oral every 4 hours PRN  dextrose 5%. 1000 milliLiter(s) IV Continuous <Continuous>  dextrose 5%. 1000 milliLiter(s) IV Continuous <Continuous>  dextrose 50% Injectable 25 Gram(s) IV Push once  dextrose 50% Injectable 12.5 Gram(s) IV Push once  dextrose 50% Injectable 25 Gram(s) IV Push once  dextrose Oral Gel 15 Gram(s) Oral once PRN  folic acid 1 milliGRAM(s) Oral <User Schedule>  glucagon  Injectable 1 milliGRAM(s) IntraMuscular once  insulin lispro (ADMELOG) corrective regimen sliding scale   SubCutaneous Before meals and at bedtime  lactulose Syrup 20 Gram(s) Oral every 8 hours  multivitamin 1 Tablet(s) Oral daily  nystatin Powder 1 Application(s) Topical two times a day  pantoprazole    Tablet 40 milliGRAM(s) Oral before breakfast  simethicone 80 milliGRAM(s) Chew three times a day  sodium chloride 0.45% 1000 milliLiter(s) IV Continuous <Continuous>  sodium chloride 0.9%. 1000 milliLiter(s) IV Continuous <Continuous>  thiamine Injectable 100 milliGRAM(s) IntraMuscular daily      ROS: Today very limited,  was not able to understand him fully  General:  No  fevers, chills  CV:  No pain  Pulm:  No dyspnea, cough  GI:  Abdominal distention worsened, no N/V, but poor appetite, still multiple small BMs      PHYSICAL EXAM:   Vital Signs:  Vital Signs Last 24 Hrs  T(C): 36.4 (28 Oct 2022 11:15), Max: 36.7 (28 Oct 2022 05:01)  T(F): 97.6 (28 Oct 2022 11:15), Max: 98 (28 Oct 2022 05:01)  HR: 104 (28 Oct 2022 11:15) (104 - 107)  BP: 129/69 (28 Oct 2022 11:15) (113/63 - 129/69)  BP(mean): --  RR: 19 (28 Oct 2022 11:15) (18 - 19)  SpO2: 99% (28 Oct 2022 11:15) (98% - 99%)    Parameters below as of 28 Oct 2022 11:15  Patient On (Oxygen Delivery Method): room air      Daily     Daily     GENERAL: no acute distress but ill appearing  NEURO: awake, alert, oriented to self, place, not time, still shortened attention span +/- confabulation, no obvious asterixis  HEENT: icteric sclera  CHEST: no respiratory distress, no accessory muscle use  CARDIAC: mildly tachycardic  ABDOMEN: soft, mild RUQ tenderness, more distended, no rebound or guarding, BS+  EXTREMITIES: b/l LE edema  SKIN: jaundice, ecchymoses    LABS: reviewed                        9.2    52.97 )-----------( 355      ( 28 Oct 2022 06:35 )             27.5     10-28    133<L>  |  102  |  9   ----------------------------<  77  3.8   |  17<L>  |  0.64    Ca    8.1<L>      28 Oct 2022 06:35  Phos  3.9     10-28  Mg     1.9     10-28    TPro  5.4<L>  /  Alb  1.3<L>  /  TBili  17.8<H>  /  DBili  x   /  AST  61<H>  /  ALT  13  /  AlkPhos  278<H>  10-28    LIVER FUNCTIONS - ( 28 Oct 2022 06:35 )  Alb: 1.3 g/dL / Pro: 5.4 g/dL / ALK PHOS: 278 U/L / ALT: 13 U/L DA / AST: 61 U/L / GGT: x             Interval Diagnostic Studies: see sunrise for full report

## 2022-10-29 LAB
ALBUMIN FLD-MCNC: 0.8 G/DL — SIGNIFICANT CHANGE UP
ALBUMIN SERPL ELPH-MCNC: 1.7 G/DL — LOW (ref 3.5–5)
ALP SERPL-CCNC: 300 U/L — HIGH (ref 40–120)
ALT FLD-CCNC: 14 U/L DA — SIGNIFICANT CHANGE UP (ref 10–60)
ANION GAP SERPL CALC-SCNC: 13 MMOL/L — SIGNIFICANT CHANGE UP (ref 5–17)
APTT BLD: 49.8 SEC — HIGH (ref 27.5–35.5)
AST SERPL-CCNC: 67 U/L — HIGH (ref 10–40)
B PERT IGG+IGM PNL SER: CLEAR — SIGNIFICANT CHANGE UP
BASOPHILS # BLD AUTO: 0.05 K/UL — SIGNIFICANT CHANGE UP (ref 0–0.2)
BASOPHILS NFR BLD AUTO: 0.1 % — SIGNIFICANT CHANGE UP (ref 0–2)
BILIRUB SERPL-MCNC: 18.7 MG/DL — HIGH (ref 0.2–1.2)
BUN SERPL-MCNC: 9 MG/DL — SIGNIFICANT CHANGE UP (ref 7–18)
CALCIUM SERPL-MCNC: 8.2 MG/DL — LOW (ref 8.4–10.5)
CHLORIDE SERPL-SCNC: 101 MMOL/L — SIGNIFICANT CHANGE UP (ref 96–108)
CO2 SERPL-SCNC: 18 MMOL/L — LOW (ref 22–31)
COLOR FLD: YELLOW — SIGNIFICANT CHANGE UP
CREAT SERPL-MCNC: 0.64 MG/DL — SIGNIFICANT CHANGE UP (ref 0.5–1.3)
EGFR: 135 ML/MIN/1.73M2 — SIGNIFICANT CHANGE UP
EOSINOPHIL # BLD AUTO: 0.26 K/UL — SIGNIFICANT CHANGE UP (ref 0–0.5)
EOSINOPHIL NFR BLD AUTO: 0.5 % — SIGNIFICANT CHANGE UP (ref 0–6)
FLUID INTAKE SUBSTANCE CLASS: SIGNIFICANT CHANGE UP
GLUCOSE BLDC GLUCOMTR-MCNC: 103 MG/DL — HIGH (ref 70–99)
GLUCOSE BLDC GLUCOMTR-MCNC: 90 MG/DL — SIGNIFICANT CHANGE UP (ref 70–99)
GLUCOSE BLDC GLUCOMTR-MCNC: 98 MG/DL — SIGNIFICANT CHANGE UP (ref 70–99)
GLUCOSE FLD-MCNC: 106 MG/DL — SIGNIFICANT CHANGE UP
GLUCOSE SERPL-MCNC: 92 MG/DL — SIGNIFICANT CHANGE UP (ref 70–99)
GRAM STN FLD: SIGNIFICANT CHANGE UP
HCT VFR BLD CALC: 25.5 % — LOW (ref 39–50)
HGB BLD-MCNC: 8.6 G/DL — LOW (ref 13–17)
IMM GRANULOCYTES NFR BLD AUTO: 2.6 % — HIGH (ref 0–0.9)
INR BLD: 2.84 RATIO — HIGH (ref 0.88–1.16)
LDH SERPL L TO P-CCNC: 92 U/L — SIGNIFICANT CHANGE UP
LYMPHOCYTES # BLD AUTO: 1.33 K/UL — SIGNIFICANT CHANGE UP (ref 1–3.3)
LYMPHOCYTES # BLD AUTO: 2.8 % — LOW (ref 13–44)
LYMPHOCYTES # FLD: 25 % — SIGNIFICANT CHANGE UP
MCHC RBC-ENTMCNC: 31.7 PG — SIGNIFICANT CHANGE UP (ref 27–34)
MCHC RBC-ENTMCNC: 33.7 GM/DL — SIGNIFICANT CHANGE UP (ref 32–36)
MCV RBC AUTO: 94.1 FL — SIGNIFICANT CHANGE UP (ref 80–100)
MESOTHL CELL # FLD: 5 % — SIGNIFICANT CHANGE UP
MONOCYTES # BLD AUTO: 1.86 K/UL — HIGH (ref 0–0.9)
MONOCYTES NFR BLD AUTO: 3.8 % — SIGNIFICANT CHANGE UP (ref 2–14)
MONOS+MACROS # FLD: 30 % — SIGNIFICANT CHANGE UP
NEUTROPHILS # BLD AUTO: 43.55 K/UL — HIGH (ref 1.8–7.4)
NEUTROPHILS NFR BLD AUTO: 90.2 % — HIGH (ref 43–77)
NEUTROPHILS-BODY FLUID: 40 % — SIGNIFICANT CHANGE UP
NRBC # BLD: 0 /100 WBCS — SIGNIFICANT CHANGE UP (ref 0–0)
PLATELET # BLD AUTO: 373 K/UL — SIGNIFICANT CHANGE UP (ref 150–400)
POTASSIUM SERPL-MCNC: 3.4 MMOL/L — LOW (ref 3.5–5.3)
POTASSIUM SERPL-SCNC: 3.4 MMOL/L — LOW (ref 3.5–5.3)
PROT FLD-MCNC: 1 G/DL — SIGNIFICANT CHANGE UP
PROT SERPL-MCNC: 5.6 G/DL — LOW (ref 6–8.3)
PROTHROM AB SERPL-ACNC: 34.2 SEC — HIGH (ref 10.5–13.4)
RBC # BLD: 2.71 M/UL — LOW (ref 4.2–5.8)
RBC # FLD: 23 % — HIGH (ref 10.3–14.5)
RCV VOL RI: 576 /UL — HIGH (ref 0–5)
SODIUM SERPL-SCNC: 132 MMOL/L — LOW (ref 135–145)
SPECIMEN SOURCE: SIGNIFICANT CHANGE UP
TOTAL NUCLEATED CELL COUNT, BODY FLUID: 41 /UL — SIGNIFICANT CHANGE UP
TUBE TYPE: SIGNIFICANT CHANGE UP
WBC # BLD: 48.33 K/UL — CRITICAL HIGH (ref 3.8–10.5)
WBC # FLD AUTO: 48.33 K/UL — CRITICAL HIGH (ref 3.8–10.5)

## 2022-10-29 PROCEDURE — 49082 ABD PARACENTESIS: CPT

## 2022-10-29 PROCEDURE — 88112 CYTOPATH CELL ENHANCE TECH: CPT | Mod: 26

## 2022-10-29 PROCEDURE — 88305 TISSUE EXAM BY PATHOLOGIST: CPT | Mod: 26

## 2022-10-29 PROCEDURE — 99233 SBSQ HOSP IP/OBS HIGH 50: CPT

## 2022-10-29 RX ORDER — POTASSIUM CHLORIDE 20 MEQ
40 PACKET (EA) ORAL ONCE
Refills: 0 | Status: COMPLETED | OUTPATIENT
Start: 2022-10-29 | End: 2022-10-29

## 2022-10-29 RX ADMIN — NYSTATIN CREAM 1 APPLICATION(S): 100000 CREAM TOPICAL at 05:29

## 2022-10-29 RX ADMIN — LACTULOSE 20 GRAM(S): 10 SOLUTION ORAL at 05:29

## 2022-10-29 RX ADMIN — LACTULOSE 20 GRAM(S): 10 SOLUTION ORAL at 14:19

## 2022-10-29 RX ADMIN — SIMETHICONE 80 MILLIGRAM(S): 80 TABLET, CHEWABLE ORAL at 05:30

## 2022-10-29 RX ADMIN — Medication 40 MILLIEQUIVALENT(S): at 14:21

## 2022-10-29 RX ADMIN — Medication 100 MILLIGRAM(S): at 11:34

## 2022-10-29 RX ADMIN — Medication 50 MILLILITER(S): at 05:29

## 2022-10-29 RX ADMIN — NYSTATIN CREAM 1 APPLICATION(S): 100000 CREAM TOPICAL at 18:02

## 2022-10-29 RX ADMIN — SIMETHICONE 80 MILLIGRAM(S): 80 TABLET, CHEWABLE ORAL at 14:20

## 2022-10-29 RX ADMIN — Medication 1 TABLET(S): at 11:33

## 2022-10-29 RX ADMIN — PANTOPRAZOLE SODIUM 40 MILLIGRAM(S): 20 TABLET, DELAYED RELEASE ORAL at 05:30

## 2022-10-29 RX ADMIN — LACTULOSE 20 GRAM(S): 10 SOLUTION ORAL at 21:10

## 2022-10-29 RX ADMIN — Medication 1 MILLIGRAM(S): at 18:03

## 2022-10-29 RX ADMIN — SIMETHICONE 80 MILLIGRAM(S): 80 TABLET, CHEWABLE ORAL at 21:09

## 2022-10-29 NOTE — PROGRESS NOTE ADULT - PROBLEM SELECTOR PLAN 3
-  Secondary Alcohol Hepatitis   -  U-tox negative for salicylate and acetaminophen levels  -  Hepatitis panel negative  -  bili - elevated  -  Elevated INR and hypoalbuminemia in the setting of liver dysfunction  -  INR 3.3 today (10/25)  -  Vit K 10 mg PO x 3 days 10/25-10/27  -  INR remains elevated at 3.1  -  Hepatology Dr. Gonzalez  - f/u CT abd/pelvis and US to eval ascites, worsening abdominal distension. will likely need paracentesis.

## 2022-10-29 NOTE — PROGRESS NOTE ADULT - PROBLEM SELECTOR PLAN 1
- WBC trending down 60k-->48-->49.5-->48.06--> 52K  - Leukemoid reaction vs steroid induced vs GI infection   - C. diff negative   - completed multiple courses of antibiotics   - Acetylserine started by attending   - flow cytometry, AFP, BCR/ ABL unreamarkable  -  CEA normal  -  heme/onc following QMA   -  ID:  Dr Puentes

## 2022-10-29 NOTE — PROGRESS NOTE ADULT - ASSESSMENT
28 yo [place] speaking Male, with PMH alcohol abuse, s/p Left temporal parietal craniotomy noted in imaging, presented  with altered mental status and seizure that lasted about 2-4 minutes.  In the ED, pt with low grade fever, tachycardic   Admitted to ICU for severe metabolic derangements, CIWA protocol with PRN ativan. Pt was started on antibiotics and IV fluids  Started on lactulose for elevated amonnia level.  Lactate trended down to normal levels. GI and Hepatology was consulted for transaminitis and hyperbilirubinemia. Patient was started on zosyn for possible cholangitis. CT abd showed hepatomegaly with biliary sludge and mild ascites. MRCP was recommended.  Noted with  Hb  7.3,  started on protonix drip and octreotide drip. Patient received one unit PRBC and one unit FFP.  GI on board recommending EGD which did not show any pathology. Flexible sigmoidoscopy showed rectal varices which was cauterized.  switched to ceftriaxone for SBP prophylaxis per ID.  Vit K for 3 days for elevated INR. He was started on prednisone 40mg daily for 28 days for alcoholic hepatitis.   Patient hospital course further complicated by fever, and rising leucocytosis. ID consulted.  blood culture 10/16 NGTD, stool studies no pathogens, Cdfif negative. CT abdomen and pelvis- 1. Marked hepatomegaly/steatosis, unchanged. 2.  Ascites/mesenteric edema and subcutaneous edema, minimally progressed. 3.  Equivocal ascending colonic mural prominence raises the possibility of portal colopathy or inflammation   BCR/ ABL sent pending results. INR 3.16,  liver biopsy plan for biopsy cancelled due to elevated INR. Vit K po continued   Pt seen at bedside, alert, awake, confused to person and place.      10/25-Awake and alert, confused x 3, talkative but does not make sense.  Leukocytosis persists and slightly uptrending, started Acetylcysteine by attending.  Pt. will need safe discharge planning, disposition currently unknown, CM previously consulted.  10/27 Pt is noted with worsening leukocytosis, heme/onc plan for malignancy work up. noted blood in stool, no diarrhea. on Vt k for elevated INR.  GI consulted. plan for colonoscopy next day.  10/28 pt failed prep, non compliant, unable to put NGT for prep due to combative behavior, pulled IV access. Hgb stable. cancelled colonoscopy. Noted worsening abdominal distention. plan CT abd/pelvis and US to evaluation ascites. will need paracentesis.

## 2022-10-29 NOTE — PROGRESS NOTE ADULT - SUBJECTIVE AND OBJECTIVE BOX
MEDICAL ATTENDING NOTE  Patient is a 27y old  Male who presents with a chief complaint of Altered mental status (28 Oct 2022 16:05)      HPI:  Patient is a 27 yo Mauritanian speaking Male, with no known past medical history, s/p Left temporal parietal craniotomy noted in imaging, presenting to the ED with altered mental status since this morning. Pt moaning, not able to provide history and pt's brother's contact information not in chart as family left ED prior to phone number being collected. As per EMS charts, pt was found to be 'asleep' when EMS arrived..as per family, pt has a seizure that lasted about 2-4 minutes. Family states pt has a seizure earlier in the day as well. Pt family denies hx of seizures. They stated pt drinks alcohol on a daily basis and hasn't drank any alcohol in the past 2 days. Family denies any recent trauma related injuries to pt.' Pt's brother told ED attending that the jaundice developed acutely over the last 1 week.     In the ED, pt's vitals were  Temp 100.1, , /67, RR 18 on room air O2 sat 96%  s/p IV Vanc + Zosyn, NS 1 L bolus, Keppra 2 gm x 1, Ativan, Mg, Kcl riders, Thiamine (02 Oct 2022 20:13)      INTERVAL HPI/OVERNIGHT EVENTS:  Patient is calm today.  Agreed to paracentesis.  Consent was obtained from patient's brother via telephone, with .  RN was witness.     MEDICATIONS  (STANDING):  dextrose 5%. 1000 milliLiter(s) (100 mL/Hr) IV Continuous <Continuous>  dextrose 5%. 1000 milliLiter(s) (50 mL/Hr) IV Continuous <Continuous>  dextrose 50% Injectable 25 Gram(s) IV Push once  dextrose 50% Injectable 12.5 Gram(s) IV Push once  dextrose 50% Injectable 25 Gram(s) IV Push once  folic acid 1 milliGRAM(s) Oral <User Schedule>  glucagon  Injectable 1 milliGRAM(s) IntraMuscular once  insulin lispro (ADMELOG) corrective regimen sliding scale   SubCutaneous Before meals and at bedtime  lactulose Syrup 20 Gram(s) Oral every 8 hours  multivitamin 1 Tablet(s) Oral daily  nystatin Powder 1 Application(s) Topical two times a day  pantoprazole    Tablet 40 milliGRAM(s) Oral before breakfast  simethicone 80 milliGRAM(s) Chew three times a day  sodium chloride 0.45% 1000 milliLiter(s) (75 mL/Hr) IV Continuous <Continuous>  sodium chloride 0.9%. 1000 milliLiter(s) (75 mL/Hr) IV Continuous <Continuous>  thiamine Injectable 100 milliGRAM(s) IntraMuscular daily    MEDICATIONS  (PRN):  aluminum hydroxide/magnesium hydroxide/simethicone Suspension 30 milliLiter(s) Oral every 4 hours PRN Dyspepsia  dextrose Oral Gel 15 Gram(s) Oral once PRN Blood Glucose LESS THAN 70 milliGRAM(s)/deciliter      __________________________________________________  REVIEW OF SYSTEMS:    Unable to elicit    Vital Signs Last 24 Hrs  T(C): 36.2 (29 Oct 2022 12:00), Max: 36.7 (28 Oct 2022 21:16)  T(F): 97.2 (29 Oct 2022 12:00), Max: 98 (28 Oct 2022 21:16)  HR: 99 (29 Oct 2022 12:00) (99 - 105)  BP: 127/72 (29 Oct 2022 12:00) (113/67 - 127/72)  BP(mean): --  RR: 20 (29 Oct 2022 12:00) (18 - 20)  SpO2: 100% (29 Oct 2022 12:00) (98% - 100%)    Parameters below as of 29 Oct 2022 12:00  Patient On (Oxygen Delivery Method): room air    ________________________________________________  PHYSICAL EXAM:  GENERAL: Jaundiced man, in NAD  HEENT: healed occipital craniotomy scar   NECK : supple  CHEST/LUNG: Clear to auscultation bilaterally with good air entry   HEART: S1 S2  regular; no murmurs, gallops or rubs  ABDOMEN: Soft, Nontender, Hepatosplenomegaly is present.  Ascites is present on examineation  EXTREMITIES: no cyanosis; no edema; no calf tenderness  SKIN: warm and dry; no rash  NERVOUS SYSTEM:  Awake and alert; Expressive aphasia, ataxic gait.    _________________________________________________  LABS:                        8.6    48.33 )-----------( 373      ( 29 Oct 2022 09:42 )             25.5     10-29    132<L>  |  101  |  9   ----------------------------<  92  3.4<L>   |  18<L>  |  0.64    Ca    8.2<L>      29 Oct 2022 09:42  Phos  3.9     10-28  Mg     1.9     10-28    TPro  5.6<L>  /  Alb  1.7<L>  /  TBili  18.7<H>  /  DBili  x   /  AST  67<H>  /  ALT  14  /  AlkPhos  300<H>  10-29    PT/INR - ( 29 Oct 2022 09:42 )   PT: 34.2 sec;   INR: 2.84 ratio         PTT - ( 29 Oct 2022 09:42 )  PTT:49.8 sec    CAPILLARY BLOOD GLUCOSE      POCT Blood Glucose.: 98 mg/dL (29 Oct 2022 17:16)  POCT Blood Glucose.: 90 mg/dL (29 Oct 2022 07:53)  POCT Blood Glucose.: 109 mg/dL (28 Oct 2022 21:31)

## 2022-10-29 NOTE — PROCEDURE NOTE - NSINFORMCONSENT_GEN_A_CORE
Brother/Benefits, risks, and possible complications of procedure explained to patient/caregiver who verbalized understanding and gave written consent.

## 2022-10-29 NOTE — PROGRESS NOTE ADULT - PROBLEM SELECTOR PLAN 2
-  Metabolic encephalopathy secondary to alcohol hepatitis   - liver biopsy cxd 10/21 due to elevated INR   -  discontinue lactulose - Patient numerous loose BM having diarrhea  -  Transaminitis- unchanged  -  Severe hepatomegaly w/mild ascites on CT abdomen w/gall bladder sludge  -  Lipase level normal  -  Hepatitis panel negative   -  Hepatic USG for characterization of gall bladder attempted however patient was very agitated  -  EGD on 10/5 with GI Dr. Johnson showed Large rectal Dieulafoy lesion. Hemostasis achieved with over-the-scope clip  -  Foreign body noted in abdominal xray -  clip placed by GI   -  RUQ US without ascites, large fatty liver noted on 10/10  -hepatology dr. montilla following  - restart lactulose

## 2022-10-29 NOTE — PROCEDURE NOTE - ADDITIONAL PROCEDURE DETAILS
Bedside paracentesis preformed with ultrasound guidance, return of serosanguinous, thick, viscous fluid. Scant ascites from site after removal, pressure held and dressing applied. Monitor site for leakage.     Patient is AMS, however procedure and instructions explained to him using  ID [id number]. Consent obtained from brother over phone.

## 2022-10-29 NOTE — PROGRESS NOTE ADULT - PROBLEM SELECTOR PLAN 8
-  Due to liver failure  -  No overt s/s of bleeding   -  Monitor CBC daily  - noted bloody BM 10/27, and previous week  - s/p IVF for tachycardia  - Hgb 9.2  -GI dr. Clarke/Elizabeth consulted  - plan for colonoscopy 10/28  - cancelled as failed prep, unable NGT placement due to combative behavior  - s/p Vt K x 3 days for elevated INR, now trending down to 2.81

## 2022-10-29 NOTE — PROGRESS NOTE ADULT - NS ATTEND AMEND GEN_ALL_CORE FT
Patient now has increased abdominal girth.  Fluid wave is present on exam.   Bedside ultrasound shows ascites.   Colonoscopy was cancelled yesterday because patient declined to drink prep.  GI does not plan to reschedule unless patient has active bleeding.   IV albumin was given x 3    Diagnostic paracentesis was performed. Fluid did not flow freely, so minimal fluid was removed.   Await results of fluid analysis.

## 2022-10-29 NOTE — PROGRESS NOTE ADULT - PROBLEM SELECTOR PLAN 9
-clinically active with worsening leukocytosis, liver failure  -monitor bloody BM, CBC.   -DC plan pending : GI, hepatology, heme/onc following.   -last COVID negative 10/27  -f/u CT abd/pelvis, US, will need paracentesis

## 2022-10-30 LAB
ALBUMIN SERPL ELPH-MCNC: 1.4 G/DL — LOW (ref 3.5–5)
ALP SERPL-CCNC: 289 U/L — HIGH (ref 40–120)
ALT FLD-CCNC: 14 U/L DA — SIGNIFICANT CHANGE UP (ref 10–60)
ANION GAP SERPL CALC-SCNC: 10 MMOL/L — SIGNIFICANT CHANGE UP (ref 5–17)
APTT BLD: 48.5 SEC — HIGH (ref 27.5–35.5)
AST SERPL-CCNC: 72 U/L — HIGH (ref 10–40)
BASOPHILS # BLD AUTO: 0.05 K/UL — SIGNIFICANT CHANGE UP (ref 0–0.2)
BASOPHILS NFR BLD AUTO: 0.1 % — SIGNIFICANT CHANGE UP (ref 0–2)
BILIRUB SERPL-MCNC: 16.6 MG/DL — HIGH (ref 0.2–1.2)
BUN SERPL-MCNC: 10 MG/DL — SIGNIFICANT CHANGE UP (ref 7–18)
CALCIUM SERPL-MCNC: 7.8 MG/DL — LOW (ref 8.4–10.5)
CHLORIDE SERPL-SCNC: 106 MMOL/L — SIGNIFICANT CHANGE UP (ref 96–108)
CO2 SERPL-SCNC: 17 MMOL/L — LOW (ref 22–31)
CREAT SERPL-MCNC: 0.62 MG/DL — SIGNIFICANT CHANGE UP (ref 0.5–1.3)
EGFR: 136 ML/MIN/1.73M2 — SIGNIFICANT CHANGE UP
EOSINOPHIL # BLD AUTO: 0.36 K/UL — SIGNIFICANT CHANGE UP (ref 0–0.5)
EOSINOPHIL NFR BLD AUTO: 0.7 % — SIGNIFICANT CHANGE UP (ref 0–6)
GLUCOSE BLDC GLUCOMTR-MCNC: 102 MG/DL — HIGH (ref 70–99)
GLUCOSE BLDC GLUCOMTR-MCNC: 117 MG/DL — HIGH (ref 70–99)
GLUCOSE BLDC GLUCOMTR-MCNC: 97 MG/DL — SIGNIFICANT CHANGE UP (ref 70–99)
GLUCOSE SERPL-MCNC: 85 MG/DL — SIGNIFICANT CHANGE UP (ref 70–99)
HCT VFR BLD CALC: 27 % — LOW (ref 39–50)
HGB BLD-MCNC: 8.7 G/DL — LOW (ref 13–17)
IMM GRANULOCYTES NFR BLD AUTO: 2.8 % — HIGH (ref 0–0.9)
INR BLD: 2.88 RATIO — HIGH (ref 0.88–1.16)
LYMPHOCYTES # BLD AUTO: 1.75 K/UL — SIGNIFICANT CHANGE UP (ref 1–3.3)
LYMPHOCYTES # BLD AUTO: 3.6 % — LOW (ref 13–44)
MCHC RBC-ENTMCNC: 31 PG — SIGNIFICANT CHANGE UP (ref 27–34)
MCHC RBC-ENTMCNC: 32.2 GM/DL — SIGNIFICANT CHANGE UP (ref 32–36)
MCV RBC AUTO: 96.1 FL — SIGNIFICANT CHANGE UP (ref 80–100)
MONOCYTES # BLD AUTO: 2.35 K/UL — HIGH (ref 0–0.9)
MONOCYTES NFR BLD AUTO: 4.9 % — SIGNIFICANT CHANGE UP (ref 2–14)
NEUTROPHILS # BLD AUTO: 42.31 K/UL — HIGH (ref 1.8–7.4)
NEUTROPHILS NFR BLD AUTO: 87.9 % — HIGH (ref 43–77)
NRBC # BLD: 0 /100 WBCS — SIGNIFICANT CHANGE UP (ref 0–0)
PLATELET # BLD AUTO: 333 K/UL — SIGNIFICANT CHANGE UP (ref 150–400)
POTASSIUM SERPL-MCNC: 4.2 MMOL/L — SIGNIFICANT CHANGE UP (ref 3.5–5.3)
POTASSIUM SERPL-SCNC: 4.2 MMOL/L — SIGNIFICANT CHANGE UP (ref 3.5–5.3)
PROT SERPL-MCNC: 5.2 G/DL — LOW (ref 6–8.3)
PROTHROM AB SERPL-ACNC: 34.7 SEC — HIGH (ref 10.5–13.4)
RBC # BLD: 2.81 M/UL — LOW (ref 4.2–5.8)
RBC # FLD: 23.2 % — HIGH (ref 10.3–14.5)
SODIUM SERPL-SCNC: 133 MMOL/L — LOW (ref 135–145)
WBC # BLD: 48.15 K/UL — CRITICAL HIGH (ref 3.8–10.5)
WBC # FLD AUTO: 48.15 K/UL — CRITICAL HIGH (ref 3.8–10.5)

## 2022-10-30 RX ORDER — THIAMINE MONONITRATE (VIT B1) 100 MG
100 TABLET ORAL DAILY
Refills: 0 | Status: DISCONTINUED | OUTPATIENT
Start: 2022-10-31 | End: 2022-11-04

## 2022-10-30 RX ORDER — ALBUMIN HUMAN 25 %
100 VIAL (ML) INTRAVENOUS EVERY 8 HOURS
Refills: 0 | Status: COMPLETED | OUTPATIENT
Start: 2022-10-30 | End: 2022-10-31

## 2022-10-30 RX ADMIN — NYSTATIN CREAM 1 APPLICATION(S): 100000 CREAM TOPICAL at 06:03

## 2022-10-30 RX ADMIN — LACTULOSE 20 GRAM(S): 10 SOLUTION ORAL at 06:03

## 2022-10-30 RX ADMIN — LACTULOSE 20 GRAM(S): 10 SOLUTION ORAL at 21:23

## 2022-10-30 RX ADMIN — LACTULOSE 20 GRAM(S): 10 SOLUTION ORAL at 16:14

## 2022-10-30 RX ADMIN — SIMETHICONE 80 MILLIGRAM(S): 80 TABLET, CHEWABLE ORAL at 06:03

## 2022-10-30 RX ADMIN — NYSTATIN CREAM 1 APPLICATION(S): 100000 CREAM TOPICAL at 17:54

## 2022-10-30 RX ADMIN — SIMETHICONE 80 MILLIGRAM(S): 80 TABLET, CHEWABLE ORAL at 16:13

## 2022-10-30 RX ADMIN — Medication 1 MILLIGRAM(S): at 09:46

## 2022-10-30 RX ADMIN — SIMETHICONE 80 MILLIGRAM(S): 80 TABLET, CHEWABLE ORAL at 21:23

## 2022-10-30 RX ADMIN — PANTOPRAZOLE SODIUM 40 MILLIGRAM(S): 20 TABLET, DELAYED RELEASE ORAL at 06:02

## 2022-10-30 RX ADMIN — Medication 50 MILLILITER(S): at 21:22

## 2022-10-30 RX ADMIN — Medication 1 TABLET(S): at 16:13

## 2022-10-30 RX ADMIN — Medication 50 MILLILITER(S): at 16:10

## 2022-10-30 RX ADMIN — Medication 1 MILLIGRAM(S): at 16:14

## 2022-10-30 NOTE — PROGRESS NOTE ADULT - NS ATTEND AMEND GEN_ALL_CORE FT
Patient is alert and cooperative.  Apologizes for his previous behavior.   Brother is visiting at the bedside.     Alert, jaundiced man in NAD  Vital Signs Last 24 Hrs  T(C): 37.1 (30 Oct 2022 13:44), Max: 37.1 (30 Oct 2022 13:44)  T(F): 98.7 (30 Oct 2022 13:44), Max: 98.7 (30 Oct 2022 13:44)  HR: 98 (30 Oct 2022 13:44) (98 - 115)  BP: 118/66 (30 Oct 2022 13:44) (117/62 - 118/66)  BP(mean): --  RR: 18 (30 Oct 2022 13:44) (18 - 20)  SpO2: 100% (30 Oct 2022 13:44) (98% - 100%)    Parameters below as of 30 Oct 2022 13:44  Patient On (Oxygen Delivery Method): room air  Scleral icterus  healed craniotomy scar  Lungs, clear  Cor, RRR  Abdomen, hepatosplenomegaly, ascites is present, no tension, non-tender  Neurological, alert, expressive aphasia                          8.7    48.15 )-----------( 333      ( 30 Oct 2022 07:47 )             27.0   10-30    133<L>  |  106  |  10  ----------------------------<  85  4.2   |  17<L>  |  0.62    Ca    7.8<L>      30 Oct 2022 07:47    TPro  5.2<L>  /  Alb  1.4<L>  /  TBili  16.6<H>  /  DBili  x   /  AST  72<H>  /  ALT  14  /  AlkPhos  289<H>  10-30        Protein Total, Fluid: 1.0: Test Repeated Lactate Dehydrogenase, Fluid: 92:Glucose, Fluid: 106: polymorphonuclear leukocytes seen   No organisms seen Total Nucleated Cell Count, Body Fluid: 41Total RBC Count: 576 /uTotal RBC Count: 576 /u    IMP:  Hepatosplenomegaly with coagulopathy, likely from alcoholic hepatitis.           Transudative ascites, no evidence of SBP.  SAAG = 0.6, not c/w portal hypertension          anemia, likely from chronic disease.  No iron deficiency despite known LGI bleed from Dieulafoy lesion in the sigmoid.           Hx TBI, s/p craniotomy.            Encephalopathy is more likely related to TBI.           Hepatic encephalopathy is controlled with lactulose          hyponatremia  Plan:  repeat albumin 25% x 3 over 24 hours.           PT reevaluation on 10/27, appreciated.           Begin planning for post-hospital care

## 2022-10-30 NOTE — PROGRESS NOTE ADULT - SUBJECTIVE AND OBJECTIVE BOX
26 y/o Tongan speaking Male, with PMH alcohol abuse, s/p Left temporal parietal craniotomy noted in imaging, presented  with altered mental status and seizure that lasted about 2-4 minutes. S/P ICU. Admitted to hospital for acute hepatic encephalopathy 2/2 possible alcoholic hepatitis.     #hyponatremia  Pt p/w Na 133  albumin low 1.4  give albumin 25 g q8h  monitor for improvement    #le  pt p/w Cr 0.6  baseline Cr 0.3  monitor BMP  plan as above

## 2022-10-31 LAB
ALBUMIN SERPL ELPH-MCNC: 1.9 G/DL — LOW (ref 3.5–5)
ALP SERPL-CCNC: 239 U/L — HIGH (ref 40–120)
ALT FLD-CCNC: 11 U/L DA — SIGNIFICANT CHANGE UP (ref 10–60)
ANION GAP SERPL CALC-SCNC: 14 MMOL/L — SIGNIFICANT CHANGE UP (ref 5–17)
ANISOCYTOSIS BLD QL: SLIGHT — SIGNIFICANT CHANGE UP
APTT BLD: 57.2 SEC — HIGH (ref 27.5–35.5)
AST SERPL-CCNC: 58 U/L — HIGH (ref 10–40)
BASOPHILS # BLD AUTO: 0 K/UL — SIGNIFICANT CHANGE UP (ref 0–0.2)
BASOPHILS NFR BLD AUTO: 0 % — SIGNIFICANT CHANGE UP (ref 0–2)
BILIRUB SERPL-MCNC: 16.4 MG/DL — HIGH (ref 0.2–1.2)
BUN SERPL-MCNC: 6 MG/DL — LOW (ref 7–18)
CALCIUM SERPL-MCNC: 8 MG/DL — LOW (ref 8.4–10.5)
CHLORIDE SERPL-SCNC: 104 MMOL/L — SIGNIFICANT CHANGE UP (ref 96–108)
CO2 SERPL-SCNC: 16 MMOL/L — LOW (ref 22–31)
CREAT SERPL-MCNC: 0.58 MG/DL — SIGNIFICANT CHANGE UP (ref 0.5–1.3)
EGFR: 139 ML/MIN/1.73M2 — SIGNIFICANT CHANGE UP
EOSINOPHIL # BLD AUTO: 0.44 K/UL — SIGNIFICANT CHANGE UP (ref 0–0.5)
EOSINOPHIL NFR BLD AUTO: 1 % — SIGNIFICANT CHANGE UP (ref 0–6)
GIANT PLATELETS BLD QL SMEAR: PRESENT — SIGNIFICANT CHANGE UP
GLUCOSE BLDC GLUCOMTR-MCNC: 108 MG/DL — HIGH (ref 70–99)
GLUCOSE BLDC GLUCOMTR-MCNC: 109 MG/DL — HIGH (ref 70–99)
GLUCOSE BLDC GLUCOMTR-MCNC: 130 MG/DL — HIGH (ref 70–99)
GLUCOSE BLDC GLUCOMTR-MCNC: 93 MG/DL — SIGNIFICANT CHANGE UP (ref 70–99)
GLUCOSE SERPL-MCNC: 94 MG/DL — SIGNIFICANT CHANGE UP (ref 70–99)
HCT VFR BLD CALC: 24.7 % — LOW (ref 39–50)
HCV RNA FLD QL NAA+PROBE: SIGNIFICANT CHANGE UP
HCV RNA SPEC QL PROBE+SIG AMP: SIGNIFICANT CHANGE UP
HGB BLD-MCNC: 8.4 G/DL — LOW (ref 13–17)
HYPOSEGMENTATION: PRESENT — SIGNIFICANT CHANGE UP
INR BLD: 3.34 RATIO — HIGH (ref 0.88–1.16)
LG PLATELETS BLD QL AUTO: SLIGHT — SIGNIFICANT CHANGE UP
LYMPHOCYTES # BLD AUTO: 1.75 K/UL — SIGNIFICANT CHANGE UP (ref 1–3.3)
LYMPHOCYTES # BLD AUTO: 4 % — LOW (ref 13–44)
MACROCYTES BLD QL: SLIGHT — SIGNIFICANT CHANGE UP
MANUAL SMEAR VERIFICATION: SIGNIFICANT CHANGE UP
MCHC RBC-ENTMCNC: 31.7 PG — SIGNIFICANT CHANGE UP (ref 27–34)
MCHC RBC-ENTMCNC: 34 GM/DL — SIGNIFICANT CHANGE UP (ref 32–36)
MCV RBC AUTO: 93.2 FL — SIGNIFICANT CHANGE UP (ref 80–100)
METAMYELOCYTES # FLD: 2 % — HIGH (ref 0–0)
MONOCYTES # BLD AUTO: 1.31 K/UL — HIGH (ref 0–0.9)
MONOCYTES NFR BLD AUTO: 3 % — SIGNIFICANT CHANGE UP (ref 2–14)
MYELOCYTES NFR BLD: 1 % — HIGH (ref 0–0)
NEUTROPHILS # BLD AUTO: 38.94 K/UL — HIGH (ref 1.8–7.4)
NEUTROPHILS NFR BLD AUTO: 89 % — HIGH (ref 43–77)
NRBC # BLD: 0 /100 — SIGNIFICANT CHANGE UP (ref 0–0)
PLAT MORPH BLD: NORMAL — SIGNIFICANT CHANGE UP
PLATELET # BLD AUTO: 319 K/UL — SIGNIFICANT CHANGE UP (ref 150–400)
POIKILOCYTOSIS BLD QL AUTO: SLIGHT — SIGNIFICANT CHANGE UP
POTASSIUM SERPL-MCNC: 3.5 MMOL/L — SIGNIFICANT CHANGE UP (ref 3.5–5.3)
POTASSIUM SERPL-SCNC: 3.5 MMOL/L — SIGNIFICANT CHANGE UP (ref 3.5–5.3)
PROT SERPL-MCNC: 5.3 G/DL — LOW (ref 6–8.3)
PROTHROM AB SERPL-ACNC: 40.2 SEC — HIGH (ref 10.5–13.4)
RBC # BLD: 2.65 M/UL — LOW (ref 4.2–5.8)
RBC # FLD: 22.6 % — HIGH (ref 10.3–14.5)
RBC BLD AUTO: ABNORMAL
SMUDGE CELLS # BLD: PRESENT — SIGNIFICANT CHANGE UP
SODIUM SERPL-SCNC: 134 MMOL/L — LOW (ref 135–145)
WBC # BLD: 43.75 K/UL — CRITICAL HIGH (ref 3.8–10.5)
WBC # FLD AUTO: 43.75 K/UL — CRITICAL HIGH (ref 3.8–10.5)

## 2022-10-31 PROCEDURE — 99233 SBSQ HOSP IP/OBS HIGH 50: CPT

## 2022-10-31 PROCEDURE — 99239 HOSP IP/OBS DSCHRG MGMT >30: CPT

## 2022-10-31 RX ORDER — LACTULOSE 10 G/15ML
30 SOLUTION ORAL
Qty: 2700 | Refills: 0
Start: 2022-10-31 | End: 2022-11-29

## 2022-10-31 RX ORDER — FOLIC ACID 0.8 MG
1 TABLET ORAL
Qty: 14 | Refills: 0
Start: 2022-10-31 | End: 2022-11-13

## 2022-10-31 RX ORDER — PHYTONADIONE (VIT K1) 5 MG
10 TABLET ORAL DAILY
Refills: 0 | Status: COMPLETED | OUTPATIENT
Start: 2022-10-31 | End: 2022-11-01

## 2022-10-31 RX ORDER — THIAMINE MONONITRATE (VIT B1) 100 MG
1 TABLET ORAL
Qty: 14 | Refills: 0
Start: 2022-10-31 | End: 2022-11-13

## 2022-10-31 RX ORDER — PHYTONADIONE (VIT K1) 5 MG
10 TABLET ORAL
Qty: 1 | Refills: 0
Start: 2022-10-31 | End: 2022-11-13

## 2022-10-31 RX ORDER — PHYTONADIONE (VIT K1) 5 MG
2 TABLET ORAL
Qty: 28 | Refills: 0
Start: 2022-10-31 | End: 2022-11-13

## 2022-10-31 RX ORDER — SIMETHICONE 80 MG/1
1 TABLET, CHEWABLE ORAL
Qty: 42 | Refills: 0
Start: 2022-10-31 | End: 2022-11-13

## 2022-10-31 RX ORDER — PANTOPRAZOLE SODIUM 20 MG/1
1 TABLET, DELAYED RELEASE ORAL
Qty: 14 | Refills: 0
Start: 2022-10-31 | End: 2022-11-13

## 2022-10-31 RX ORDER — NYSTATIN CREAM 100000 [USP'U]/G
1 CREAM TOPICAL
Qty: 10 | Refills: 0
Start: 2022-10-31 | End: 2022-11-13

## 2022-10-31 RX ADMIN — Medication 1 MILLIGRAM(S): at 09:12

## 2022-10-31 RX ADMIN — LACTULOSE 20 GRAM(S): 10 SOLUTION ORAL at 05:20

## 2022-10-31 RX ADMIN — Medication 1 MILLIGRAM(S): at 16:31

## 2022-10-31 RX ADMIN — Medication 100 MILLIGRAM(S): at 12:42

## 2022-10-31 RX ADMIN — Medication 50 MILLILITER(S): at 05:20

## 2022-10-31 RX ADMIN — SIMETHICONE 80 MILLIGRAM(S): 80 TABLET, CHEWABLE ORAL at 05:22

## 2022-10-31 RX ADMIN — Medication 10 MILLIGRAM(S): at 12:42

## 2022-10-31 RX ADMIN — Medication 1 TABLET(S): at 12:42

## 2022-10-31 RX ADMIN — SIMETHICONE 80 MILLIGRAM(S): 80 TABLET, CHEWABLE ORAL at 13:47

## 2022-10-31 RX ADMIN — LACTULOSE 20 GRAM(S): 10 SOLUTION ORAL at 13:47

## 2022-10-31 RX ADMIN — PANTOPRAZOLE SODIUM 40 MILLIGRAM(S): 20 TABLET, DELAYED RELEASE ORAL at 05:22

## 2022-10-31 RX ADMIN — SIMETHICONE 80 MILLIGRAM(S): 80 TABLET, CHEWABLE ORAL at 21:53

## 2022-10-31 RX ADMIN — LACTULOSE 20 GRAM(S): 10 SOLUTION ORAL at 21:04

## 2022-10-31 RX ADMIN — NYSTATIN CREAM 1 APPLICATION(S): 100000 CREAM TOPICAL at 05:21

## 2022-10-31 NOTE — PROGRESS NOTE ADULT - NS ATTEND AMEND GEN_ALL_CORE FT
Patient is alert.      Alert, jaundiced man in NAD  Vital Signs Last 24 Hrs  T(C): 36.1 (31 Oct 2022 21:16), Max: 36.8 (31 Oct 2022 04:47)  T(F): 97 (31 Oct 2022 21:16), Max: 98.2 (31 Oct 2022 04:47)  HR: 107 (31 Oct 2022 21:16) (104 - 107)  BP: 110/68 (31 Oct 2022 21:16) (110/68 - 122/74)  BP(mean): --  RR: 19 (31 Oct 2022 21:16) (18 - 19)  SpO2: 99% (31 Oct 2022 21:16) (96% - 99%)    Parameters below as of 31 Oct 2022 21:16  Patient On (Oxygen Delivery Method): room air    Scleral icterus  healed craniotomy scar  Lungs, clear  Cor, RRR  Abdomen, hepatosplenomegaly, ascites is present, no tension, non-tender  Neurological, alert, expressive aphasia                        8.4    43.75 )-----------( 319      ( 31 Oct 2022 06:52 )             24.7   10-31    134<L>  |  104  |  6<L>  ----------------------------<  94  3.5   |  16<L>  |  0.58    Ca    8.0<L>      31 Oct 2022 06:52    TPro  5.3<L>  /  Alb  1.9<L>  /  TBili  16.4<H>  /  DBili  x   /  AST  58<H>  /  ALT  11  /  AlkPhos  239<H>  10-31    Protein Total, Fluid: 1.0: Test Repeated Lactate Dehydrogenase, Fluid: 92:Glucose, Fluid: 106: polymorphonuclear leukocytes seen   No organisms seen Total Nucleated Cell Count, Body Fluid: 41Total RBC Count: 576 /uTotal RBC Count: 576 /u    IMP:  Hepatosplenomegaly with coagulopathy, likely from alcoholic hepatitis, stabilized          Transudative ascites, no evidence of SBP.  SAAG = 0.6, not c/w portal hypertension          anemia, likely from chronic disease.  No iron deficiency despite known LGI bleed from Dieulafoy lesion in the sigmoid.           Hx TBI, s/p craniotomy.            Encephalopathy is more likely related to TBI and is chronic.          Hepatic encephalopathy is controlled with lactulose          hyponatremia, improved  Plan:            PT reevaluation on 10/27, appreciated.           Discharge home.  Brother will pick him up.  f/u [facility]

## 2022-10-31 NOTE — PROGRESS NOTE ADULT - PROBLEM SELECTOR PLAN 9
-clinically active with worsening leukocytosis, liver failure  -monitor bloody BM, CBC.   -on DC pt needs to f/u GI, hepatology, heme/onc    -last COVID negative 10/27  -[facility] meds sent 10/31, SW to follow

## 2022-10-31 NOTE — DISCHARGE NOTE NURSING/CASE MANAGEMENT/SOCIAL WORK - PATIENT PORTAL LINK FT
You can access the FollowMyHealth Patient Portal offered by [facility] by registering at the following website: [email or web address]. By joining [facility]’s FollowMyHealth portal, you will also be able to view your health information using other applications (apps) compatible with our system.

## 2022-10-31 NOTE — PROGRESS NOTE ADULT - SUBJECTIVE AND OBJECTIVE BOX
Chief Complaint:  Patient is a 27y old  Male who presents with a chief complaint of Altered mental status (31 Oct 2022 11:55)      Reason for consult:    Interval Events:     Hospital Medications:  aluminum hydroxide/magnesium hydroxide/simethicone Suspension 30 milliLiter(s) Oral every 4 hours PRN  dextrose 5%. 1000 milliLiter(s) IV Continuous <Continuous>  dextrose 5%. 1000 milliLiter(s) IV Continuous <Continuous>  dextrose 50% Injectable 25 Gram(s) IV Push once  dextrose 50% Injectable 12.5 Gram(s) IV Push once  dextrose 50% Injectable 25 Gram(s) IV Push once  dextrose Oral Gel 15 Gram(s) Oral once PRN  folic acid 1 milliGRAM(s) Oral <User Schedule>  glucagon  Injectable 1 milliGRAM(s) IntraMuscular once  insulin lispro (ADMELOG) corrective regimen sliding scale   SubCutaneous Before meals and at bedtime  lactulose Syrup 20 Gram(s) Oral every 8 hours  multivitamin 1 Tablet(s) Oral daily  nystatin Powder 1 Application(s) Topical two times a day  pantoprazole    Tablet 40 milliGRAM(s) Oral before breakfast  phytonadione   Solution 10 milliGRAM(s) Oral daily  simethicone 80 milliGRAM(s) Chew three times a day  thiamine 100 milliGRAM(s) Oral daily      ROS:   General:  No  fevers, chills, night sweats, fatigue  Eyes:  Good vision, no reported pain  ENT:  No sore throat, pain, runny nose  CV:  No pain, palpitations  Pulm:  No dyspnea, cough  GI:  See HPI, otherwise negative  :  No  incontinence, nocturia  Muscle:  No pain, weakness  Neuro:  No memory problems  Psych:  No insomnia, mood problems, depression  Endocrine:  No polyuria, polydipsia, cold/heat intolerance  Heme:  No petechiae, ecchymosis, easy bruisability  Skin:  No rash    PHYSICAL EXAM:   Vital Signs:  Vital Signs Last 24 Hrs  T(C): 36.8 (31 Oct 2022 04:47), Max: 37.1 (30 Oct 2022 13:44)  T(F): 98.2 (31 Oct 2022 04:47), Max: 98.7 (30 Oct 2022 13:44)  HR: 105 (31 Oct 2022 04:47) (98 - 106)  BP: 122/67 (31 Oct 2022 04:47) (118/66 - 126/73)  BP(mean): --  RR: 18 (31 Oct 2022 04:47) (18 - 18)  SpO2: 97% (31 Oct 2022 04:47) (97% - 100%)    Parameters below as of 31 Oct 2022 04:47  Patient On (Oxygen Delivery Method): room air      Daily     Daily     GENERAL: no acute distress  NEURO: alert, no asterixis  HEENT: anicteric sclera, no conjunctival pallor appreciated  CHEST: no respiratory distress, no accessory muscle use  CARDIAC: regular rate, rhythm  ABDOMEN: soft, non-tender, non-distended, no rebound or guarding  EXTREMITIES: warm, well perfused, no edema  SKIN: no lesions noted    LABS: reviewed                        8.4    43.75 )-----------( 319      ( 31 Oct 2022 06:52 )             24.7     10-31    134<L>  |  104  |  6<L>  ----------------------------<  94  3.5   |  16<L>  |  0.58    Ca    8.0<L>      31 Oct 2022 06:52    TPro  5.3<L>  /  Alb  1.9<L>  /  TBili  16.4<H>  /  DBili  x   /  AST  58<H>  /  ALT  11  /  AlkPhos  239<H>  10-31    LIVER FUNCTIONS - ( 31 Oct 2022 06:52 )  Alb: 1.9 g/dL / Pro: 5.3 g/dL / ALK PHOS: 239 U/L / ALT: 11 U/L DA / AST: 58 U/L / GGT: x             Interval Diagnostic Studies: see sunrise for full report   Chief Complaint:  Patient is a 27y old  Male who presents with a chief complaint of Altered mental status (31 Oct 2022 11:55)      Reason for consult: SYEDA vs. ACLF    Interval Events: Patient was seen and examined at bedside. No new complaints. Had Dx paracentesis 10/29, no SBP. Hb stable. WBC slightly downtrending. Bilirubin fluctuating, slightly downtrending. INR has not improved.     Hospital Medications:  aluminum hydroxide/magnesium hydroxide/simethicone Suspension 30 milliLiter(s) Oral every 4 hours PRN  dextrose 5%. 1000 milliLiter(s) IV Continuous <Continuous>  dextrose 5%. 1000 milliLiter(s) IV Continuous <Continuous>  dextrose 50% Injectable 25 Gram(s) IV Push once  dextrose 50% Injectable 12.5 Gram(s) IV Push once  dextrose 50% Injectable 25 Gram(s) IV Push once  dextrose Oral Gel 15 Gram(s) Oral once PRN  folic acid 1 milliGRAM(s) Oral <User Schedule>  glucagon  Injectable 1 milliGRAM(s) IntraMuscular once  insulin lispro (ADMELOG) corrective regimen sliding scale   SubCutaneous Before meals and at bedtime  lactulose Syrup 20 Gram(s) Oral every 8 hours  multivitamin 1 Tablet(s) Oral daily  nystatin Powder 1 Application(s) Topical two times a day  pantoprazole    Tablet 40 milliGRAM(s) Oral before breakfast  phytonadione   Solution 10 milliGRAM(s) Oral daily  simethicone 80 milliGRAM(s) Chew three times a day  thiamine 100 milliGRAM(s) Oral daily      ROS: Limited due to patient condition  General:  No  fevers, chills  ENT:  No sore throat, pain, runny nose  CV:  No pain  Pulm:  No dyspnea, cough  GI:  Abdominal tenderness, no N/V, still multiple BMs per PCA, but small, no overt bleeding recently  :  does not answer  Neuro:  Remains unchanged, not oriented in time  Skin:  Jaundice    PHYSICAL EXAM:   Vital Signs:  Vital Signs Last 24 Hrs  T(C): 36.8 (31 Oct 2022 04:47), Max: 37.1 (30 Oct 2022 13:44)  T(F): 98.2 (31 Oct 2022 04:47), Max: 98.7 (30 Oct 2022 13:44)  HR: 105 (31 Oct 2022 04:47) (98 - 106)  BP: 122/67 (31 Oct 2022 04:47) (118/66 - 126/73)  BP(mean): --  RR: 18 (31 Oct 2022 04:47) (18 - 18)  SpO2: 97% (31 Oct 2022 04:47) (97% - 100%)    Parameters below as of 31 Oct 2022 04:47  Patient On (Oxygen Delivery Method): room air      Daily     Daily     GENERAL: no acute distress  NEURO: awake, alert, oriented x2, no asterixis  HEENT: icteric sclera  CHEST: no respiratory distress, no accessory muscle use  CARDIAC: regular rate, rhythm  ABDOMEN: soft, distended, mild diffuse tenderness, hepatomegaly, no rebound or guarding  EXTREMITIES: b/l mile LE edema  SKIN: jaundice    LABS: reviewed                        8.4    43.75 )-----------( 319      ( 31 Oct 2022 06:52 )             24.7     10-31    134<L>  |  104  |  6<L>  ----------------------------<  94  3.5   |  16<L>  |  0.58    Ca    8.0<L>      31 Oct 2022 06:52    TPro  5.3<L>  /  Alb  1.9<L>  /  TBili  16.4<H>  /  DBili  x   /  AST  58<H>  /  ALT  11  /  AlkPhos  239<H>  10-31    LIVER FUNCTIONS - ( 31 Oct 2022 06:52 )  Alb: 1.9 g/dL / Pro: 5.3 g/dL / ALK PHOS: 239 U/L / ALT: 11 U/L DA / AST: 58 U/L / GGT: x             Interval Diagnostic Studies: see sunrise for full report

## 2022-10-31 NOTE — PROGRESS NOTE ADULT - PROBLEM SELECTOR PLAN 3
-  Secondary Alcohol Hepatitis   -  U-tox negative for salicylate and acetaminophen levels  -  Hepatitis panel negative  -  bili - elevated  -  Elevated INR and hypoalbuminemia in the setting of liver dysfunction  -  INR 3.3 today (10/25)  -  Vit K 10 mg PO x 3 days 10/25-10/27  -  INR remains elevated at 3.34, restarted Vt K  -  Hepatology Dr. Gonzalez  -  CT abd/pelvis and US to eval ascites-result above, mod ascites  - s/p paracentesis 10/29, 60ml removed.   - c/w lactulose

## 2022-10-31 NOTE — PROGRESS NOTE ADULT - PROBLEM SELECTOR PLAN 1
- WBC trending down 60k-->48-->49.5-->48.06--> 52K  - Leukemoid reaction vs steroid induced vs GI infection   - C. diff negative   - completed multiple courses of antibiotics   - Acetylserine started by attending   - flow cytometry, AFP, BCR/ ABL unremarkable  -  CEA normal  -  heme/onc following [facility]   -  ID:  Dr Puentes

## 2022-10-31 NOTE — PROGRESS NOTE ADULT - PROBLEM SELECTOR PLAN 8
-  Due to liver failure  -  No overt s/s of bleeding   -  Monitor CBC daily  - noted bloody BM 10/27, and previous week  - s/p IVF for tachycardia  - Hgb 9.2  -GI dr. Clarke/Elizabeth consulted  - plan for colonoscopy 10/28  - cancelled as failed prep, unable NGT placement due to combative behavior  - s/p Vt K x 3 days for elevated INR, still remains elevated  - c/w Vt K x 14 more days on d/c as per attending. [employer] meds sent 10/31

## 2022-10-31 NOTE — PROGRESS NOTE ADULT - ASSESSMENT
Brief hospital course:  27yo Mongolian speaking Male from [facility], s/p Left temporal parietal craniotomy b/o TBI and alcohol abuse (reported current drinking by family, [address] [address]) presented to [facility] ED on 10/2/22 with 1 day Hx of AMS, after reported witnessed seizure episodes and 1 week Hx of jaundice and was admitted to MICU with suspected EtOH withdrawal seizure, electrolyte abnormalities (Na 125, K 2.7, Mg 1.0, P 1.1), lactic acidosis (with lactate 6.4, bicarb 19), leukocytosis with neutrophilia (WBC 20.35, roderick 90%) with low grade T (100.1F), anemia without evidence of overt bleeding (Hb 6.9->8.3 s/p 1 U PRBC) and abnormal liver tests including coagulopathy (INR 2.13), hyperbilirubinemia (Se bi 9.3->10.8, direct 8.0), hyperammonemia (131), elevated liver enzymes in AST> ALT pattern (, ALT 22) and elevated ALP (669) and CT a/p w/ iv 10/2/22 suggesting diffuse hepatic steatosis, hepatomegaly with possible GB sludge and GB wall thickening, but normal bile ducts.  He was started on Alcoholic hepatitis management after infection was ruled out on admission (except that rhinovirus was pos, w/o URI symptoms) and UGIB was ruled out (EGD 10/5/22). He had LGIB, 2 rectal Dieulafoy lesions on sigmoidoscopy (10/5/22). Completed SBP ppx b/o GIB, was on ceftriaxone (10/4-10/12). His bilirubin remained unchanged after 7 days on prednisolone 40 mg (10/8-10/15) + total 3 days NAC, and developed low grade T, worsening leukocytosis up till 30-36k while on steroid (prior to steroid 17k), worsening abdominal pain,  distention and diarrhea despite being off lactulose, thus steroid was held 10/15 and septic workup was repeated. GI PCR panel showed STEC, culture was negative. BCx neg 10/16. C. diff neg 10/19. He was also started on Zosyn on 10/14, and switched to Meropenem on 10/16 and to Tigacycline on 10/18 per ID, and all abx were d/c 10/20.  Had repeat CT abd / pelvis 10/16, had only mild ascites (pelvic, perihepatosplenic), was not feasible for Dx paracentesis and  prior it was none.   WBC continued to rise till 10/21 up till > 60k, thought to be possibly multifactorial.  Hematology been following since 10/16 b/o leukocytosis.   WBC count slightly downtrending.  He had another episode of LGIB 10/21, 10/22 and 10/27 but Hb remained stable and reportedly resolved.   Had Dx paracentesis 10/29, no SBP.    Prior liver workup: Hep C ab neg, Hep A IgM neg, HBsAg, HBcAb IgM, HBcAb, HBsAb all neg, Hep E IgM neg, HIV neg, EBV past infection, CMV past infection, HSV PCR neg, leptospirosis neg. LUCIUS neg, SMA neg, LKM neg, AMA neg, IgG and IgM WNL. Ceruloplasmin 25. Fungitell neg. Strongyloides serology neg. Iron studies might not be informative b/o active drinking, recent bleeding, recent transfusion, but ferritin was WNL.     # Hepatomegaly suspected due to severe alcoholic hepatitis, s/p 7 days prednisolone 10/8-10/15, and s/p total 3 days NAC, bilirubin 14.1 on 10/8 and 14.1 10/15.  MDF> 32, MELD 28-23--->32   # Elevated transaminases in AST>ALT pattern - overall improved (-->58)  # Elevated ALP - improved (669->239)  # Hyperbilirubinemia - fluctuates (10--->17.8->16.4->18.7-->16.4)  # Coagulopathy - initially improved but also got vit K and FFP (10/5-7 and 10/5), now remains around 3  # Diarrhea off lactulose - improved, but still multiple small BMs  # STEC  # Abdominal pain - improved  # LGIB  # Anemia - stable  # Worsening leukocytosis from 17k to 60k, with slight improvement to 43k      - Being monitored off steroid since 8/15, (got 7 days) for reasons as above. S/p NAC.  C/w supportive measures, including nutritional optimization. Can consider checking zinc level.   - Follow up VZV PCR, HCV PCR, Schistosomiasis serology for completeness.   - Will need to monitor LFTs, including INR even after discharge.  - PLT WNL, only borderline splenomegaly, no or only trace ascites initially suggested less prominent portal hypertension. Now slightly more ascites, low albumin, protein.  - CT a/p w/ iv 10/2 was reviewed with IR, hepatic vasculature appeared patent. Patent vasculature reported on 10/20 US too.   - Was no biliary obstruction on CT abd, US abd. MRCP was considered on admission, has not been done yet, can consider to obtain.  - Still alcoholic hepatitis remains the working Dx, and might be just poor responder to steroid, but given the rising WBC, the massive hepatomegaly, liver biopsy was considered but was deferred b/o coagulopathy.     # Hyponatremia - improved  # Hypokalemia - improved  # LE edema - slight improvement  # Hypoalbuminemia, s/p 25% albumin  # Small Ascites - no SBP  - Renal function remains WNL.   - F/u final ascites analysis      - No EV on EGD 10/5/22, but mild portal gastropathy. S/p sigmoidoscopy 10/5 showing 2 rectal Dieulafoy lesions, s/p treatment. Had few episodes of hematochezia again 10/21 and 10/22 but Hb remained stable and reportedly resolved. 10/27 noted again LGIB that appears to be resolved again. Colonoscopy was cancelled because patient refused bowel prep. C/w mgmt. per GI.     - HCC status - no focal hepatic lesion    # AMS likely multifactorial  - TBI patient, with EtOH use, initially been on CIWA on admission, also was s/p seizures, and had elevated ammonia on admission, thus could not r/u HE either (Gr I-II)  - Monitor mental status, overall improved compared to admission, but remains AAOX2 (not oriented to time) now for weeks.  - C/w Folic, thiamine.   - Lactulose been held b/o diarrhea since 10/14 and 10/18, and rifaximin being held since 10/20 b/o dermatitis w/o other explanation, no resolution. Judie has multiple BMs per d/w primary team.        # Transplant candidacy: Obstacles been that he has only emergency medicaid per d/w SW, recent EtOH (up till hospitalization), poor insight possibly due to prior TBI too     Thank you for consult  Will continue to monitor. If gets discharged patient will need to follow in Liver / GI clinic, given his insurance status at [place] or [place], within few days.   Been d/w [facility] Transplant Hepatology service attending(s) previously.  D/w primary team

## 2022-10-31 NOTE — PROGRESS NOTE ADULT - ASSESSMENT
28 yo Citizen of [place] speaking Male, with PMH alcohol abuse, s/p Left temporal parietal craniotomy noted in imaging, presented  with altered mental status and seizure that lasted about 2-4 minutes.  In the ED, pt with low grade fever, tachycardic   Admitted to ICU for severe metabolic derangements, CIWA protocol with PRN ativan. Pt was started on antibiotics and IV fluids  Started on lactulose for elevated amonnia level.  Lactate trended down to normal levels. GI and Hepatology was consulted for transaminitis and hyperbilirubinemia. Patient was started on zosyn for possible cholangitis. CT abd showed hepatomegaly with biliary sludge and mild ascites. MRCP was recommended.  Noted with  Hb  7.3,  started on protonix drip and octreotide drip. Patient received one unit PRBC and one unit FFP.  GI on board recommending EGD which did not show any pathology. Flexible sigmoidoscopy showed rectal varices which was cauterized.  switched to ceftriaxone for SBP prophylaxis per ID.  Vit K for 3 days for elevated INR. He was started on prednisone 40mg daily for 28 days for alcoholic hepatitis.   Patient hospital course further complicated by fever, and rising leucocytosis. ID consulted.  blood culture 10/16 NGTD, stool studies no pathogens, Cdfif negative. CT abdomen and pelvis- 1. Marked hepatomegaly/steatosis, unchanged. 2.  Ascites/mesenteric edema and subcutaneous edema, minimally progressed. 3.  Equivocal ascending colonic mural prominence raises the possibility of portal colopathy or inflammation   BCR/ ABL sent pending results. INR 3.16,  liver biopsy plan for biopsy cancelled due to elevated INR. Vit K po continued   Pt seen at bedside, alert, awake, confused to person and place.      10/25-Awake and alert, confused x 3, talkative but does not make sense.  Leukocytosis persists and slightly uptrending, started Acetylcysteine by attending.  Pt. will need safe discharge planning, disposition currently unknown, CM previously consulted.  10/27 Pt is noted with worsening leukocytosis, heme/onc plan for malignancy work up. noted blood in stool, no diarrhea. on Vt k for elevated INR.  GI consulted. plan for colonoscopy next day.  10/28 pt failed prep, non compliant, unable to put NGT for prep due to combative behavior, pulled IV access. Hgb stable. cancelled colonoscopy. Noted worsening abdominal distention. plan CT abd/pelvis and US to evaluation ascites. will need paracentesis.   10/29 CT abd/pelvis and US resulted moderate ascites. ICU NP/MD performed paracentesis, 60ml removed. s/p Albumin x 2 days.   10/31 leukocytosis slightly improving, no acute issues reported. DC plan started. VIVO meds sent.

## 2022-10-31 NOTE — PROGRESS NOTE ADULT - PROBLEM SELECTOR PROBLEM 9
Anemia of chronic disease
Discharge planning issues

## 2022-10-31 NOTE — PROGRESS NOTE ADULT - SUBJECTIVE AND OBJECTIVE BOX
NP Note discussed with  Primary Attending    INTERVAL HPI/OVERNIGHT EVENTS: UNABLE TO ASSESS DUE TO CHANGE IN MENTAL STATUS, NO OVERNIGHT EVENT    MEDICATIONS  (STANDING):  dextrose 5%. 1000 milliLiter(s) (50 mL/Hr) IV Continuous <Continuous>  dextrose 5%. 1000 milliLiter(s) (100 mL/Hr) IV Continuous <Continuous>  dextrose 50% Injectable 25 Gram(s) IV Push once  dextrose 50% Injectable 12.5 Gram(s) IV Push once  dextrose 50% Injectable 25 Gram(s) IV Push once  folic acid 1 milliGRAM(s) Oral <User Schedule>  glucagon  Injectable 1 milliGRAM(s) IntraMuscular once  insulin lispro (ADMELOG) corrective regimen sliding scale   SubCutaneous Before meals and at bedtime  lactulose Syrup 20 Gram(s) Oral every 8 hours  multivitamin 1 Tablet(s) Oral daily  nystatin Powder 1 Application(s) Topical two times a day  pantoprazole    Tablet 40 milliGRAM(s) Oral before breakfast  phytonadione   Solution 10 milliGRAM(s) Oral daily  simethicone 80 milliGRAM(s) Chew three times a day  thiamine 100 milliGRAM(s) Oral daily    MEDICATIONS  (PRN):  aluminum hydroxide/magnesium hydroxide/simethicone Suspension 30 milliLiter(s) Oral every 4 hours PRN Dyspepsia  dextrose Oral Gel 15 Gram(s) Oral once PRN Blood Glucose LESS THAN 70 milliGRAM(s)/deciliter      __________________________________________________  REVIEW OF SYSTEMS:  unable to assess ROS due to impaired cognition, denies pain.       Vital Signs Last 24 Hrs  T(C): 36.8 (31 Oct 2022 04:47), Max: 37.1 (30 Oct 2022 13:44)  T(F): 98.2 (31 Oct 2022 04:47), Max: 98.7 (30 Oct 2022 13:44)  HR: 105 (31 Oct 2022 04:47) (98 - 106)  BP: 122/67 (31 Oct 2022 04:47) (118/66 - 126/73)  BP(mean): --  RR: 18 (31 Oct 2022 04:47) (18 - 18)  SpO2: 97% (31 Oct 2022 04:47) (97% - 100%)    Parameters below as of 31 Oct 2022 04:47  Patient On (Oxygen Delivery Method): room air        ________________________________________________  PHYSICAL EXAM:  GENERAL: NAD, confused, Jaundiced  HEENT: Normocephalic;  Jaundiced sclerae ; moist mucous membranes;   NECK : supple  CHEST/LUNG: Clear to auscultation bilaterally with fair air entry   HEART: S1 S2  regular; no murmurs, gallops or rubs  ABDOMEN: Soft, Nontender,+ distended; Bowel sounds present  EXTREMITIES: no cyanosis; no edema; no calf tenderness  SKIN: warm and dry; Jaundiced skin  NERVOUS SYSTEM:  Awake and alert; Oriented  to self    _________________________________________________  LABS:                        8.4    43.75 )-----------( 319      ( 31 Oct 2022 06:52 )             24.7     10-31    134<L>  |  104  |  6<L>  ----------------------------<  94  3.5   |  16<L>  |  0.58    Ca    8.0<L>      31 Oct 2022 06:52    TPro  5.3<L>  /  Alb  1.9<L>  /  TBili  16.4<H>  /  DBili  x   /  AST  58<H>  /  ALT  11  /  AlkPhos  239<H>  10-31    PT/INR - ( 31 Oct 2022 06:52 )   PT: 40.2 sec;   INR: 3.34 ratio         PTT - ( 31 Oct 2022 06:52 )  PTT:57.2 sec    CAPILLARY BLOOD GLUCOSE      POCT Blood Glucose.: 109 mg/dL (31 Oct 2022 11:47)  POCT Blood Glucose.: 93 mg/dL (31 Oct 2022 07:51)  POCT Blood Glucose.: 117 mg/dL (30 Oct 2022 21:14)  POCT Blood Glucose.: 97 mg/dL (30 Oct 2022 16:39)        RADIOLOGY & ADDITIONAL TESTS:    Imaging  Reviewed:  YES    < from: CT Abdomen and Pelvis w/ IV Cont (10.28.22 @ 20:21) >    ACC: [id number] EXAM:  CT ABDOMEN AND PELVIS IC                          PROCEDURE DATE:  10/28/2022          INTERPRETATION:  CLINICAL INFORMATION: Abdominal distention. Liver   failure.    COMPARISON: CT abdomen pelvis 10/16/2022    CONTRAST/COMPLICATIONS:  IV Contrast: Omnipaque 350  90 cc administered   10 cc discarded  Oral Contrast: NONE  Complications: None reported at time of study completion    PROCEDURE:  CT of the Abdomen and Pelvis was performed.  Sagittal and coronal reformats were performed.    FINDINGS: Respiratory motion artifact degrades images in the lung bases   and upper chest.  LOWER CHEST: Marked hepatomegaly with a craniocaudad span of 26 cm.    LIVER: Within normal limits.  BILE DUCTS: Normal caliber.  GALLBLADDER: Cholelithiasis and nonspecific gallbladder wall edema..  SPLEEN: Within normal limits.  PANCREAS: Within normal limits.  ADRENALS: Within normal limits.  KIDNEYS/URETERS: Within normal limits.    BLADDER: Within normal limits.  REPRODUCTIVE ORGANS: Prostate within normal limits.    BOWEL: No bowel obstruction. Stable metallic density in the rectum.   Normal appendix.  PERITONEUM: Moderate volume ascites, increased from prior study..  VESSELS: Within normal limits.  RETROPERITONEUM/LYMPH NODES: No lymphadenopathy.  ABDOMINAL WALL: Bilateral fat-containing inguinal hernias. High riding   left testicle in the left inguinal canal..  BONES: Within normal limits.    IMPRESSION:  Marked hepatomegaly.  Moderate volume ascites, increased from prior study.        --- End of Report ---            EULALIO ADAMES MD; Attending Radiologist  This document has been electronically signed. Oct 28 2022  8:27PM    < end of copied text >  < from: US Abdomen Limited (10.28.22 @ 20:34) >    ACC: [id number] EXAM:  US ABDOMEN LIMITED                          PROCEDURE DATE:  10/28/2022          INTERPRETATION:  CLINICAL INFORMATION: Increased abdominal girth    COMPARISON: Same day CT abdomen and pelvis    TECHNIQUE: Limited ultrasound ofthe abdomen to evaluate for ascites.    FINDINGS:    Moderate ascites, largest pocket in the left lower quadrant measuring 13   cm in depth.    IMPRESSION:  Moderate ascites.        --- End of Report ---      EULALIO ADAMES MD; Attending Radiologist  This document has been electronically signed. Oct 28 2022  8:39PM    < end of copied text >    Consultant(s) Notes Reviewed:   YES      Plan of care was discussed with patient and /or primary care giver; all questions and concerns were addressed

## 2022-10-31 NOTE — DISCHARGE NOTE NURSING/CASE MANAGEMENT/SOCIAL WORK - NSDCPEFALRISK_GEN_ALL_CORE
For information on Fall & Injury Prevention, visit: [email or web address] OR  [email or web address] OR  https://www.cdc.gov/steadi/patient.html

## 2022-10-31 NOTE — PROGRESS NOTE ADULT - COVID-19 NEGATIVE LAB RESULT
COVID-19 ruled out

## 2022-11-01 LAB
GLUCOSE BLDC GLUCOMTR-MCNC: 102 MG/DL — HIGH (ref 70–99)
GLUCOSE BLDC GLUCOMTR-MCNC: 105 MG/DL — HIGH (ref 70–99)
GLUCOSE BLDC GLUCOMTR-MCNC: 98 MG/DL — SIGNIFICANT CHANGE UP (ref 70–99)
GLUCOSE BLDC GLUCOMTR-MCNC: 99 MG/DL — SIGNIFICANT CHANGE UP (ref 70–99)
SARS-COV-2 RNA SPEC QL NAA+PROBE: SIGNIFICANT CHANGE UP

## 2022-11-01 PROCEDURE — 99233 SBSQ HOSP IP/OBS HIGH 50: CPT

## 2022-11-01 PROCEDURE — 76705 ECHO EXAM OF ABDOMEN: CPT | Mod: 26

## 2022-11-01 RX ADMIN — SIMETHICONE 80 MILLIGRAM(S): 80 TABLET, CHEWABLE ORAL at 13:46

## 2022-11-01 RX ADMIN — LACTULOSE 20 GRAM(S): 10 SOLUTION ORAL at 13:46

## 2022-11-01 RX ADMIN — SIMETHICONE 80 MILLIGRAM(S): 80 TABLET, CHEWABLE ORAL at 21:23

## 2022-11-01 RX ADMIN — Medication 100 MILLIGRAM(S): at 12:23

## 2022-11-01 RX ADMIN — NYSTATIN CREAM 1 APPLICATION(S): 100000 CREAM TOPICAL at 05:08

## 2022-11-01 RX ADMIN — Medication 1 MILLIGRAM(S): at 08:05

## 2022-11-01 RX ADMIN — Medication 1 TABLET(S): at 12:23

## 2022-11-01 RX ADMIN — PANTOPRAZOLE SODIUM 40 MILLIGRAM(S): 20 TABLET, DELAYED RELEASE ORAL at 05:08

## 2022-11-01 RX ADMIN — SIMETHICONE 80 MILLIGRAM(S): 80 TABLET, CHEWABLE ORAL at 05:08

## 2022-11-01 RX ADMIN — LACTULOSE 20 GRAM(S): 10 SOLUTION ORAL at 05:08

## 2022-11-01 RX ADMIN — LACTULOSE 20 GRAM(S): 10 SOLUTION ORAL at 21:23

## 2022-11-01 RX ADMIN — NYSTATIN CREAM 1 APPLICATION(S): 100000 CREAM TOPICAL at 17:29

## 2022-11-01 RX ADMIN — Medication 10 MILLIGRAM(S): at 12:22

## 2022-11-01 RX ADMIN — Medication 1 MILLIGRAM(S): at 17:29

## 2022-11-01 NOTE — PROGRESS NOTE ADULT - NS ATTEND AMEND GEN_ALL_CORE FT
Peruvian translated by Darius Carey.     Vital Signs Last 24 Hrs  T(C): 35.7 (01 Nov 2022 16:10), Max: 36.4 (01 Nov 2022 04:51)  T(F): 96.3 (01 Nov 2022 16:10), Max: 97.6 (01 Nov 2022 04:51)  HR: 104 (01 Nov 2022 16:10) (102 - 107)  BP: 112/71 (01 Nov 2022 16:10) (110/68 - 113/61)  BP(mean): --  RR: 19 (01 Nov 2022 16:10) (18 - 20)  SpO2: 97% (01 Nov 2022 16:10) (97% - 99%)    Parameters below as of 01 Nov 2022 16:10  Patient On (Oxygen Delivery Method): room air        11-01-22 @ 07:01  -  11-01-22 @ 19:13  --------------------------------------------------------  IN: 0 mL / OUT: 600 mL / NET: -600 mL        PHYSICAL EXAM:  GENERAL: Jaundice, NAD, well-groomed, well-developed  HEAD:  Atraumatic, Normocephalic  EYES: EOMI, conjunctiva and sclera clear  ENMT: Moist mucous membranes, Poor dentition,   NECK: Supple, No JVD,  NERVOUS SYSTEM:  Alert & awake but mildly confused. Moving all extremities   CHEST/LUNG: Clear to percussion bilaterally; No rales, rhonchi, wheezing, or rubs  HEART: Regular rate and rhythm; No murmurs, rubs, or gallops  ABDOMEN: Nontender, distended; Bowel sounds present  EXTREMITIES:  2+ Peripheral Pulses, No clubbing, cyanosis  LYMPH: No lymphadenopathy noted  SKIN: (+) leaking from para site, ostomy bag was placed    A/P:     #Alcoholic hepatitis  #Encephalopathy  #Leukocytosis  #anemia  #ascites  #Hepatosplenomegaly with coagulopathy  #Hx of TBI  #Hyponatremia      -     , likely from alcoholic hepatitis, stabilized          Transudative ascites, no evidence of SBP.  SAAG = 0.6, not c/w portal hypertension          anemia, likely from chronic disease.  No iron deficiency despite known LGI bleed from Dieulafoy lesion in the sigmoid.           Hx TBI, s/p craniotomy.            Encephalopathy is more likely related to TBI and is chronic.          Hepatic encephalopathy is controlled with lactulose          hyponatremia, improved [place]ian translated by Darius Carey.     Vital Signs Last 24 Hrs  T(C): 35.7 (01 Nov 2022 16:10), Max: 36.4 (01 Nov 2022 04:51)  T(F): 96.3 (01 Nov 2022 16:10), Max: 97.6 (01 Nov 2022 04:51)  HR: 104 (01 Nov 2022 16:10) (102 - 107)  BP: 112/71 (01 Nov 2022 16:10) (110/68 - 113/61)  BP(mean): --  RR: 19 (01 Nov 2022 16:10) (18 - 20)  SpO2: 97% (01 Nov 2022 16:10) (97% - 99%)    Parameters below as of 01 Nov 2022 16:10  Patient On (Oxygen Delivery Method): room air        11-01-22 @ 07:01  -  11-01-22 @ 19:13  --------------------------------------------------------  IN: 0 mL / OUT: 600 mL / NET: -600 mL        PHYSICAL EXAM:  GENERAL: Jaundice, NAD, well-groomed, well-developed  HEAD:  Atraumatic, Normocephalic  EYES: EOMI, conjunctiva and sclera clear  ENMT: Moist mucous membranes, Poor dentition,   NECK: Supple, No JVD,  NERVOUS SYSTEM:  Alert & awake but mildly confused. Moving all extremities   CHEST/LUNG: Clear to percussion bilaterally; No rales, rhonchi, wheezing, or rubs  HEART: Regular rate and rhythm; No murmurs, rubs, or gallops  ABDOMEN: Nontender, distended; Bowel sounds present  EXTREMITIES:  2+ Peripheral Pulses, No clubbing, cyanosis  LYMPH: No lymphadenopathy noted  SKIN: (+) leaking from para site, ostomy bag was placed    A/P:     #Alcoholic hepatitis  #Encephalopathy  #Leukocytosis  #anemia  #ascites  #Hepatosplenomegaly with coagulopathy  #Hx of TBI  #Hyponatremia      - AMS likely from hepatic encephalopathy and/or hx of TBI. C/w lactulose  - leukemoid reaction of mostly neutrophilia per heme/onc. Leukemia w/u is negative  - Hb stable around 8-9, monitor CBC  - Now ascites is leaking from previous para site. F/u US abd. If large, will consider repeat para.  - Monitor signs of bleeding    POC discussed with EVAN Guzman

## 2022-11-01 NOTE — PROGRESS NOTE ADULT - PROBLEM SELECTOR PLAN 1
2/2 acute hepatic failure from EtOH use  also likely multifactorial as pt has Hx of TBI  resolved to baseline

## 2022-11-01 NOTE — PROGRESS NOTE ADULT - SUBJECTIVE AND OBJECTIVE BOX
NP Note discussed with  Primary Attending    Patient is a 27y old  Male who presents with a chief complaint of Altered mental status (31 Oct 2022 12:51)      INTERVAL HPI/OVERNIGHT EVENTS: no new complaints    MEDICATIONS  (STANDING):  dextrose 5%. 1000 milliLiter(s) (50 mL/Hr) IV Continuous <Continuous>  dextrose 5%. 1000 milliLiter(s) (100 mL/Hr) IV Continuous <Continuous>  dextrose 50% Injectable 25 Gram(s) IV Push once  dextrose 50% Injectable 12.5 Gram(s) IV Push once  dextrose 50% Injectable 25 Gram(s) IV Push once  folic acid 1 milliGRAM(s) Oral <User Schedule>  glucagon  Injectable 1 milliGRAM(s) IntraMuscular once  insulin lispro (ADMELOG) corrective regimen sliding scale   SubCutaneous Before meals and at bedtime  lactulose Syrup 20 Gram(s) Oral every 8 hours  multivitamin 1 Tablet(s) Oral daily  nystatin Powder 1 Application(s) Topical two times a day  pantoprazole    Tablet 40 milliGRAM(s) Oral before breakfast  simethicone 80 milliGRAM(s) Chew three times a day  thiamine 100 milliGRAM(s) Oral daily    MEDICATIONS  (PRN):  aluminum hydroxide/magnesium hydroxide/simethicone Suspension 30 milliLiter(s) Oral every 4 hours PRN Dyspepsia  dextrose Oral Gel 15 Gram(s) Oral once PRN Blood Glucose LESS THAN 70 milliGRAM(s)/deciliter      __________________________________________________  REVIEW OF SYSTEMS:    unable to obtain due to patient's encephalopathy       Vital Signs Last 24 Hrs  T(C): 35.7 (01 Nov 2022 16:10), Max: 36.4 (01 Nov 2022 04:51)  T(F): 96.3 (01 Nov 2022 16:10), Max: 97.6 (01 Nov 2022 04:51)  HR: 104 (01 Nov 2022 16:10) (102 - 107)  BP: 112/71 (01 Nov 2022 16:10) (110/68 - 113/61)  BP(mean): --  RR: 19 (01 Nov 2022 16:10) (18 - 20)  SpO2: 97% (01 Nov 2022 16:10) (97% - 99%)    Parameters below as of 01 Nov 2022 16:10  Patient On (Oxygen Delivery Method): room air        ________________________________________________  PHYSICAL EXAM:  GENERAL: NAD,   HEENT: Normocephalic; B/L sclerae icteric; moist mucous membranes;   NECK : supple  CHEST/LUNG: Clear to auscultation bilaterally with good air entry, however diminished air movement B/L lower lobes  HEART: S1 S2  regular; no murmurs, gallops or rubs  ABDOMEN: Soft, Nontender, Nondistended; Bowel sounds present  EXTREMITIES: no cyanosis; 3+BLE edema; no calf tenderness, 2+ radial and pedal pulses  SKIN:  warm and dry; with jaundice throughout, no rash  NERVOUS SYSTEM:  Awake and alert; Oriented to person only; unable to stay focused    _________________________________________________  LABS:                        8.4    43.75 )-----------( 319      ( 31 Oct 2022 06:52 )             24.7     10-31    134<L>  |  104  |  6<L>  ----------------------------<  94  3.5   |  16<L>  |  0.58    Ca    8.0<L>      31 Oct 2022 06:52    TPro  5.3<L>  /  Alb  1.9<L>  /  TBili  16.4<H>  /  DBili  x   /  AST  58<H>  /  ALT  11  /  AlkPhos  239<H>  10-31    PT/INR - ( 31 Oct 2022 06:52 )   PT: 40.2 sec;   INR: 3.34 ratio         PTT - ( 31 Oct 2022 06:52 )  PTT:57.2 sec    CAPILLARY BLOOD GLUCOSE      POCT Blood Glucose.: 102 mg/dL (01 Nov 2022 16:40)  POCT Blood Glucose.: 99 mg/dL (01 Nov 2022 12:01)  POCT Blood Glucose.: 105 mg/dL (01 Nov 2022 07:33)  POCT Blood Glucose.: 108 mg/dL (31 Oct 2022 21:26)        RADIOLOGY & ADDITIONAL TESTS:    Imaging  Reviewed:  YES/NO    Consultant(s) Notes Reviewed:   YES/ No      Plan of care was discussed with patient and /or primary care giver; all questions and concerns were addressed

## 2022-11-01 NOTE — PROGRESS NOTE ADULT - ASSESSMENT
Patient is a 25 yo [place] Island[place] speaking Male, lives at home with brother, with no known past medical history,  s/p Left temporal parietal craniotomy noted in imaging, presenting to the ED with altered mental status since this morning. Pt moaning, not able to provide history and pt's brother's contact information not in chart as family left ED prior to phone number being collected. As per EMS charts, pt was found to be 'asleep' when EMS arrived..as per family, pt has a seizure that lasted about 2-4 minutes. Family states pt has a seizure earlier in the day as well. Pt family denies hx of seizures. They stated pt drinks alcohol on a daily basis and hasn't drank any alcohol in the past 2 days. Family denies any recent trauma related injuries to pt.' Pt's brother told ED attending that the jaundice developed acutely over the last 1 week Patient is a 25 yo Moldovan speaking Male, lives at home with brother, with no known past medical history,  s/p Left temporal parietal craniotomy noted in imaging.  Patient presented with AMS and admitted to ICU with acute metabolic encephalopathy. and EtOH withdrawal.   Pt was started on antibiotics, IV fluids, and lactulose for elevated amonnia level.  Lactate trended down to normal levels. GI and Hepatology was consulted for transaminitis and hyperbilirubinemia. Patient was started on zosyn for possible cholangitis. CT abd showed hepatomegaly with biliary sludge and mild ascites. MRCP was recommended.  Noted with  Hb  7.3,  started on protonix drip and octreotide drip. Patient received one unit PRBC and one unit FFP.  GI recommended EGD which did not show any pathology. Flexible sigmoidoscopy showed rectal varices which was cauterized. Pt then switched ceftriaxone for SBP prophylaxis per ID.  Vit K for 3 days for elevated INR. He was started on prednisone 40mg daily for 28 days for alcoholic hepatitis.   Patient hospital course further complicated by fever, and rising leucocytosis. ID consulted.  blood culture 10/16 NGTD, stool studies no pathogens, Cdfif negative. CT abdomen and pelvis- 1. Marked hepatomegaly/steatosis, unchanged. 2.  Ascites/mesenteric edema and subcutaneous edema, minimally progressed. 3.  Equivocal ascending colonic mural prominence raises the possibility of portal colopathy or inflammation   BCR/ ABL sent pending results. INR 3.16,  liver biopsy plan for biopsy cancelled due to elevated INR. Vit K po continued   Pt seen at bedside, alert, awake, confused to person and place.  US revealed moderate ascites for which patient underwent paracentesis recovering 60mL fluid.  Patient now with optimized for discharge, pending repeat US abdomen to f/u on ascites. Medications sent to [facility] and awaiting approval.

## 2022-11-02 DIAGNOSIS — K76.82 HEPATIC ENCEPHALOPATHY: ICD-10-CM

## 2022-11-02 LAB
ALBUMIN SERPL ELPH-MCNC: 1.7 G/DL — LOW (ref 3.5–5)
ALP SERPL-CCNC: 266 U/L — HIGH (ref 40–120)
ALT FLD-CCNC: 13 U/L DA — SIGNIFICANT CHANGE UP (ref 10–60)
ANION GAP SERPL CALC-SCNC: 12 MMOL/L — SIGNIFICANT CHANGE UP (ref 5–17)
AST SERPL-CCNC: 59 U/L — HIGH (ref 10–40)
BILIRUB SERPL-MCNC: 18.6 MG/DL — HIGH (ref 0.2–1.2)
BUN SERPL-MCNC: 7 MG/DL — SIGNIFICANT CHANGE UP (ref 7–18)
CALCIUM SERPL-MCNC: 8.2 MG/DL — LOW (ref 8.4–10.5)
CHLORIDE SERPL-SCNC: 103 MMOL/L — SIGNIFICANT CHANGE UP (ref 96–108)
CO2 SERPL-SCNC: 20 MMOL/L — LOW (ref 22–31)
CREAT SERPL-MCNC: 0.76 MG/DL — SIGNIFICANT CHANGE UP (ref 0.5–1.3)
EGFR: 128 ML/MIN/1.73M2 — SIGNIFICANT CHANGE UP
GLUCOSE BLDC GLUCOMTR-MCNC: 119 MG/DL — HIGH (ref 70–99)
GLUCOSE BLDC GLUCOMTR-MCNC: 124 MG/DL — HIGH (ref 70–99)
GLUCOSE BLDC GLUCOMTR-MCNC: 126 MG/DL — HIGH (ref 70–99)
GLUCOSE BLDC GLUCOMTR-MCNC: 93 MG/DL — SIGNIFICANT CHANGE UP (ref 70–99)
GLUCOSE SERPL-MCNC: 113 MG/DL — HIGH (ref 70–99)
HCT VFR BLD CALC: 28.1 % — LOW (ref 39–50)
HGB BLD-MCNC: 9.1 G/DL — LOW (ref 13–17)
INR BLD: 3.43 RATIO — HIGH (ref 0.88–1.16)
MCHC RBC-ENTMCNC: 31.3 PG — SIGNIFICANT CHANGE UP (ref 27–34)
MCHC RBC-ENTMCNC: 32.4 GM/DL — SIGNIFICANT CHANGE UP (ref 32–36)
MCV RBC AUTO: 96.6 FL — SIGNIFICANT CHANGE UP (ref 80–100)
NON-GYNECOLOGICAL CYTOLOGY STUDY: SIGNIFICANT CHANGE UP
NRBC # BLD: 0 /100 WBCS — SIGNIFICANT CHANGE UP (ref 0–0)
PLATELET # BLD AUTO: 327 K/UL — SIGNIFICANT CHANGE UP (ref 150–400)
POTASSIUM SERPL-MCNC: 3.6 MMOL/L — SIGNIFICANT CHANGE UP (ref 3.5–5.3)
POTASSIUM SERPL-SCNC: 3.6 MMOL/L — SIGNIFICANT CHANGE UP (ref 3.5–5.3)
PROT SERPL-MCNC: 5.6 G/DL — LOW (ref 6–8.3)
PROTHROM AB SERPL-ACNC: 41.3 SEC — HIGH (ref 10.5–13.4)
RBC # BLD: 2.91 M/UL — LOW (ref 4.2–5.8)
RBC # FLD: 22.8 % — HIGH (ref 10.3–14.5)
SCHISTOSOMA IGG SER-ACNC: <1 — SIGNIFICANT CHANGE UP
SODIUM SERPL-SCNC: 135 MMOL/L — SIGNIFICANT CHANGE UP (ref 135–145)
WBC # BLD: 47.84 K/UL — CRITICAL HIGH (ref 3.8–10.5)
WBC # FLD AUTO: 47.84 K/UL — CRITICAL HIGH (ref 3.8–10.5)

## 2022-11-02 PROCEDURE — 99233 SBSQ HOSP IP/OBS HIGH 50: CPT

## 2022-11-02 RX ORDER — ALBUMIN HUMAN 25 %
50 VIAL (ML) INTRAVENOUS EVERY 8 HOURS
Refills: 0 | Status: COMPLETED | OUTPATIENT
Start: 2022-11-02 | End: 2022-11-03

## 2022-11-02 RX ORDER — LACTULOSE 10 G/15ML
15 SOLUTION ORAL
Qty: 450 | Refills: 0
Start: 2022-11-02 | End: 2022-12-01

## 2022-11-02 RX ADMIN — PANTOPRAZOLE SODIUM 40 MILLIGRAM(S): 20 TABLET, DELAYED RELEASE ORAL at 05:08

## 2022-11-02 RX ADMIN — SIMETHICONE 80 MILLIGRAM(S): 80 TABLET, CHEWABLE ORAL at 21:36

## 2022-11-02 RX ADMIN — Medication 1 MILLIGRAM(S): at 16:05

## 2022-11-02 RX ADMIN — Medication 50 MILLILITER(S): at 17:38

## 2022-11-02 RX ADMIN — LACTULOSE 20 GRAM(S): 10 SOLUTION ORAL at 14:12

## 2022-11-02 RX ADMIN — Medication 1 MILLIGRAM(S): at 08:41

## 2022-11-02 RX ADMIN — SIMETHICONE 80 MILLIGRAM(S): 80 TABLET, CHEWABLE ORAL at 05:07

## 2022-11-02 RX ADMIN — Medication 100 MILLIGRAM(S): at 12:48

## 2022-11-02 RX ADMIN — SIMETHICONE 80 MILLIGRAM(S): 80 TABLET, CHEWABLE ORAL at 14:13

## 2022-11-02 RX ADMIN — Medication 1 TABLET(S): at 12:46

## 2022-11-02 RX ADMIN — NYSTATIN CREAM 1 APPLICATION(S): 100000 CREAM TOPICAL at 05:07

## 2022-11-02 RX ADMIN — Medication 50 MILLILITER(S): at 21:37

## 2022-11-02 RX ADMIN — NYSTATIN CREAM 1 APPLICATION(S): 100000 CREAM TOPICAL at 17:44

## 2022-11-02 NOTE — PROGRESS NOTE ADULT - PROBLEM SELECTOR PLAN 1
2/2 severe alcoholic hepatitis   improved but remains altered   on enhanced supervision for saftey   lacks insight to illness  holding lactulose >3BMs overnight   Hepatology Dr Gonzalez follows

## 2022-11-02 NOTE — PROGRESS NOTE ADULT - SUBJECTIVE AND OBJECTIVE BOX
Chief Complaint:  Patient is a 27y old  Male who presents with a chief complaint of Altered mental status (02 Nov 2022 12:59)      Reason for consult: SYEDA (ACLF)    Interval Events: Patient was seen and examined at bedside.  ID [id number].  Patient noted worsened abdominal distention, moderate ascites on Us abd, awaiting repeat paracentesis. Noted leakage from prior paracentesis site, per d/w primary team at least 1l today. Please, give albumin even for the leaked ascites, 6-8g/l removed. Since it is not recorded, uncertain the exact amount, consider 50 ml 25% (12.5g) q8 hrs for now for 24 hrs, and tomorrow additional depending how much fluid removed. Follow up with IR regarding sealing the prior paracentesis site too to avoid leaking.   Mental status remains AAx2, not in time, and overall still confused, but does recall events from days prior. Primary team resumed lactulose 10/28, but uncertain how many BMs he had. None recorded in flowsheet, but per medication administration, lactulose was not given b/o > 3 BMs. At present witnessed 1 BM. Please, record BMs. Notably, lactulose was held previously b/o diarrhea and till end of last week he still had >4-5 BMs off lactulose thus would need caution and accurate recording of BMs.     Hospital Medications:  aluminum hydroxide/magnesium hydroxide/simethicone Suspension 30 milliLiter(s) Oral every 4 hours PRN  dextrose 5%. 1000 milliLiter(s) IV Continuous <Continuous>  dextrose 50% Injectable 25 Gram(s) IV Push once  dextrose Oral Gel 15 Gram(s) Oral once PRN  folic acid 1 milliGRAM(s) Oral <User Schedule>  glucagon  Injectable 1 milliGRAM(s) IntraMuscular once  insulin lispro (ADMELOG) corrective regimen sliding scale   SubCutaneous Before meals and at bedtime  lactulose Syrup 20 Gram(s) Oral every 8 hours  multivitamin 1 Tablet(s) Oral daily  nystatin Powder 1 Application(s) Topical two times a day  pantoprazole    Tablet 40 milliGRAM(s) Oral before breakfast  simethicone 80 milliGRAM(s) Chew three times a day  thiamine 100 milliGRAM(s) Oral daily      ROS:   General:  No  fevers, chills, night sweats, fatigue  Eyes:  Good vision, no reported pain  ENT:  No sore throat, pain, runny nose  CV:  No pain, palpitations  Pulm:  No dyspnea, cough  GI:  See HPI, otherwise negative  :  No  incontinence, nocturia  Muscle:  No pain, weakness  Neuro:  No memory problems  Psych:  No insomnia, mood problems, depression  Endocrine:  No polyuria, polydipsia, cold/heat intolerance  Heme:  No petechiae, ecchymosis, easy bruisability  Skin:  No rash    PHYSICAL EXAM:   Vital Signs:  Vital Signs Last 24 Hrs  T(C): 35.7 (02 Nov 2022 12:00), Max: 37 (02 Nov 2022 04:10)  T(F): 96.3 (02 Nov 2022 12:00), Max: 98.6 (02 Nov 2022 04:10)  HR: 100 (02 Nov 2022 12:00) (100 - 114)  BP: 117/50 (02 Nov 2022 12:00) (111/58 - 118/68)  BP(mean): --  RR: 18 (02 Nov 2022 12:00) (18 - 19)  SpO2: 99% (02 Nov 2022 12:00) (97% - 99%)    Parameters below as of 02 Nov 2022 12:00  Patient On (Oxygen Delivery Method): room air      Daily     Daily     GENERAL: no acute distress  NEURO: alert, no asterixis  HEENT: anicteric sclera, no conjunctival pallor appreciated  CHEST: no respiratory distress, no accessory muscle use  CARDIAC: regular rate, rhythm  ABDOMEN: soft, non-tender, non-distended, no rebound or guarding  EXTREMITIES: warm, well perfused, no edema  SKIN: no lesions noted    LABS: reviewed                        9.1    47.84 )-----------( 327      ( 02 Nov 2022 11:13 )             28.1     11-02    135  |  103  |  7   ----------------------------<  113<H>  3.6   |  20<L>  |  0.76    Ca    8.2<L>      02 Nov 2022 11:13    TPro  5.6<L>  /  Alb  1.7<L>  /  TBili  18.6<H>  /  DBili  x   /  AST  59<H>  /  ALT  13  /  AlkPhos  266<H>  11-02    LIVER FUNCTIONS - ( 02 Nov 2022 11:13 )  Alb: 1.7 g/dL / Pro: 5.6 g/dL / ALK PHOS: 266 U/L / ALT: 13 U/L DA / AST: 59 U/L / GGT: x             Interval Diagnostic Studies: see sunrise for full report   Chief Complaint:  Patient is a 27y old  Male who presents with a chief complaint of Altered mental status (02 Nov 2022 12:59)      Reason for consult: SYEDA (ACLF)    Interval Events: Patient was seen and examined at bedside.  ID [id number].  Patient noted worsened abdominal distention, moderate ascites on Us abd, awaiting repeat paracentesis. Noted leakage from prior paracentesis site, per d/w primary team at least 1l today. Please, give albumin even for the leaked ascites, 6-8g/l removed. Since it is not recorded, uncertain the exact amount, consider 50 ml 25% (12.5g) q8 hrs for now for 24 hrs, and tomorrow additional depending how much fluid removed. Follow up with IR regarding sealing the prior paracentesis site too to avoid leaking.   Mental status remains AAx2, not in time, and overall still confused, but does recall events from days prior. Primary team resumed lactulose 10/28, but uncertain how many BMs he had. None recorded in flowsheet, but per medication administration, lactulose was not given b/o > 3 BMs. At present witnessed 1 BM. Please, record BMs. Notably, lactulose was held previously b/o diarrhea and till end of last week he still had >4-5 BMs off lactulose thus would need caution and accurate recording of BMs.     Hospital Medications:  aluminum hydroxide/magnesium hydroxide/simethicone Suspension 30 milliLiter(s) Oral every 4 hours PRN  dextrose 5%. 1000 milliLiter(s) IV Continuous <Continuous>  dextrose 50% Injectable 25 Gram(s) IV Push once  dextrose Oral Gel 15 Gram(s) Oral once PRN  folic acid 1 milliGRAM(s) Oral <User Schedule>  glucagon  Injectable 1 milliGRAM(s) IntraMuscular once  insulin lispro (ADMELOG) corrective regimen sliding scale   SubCutaneous Before meals and at bedtime  lactulose Syrup 20 Gram(s) Oral every 8 hours  multivitamin 1 Tablet(s) Oral daily  nystatin Powder 1 Application(s) Topical two times a day  pantoprazole    Tablet 40 milliGRAM(s) Oral before breakfast  simethicone 80 milliGRAM(s) Chew three times a day  thiamine 100 milliGRAM(s) Oral daily      ROS: Limited due to patient condition (mental status)  General:  No  fevers, chills  CV:  No pain  Pulm:  No dyspnea, cough  GI:  C/o abdominal distention, he still reports "lot of stool", unable to tell frequency, no bleeding per PCA at bedside. Leakage from prior paracentesis site.      PHYSICAL EXAM:   Vital Signs:  Vital Signs Last 24 Hrs  T(C): 35.7 (02 Nov 2022 12:00), Max: 37 (02 Nov 2022 04:10)  T(F): 96.3 (02 Nov 2022 12:00), Max: 98.6 (02 Nov 2022 04:10)  HR: 100 (02 Nov 2022 12:00) (100 - 114)  BP: 117/50 (02 Nov 2022 12:00) (111/58 - 118/68)  BP(mean): --  RR: 18 (02 Nov 2022 12:00) (18 - 19)  SpO2: 99% (02 Nov 2022 12:00) (97% - 99%)    Parameters below as of 02 Nov 2022 12:00  Patient On (Oxygen Delivery Method): room air      Daily     Daily     GENERAL: no acute distress  NEURO: awake, alert, oriented to place and self/person, not in time and still decreased attention span, no asterixis  HEENT: icteric sclera  CHEST: no respiratory distress, no accessory muscle use  CARDIAC: mild tachycardi  ABDOMEN: soft, non-tender, distended, w/ ascites, leakage at LLQ from prior paracentesis site, no rebound or guarding, BS+, noted an episode of brown, liquid BM during exam  EXTREMITIES: b/l edema  SKIN: jaundice, ecchymoses    LABS: reviewed                        9.1    47.84 )-----------( 327      ( 02 Nov 2022 11:13 )             28.1     11-02    135  |  103  |  7   ----------------------------<  113<H>  3.6   |  20<L>  |  0.76    Ca    8.2<L>      02 Nov 2022 11:13    TPro  5.6<L>  /  Alb  1.7<L>  /  TBili  18.6<H>  /  DBili  x   /  AST  59<H>  /  ALT  13  /  AlkPhos  266<H>  11-02    LIVER FUNCTIONS - ( 02 Nov 2022 11:13 )  Alb: 1.7 g/dL / Pro: 5.6 g/dL / ALK PHOS: 266 U/L / ALT: 13 U/L DA / AST: 59 U/L / GGT: x             Interval Diagnostic Studies: see sunrise for full report

## 2022-11-02 NOTE — PROGRESS NOTE ADULT - ASSESSMENT
Patient is a 25 yo [place] speaking Male, lives at home with brother, with no known past medical history,  s/p Left temporal parietal craniotomy noted in imaging.  Patient presented with AMS and admitted to ICU with acute metabolic encephalopathy. and EtOH withdrawal.   Pt was started on antibiotics, IV fluids, and lactulose for elevated amonnia level.  Lactate trended down to normal levels. GI and Hepatology was consulted for transaminitis and hyperbilirubinemia. Patient was started on zosyn for possible cholangitis. CT abd showed hepatomegaly with biliary sludge and mild ascites. MRCP was recommended.  Noted with  Hb  7.3,  started on protonix drip and octreotide drip. Patient received one unit PRBC and one unit FFP.  GI recommended EGD which did not show any pathology. Flexible sigmoidoscopy showed rectal varices which was cauterized. Pt then switched ceftriaxone for SBP prophylaxis per ID.  Vit K for 3 days for elevated INR. He was started on prednisone 40mg daily for 28 days for alcoholic hepatitis.   Patient hospital course further complicated by fever, and rising leucocytosis. ID consulted.  blood culture 10/16 NGTD, stool studies no pathogens, Cdfif negative. CT abdomen and pelvis- 1. Marked hepatomegaly/steatosis, unchanged. 2.  Ascites/mesenteric edema and subcutaneous edema, minimally progressed. 3.  Equivocal ascending colonic mural prominence raises the possibility of portal colopathy or inflammation   BCR/ ABL sent pending results. INR 3.16,  liver biopsy plan for biopsy cancelled due to elevated INR. Vit K given with no improvement in INR. s/p failed bedside paracentesis only recovering 60mL fluid.   Hospital course complicated by leaking paracentesis site, and worsening ascities. Plan to attempt IR guided paracentesis in AM

## 2022-11-02 NOTE — PROGRESS NOTE ADULT - ASSESSMENT
Brief hospital course:  27yo Y[place] speaking Male from [facility], s/p Left temporal parietal craniotomy b/o TBI and alcohol abuse (reported current drinking by family, [address] [phone]) presented to [facility] ED on 10/2/22 with 1 day Hx of AMS, after reported witnessed seizure episodes and 1 week Hx of jaundice and was admitted to MICU with suspected EtOH withdrawal seizure, electrolyte abnormalities (Na 125, K 2.7, Mg 1.0, P 1.1), lactic acidosis (with lactate 6.4, bicarb 19), leukocytosis with neutrophilia (WBC 20.35, roderick 90%) with low grade T (100.1F), anemia without evidence of overt bleeding (Hb 6.9->8.3 s/p 1 U PRBC) and abnormal liver tests including coagulopathy (INR 2.13), hyperbilirubinemia (Se bi 9.3->10.8, direct 8.0), hyperammonemia (131), elevated liver enzymes in AST> ALT pattern (, ALT 22) and elevated ALP (669) and CT a/p w/ iv 10/2/22 suggesting diffuse hepatic steatosis, hepatomegaly with possible GB sludge and GB wall thickening, but normal bile ducts.  He was started on Alcoholic hepatitis management after infection was ruled out on admission (except that rhinovirus was pos, w/o URI symptoms) and UGIB was ruled out (EGD 10/5/22). He had LGIB, 2 rectal Dieulafoy lesions on sigmoidoscopy (10/5/22). Completed SBP ppx b/o GIB, was on ceftriaxone (10/4-10/12). His bilirubin remained unchanged after 7 days on prednisolone 40 mg (10/8-10/15) + total 3 days NAC, and developed low grade T, worsening leukocytosis up till 30-36k while on steroid (prior to steroid 17k), worsening abdominal pain,  distention and diarrhea despite being off lactulose, thus steroid was held 10/15 and septic workup was repeated. GI PCR panel showed STEC, culture was negative. BCx neg 10/16. C. diff neg 10/19. He was also started on Zosyn on 10/14, and switched to Meropenem on 10/16 and to Tigacycline on 10/18 per ID, and all abx were d/c 10/20.  Had repeat CT abd / pelvis 10/16, had only mild ascites (pelvic, perihepatosplenic), was not feasible for Dx paracentesis and  prior it was none.   WBC continued to rise till 10/21 up till > 60k, thought to be possibly multifactorial.  Hematology been following since 10/16 b/o leukocytosis.   WBC count was slightly downtrending.  He had another episode of LGIB 10/21, 10/22 and 10/27 but Hb remained stable and reportedly resolved.   Had Dx paracentesis 10/29, no SBP.    Prior liver workup: Hep C ab/RNA neg, Hep A IgM neg, HBsAg, HBcAb IgM, HBcAb, HBsAb all neg, Hep E IgM neg, HIV neg, EBV past infection, CMV past infection, HSV PCR neg, leptospirosis neg. LUCIUS neg, SMA neg, LKM neg, AMA neg, IgG and IgM WNL. Ceruloplasmin 25. Fungitell neg. Strongyloides serology neg. Schistosomiasis IgG neg. Iron studies might not be informative b/o active drinking, recent bleeding, recent transfusion, but ferritin was WNL.   VZV PCR in process.    # Hepatomegaly suspected due to severe alcoholic hepatitis, s/p 7 days prednisolone 10/8-10/15, and s/p total 3 days NAC, bilirubin 14.1 on 10/8 and 14.1 10/15.  MDF> 32, MELD 28-23--->32   # Elevated transaminases in AST>ALT pattern - overall improved (-->58)  # Elevated ALP - improved (669->in 200s)  # Hyperbilirubinemia - fluctuates (10--->~16-18)  # Coagulopathy - initially improved but also got vit K and FFP (10/5-7 and 10/5), and now remains around 3 w/o improvement with vit K  # Diarrhea off lactulose - improved, but still multiple small BMs   # STEC  # Abdominal pain - improved  # LGIB - resolved  # Anemia - stable  # Worsening leukocytosis from 17k to 60k, with slight improvement to 47k      - Being monitored off steroid since 8/15, (got 7 days) for reasons as above. S/p NAC.  C/w supportive measures, including nutritional optimization. Still can consider checking zinc level.   - Will need to monitor LFTs, including INR even after discharge.  - PLT WNL, only borderline splenomegaly, no or only trace ascites initially suggested less prominent portal hypertension. Now slightly more ascites, low albumin, protein.  - CT a/p w/ iv 10/2 was reviewed with IR, hepatic vasculature appeared patent. Patent vasculature reported on 10/20 US too.   - Was no biliary obstruction on CT abd, US abd. MRCP was considered on admission, has not been done yet, can consider to obtain.  - Still alcoholic hepatitis remained the working Dx, and might be just poor responder to steroid, but given the rising WBC, the massive hepatomegaly, liver biopsy was considered however was deferred b/o coagulopathy.     # Hyponatremia - improved  # Hypokalemia - improved  # LE edema - slight improvement  # Hypoalbuminemia  # Small -> moderateAscites - no SBP  - Renal function remains WNL, but Cr uptrending.   - Leaking from paracentesis site, to d/w IR to seal  - Noted leakage from prior paracentesis site, per d/w primary team at least 1l today. Please, give albumin even for the leaked ascites, 6-8g/l removed/leaked. Since it is not recorded, uncertain the exact amount, consider 50 ml 25% (12.5g) q8 hrs for now for 24 hrs, and tomorrow additional depending how much fluid removed. Pending paracentesis tomorrow. Check coags, CBC, fibrinogen early morning.       - No EV on EGD 10/5/22, but mild portal gastropathy. S/p sigmoidoscopy 10/5 showing 2 rectal Dieulafoy lesions, s/p treatment. Had few episodes of hematochezia again 10/21 and 10/22 but Hb remained stable and reportedly resolved. 10/27 noted again LGIB that appears to be resolved again. Colonoscopy was cancelled because patient refused bowel prep. C/w mgmt. per GI.     - HCC status - no focal hepatic lesion    # AMS likely multifactorial  - TBI patient, with EtOH use, initially been on CIWA on admission, also was s/p seizures, and had elevated ammonia on admission, thus could not r/u HE either (Gr I-II)  - Monitor mental status, overall improved compared to admission, but remains AAOX2 (not oriented to time) now for weeks.  - C/w Folic, thiamine.   - Lactulose been held b/o diarrhea since 10/14 and 10/18, and rifaximin being held since 10/20 b/o dermatitis w/o other explanation, no resolution. Lactulose was resumed per primary team 10/28 tid. Would need exact recording of BMs. Per d/w PCA at bedside on 10/31, still had multiple BMs. No stool recorded in flowsheet, but per med records, lactulose was not given today b/o > 3 BMs o/n. Please, record BMs, can continue with caution if no more than 4 BMs. On discharge patient family will need detailed education about it.    # Coagulopathy  - Initially some improvement with vit K, but most recently no improvement. Received call from primary team that difficulty to arrange vit K. While cholestasis, poor nutrition can lead to vit K deficiency, he received vit K during hospitalization (10/5-10/7, 10/21-10/23, 10/25-10/27, 10/31-11/1) and now without any improvement in INR. Can c/w MV.         # Transplant candidacy: Obstacles been that he has only emergency medicaid per d/w SW, recent EtOH (up till hospitalization), poor insight possibly due to prior TBI too     Thank you for consult  Will continue to monitor. If gets discharged patient will need to follow in Liver / GI clinic, given his insurance status at [place] or [facility], within few days.   Been d/w [facility] Hepatology service attending(s) previously.  D/w primary team

## 2022-11-02 NOTE — PROGRESS NOTE ADULT - PROBLEM SELECTOR PLAN 6
patient from home   uninsured   poor insight unable to care for self   brother educated on how to care for him   pending repeat paracentesis in AM  COVID exposure 10/31  COVID - 11/1

## 2022-11-02 NOTE — PROGRESS NOTE ADULT - PROBLEM SELECTOR PLAN 2
with moderate ascities   S.P failed bedside paracentesis   IR consult for paracentesis in AM   s/p ppx treatment of SBP   Trend hepatic panel   avoid hepatotoxins with moderate ascities   S.P failed bedside paracentesis   now leaking from puncture site   will started Albumin Q8 x24h  IR consult for paracentesis in AM   s/p ppx treatment of SBP   Trend hepatic panel   avoid hepatotoxins

## 2022-11-02 NOTE — PROGRESS NOTE ADULT - NS ATTEND AMEND GEN_ALL_CORE FT
Niuean translated by Darius Carey. Keep leaking from previous para site.        PHYSICAL EXAM:  GENERAL: Jaundice, NAD, well-groomed, well-developed  HEAD:  Atraumatic, Normocephalic  EYES: EOMI, conjunctiva and sclera icteric  ENMT: Moist mucous membranes, Poor dentition,   NECK: Supple, No JVD,  NERVOUS SYSTEM:  Alert & awake but mildly confused. Moving all extremities   CHEST/LUNG: Clear to percussion bilaterally; No rales, rhonchi, wheezing, or rubs  HEART: Regular rate and rhythm; No murmurs, rubs, or gallops  ABDOMEN: Nontender, distended; Bowel sounds present  EXTREMITIES:  2+ Peripheral Pulses, No clubbing, cyanosis  LYMPH: No lymphadenopathy noted  SKIN: (+) leaking from para site, ostomy bag was placed    < from: US Abdomen Limited (11.01.22 @ 20:21) >    IMPRESSION:    There is moderate amount of ascites.    < end of copied text >      A/P:     #Alcoholic hepatitis  #Encephalopathy  #Leukocytosis  #anemia  #ascites  #Hepatosplenomegaly with coagulopathy  #Hx of TBI  #Hyponatremia      - AMS likely from hepatic encephalopathy and/or hx of TBI. C/w lactulose  - leukemoid reaction of mostly neutrophilia per heme/onc. Leukemia w/u is negative  - Hb stable around 8-9, monitor CBC  - Now ascites is leaking from previous para site. US abd showed moderate ascites. Pt is possibly developing portal HTN. IR was consulted and plan for para tomorrow AM.  - Monitor signs of bleeding  - Hepatologist doesn't recommend vit k as INR is not changing much.  - DC planning after para. [facility] (lactulose only) was ordered.    POC discussed with EVAN Ro

## 2022-11-02 NOTE — PROGRESS NOTE ADULT - PROBLEM SELECTOR PLAN 4
s/p EGD/colo   found to have 2 rectal Dieulafoy lesions on sigmoidoscopy (10/5/22)  Hemostasis achieved with over the scope clipping and cautery  GI Dr. Johnson

## 2022-11-02 NOTE — PROGRESS NOTE ADULT - SUBJECTIVE AND OBJECTIVE BOX
NP Note discussed with  primary attending    Patient is a 27y old  Male who presents with a chief complaint of Altered mental status (01 Nov 2022 18:15)      INTERVAL HPI/OVERNIGHT EVENTS: remains on enhanced supervision    MEDICATIONS  (STANDING):  dextrose 5%. 1000 milliLiter(s) (50 mL/Hr) IV Continuous <Continuous>  dextrose 50% Injectable 25 Gram(s) IV Push once  folic acid 1 milliGRAM(s) Oral <User Schedule>  glucagon  Injectable 1 milliGRAM(s) IntraMuscular once  insulin lispro (ADMELOG) corrective regimen sliding scale   SubCutaneous Before meals and at bedtime  lactulose Syrup 20 Gram(s) Oral every 8 hours  multivitamin 1 Tablet(s) Oral daily  nystatin Powder 1 Application(s) Topical two times a day  pantoprazole    Tablet 40 milliGRAM(s) Oral before breakfast  simethicone 80 milliGRAM(s) Chew three times a day  thiamine 100 milliGRAM(s) Oral daily    MEDICATIONS  (PRN):  aluminum hydroxide/magnesium hydroxide/simethicone Suspension 30 milliLiter(s) Oral every 4 hours PRN Dyspepsia  dextrose Oral Gel 15 Gram(s) Oral once PRN Blood Glucose LESS THAN 70 milliGRAM(s)/deciliter      __________________________________________________  REVIEW OF SYSTEMS:  limited due to mental status       Vital Signs Last 24 Hrs  T(C): 35.7 (02 Nov 2022 12:00), Max: 37 (02 Nov 2022 04:10)  T(F): 96.3 (02 Nov 2022 12:00), Max: 98.6 (02 Nov 2022 04:10)  HR: 100 (02 Nov 2022 12:00) (100 - 114)  BP: 117/50 (02 Nov 2022 12:00) (111/58 - 118/68)  BP(mean): --  RR: 18 (02 Nov 2022 12:00) (18 - 19)  SpO2: 99% (02 Nov 2022 12:00) (97% - 99%)    Parameters below as of 02 Nov 2022 12:00  Patient On (Oxygen Delivery Method): room air        ________________________________________________  PHYSICAL EXAM:  GENERAL: NAD,   HEENT: Normocephalic; B/L sclerae icteric poor dentation  NECK : supple  CHEST/LUNG: diminished air movement B/L lower lobes  HEART: S1 S2  regular; no murmurs, gallops or rubs  ABDOMEN: + ascities mild diffuse tenderness, hepatomegaly, no rebound or guarding  EXTREMITIES: no cyanosis; 3+BLE edema; no calf tenderness, 2+ radial and pedal pulses  SKIN:  warm and dry; with jaundice   NERVOUS SYSTEM: a&Ox1   _________________________________________________  LABS:                        9.1    x     )-----------( 327      ( 02 Nov 2022 11:13 )             28.1     11-02    135  |  103  |  7   ----------------------------<  113<H>  3.6   |  20<L>  |  0.76    Ca    8.2<L>      02 Nov 2022 11:13    TPro  5.6<L>  /  Alb  1.7<L>  /  TBili  18.6<H>  /  DBili  x   /  AST  59<H>  /  ALT  13  /  AlkPhos  266<H>  11-02    PT/INR - ( 02 Nov 2022 11:13 )   PT: 41.3 sec;   INR: 3.43 ratio             CAPILLARY BLOOD GLUCOSE      POCT Blood Glucose.: 126 mg/dL (02 Nov 2022 11:24)  POCT Blood Glucose.: 93 mg/dL (02 Nov 2022 07:52)  POCT Blood Glucose.: 98 mg/dL (01 Nov 2022 21:18)  POCT Blood Glucose.: 102 mg/dL (01 Nov 2022 16:40)        RADIOLOGY & ADDITIONAL TESTS: < from: US Abdomen Limited (11.01.22 @ 20:21) >  IMPRESSION:    There is moderate amount of ascites.        --- End of Report ---      < end of copied text >    < from: CT Abdomen and Pelvis w/ IV Cont (10.28.22 @ 20:21) >    IMPRESSION:  Marked hepatomegaly.  Moderate volume ascites, increased from prior study.        --- End of Report ---      < end of copied text >    < from: Xray Abdomen 2 Views (10.23.22 @ 20:58) >  IMPRESSION:  No evidence of bowel obstruction.  Probable hepatomegaly.    --- End of Report ---    < end of copied text >< from: US Abdomen Limited (10.20.22 @ 13:05) >  IMPRESSION:  Severe hepatomegaly and steatosis.  Mild ascites.        --- End of Report ---    < end of copied text >< from: Xray Knee 1 or 2 Views, Bilateral (10.19.22 @ 10:50) >  IMPRESSION:  1.  No fractures.  2.  No evidence of bowel obstruction however if intra-abdominal injury is   suspected recommend CT.    --- End of Report ---      < end of copied text >  < from: Xray Shoulder 2 Views, Bilateral (10.19.22 @ 10:49) >  IMPRESSION:  1.  No fractures.  2.  No evidence of bowel obstruction however if intra-abdominal injury is   suspected recommend CT.    --- End of Report ---      < end of copied text >      Imaging Personally Reviewed:  YES/  Consultant(s) Notes Reviewed:   YES/    Care Discussed with Consultants: hepatology      Plan of care was discussed with patient and /or primary care giver; all questions and concerns were addressed and care was aligned with patient's wishes.

## 2022-11-03 DIAGNOSIS — D68.9 COAGULATION DEFECT, UNSPECIFIED: ICD-10-CM

## 2022-11-03 LAB
ALBUMIN SERPL ELPH-MCNC: 1.9 G/DL — LOW (ref 3.5–5)
ALP SERPL-CCNC: 242 U/L — HIGH (ref 40–120)
ALT FLD-CCNC: 10 U/L DA — SIGNIFICANT CHANGE UP (ref 10–60)
ANION GAP SERPL CALC-SCNC: 12 MMOL/L — SIGNIFICANT CHANGE UP (ref 5–17)
ANNOTATION COMMENT IMP: SIGNIFICANT CHANGE UP
AST SERPL-CCNC: 53 U/L — HIGH (ref 10–40)
BILIRUB DIRECT SERPL-MCNC: 13.4 MG/DL — HIGH (ref 0–0.3)
BILIRUB SERPL-MCNC: 17.5 MG/DL — HIGH (ref 0.2–1.2)
BUN SERPL-MCNC: 7 MG/DL — SIGNIFICANT CHANGE UP (ref 7–18)
CALCIUM SERPL-MCNC: 8 MG/DL — LOW (ref 8.4–10.5)
CHLORIDE SERPL-SCNC: 104 MMOL/L — SIGNIFICANT CHANGE UP (ref 96–108)
CO2 SERPL-SCNC: 17 MMOL/L — LOW (ref 22–31)
CREAT SERPL-MCNC: 0.59 MG/DL — SIGNIFICANT CHANGE UP (ref 0.5–1.3)
CULTURE RESULTS: SIGNIFICANT CHANGE UP
EGFR: 138 ML/MIN/1.73M2 — SIGNIFICANT CHANGE UP
GLUCOSE BLDC GLUCOMTR-MCNC: 113 MG/DL — HIGH (ref 70–99)
GLUCOSE BLDC GLUCOMTR-MCNC: 125 MG/DL — HIGH (ref 70–99)
GLUCOSE BLDC GLUCOMTR-MCNC: 127 MG/DL — HIGH (ref 70–99)
GLUCOSE BLDC GLUCOMTR-MCNC: 132 MG/DL — HIGH (ref 70–99)
GLUCOSE SERPL-MCNC: 94 MG/DL — SIGNIFICANT CHANGE UP (ref 70–99)
HCT VFR BLD CALC: 25 % — LOW (ref 39–50)
HEV RNA SERPL NAA+PROBE-ACNC: SIGNIFICANT CHANGE UP IU/ML
HEV RNA SERPL NAA+PROBE-LOG IU: <3.3 LOG IU/ML — SIGNIFICANT CHANGE UP
HGB BLD-MCNC: 8.2 G/DL — LOW (ref 13–17)
INR BLD: 3.48 RATIO — HIGH (ref 0.88–1.16)
MCHC RBC-ENTMCNC: 31.5 PG — SIGNIFICANT CHANGE UP (ref 27–34)
MCHC RBC-ENTMCNC: 32.8 GM/DL — SIGNIFICANT CHANGE UP (ref 32–36)
MCV RBC AUTO: 96.2 FL — SIGNIFICANT CHANGE UP (ref 80–100)
NRBC # BLD: 0 /100 WBCS — SIGNIFICANT CHANGE UP (ref 0–0)
OB PNL STL: POSITIVE
PLATELET # BLD AUTO: 254 K/UL — SIGNIFICANT CHANGE UP (ref 150–400)
POTASSIUM SERPL-MCNC: 3.5 MMOL/L — SIGNIFICANT CHANGE UP (ref 3.5–5.3)
POTASSIUM SERPL-SCNC: 3.5 MMOL/L — SIGNIFICANT CHANGE UP (ref 3.5–5.3)
PROT SERPL-MCNC: 5.3 G/DL — LOW (ref 6–8.3)
PROTHROM AB SERPL-ACNC: 41.9 SEC — HIGH (ref 10.5–13.4)
RBC # BLD: 2.6 M/UL — LOW (ref 4.2–5.8)
RBC # FLD: 22.7 % — HIGH (ref 10.3–14.5)
SODIUM SERPL-SCNC: 133 MMOL/L — LOW (ref 135–145)
SPECIMEN SOURCE: SIGNIFICANT CHANGE UP
SPECIMEN SOURCE: SIGNIFICANT CHANGE UP
WBC # BLD: 48.59 K/UL — CRITICAL HIGH (ref 3.8–10.5)
WBC # FLD AUTO: 48.59 K/UL — CRITICAL HIGH (ref 3.8–10.5)

## 2022-11-03 PROCEDURE — 99233 SBSQ HOSP IP/OBS HIGH 50: CPT

## 2022-11-03 RX ORDER — HYDROMORPHONE HYDROCHLORIDE 2 MG/ML
0.5 INJECTION INTRAMUSCULAR; INTRAVENOUS; SUBCUTANEOUS ONCE
Refills: 0 | Status: DISCONTINUED | OUTPATIENT
Start: 2022-11-03 | End: 2022-11-03

## 2022-11-03 RX ADMIN — Medication 50 MILLILITER(S): at 13:49

## 2022-11-03 RX ADMIN — HYDROMORPHONE HYDROCHLORIDE 0.5 MILLIGRAM(S): 2 INJECTION INTRAMUSCULAR; INTRAVENOUS; SUBCUTANEOUS at 01:47

## 2022-11-03 RX ADMIN — SIMETHICONE 80 MILLIGRAM(S): 80 TABLET, CHEWABLE ORAL at 05:04

## 2022-11-03 RX ADMIN — SIMETHICONE 80 MILLIGRAM(S): 80 TABLET, CHEWABLE ORAL at 21:50

## 2022-11-03 RX ADMIN — Medication 50 MILLILITER(S): at 05:04

## 2022-11-03 RX ADMIN — PANTOPRAZOLE SODIUM 40 MILLIGRAM(S): 20 TABLET, DELAYED RELEASE ORAL at 05:04

## 2022-11-03 RX ADMIN — SIMETHICONE 80 MILLIGRAM(S): 80 TABLET, CHEWABLE ORAL at 13:46

## 2022-11-03 RX ADMIN — HYDROMORPHONE HYDROCHLORIDE 0.5 MILLIGRAM(S): 2 INJECTION INTRAMUSCULAR; INTRAVENOUS; SUBCUTANEOUS at 02:02

## 2022-11-03 RX ADMIN — Medication 1 MILLIGRAM(S): at 08:07

## 2022-11-03 RX ADMIN — LACTULOSE 20 GRAM(S): 10 SOLUTION ORAL at 13:47

## 2022-11-03 RX ADMIN — Medication 1 TABLET(S): at 12:12

## 2022-11-03 RX ADMIN — Medication 100 MILLIGRAM(S): at 12:12

## 2022-11-03 RX ADMIN — NYSTATIN CREAM 1 APPLICATION(S): 100000 CREAM TOPICAL at 05:05

## 2022-11-03 NOTE — PROGRESS NOTE ADULT - PROBLEM SELECTOR PLAN 7
+ stool OB  f/u s+s bleeding and Hgb in AM   leaking peritoneal fluid left abdomen  pressure dressing  Vivo meds received  Once optimized will dc home under care of brother

## 2022-11-03 NOTE — PHARMACOTHERAPY INTERVENTION NOTE - COMMENTS
VIVO medications delivered, education and materials provided    Brought VIVO medication (lactulose) to the floor at 10.28 am, 11 02 2022. Talked to patient’s attending physician and to the covering NP Jia about pt’s discharge. As per covering team the discharge is not confirmed and the pt might stay, will be known better by 3pm when response from other services regarding patient’s ascites as well as based on clearance for COVID exposure (for now on droplet isolation due to potential exposure on 11 01 2022). Decided with covering team that will leave medication with the pt’s covering RN (Narcisa). RN refused to accept medication based on the point that the patient needs counselling first (not pertaining to the RNs responsibilities and requested activity). Based on patient assessment and verifying with other providers, the patient is not able to understand medication information due to his mental status (confirmed by  and covering NP and my personal assessment as I called patient over the bed side phone). I tried to get in touch with patient’s brother (contact information in [place]) but he did not  the phone so I left a message. Also tried to get  service over the phone but needed the RN assistance with approaching the patient for automatic  use (RN did not respond if she is willing to cooperate). Attempted to provide medication counselling over the phone, the patient speaks some English (Malagasy as a primary language) but the patient did not sound like understanding and oriented.  Meanwhile asked NP to order ammonia level to verify the need for the medication and Hepatitis C, B to make sure no complications. Waiting for now tests results, decision upon discharge plan and pts brother to call back. The medication returned to the main pharmacy (RN was made aware where to pick it up).     2nd attempt to reach the pt caregiver was at 3:28pm (phone # on file [phone]) no response.

## 2022-11-03 NOTE — PROGRESS NOTE ADULT - SUBJECTIVE AND OBJECTIVE BOX
Chief Complaint:  Patient is a 27y old  Male who presents with a chief complaint of Altered mental status (03 Nov 2022 17:06)      Reason for consult: SYEDA/ACLF    Interval Events: Patient was seen and examined at bedside.  ID [id number]. Patient remains awake, alert, but oriented to self and place, not time, and still confused in his answers, although recalling events of last few days correctly. He still had leakage from paracentesis site, ostomy bag was removed, with pressure dressing. Remains coagulopathic, with elevated WBC, although afebrile, with minimal fluctuation of his bilirubin level. Paracentesis was deferred by IR b/o elevated INR.   Lactulose was reordered on 10/28 per primary team, he does c/o worsening abdominal pain / distention when on lactulose, and per d/w PCA at bedside, had 5 BMs today during the day till 4 pm.  (None recorded in flowsheet.)    Hospital Medications:  aluminum hydroxide/magnesium hydroxide/simethicone Suspension 30 milliLiter(s) Oral every 4 hours PRN  dextrose 5%. 1000 milliLiter(s) IV Continuous <Continuous>  dextrose 50% Injectable 25 Gram(s) IV Push once  dextrose Oral Gel 15 Gram(s) Oral once PRN  folic acid 1 milliGRAM(s) Oral <User Schedule>  glucagon  Injectable 1 milliGRAM(s) IntraMuscular once  insulin lispro (ADMELOG) corrective regimen sliding scale   SubCutaneous Before meals and at bedtime  lactulose Syrup 20 Gram(s) Oral every 8 hours  LORazepam   Injectable 0.5 milliGRAM(s) IV Push once  multivitamin 1 Tablet(s) Oral daily  nystatin Powder 1 Application(s) Topical two times a day  pantoprazole    Tablet 40 milliGRAM(s) Oral before breakfast  simethicone 80 milliGRAM(s) Chew three times a day  thiamine 100 milliGRAM(s) Oral daily      ROS:   General:  No  fevers, chills  Eyes:  Good vision, no reported pain  ENT:  No sore throat, pain, runny nose  CV:  No pain  Pulm:  No dyspnea, cough  GI:  Abdominal distention, no N/V, uncertain exact # of BMs, during day till around 4 pm had 5 BMs per d/w PCA at bedside. Witnessed one, liquid, no overt bleeding.   :  does not complaint, but does not answer  Muscle:  No pain, or focal weakness  Neuro:  Remains awake, alert, but only oriented to self and place, not time and his answers sometimes still suggest confusion  Heme:  Easy bruisability  Skin:  Jaundice.    PHYSICAL EXAM:   Vital Signs:  Vital Signs Last 24 Hrs  T(C): 35.9 (03 Nov 2022 21:09), Max: 36.3 (03 Nov 2022 04:55)  T(F): 96.6 (03 Nov 2022 21:09), Max: 97.4 (03 Nov 2022 04:55)  HR: 92 (03 Nov 2022 21:09) (92 - 104)  BP: 109/59 (03 Nov 2022 21:09) (109/59 - 118/65)  BP(mean): --  RR: 17 (03 Nov 2022 21:09) (17 - 18)  SpO2: 99% (03 Nov 2022 21:09) (99% - 100%)    Parameters below as of 03 Nov 2022 21:09  Patient On (Oxygen Delivery Method): room air      Daily     Daily     GENERAL: no acute distress but ill appearing  NEURO: awake, alert, oriented to place and self, not time, lot of his answers remain confused, but overall appears to understand situation  HEENT: icteric sclera  CHEST: no respiratory distress, no accessory muscle use  CARDIAC: Mildly tachycardic  ABDOMEN: soft, non-tender, but moderately distended, with ascites, dressing on LLQ, without leakage when see (but was after dressing change), no rebound or guarding, BS+  EXTREMITIES: B/l LE edema  SKIN: Jaundice.    LABS: reviewed                        8.2    48.59 )-----------( 254      ( 03 Nov 2022 07:10 )             25.0     11-03    133<L>  |  104  |  7   ----------------------------<  94  3.5   |  17<L>  |  0.59    Ca    8.0<L>      03 Nov 2022 07:10    TPro  5.3<L>  /  Alb  1.9<L>  /  TBili  17.5<H>  /  DBili  13.4<H>  /  AST  53<H>  /  ALT  10  /  AlkPhos  242<H>  11-03    LIVER FUNCTIONS - ( 03 Nov 2022 07:10 )  Alb: 1.9 g/dL / Pro: 5.3 g/dL / ALK PHOS: 242 U/L / ALT: 10 U/L DA / AST: 53 U/L / GGT: x             Interval Diagnostic Studies: see sunrise for full report

## 2022-11-03 NOTE — PROGRESS NOTE ADULT - PROBLEM SELECTOR PLAN 1
2/2 severe alcoholic hepatitis and hx of TBI  improved but remains altered   on enhanced supervision for safety   holding lactulose on hold due to frequent stooling and + stool for OB   Hepatology Dr Gonzalez following

## 2022-11-03 NOTE — CHART NOTE - NSCHARTNOTEFT_GEN_A_CORE
Admit Diagnosis: Hepatic Failure/ Encephalopathy    EVENT: Primary RN relaying patient c/o abdominal pain    SUBJECTIVE: Patient unable to verbalize symptoms 2/2 metabolic encephalopathy, noted to be moaning in pain    OBJECTIVE: Vital Signs Last 24 Hrs  T(C): 36.7 (02 Nov 2022 20:23), Max: 37 (02 Nov 2022 04:10)  T(F): 98.1 (02 Nov 2022 20:23), Max: 98.6 (02 Nov 2022 04:10)  HR: 110 (02 Nov 2022 20:23) (100 - 114)  BP: 121/58 (02 Nov 2022 20:23) (111/58 - 121/58)  RR: 19 (02 Nov 2022 20:23) (18 - 19)  SpO2: 98% (02 Nov 2022 20:23) (98% - 99%)    Parameters below as of 02 Nov 2022 20:23  Patient On (Oxygen Delivery Method): room air    POCT Blood Glucose.: 124 mg/dL (02 Nov 2022 21:02)      HPI: Patient is a 27 yo Serbian speaking Male, lives at home with brother, with no known past medical history,  s/p Left temporal parietal craniotomy noted in imaging.  Patient presented with AMS and admitted to ICU with acute metabolic encephalopathy. and EtOH withdrawal.   Pt was started on antibiotics, IV fluids, and lactulose for elevated ammonia level.  Lactate trended down to normal levels. GI and Hepatology was consulted for transaminitis and hyperbilirubinemia. Patient was started on zosyn for possible cholangitis. CT abd showed hepatomegaly with biliary sludge and mild ascites.      ROS:  CONSTITUTIONAL: No fever  EYES: No acute visual disturbances  NECK: No pain or stiffness  RESPIRATORY: No cough; No shortness of breath  CARDIOVASCULAR: No chest pain, no palpitations  GASTROINTESTINAL: No nausea or vomiting.  No diarrhea, + abdominal pain  NEUROLOGICAL: No headache or numbness, no tremors  MUSCULOSKELETAL: No joint pain, no muscle pain  GENITOURINARY: No dysuria, no frequency, no hesitancy    ALL OTHER  ROS negative        PHYSICAL EXAM:  GENERAL: Cachectic, malnourished  HEAD:  Atraumatic, Normocephalic  EYES: EOMI, PERRLA, conjunctiva and sclera yellow  ENT: Moist mucous membranes, No lesions  NECK: Supple, No JVD, Normal thyroid, no enlarged nodes  Neuro:  Baseline w/altered mentation, A/O x 0  CHEST/LUNG: Clear B/L ; No adventitious sounds  HEART: S1/S2 normal, RRR, No M/R/G  ABDOMEN: Gross ascites, small ascitic leak noted to left abdomen  EXTREMITIES:  2+ Peripheral Pulses, No c/c/e  LYMPH: No lymphadenopathy   SKIN: No rashes or lesions      A/P: Acute abdominal pain due to liver failure with corresponding ascites.    PLAN: Hydromorphone 0.5 mg IV x 1 dose            Patient for paracentesis 11/3

## 2022-11-03 NOTE — PHARMACOTHERAPY INTERVENTION NOTE - COMMENTS
Pt with hepatic insufficiency (liver transplant candidate) tx for hepatic encephalopathy, recommended to reorder ammonium level (as the only one at admission) as well as Hepatitis panel

## 2022-11-03 NOTE — PROGRESS NOTE ADULT - PROBLEM SELECTOR PLAN 4
2/2 EtOH   f/u cbc in AM increased PT/INR  not responding to vit K treatment  hepatology following Dr. Gonzalez  f/u coags in AM

## 2022-11-03 NOTE — PROGRESS NOTE ADULT - PROBLEM SELECTOR PLAN 3
LFTs trended down slightly  likely at new baseline given chronic daily EtOH use LFTs trended down slightly  likely at new baseline given chronic daily EtOH use  MRCP to r/o obstruction

## 2022-11-03 NOTE — PHARMACOTHERAPY INTERVENTION NOTE - NSPHARMCOMMPTEDU
Patient Education - Discharge Counseling
Patient Education - Discharge Counseling
Referred To Otolaryngology For Closure Text (Leave Blank If You Do Not Want): After obtaining clear surgical margins the patient was sent to otolaryngology for surgical repair.  The patient understands they will receive post-surgical care and follow-up from the referring physician's office.

## 2022-11-03 NOTE — PROGRESS NOTE ADULT - PROBLEM SELECTOR PLAN 6
+ stool OB  f/u s+s bleeding and Hgb in AM   leaking peritoneal fluid left abdomen  pressure dressing on prednisone

## 2022-11-03 NOTE — PHARMACOTHERAPY INTERVENTION NOTE - COMMENTS
RE: [facility]O meds update. As per covering NP the pt is not for Dsc today, awaiting MRI tomorrow morning; ammonia level is also scheduled to be checked tomorrow morning; Hepatitis panel was checked at [place] prior to adm to [facility], claimed as negative; Pt caregiver (brother) did not respond after 2 attempts to contact

## 2022-11-03 NOTE — PROGRESS NOTE ADULT - ATTENDING COMMENTS
Patient was seen and examined at bedside. At same mental status as yesterday as per RN and PCA. Reports intermittent abd pain but denies any other complains. Had BM yesterday and today.     ICU Vital Signs Last 24 Hrs  T(C): 36.1 (03 Nov 2022 12:00), Max: 36.7 (02 Nov 2022 20:23)  T(F): 97 (03 Nov 2022 12:00), Max: 98.1 (02 Nov 2022 20:23)  HR: 104 (03 Nov 2022 12:00) (101 - 110)  BP: 112/67 (03 Nov 2022 12:00) (112/67 - 121/58)  BP(mean): --  ABP: --  ABP(mean): --  RR: 18 (03 Nov 2022 12:00) (18 - 19)  SpO2: 99% (03 Nov 2022 12:00) (98% - 100%)    O2 Parameters below as of 03 Nov 2022 12:00  Patient On (Oxygen Delivery Method): room air    P/E:  NAD. icteric   AAOx1-2, no focal deficit  CCTABL  S1S2 WNL, no MRG   Abd soft, distended, pressure bandage on left lower quadrant   BL LE + edema     Labs                           8.2    48.59 )-----------( 254      ( 03 Nov 2022 07:10 )             25.0     11-03    133<L>  |  104  |  7   ----------------------------<  94  3.5   |  17<L>  |  0.59    Ca    8.0<L>      03 Nov 2022 07:10    TPro  5.3<L>  /  Alb  1.9<L>  /  TBili  17.5<H>  /  DBili  13.4<H>  /  AST  53<H>  /  ALT  10  /  AlkPhos  242<H>  11-03    A/P:  Anemia of chronic disease vs acute blood loss anemia, concern for GI bleed   Acute hepatic encephalopathy  Acute decompensated liver failure, concern for alcoholic hepatitis  H/o TBI  Alcohol withdrawal   Rectal Varices s/p cauterized   Coagulopathy due to liver failure  leukocytosis     Plan:  Discharge cancelled today, monitor for any GIB, monitor Hb, repeat in am, cont Lactulose now   Cont Protonix daily, increase to BID and GI consult if Hb continues to drop   Daily LFTs  Monitor mental status   Cont Thiamine, folic acid   Not on steroid anymore   Aspiration and fall precaution Patient was seen and examined at bedside. At same mental status as yesterday as per RN and PCA. Reports intermittent abd pain but denies any other complains. Had BM yesterday and today.     ICU Vital Signs Last 24 Hrs  T(C): 36.1 (03 Nov 2022 12:00), Max: 36.7 (02 Nov 2022 20:23)  T(F): 97 (03 Nov 2022 12:00), Max: 98.1 (02 Nov 2022 20:23)  HR: 104 (03 Nov 2022 12:00) (101 - 110)  BP: 112/67 (03 Nov 2022 12:00) (112/67 - 121/58)  BP(mean): --  ABP: --  ABP(mean): --  RR: 18 (03 Nov 2022 12:00) (18 - 19)  SpO2: 99% (03 Nov 2022 12:00) (98% - 100%)    O2 Parameters below as of 03 Nov 2022 12:00  Patient On (Oxygen Delivery Method): room air    P/E:  NAD. icteric   AAOx1-2, no focal deficit  CCTABL  S1S2 WNL, no MRG   Abd soft, distended, pressure bandage on left lower quadrant   BL LE + edema     Labs                           8.2    48.59 )-----------( 254      ( 03 Nov 2022 07:10 )             25.0     11-03    133<L>  |  104  |  7   ----------------------------<  94  3.5   |  17<L>  |  0.59    Ca    8.0<L>      03 Nov 2022 07:10    TPro  5.3<L>  /  Alb  1.9<L>  /  TBili  17.5<H>  /  DBili  13.4<H>  /  AST  53<H>  /  ALT  10  /  AlkPhos  242<H>  11-03    A/P:  Anemia of chronic disease vs acute blood loss anemia, concern for GI bleed   Acute hepatic encephalopathy  Acute decompensated liver failure, concern for alcoholic hepatitis  H/o TBI  Alcohol withdrawal   Rectal Varices s/p cauterized   Coagulopathy due to liver failure  leukocytosis     Plan:  Discharge cancelled today, monitor for any GIB, monitor Hb, repeat in am, cont Lactulose now   Cont Protonix daily, increase to BID and GI consult if Hb continues to drop   Daily LFTs  Monitor mental status   Cont Thiamine, folic acid   Not on steroid anymore   Persistent leukocytosis, hem-onc- recommending possible leukemoid reaction. Will obtain MRCP to r/o biliary malignancy  Aspiration and fall precaution

## 2022-11-03 NOTE — PROGRESS NOTE ADULT - PROBLEM SELECTOR PROBLEM 1
GIB (gastrointestinal bleeding)
GIB (gastrointestinal bleeding)
Hepatic encephalopathy
Acute encephalopathy
GIB (gastrointestinal bleeding)
GIB (gastrointestinal bleeding)
Leukocytosis
GIB (gastrointestinal bleeding)
Sepsis
Acute encephalopathy
Acute encephalopathy
Leukocytosis
Leukocytosis
Acute encephalopathy
Leukocytosis
Leukocytosis
Acute encephalopathy
Leukocytosis
Acute encephalopathy
Acute encephalopathy
Leukocytosis
Leukocytosis
Acute encephalopathy
Leukocytosis

## 2022-11-03 NOTE — PROGRESS NOTE ADULT - ASSESSMENT
Patient is a 25 yo Sri Lankan speaking Male, lives at home with brother, with no known past medical history,  s/p Left temporal parietal craniotomy noted in imaging.  Patient presented with AMS and admitted to ICU with acute metabolic encephalopathy. and EtOH withdrawal.   Pt was started on antibiotics, IV fluids, and lactulose for elevated amonnia level.  Lactate trended down to normal levels. GI and Hepatology was consulted for transaminitis and hyperbilirubinemia. Patient was started on zosyn for possible cholangitis. CT abd showed hepatomegaly with biliary sludge and mild ascites. MRCP was recommended.  Noted with  Hb  7.3,  started on protonix drip and octreotide drip. Patient received one unit PRBC and one unit FFP.  GI recommended EGD which did not show any pathology. Flexible sigmoidoscopy showed rectal varices which was cauterized. Pt then switched ceftriaxone for SBP prophylaxis per ID.  Vit K for 3 days for elevated INR. He was started on prednisone 40mg daily for 28 days for alcoholic hepatitis.   Patient hospital course further complicated by fever, and rising leucocytosis. ID consulted.  blood culture 10/16 NGTD, stool studies no pathogens, Cdfif negative. CT abdomen and pelvis- 1. Marked hepatomegaly/steatosis, unchanged. 2.  Ascites/mesenteric edema and subcutaneous edema, minimally progressed. 3.  Equivocal ascending colonic mural prominence raises the possibility of portal colopathy or inflammation   BCR/ ABL sent pending results. INR 3.16,  liver biopsy plan for biopsy cancelled due to elevated INR. Vit K po continued   Pt seen at bedside, alert, awake, confused to person and place.  Persistent ascites despite having paracentesis recovering 60mL fluid. Repeat US revealed moderate ascites. Needs repeat paracentesis however on hold given supratherapeutic INR not responding to vit K, decreasing Hgb and +stool for OB, will monitor for bleeding and f/u cbc in AM.     Patient is a 27 yo Turkish speaking Male, lives at home with brother, with no known past medical history,  s/p Left temporal parietal craniotomy noted in imaging.  Patient presented with AMS and admitted to ICU with acute metabolic encephalopathy. and EtOH withdrawal.   Pt was started on antibiotics, IV fluids, and lactulose for elevated amonnia level.  Lactate trended down to normal levels. GI and Hepatology was consulted for transaminitis and hyperbilirubinemia. Patient was started on zosyn for possible cholangitis. CT abd showed hepatomegaly with biliary sludge and mild ascites. MRCP was recommended.  Noted with  Hb  7.3,  started on protonix drip and octreotide drip. Patient received one unit PRBC and one unit FFP.  GI recommended EGD which did not show any pathology. Flexible sigmoidoscopy showed rectal varices which was cauterized. Pt then switched ceftriaxone for SBP prophylaxis per ID.  Vit K for 3 days for elevated INR. He was started on prednisone 40mg daily for 28 days for alcoholic hepatitis.   Patient hospital course further complicated by fever, and rising leucocytosis. ID consulted.  blood culture 10/16 NGTD, stool studies no pathogens, Cdfif negative. CT abdomen and pelvis- 1. Marked hepatomegaly/steatosis, unchanged. 2.  Ascites/mesenteric edema and subcutaneous edema, minimally progressed. 3.  Equivocal ascending colonic mural prominence raises the possibility of portal colopathy or inflammation. RCP today to r/o biliary obstruction.   BCR/ ABL sent pending results. INR 3.16,  liver biopsy plan for biopsy cancelled due to elevated INR. Vit K po continued   Pt seen at bedside, alert, awake, confused to person and place.  Persistent ascites despite having paracentesis recovering 60mL fluid. Repeat US revealed moderate ascites. Needs repeat paracentesis however on hold given supratherapeutic INR not responding to vit K, decreasing Hgb and +stool for OB, will monitor for bleeding and f/u cbc in AM.

## 2022-11-03 NOTE — PROGRESS NOTE ADULT - SUBJECTIVE AND OBJECTIVE BOX
NP Note discussed with  Primary Attending    Patient is a 27y old  Male who presents with a chief complaint of Altered mental status (02 Nov 2022 16:04)      INTERVAL HPI/OVERNIGHT EVENTS: no new complaints    MEDICATIONS  (STANDING):  albumin human 25% IVPB 50 milliLiter(s) IV Intermittent every 8 hours  dextrose 5%. 1000 milliLiter(s) (50 mL/Hr) IV Continuous <Continuous>  dextrose 50% Injectable 25 Gram(s) IV Push once  folic acid 1 milliGRAM(s) Oral <User Schedule>  glucagon  Injectable 1 milliGRAM(s) IntraMuscular once  insulin lispro (ADMELOG) corrective regimen sliding scale   SubCutaneous Before meals and at bedtime  lactulose Syrup 20 Gram(s) Oral every 8 hours  multivitamin 1 Tablet(s) Oral daily  nystatin Powder 1 Application(s) Topical two times a day  pantoprazole    Tablet 40 milliGRAM(s) Oral before breakfast  simethicone 80 milliGRAM(s) Chew three times a day  thiamine 100 milliGRAM(s) Oral daily    MEDICATIONS  (PRN):  aluminum hydroxide/magnesium hydroxide/simethicone Suspension 30 milliLiter(s) Oral every 4 hours PRN Dyspepsia  dextrose Oral Gel 15 Gram(s) Oral once PRN Blood Glucose LESS THAN 70 milliGRAM(s)/deciliter      __________________________________________________  REVIEW OF SYSTEMS:    CONSTITUTIONAL: No fever,   EYES: no acute visual disturbances  NECK: No pain or stiffness  RESPIRATORY: No cough; No shortness of breath  CARDIOVASCULAR: No chest pain, no palpitations  GASTROINTESTINAL: No pain. No nausea or vomiting; No diarrhea   NEUROLOGICAL: No headache or numbness, no tremors  MUSCULOSKELETAL: No joint pain, no muscle pain  GENITOURINARY: no dysuria, no frequency, no hesitancy  PSYCHIATRY: no depression , no anxiety  ALL OTHER  ROS negative        Vital Signs Last 24 Hrs  T(C): 36.3 (03 Nov 2022 04:55), Max: 36.7 (02 Nov 2022 20:23)  T(F): 97.4 (03 Nov 2022 04:55), Max: 98.1 (02 Nov 2022 20:23)  HR: 101 (03 Nov 2022 04:55) (101 - 110)  BP: 118/65 (03 Nov 2022 04:55) (118/65 - 121/58)  BP(mean): --  RR: 18 (03 Nov 2022 04:55) (18 - 19)  SpO2: 100% (03 Nov 2022 04:55) (98% - 100%)    Parameters below as of 03 Nov 2022 04:55  Patient On (Oxygen Delivery Method): room air        ________________________________________________  PHYSICAL EXAM:  GENERAL: NAD  HEENT: Normocephalic;  conjunctivae and sclerae clear; moist mucous membranes;   NECK : supple  CHEST/LUNG: Clear to auscultation bilaterally with good air entry   HEART: S1 S2  regular; no murmurs, gallops or rubs  ABDOMEN: Soft, Nontender, Nondistended; Bowel sounds present  EXTREMITIES: no cyanosis; no edema; no calf tenderness  SKIN: warm and dry; no rash  NERVOUS SYSTEM:  Awake and alert; Oriented  to place, person and time ; no new deficits    _________________________________________________  LABS:                        8.2    48.59 )-----------( 254      ( 03 Nov 2022 07:10 )             25.0     11-03    133<L>  |  104  |  7   ----------------------------<  94  3.5   |  17<L>  |  0.59    Ca    8.0<L>      03 Nov 2022 07:10    TPro  5.3<L>  /  Alb  1.9<L>  /  TBili  17.5<H>  /  DBili  13.4<H>  /  AST  53<H>  /  ALT  10  /  AlkPhos  242<H>  11-03    PT/INR - ( 03 Nov 2022 07:10 )   PT: 41.9 sec;   INR: 3.48 ratio             CAPILLARY BLOOD GLUCOSE      POCT Blood Glucose.: 113 mg/dL (03 Nov 2022 12:00)  POCT Blood Glucose.: 127 mg/dL (03 Nov 2022 07:58)  POCT Blood Glucose.: 124 mg/dL (02 Nov 2022 21:02)  POCT Blood Glucose.: 119 mg/dL (02 Nov 2022 16:51)        RADIOLOGY & ADDITIONAL TESTS:    Imaging  Reviewed:  YES/NO    Consultant(s) Notes Reviewed:   YES/ No      Plan of care was discussed with patient and /or primary care giver; all questions and concerns were addressed  NP Note discussed with  Primary Attending    Patient is a 27y old  Male who presents with a chief complaint of Altered mental status (02 Nov 2022 16:04)      INTERVAL HPI/OVERNIGHT EVENTS: no new complaints    MEDICATIONS  (STANDING):  albumin human 25% IVPB 50 milliLiter(s) IV Intermittent every 8 hours  dextrose 5%. 1000 milliLiter(s) (50 mL/Hr) IV Continuous <Continuous>  dextrose 50% Injectable 25 Gram(s) IV Push once  folic acid 1 milliGRAM(s) Oral <User Schedule>  glucagon  Injectable 1 milliGRAM(s) IntraMuscular once  insulin lispro (ADMELOG) corrective regimen sliding scale   SubCutaneous Before meals and at bedtime  lactulose Syrup 20 Gram(s) Oral every 8 hours  multivitamin 1 Tablet(s) Oral daily  nystatin Powder 1 Application(s) Topical two times a day  pantoprazole    Tablet 40 milliGRAM(s) Oral before breakfast  simethicone 80 milliGRAM(s) Chew three times a day  thiamine 100 milliGRAM(s) Oral daily    MEDICATIONS  (PRN):  aluminum hydroxide/magnesium hydroxide/simethicone Suspension 30 milliLiter(s) Oral every 4 hours PRN Dyspepsia  dextrose Oral Gel 15 Gram(s) Oral once PRN Blood Glucose LESS THAN 70 milliGRAM(s)/deciliter      __________________________________________________  REVIEW OF SYSTEMS:    CONSTITUTIONAL: No fever,   EYES: no acute visual disturbances  NECK: No pain or stiffness  RESPIRATORY: No cough; No shortness of breath  CARDIOVASCULAR: No chest pain, no palpitations  GASTROINTESTINAL: No pain. No nausea or vomiting; No diarrhea   NEUROLOGICAL: No headache or numbness, no tremors  MUSCULOSKELETAL: No joint pain, no muscle pain  GENITOURINARY: no dysuria, no frequency, no hesitancy  PSYCHIATRY: no depression , no anxiety  ALL OTHER  ROS negative        Vital Signs Last 24 Hrs  T(C): 36.3 (03 Nov 2022 04:55), Max: 36.7 (02 Nov 2022 20:23)  T(F): 97.4 (03 Nov 2022 04:55), Max: 98.1 (02 Nov 2022 20:23)  HR: 101 (03 Nov 2022 04:55) (101 - 110)  BP: 118/65 (03 Nov 2022 04:55) (118/65 - 121/58)  BP(mean): --  RR: 18 (03 Nov 2022 04:55) (18 - 19)  SpO2: 100% (03 Nov 2022 04:55) (98% - 100%)    Parameters below as of 03 Nov 2022 04:55  Patient On (Oxygen Delivery Method): room air        ________________________________________________  PHYSICAL EXAM:  GENERAL: NAD,   HEENT: Normocephalic; B/L sclerae icteric; moist mucous membranes;   NECK : supple  CHEST/LUNG: Clear to auscultation bilaterally with good air entry, however diminished air movement B/L lower lobes  HEART: S1 S2  regular; no murmurs, gallops or rubs  ABDOMEN: distended, Nontender, with ascites; Bowel sounds present, slow peritoneal fluid leak to paracentesis site on left abdomen  EXTREMITIES: no cyanosis; 3+BLE edema; no calf tenderness, 2+ radial and pedal pulses  SKIN:  warm and dry; with jaundice throughout, no rash  NERVOUS SYSTEM:  Awake and alert; Oriented to person only; unable to stay focused      _________________________________________________  LABS:                        8.2    48.59 )-----------( 254      ( 03 Nov 2022 07:10 )             25.0     11-03    133<L>  |  104  |  7   ----------------------------<  94  3.5   |  17<L>  |  0.59    Ca    8.0<L>      03 Nov 2022 07:10    TPro  5.3<L>  /  Alb  1.9<L>  /  TBili  17.5<H>  /  DBili  13.4<H>  /  AST  53<H>  /  ALT  10  /  AlkPhos  242<H>  11-03    PT/INR - ( 03 Nov 2022 07:10 )   PT: 41.9 sec;   INR: 3.48 ratio             CAPILLARY BLOOD GLUCOSE      POCT Blood Glucose.: 113 mg/dL (03 Nov 2022 12:00)  POCT Blood Glucose.: 127 mg/dL (03 Nov 2022 07:58)  POCT Blood Glucose.: 124 mg/dL (02 Nov 2022 21:02)  POCT Blood Glucose.: 119 mg/dL (02 Nov 2022 16:51)        RADIOLOGY & ADDITIONAL TESTS:    Imaging  Reviewed:  YES/NO    Consultant(s) Notes Reviewed:   YES/ No      Plan of care was discussed with patient and /or primary care giver; all questions and concerns were addressed  NP Note discussed with  Primary Attending    Patient is a 27y old  Male who presents with a chief complaint of Altered mental status (02 Nov 2022 16:04)      INTERVAL HPI/OVERNIGHT EVENTS: nursing reports melena in stool and continued leaking of peritineal fluid from left abdominal paracentesis site    MEDICATIONS  (STANDING):  albumin human 25% IVPB 50 milliLiter(s) IV Intermittent every 8 hours  dextrose 5%. 1000 milliLiter(s) (50 mL/Hr) IV Continuous <Continuous>  dextrose 50% Injectable 25 Gram(s) IV Push once  folic acid 1 milliGRAM(s) Oral <User Schedule>  glucagon  Injectable 1 milliGRAM(s) IntraMuscular once  insulin lispro (ADMELOG) corrective regimen sliding scale   SubCutaneous Before meals and at bedtime  lactulose Syrup 20 Gram(s) Oral every 8 hours  multivitamin 1 Tablet(s) Oral daily  nystatin Powder 1 Application(s) Topical two times a day  pantoprazole    Tablet 40 milliGRAM(s) Oral before breakfast  simethicone 80 milliGRAM(s) Chew three times a day  thiamine 100 milliGRAM(s) Oral daily    MEDICATIONS  (PRN):  aluminum hydroxide/magnesium hydroxide/simethicone Suspension 30 milliLiter(s) Oral every 4 hours PRN Dyspepsia  dextrose Oral Gel 15 Gram(s) Oral once PRN Blood Glucose LESS THAN 70 milliGRAM(s)/deciliter      __________________________________________________  REVIEW OF SYSTEMS:    CONSTITUTIONAL: No fever,   EYES: no acute visual disturbances  NECK: No pain or stiffness  RESPIRATORY: No cough; No shortness of breath  CARDIOVASCULAR: No chest pain, no palpitations  GASTROINTESTINAL: No pain. No nausea or vomiting; No diarrhea   NEUROLOGICAL: No headache or numbness, no tremors  MUSCULOSKELETAL: No joint pain, no muscle pain  GENITOURINARY: no dysuria, no frequency, no hesitancy  PSYCHIATRY: no depression , no anxiety  ALL OTHER  ROS negative        Vital Signs Last 24 Hrs  T(C): 36.3 (03 Nov 2022 04:55), Max: 36.7 (02 Nov 2022 20:23)  T(F): 97.4 (03 Nov 2022 04:55), Max: 98.1 (02 Nov 2022 20:23)  HR: 101 (03 Nov 2022 04:55) (101 - 110)  BP: 118/65 (03 Nov 2022 04:55) (118/65 - 121/58)  BP(mean): --  RR: 18 (03 Nov 2022 04:55) (18 - 19)  SpO2: 100% (03 Nov 2022 04:55) (98% - 100%)    Parameters below as of 03 Nov 2022 04:55  Patient On (Oxygen Delivery Method): room air        ________________________________________________  PHYSICAL EXAM:  GENERAL: NAD,   HEENT: Normocephalic; B/L sclerae icteric; moist mucous membranes;   NECK : supple  CHEST/LUNG: Clear to auscultation bilaterally with good air entry, however diminished air movement B/L lower lobes  HEART: S1 S2  regular; no murmurs, gallops or rubs  ABDOMEN: distended, Nontender, with ascites; Bowel sounds present, slow peritoneal fluid leak to paracentesis site on left abdomen  EXTREMITIES: no cyanosis; 3+BLE edema; no calf tenderness, 2+ radial and pedal pulses  SKIN:  warm and dry; with jaundice throughout, no rash  NERVOUS SYSTEM:  Awake and alert; Oriented to person only; unable to stay focused      _________________________________________________  LABS:                        8.2    48.59 )-----------( 254      ( 03 Nov 2022 07:10 )             25.0     11-03    133<L>  |  104  |  7   ----------------------------<  94  3.5   |  17<L>  |  0.59    Ca    8.0<L>      03 Nov 2022 07:10    TPro  5.3<L>  /  Alb  1.9<L>  /  TBili  17.5<H>  /  DBili  13.4<H>  /  AST  53<H>  /  ALT  10  /  AlkPhos  242<H>  11-03    PT/INR - ( 03 Nov 2022 07:10 )   PT: 41.9 sec;   INR: 3.48 ratio             CAPILLARY BLOOD GLUCOSE      POCT Blood Glucose.: 113 mg/dL (03 Nov 2022 12:00)  POCT Blood Glucose.: 127 mg/dL (03 Nov 2022 07:58)  POCT Blood Glucose.: 124 mg/dL (02 Nov 2022 21:02)  POCT Blood Glucose.: 119 mg/dL (02 Nov 2022 16:51)        RADIOLOGY & ADDITIONAL TESTS:    Imaging  Reviewed:    < from: US Abdomen Limited (11.01.22 @ 20:21) >  IMPRESSION:    There is moderate amount of ascites.        < end of copied text >  < from: US Abdomen Limited (10.28.22 @ 20:34) >  FINDINGS:    Moderate ascites, largest pocket in the left lower quadrant measuring 13   cm in depth.    IMPRESSION:  Moderate ascites.      < end of copied text >  < from: CT Abdomen and Pelvis w/ IV Cont (10.28.22 @ 20:21) >  FINDINGS: Respiratory motion artifact degrades images in the lung bases   and upper chest.  LOWER CHEST: Marked hepatomegaly with a craniocaudad span of 26 cm.    LIVER: Within normal limits.  BILE DUCTS: Normal caliber.  GALLBLADDER: Cholelithiasis and nonspecific gallbladder wall edema..  SPLEEN: Within normal limits.  PANCREAS: Within normal limits.  ADRENALS: Within normal limits.  KIDNEYS/URETERS: Within normal limits.    BLADDER: Within normal limits.  REPRODUCTIVE ORGANS: Prostate within normal limits.    BOWEL: No bowel obstruction. Stable metallic density in the rectum.   Normal appendix.  PERITONEUM: Moderate volume ascites, increased from prior study..  VESSELS: Within normal limits.  RETROPERITONEUM/LYMPH NODES: No lymphadenopathy.  ABDOMINAL WALL: Bilateral fat-containing inguinal hernias. High riding   left testicle in the left inguinal canal..  BONES: Within normal limits.    IMPRESSION:  Marked hepatomegaly.  Moderate volume ascites, increased from prior study.      < end of copied text >  < from: Xray Abdomen 2 Views (10.23.22 @ 20:58) >    FINDINGS:  Bibasilar atelectasis is noted at the lung bases. There is no   evidence of bowel obstruction. No gross free intraperitoneal air. There   is probable hepatomegaly displacing bowel loops in the left and   inferiorly. A metallic clip overlies the pubic symphysis.    IMPRESSION:  No evidence of bowel obstruction.  Probable hepatomegaly.      < end of copied text >  < from: US Abdomen Limited (10.20.22 @ 13:05) >  IMPRESSION:  Severe hepatomegaly and steatosis.  Mild ascites.      < end of copied text >  < from: Xray Humerus, Bilateral (10.19.22 @ 10:51) >  FINDINGS: The lower pelvis and right flank is not completely included.   There is no evidence of bowel obstruction, free air or abdominal   calcifications. There are no acutefractures.    IMPRESSION:  1.  No fractures.  2.  No evidence of bowel obstruction however if intra-abdominal injury is   suspected recommend CT.    --- End of Report ---    < end of copied text >  < from: Xray Knee 1 or 2 Views, Bilateral (10.19.22 @ 10:50) >    FINDINGS: The lower pelvis and right flank is not completely included.   There is no evidence of bowel obstruction, free air or abdominal   calcifications. There are no acutefractures.    IMPRESSION:  1.  No fractures.  2.  No evidence of bowel obstruction however if intra-abdominal injury is   suspected recommend CT.    --- End of Report ---    < end of copied text >  < from: Xray Shoulder 2 Views, Bilateral (10.19.22 @ 10:49) >  FINDINGS: The lower pelvis and right flank is not completely included.   There is no evidence of bowel obstruction, free air or abdominal   calcifications. There are no acutefractures.    IMPRESSION:  1.  No fractures.  2.  No evidence of bowel obstruction however if intra-abdominal injury is   suspected recommend CT.    --- End of Report ---      < end of copied text >  < from: Xray Abdomen 1 View PORTABLE -Routine (Xray Abdomen 1 View PORTABLE -Routine in AM.) (10.17.22 @ 13:32) >  FINDINGS: The lower pelvis and right flank is not completely included.   There is no evidence of bowel obstruction, free air or abdominal   calcifications. There are no acutefractures.    IMPRESSION:  1.  No fractures.  2.  No evidence of bowel obstruction however if intra-abdominal injury is   suspected recommend CT.    --- End of Report ---        < end of copied text >  < from: CT Abdomen and Pelvis No Cont (10.16.22 @ 11:03) >  FINDINGS:  LOWER CHEST: Bibasilar platelike atelectasis    LIVER: Marked hepatomegaly (28 cm max sagittal)/hepatic steatosis, not   significantly changed from 10/2/2022  BILE DUCTS: Normal caliber.  GALLBLADDER: Contracted gallbladder with stone/coalescent sludge.  SPLEEN: Borderline splenomegaly (13.5 cm max sagittal), unchanged.  PANCREAS: Within normal limits.  ADRENALS: Within normal limits.  KIDNEYS/URETERS: No renal stones or hydronephrosis.    BLADDER: Within normal limits.  REPRODUCTIVE ORGANS: Normal prostate.    BOWEL: Underdistended duodenum with increased fat stranding about the   distal duodenal sweep, nonspecific (coronal 4/68). No bowel obstruction.   Normal appendix. Equivocal ascending colonicmural prominence .   Undetermined rectosigmoid foreign body/medical hardware (2/54, CT    image).  PERITONEUM: Mild pelvic >>perihepatosplenic ascites, progressed. Central   mesenteric root edema, progressed.  VESSELS: Within normal limits.  RETROPERITONEUM/LYMPH NODES: No lymphadenopathy.  ABDOMINAL WALL: Fat-containing inguinal hernias, unchanged. Dependent   subcutaneous edema, progressed  BONES: L5 spondylolysis. Healed lower anterolateral LEFT rib fractures    IMPRESSION:  1.  Marked hepatomegaly/steatosis, unchanged.  2.  Ascites/mesenteric edema and subcutaneous edema, minimally progressed.  3.  Equivocal ascending colonic mural prominence raises the possibility   of portal colopathy or inflammation  4.  Underdistended duodenum with increased fat stranding about distal   duodenal sweep, nonspecific (coronal 4/68). Clinical correlation with   regard to duodenal inflammation recommended.  5.  Undetermined rectosigmoid foreign body/medical hardware (2/54, CT    image)-(this finding discussed with Iliana Keys 10/16/2022 11:25   AM).    --- End of Report ---      < end of copied text >  < from: Xray Chest 1 View- PORTABLE-Urgent (Xray Chest 1 View- PORTABLE-Urgent .) (10.12.22 @ 13:47) >  FINDINGS:  CATHETERS AND TUBES: None    PULMONARY: Mild bilateral perihilar diffuse airspace disease..   No pneumothorax.    HEART/VASCULAR: The heart and mediastinum size and configuration are   within normal limits.    BONES: Visualized osseous structures are intact.    IMPRESSION:   Mild bilateral perihilar diffuse airspace disease..    --- End of Report ---        < end of copied text >  < from: US Abdomen Liver Followup (10.10.22 @ 17:08) >  FINDINGS:    Liver: Increased echogenicity. No focal hepatic mass lesion is   demonstrated. The liver appears to be enlarged, however, no measurements   were obtained. Normal portal venous flow.  Bile ducts: Normal caliber. Common bile duct measures 5mm.  Gallbladder: Nonspecific mildly thickened wall measuring 4 mm.  Pancreas: Not visualized due to overlying bowel.  Right kidney: 12 cm. No hydronephrosis.  Ascites: None.  IVC: Visualized portions are within normal limits.    IMPRESSION:    Enlarged fatty liver.      < end of copied text >      Consultant(s) Notes Reviewed:   YES      Plan of care was discussed with patient and /or primary care giver; all questions and concerns were addressed

## 2022-11-03 NOTE — PROGRESS NOTE ADULT - ASSESSMENT
Brief hospital course:  25yo Hungarian speaking Male from [facility], s/p Left temporal parietal craniotomy b/o TBI and alcohol abuse (reported current drinking by family, [address]) presented to [facility] ED on 10/2/22 with 1 day Hx of AMS, after reported witnessed seizure episodes and 1 week Hx of jaundice and was admitted to MICU with suspected EtOH withdrawal seizure, electrolyte abnormalities (Na 125, K 2.7, Mg 1.0, P 1.1), lactic acidosis (with lactate 6.4, bicarb 19), leukocytosis with neutrophilia (WBC 20.35, rodreick 90%) with low grade T (100.1F), anemia without evidence of overt bleeding (Hb 6.9->8.3 s/p 1 U PRBC) and abnormal liver tests including coagulopathy (INR 2.13), hyperbilirubinemia (Se bi 9.3->10.8, direct 8.0), hyperammonemia (131), elevated liver enzymes in AST> ALT pattern (, ALT 22) and elevated ALP (669) and CT a/p w/ iv 10/2/22 suggesting diffuse hepatic steatosis, hepatomegaly with possible GB sludge and GB wall thickening, but normal bile ducts.  He was started on Alcoholic hepatitis management after infection was ruled out on admission (except that rhinovirus was pos, w/o URI symptoms) and UGIB was ruled out (EGD 10/5/22). He had LGIB, 2 rectal Dieulafoy lesions on sigmoidoscopy (10/5/22). Completed SBP ppx b/o GIB, was on ceftriaxone (10/4-10/12). His bilirubin remained unchanged after 7 days on prednisolone 40 mg (10/8-10/15) + total 3 days NAC, and developed low grade T, worsening leukocytosis up till 30-36k while on steroid (prior to steroid 17k), worsening abdominal pain,  distention and diarrhea despite being off lactulose, thus steroid was held 10/15 and septic workup was repeated. GI PCR panel showed STEC, culture was negative. BCx neg 10/16. C. diff neg 10/19. He was also started on Zosyn on 10/14, and switched to Meropenem on 10/16 and to Tigacycline on 10/18 per ID, and all abx were d/c 10/20.  Had repeat CT abd / pelvis 10/16, had only mild ascites (pelvic, perihepatosplenic), was not feasible for Dx paracentesis and  prior it was none.   WBC continued to rise till 10/21 up till > 60k, thought to be possibly multifactorial.  Hematology been following since 10/16 b/o leukocytosis.   WBC count was slightly downtrending.  He had another episode of LGIB 10/21, 10/22 and 10/27 but Hb remained stable and reportedly resolved.   Had Dx paracentesis 10/29, no SBP.    Prior liver workup: Hep C ab/RNA neg, Hep A IgM neg, HBsAg, HBcAb IgM, HBcAb, HBsAb all neg, Hep E IgM neg, HIV neg, EBV past infection, CMV past infection, HSV PCR neg, leptospirosis neg. LUCIUS neg, SMA neg, LKM neg, AMA neg, IgG and IgM WNL. Ceruloplasmin 25. Fungitell neg. Strongyloides serology neg. Schistosomiasis IgG neg. Iron studies might not be informative b/o active drinking, recent bleeding, recent transfusion, but ferritin was WNL.   VZV PCR in process.    Today his d/c was held b/o drop in Hb.    # Hepatomegaly with liver failure, suspected due to severe alcoholic hepatitis, s/p 7 days prednisolone 10/8-10/15, and s/p total 3 days NAC, bilirubin 14.1 on 10/8 and 14.1 10/15.  MDF> 32, MELD 28-23--->32   # Elevated transaminases in AST>ALT pattern - overall improved (-->58)  # Elevated ALP - improved (669->in 200s)  # Hyperbilirubinemia - fluctuates (10--->~16-18)  # Coagulopathy - initially improved but also got vit K and FFP (10/5-7 and 10/5), and now remains around 3 w/o improvement with vit K  # Diarrhea off lactulose - improved, but still multiple small BMs   # STEC  # Abdominal pain - improved  # LGIB - resolved  # Anemia   # Persistent leukocytosis from 17k (on admission) to ~30k (after 1 week steroid), to 60k peak, with slight improvement but still > 45k    - Being monitored off steroid since 8/15, (got 7 days) for reasons as above. S/p NAC.  C/w supportive measures, including nutritional optimization. Still can send zinc level.   - Will need to closely monitor LFTs, including INR even after discharge.  - PLT WNL, only borderline splenomegaly, no or only trace ascites initially suggested less prominent portal hypertension. Now more ascites, low albumin, protein.  - CT a/p w/ iv 10/2 was reviewed with IR, hepatic vasculature appeared patent. Patent vasculature reported on 10/20 US too.   - Was no biliary obstruction on CT abd, US abd. MRCP was considered on admission, has not been done yet, can consider to obtain,  - Still alcoholic hepatitis remained the working Dx, and might be just poor responder to steroid, but given the rising WBC, the massive hepatomegaly, liver biopsy was considered however was deferred b/o coagulopathy.   - F/u with hematology regarding leukocytosis    # Hyponatremia - improved  # Hypokalemia - improved  # LE edema - slight improvement  # Hypoalbuminemia  # Small -> moderate ascites - no SBP  - Renal function remains WNL  - Leaking from paracentesis site (550 ml reported), d/w IR to seal, avoid ostomy bag  - Please, give albumin even for the leaked ascites, 6-8g/l removed/leaked. Since it is not recorded, uncertain the exact amount, consider c/w 50 ml 25% (12.5g) q8 hrs for another 24 hrs,  - Paracentesis was deferred by IR b/o coagulopathy    # HCC status - no focal hepatic lesion    # GIB  - No EV on EGD 10/5/22, but mild portal gastropathy.   - S/p sigmoidoscopy 10/5 showing 2 rectal Dieulafoy lesions, s/p treatment.   - Had few episodes of hematochezia again 10/21 and 10/22 but Hb remained stable and reportedly resolved.   - 10/27 noted again LGIB that appears to be resolved again.   - Colonoscopy was cancelled because patient refused bowel prep.   - Noted occult blood pos, but no overt bleeding (per d/w PCA bedside, and also witnessed one BM while at bedside)  - C/w mgmt. per GI.       # AMS   - Likely multifactorial: TBI patient, with EtOH use, initially been on CIWA on admission, also was s/p seizures, and had elevated ammonia on admission, thus could not r/u HE either (Gr I-II)  - Monitor mental status, overall improved compared to admission, but remains AAOX2 (not oriented to time) now for weeks.  - C/w Folic, thiamine.   - Lactulose been held b/o diarrhea after 10/14 and rifaximin being held since 10/20 b/o dermatitis w/o other explanation, no resolution.  - Lactulose was resumed per primary team 10/28 tid with parameters for titration. However, would need exact recording of BMs. Per d/w PCA at bedside  today, he had 5 BM during the day till afternoon. No stool recorded in flowsheet. Patient also reports more abdominal discomfort on lactulose. Consider adjusting order depending on stool count.    # Coagulopathy  - Initially some improvement with vit K, but most recently no improvement. Received call from primary team that difficulty to arrange vit K. While cholestasis, poor nutrition can lead to vit K deficiency, he received vit K during hospitalization (10/5-10/7, 10/21-10/23, 10/25-10/27, 10/31-11/1) and now without any improvement in INR. Can c/w MV.     # Transplant candidacy: Obstacles been that he has and eligible for only emergency medicaid per d/w SW, recent EtOH (up till hospitalization), poor insight possibly due to prior TBI too     Thank you for consult  Will continue to monitor. Hepatology will be covered by GI service attending from [facility] till 11/9/22.  If gets discharged patient will need to follow in Liver / GI clinic, given his insurance status at [employer] or [place], within few days.   Been d/w [facility] Transplant Hepatology service attending(s) previously.  D/w primary team

## 2022-11-04 VITALS
HEART RATE: 106 BPM | OXYGEN SATURATION: 98 % | SYSTOLIC BLOOD PRESSURE: 99 MMHG | DIASTOLIC BLOOD PRESSURE: 60 MMHG | RESPIRATION RATE: 18 BRPM | TEMPERATURE: 98 F

## 2022-11-04 LAB
AMMONIA BLD-MCNC: 11 UMOL/L — SIGNIFICANT CHANGE UP (ref 11–32)
GLUCOSE BLDC GLUCOMTR-MCNC: 102 MG/DL — HIGH (ref 70–99)
GLUCOSE BLDC GLUCOMTR-MCNC: 107 MG/DL — HIGH (ref 70–99)
GLUCOSE BLDC GLUCOMTR-MCNC: 108 MG/DL — HIGH (ref 70–99)
GLUCOSE BLDC GLUCOMTR-MCNC: 113 MG/DL — HIGH (ref 70–99)
HCT VFR BLD CALC: 25 % — LOW (ref 39–50)
HGB BLD-MCNC: 8.3 G/DL — LOW (ref 13–17)
INR BLD: 3.71 RATIO — HIGH (ref 0.88–1.16)
MCHC RBC-ENTMCNC: 31.3 PG — SIGNIFICANT CHANGE UP (ref 27–34)
MCHC RBC-ENTMCNC: 33.2 GM/DL — SIGNIFICANT CHANGE UP (ref 32–36)
MCV RBC AUTO: 94.3 FL — SIGNIFICANT CHANGE UP (ref 80–100)
NRBC # BLD: 0 /100 WBCS — SIGNIFICANT CHANGE UP (ref 0–0)
PLATELET # BLD AUTO: 262 K/UL — SIGNIFICANT CHANGE UP (ref 150–400)
PROTHROM AB SERPL-ACNC: 44.7 SEC — HIGH (ref 10.5–13.4)
RBC # BLD: 2.65 M/UL — LOW (ref 4.2–5.8)
RBC # FLD: 22.5 % — HIGH (ref 10.3–14.5)
SARS-COV-2 RNA SPEC QL NAA+PROBE: SIGNIFICANT CHANGE UP
WBC # BLD: 45.31 K/UL — CRITICAL HIGH (ref 3.8–10.5)
WBC # FLD AUTO: 45.31 K/UL — CRITICAL HIGH (ref 3.8–10.5)

## 2022-11-04 PROCEDURE — 84157 ASSAY OF PROTEIN OTHER: CPT

## 2022-11-04 PROCEDURE — 96366 THER/PROPH/DIAG IV INF ADDON: CPT

## 2022-11-04 PROCEDURE — 87070 CULTURE OTHR SPECIMN AEROBIC: CPT

## 2022-11-04 PROCEDURE — 84443 ASSAY THYROID STIM HORMONE: CPT

## 2022-11-04 PROCEDURE — 87046 STOOL CULTR AEROBIC BACT EA: CPT

## 2022-11-04 PROCEDURE — 36415 COLL VENOUS BLD VENIPUNCTURE: CPT

## 2022-11-04 PROCEDURE — 85730 THROMBOPLASTIN TIME PARTIAL: CPT

## 2022-11-04 PROCEDURE — 93005 ELECTROCARDIOGRAM TRACING: CPT

## 2022-11-04 PROCEDURE — 86255 FLUORESCENT ANTIBODY SCREEN: CPT

## 2022-11-04 PROCEDURE — 85610 PROTHROMBIN TIME: CPT

## 2022-11-04 PROCEDURE — 86850 RBC ANTIBODY SCREEN: CPT

## 2022-11-04 PROCEDURE — 86140 C-REACTIVE PROTEIN: CPT

## 2022-11-04 PROCEDURE — 82390 ASSAY OF CERULOPLASMIN: CPT

## 2022-11-04 PROCEDURE — 80074 ACUTE HEPATITIS PANEL: CPT

## 2022-11-04 PROCEDURE — 83010 ASSAY OF HAPTOGLOBIN QUANT: CPT

## 2022-11-04 PROCEDURE — 73030 X-RAY EXAM OF SHOULDER: CPT

## 2022-11-04 PROCEDURE — 87177 OVA AND PARASITES SMEARS: CPT

## 2022-11-04 PROCEDURE — 84439 ASSAY OF FREE THYROXINE: CPT

## 2022-11-04 PROCEDURE — 85045 AUTOMATED RETICULOCYTE COUNT: CPT

## 2022-11-04 PROCEDURE — 36430 TRANSFUSION BLD/BLD COMPNT: CPT

## 2022-11-04 PROCEDURE — 87640 STAPH A DNA AMP PROBE: CPT

## 2022-11-04 PROCEDURE — 86038 ANTINUCLEAR ANTIBODIES: CPT

## 2022-11-04 PROCEDURE — 84480 ASSAY TRIIODOTHYRONINE (T3): CPT

## 2022-11-04 PROCEDURE — 82248 BILIRUBIN DIRECT: CPT

## 2022-11-04 PROCEDURE — 87493 C DIFF AMPLIFIED PROBE: CPT

## 2022-11-04 PROCEDURE — 82607 VITAMIN B-12: CPT

## 2022-11-04 PROCEDURE — 86703 HIV-1/HIV-2 1 RESULT ANTBDY: CPT

## 2022-11-04 PROCEDURE — 82550 ASSAY OF CK (CPK): CPT

## 2022-11-04 PROCEDURE — 96374 THER/PROPH/DIAG INJ IV PUSH: CPT

## 2022-11-04 PROCEDURE — 84300 ASSAY OF URINE SODIUM: CPT

## 2022-11-04 PROCEDURE — 87799 DETECT AGENT NOS DNA QUANT: CPT

## 2022-11-04 PROCEDURE — 82272 OCCULT BLD FECES 1-3 TESTS: CPT

## 2022-11-04 PROCEDURE — 73060 X-RAY EXAM OF HUMERUS: CPT

## 2022-11-04 PROCEDURE — 81001 URINALYSIS AUTO W/SCOPE: CPT

## 2022-11-04 PROCEDURE — 99232 SBSQ HOSP IP/OBS MODERATE 35: CPT

## 2022-11-04 PROCEDURE — 0225U NFCT DS DNA&RNA 21 SARSCOV2: CPT

## 2022-11-04 PROCEDURE — P9040: CPT

## 2022-11-04 PROCEDURE — 86720 LEPTOSPIRA ANTIBODY: CPT

## 2022-11-04 PROCEDURE — 84478 ASSAY OF TRIGLYCERIDES: CPT

## 2022-11-04 PROCEDURE — P9059: CPT

## 2022-11-04 PROCEDURE — 80307 DRUG TEST PRSMV CHEM ANLYZR: CPT

## 2022-11-04 PROCEDURE — 83540 ASSAY OF IRON: CPT

## 2022-11-04 PROCEDURE — 83550 IRON BINDING TEST: CPT

## 2022-11-04 PROCEDURE — 86870 RBC ANTIBODY IDENTIFICATION: CPT

## 2022-11-04 PROCEDURE — 87045 FECES CULTURE AEROBIC BACT: CPT

## 2022-11-04 PROCEDURE — 89051 BODY FLUID CELL COUNT: CPT

## 2022-11-04 PROCEDURE — 97116 GAIT TRAINING THERAPY: CPT

## 2022-11-04 PROCEDURE — 74018 RADEX ABDOMEN 1 VIEW: CPT

## 2022-11-04 PROCEDURE — 86381 MITOCHONDRIAL ANTIBODY EACH: CPT

## 2022-11-04 PROCEDURE — 82140 ASSAY OF AMMONIA: CPT

## 2022-11-04 PROCEDURE — 87798 DETECT AGENT NOS DNA AMP: CPT

## 2022-11-04 PROCEDURE — 85670 THROMBIN TIME PLASMA: CPT

## 2022-11-04 PROCEDURE — 86682 HELMINTH ANTIBODY: CPT

## 2022-11-04 PROCEDURE — 87521 HEPATITIS C PROBE&RVRS TRNSC: CPT

## 2022-11-04 PROCEDURE — 74176 CT ABD & PELVIS W/O CONTRAST: CPT

## 2022-11-04 PROCEDURE — 86880 COOMBS TEST DIRECT: CPT

## 2022-11-04 PROCEDURE — 80076 HEPATIC FUNCTION PANEL: CPT

## 2022-11-04 PROCEDURE — 88305 TISSUE EXAM BY PATHOLOGIST: CPT

## 2022-11-04 PROCEDURE — 83605 ASSAY OF LACTIC ACID: CPT

## 2022-11-04 PROCEDURE — 85025 COMPLETE CBC W/AUTO DIFF WBC: CPT

## 2022-11-04 PROCEDURE — 83036 HEMOGLOBIN GLYCOSYLATED A1C: CPT

## 2022-11-04 PROCEDURE — 83690 ASSAY OF LIPASE: CPT

## 2022-11-04 PROCEDURE — U0005: CPT

## 2022-11-04 PROCEDURE — 85027 COMPLETE CBC AUTOMATED: CPT

## 2022-11-04 PROCEDURE — 87075 CULTR BACTERIA EXCEPT BLOOD: CPT

## 2022-11-04 PROCEDURE — 82945 GLUCOSE OTHER FLUID: CPT

## 2022-11-04 PROCEDURE — 86705 HEP B CORE ANTIBODY IGM: CPT

## 2022-11-04 PROCEDURE — 87040 BLOOD CULTURE FOR BACTERIA: CPT

## 2022-11-04 PROCEDURE — 86900 BLOOD TYPING SEROLOGIC ABO: CPT

## 2022-11-04 PROCEDURE — 73560 X-RAY EXAM OF KNEE 1 OR 2: CPT

## 2022-11-04 PROCEDURE — 86645 CMV ANTIBODY IGM: CPT

## 2022-11-04 PROCEDURE — 87491 CHLMYD TRACH DNA AMP PROBE: CPT

## 2022-11-04 PROCEDURE — 70450 CT HEAD/BRAIN W/O DYE: CPT | Mod: MA

## 2022-11-04 PROCEDURE — 86790 VIRUS ANTIBODY NOS: CPT

## 2022-11-04 PROCEDURE — 83935 ASSAY OF URINE OSMOLALITY: CPT

## 2022-11-04 PROCEDURE — 88112 CYTOPATH CELL ENHANCE TECH: CPT

## 2022-11-04 PROCEDURE — 87086 URINE CULTURE/COLONY COUNT: CPT

## 2022-11-04 PROCEDURE — 80061 LIPID PANEL: CPT

## 2022-11-04 PROCEDURE — 81207 BCR/ABL1 GENE MINOR BP: CPT

## 2022-11-04 PROCEDURE — 86376 MICROSOMAL ANTIBODY EACH: CPT

## 2022-11-04 PROCEDURE — 82105 ALPHA-FETOPROTEIN SERUM: CPT

## 2022-11-04 PROCEDURE — G0480: CPT

## 2022-11-04 PROCEDURE — 87205 SMEAR GRAM STAIN: CPT

## 2022-11-04 PROCEDURE — 86704 HEP B CORE ANTIBODY TOTAL: CPT

## 2022-11-04 PROCEDURE — 74019 RADEX ABDOMEN 2 VIEWS: CPT

## 2022-11-04 PROCEDURE — 97161 PT EVAL LOW COMPLEX 20 MIN: CPT

## 2022-11-04 PROCEDURE — 85384 FIBRINOGEN ACTIVITY: CPT

## 2022-11-04 PROCEDURE — 76705 ECHO EXAM OF ABDOMEN: CPT

## 2022-11-04 PROCEDURE — 83930 ASSAY OF BLOOD OSMOLALITY: CPT

## 2022-11-04 PROCEDURE — 87529 HSV DNA AMP PROBE: CPT

## 2022-11-04 PROCEDURE — 85652 RBC SED RATE AUTOMATED: CPT

## 2022-11-04 PROCEDURE — 87449 NOS EACH ORGANISM AG IA: CPT

## 2022-11-04 PROCEDURE — 99291 CRITICAL CARE FIRST HOUR: CPT | Mod: 25

## 2022-11-04 PROCEDURE — 83615 LACTATE (LD) (LDH) ENZYME: CPT

## 2022-11-04 PROCEDURE — 86780 TREPONEMA PALLIDUM: CPT

## 2022-11-04 PROCEDURE — 86663 EPSTEIN-BARR ANTIBODY: CPT

## 2022-11-04 PROCEDURE — 80048 BASIC METABOLIC PNL TOTAL CA: CPT

## 2022-11-04 PROCEDURE — 86644 CMV ANTIBODY: CPT

## 2022-11-04 PROCEDURE — 82962 GLUCOSE BLOOD TEST: CPT

## 2022-11-04 PROCEDURE — 83735 ASSAY OF MAGNESIUM: CPT

## 2022-11-04 PROCEDURE — 97164 PT RE-EVAL EST PLAN CARE: CPT

## 2022-11-04 PROCEDURE — 87635 SARS-COV-2 COVID-19 AMP PRB: CPT

## 2022-11-04 PROCEDURE — 99233 SBSQ HOSP IP/OBS HIGH 50: CPT

## 2022-11-04 PROCEDURE — U0003: CPT

## 2022-11-04 PROCEDURE — 86664 EPSTEIN-BARR NUCLEAR ANTIGEN: CPT

## 2022-11-04 PROCEDURE — 71045 X-RAY EXAM CHEST 1 VIEW: CPT

## 2022-11-04 PROCEDURE — 85385 FIBRINOGEN ANTIGEN: CPT

## 2022-11-04 PROCEDURE — 85611 PROTHROMBIN TEST: CPT

## 2022-11-04 PROCEDURE — 87641 MR-STAPH DNA AMP PROBE: CPT

## 2022-11-04 PROCEDURE — 82378 CARCINOEMBRYONIC ANTIGEN: CPT

## 2022-11-04 PROCEDURE — P9047: CPT

## 2022-11-04 PROCEDURE — 74177 CT ABD & PELVIS W/CONTRAST: CPT

## 2022-11-04 PROCEDURE — 80053 COMPREHEN METABOLIC PANEL: CPT

## 2022-11-04 PROCEDURE — 85732 THROMBOPLASTIN TIME PARTIAL: CPT

## 2022-11-04 PROCEDURE — 0001U RBC DNA HEA 35 AG 11 BLD GRP: CPT

## 2022-11-04 PROCEDURE — 82042 OTHER SOURCE ALBUMIN QUAN EA: CPT

## 2022-11-04 PROCEDURE — 84100 ASSAY OF PHOSPHORUS: CPT

## 2022-11-04 PROCEDURE — 96375 TX/PRO/DX INJ NEW DRUG ADDON: CPT

## 2022-11-04 PROCEDURE — 88185 FLOWCYTOMETRY/TC ADD-ON: CPT

## 2022-11-04 PROCEDURE — 82247 BILIRUBIN TOTAL: CPT

## 2022-11-04 PROCEDURE — 84145 PROCALCITONIN (PCT): CPT

## 2022-11-04 PROCEDURE — 87591 N.GONORRHOEAE DNA AMP PROB: CPT

## 2022-11-04 PROCEDURE — 87507 IADNA-DNA/RNA PROBE TQ 12-25: CPT

## 2022-11-04 PROCEDURE — 86706 HEP B SURFACE ANTIBODY: CPT

## 2022-11-04 PROCEDURE — 86665 EPSTEIN-BARR CAPSID VCA: CPT

## 2022-11-04 PROCEDURE — 86901 BLOOD TYPING SEROLOGIC RH(D): CPT

## 2022-11-04 PROCEDURE — C1889: CPT

## 2022-11-04 PROCEDURE — 82784 ASSAY IGA/IGD/IGG/IGM EACH: CPT

## 2022-11-04 PROCEDURE — 99292 CRITICAL CARE ADDL 30 MIN: CPT

## 2022-11-04 PROCEDURE — 82728 ASSAY OF FERRITIN: CPT

## 2022-11-04 PROCEDURE — 86923 COMPATIBILITY TEST ELECTRIC: CPT

## 2022-11-04 PROCEDURE — 81206 BCR/ABL1 GENE MAJOR BP: CPT

## 2022-11-04 RX ADMIN — Medication 0.5 MILLIGRAM(S): at 08:37

## 2022-11-04 RX ADMIN — LACTULOSE 20 GRAM(S): 10 SOLUTION ORAL at 13:38

## 2022-11-04 RX ADMIN — LACTULOSE 20 GRAM(S): 10 SOLUTION ORAL at 22:06

## 2022-11-04 RX ADMIN — Medication 100 MILLIGRAM(S): at 11:40

## 2022-11-04 RX ADMIN — PANTOPRAZOLE SODIUM 40 MILLIGRAM(S): 20 TABLET, DELAYED RELEASE ORAL at 05:20

## 2022-11-04 RX ADMIN — SIMETHICONE 80 MILLIGRAM(S): 80 TABLET, CHEWABLE ORAL at 05:20

## 2022-11-04 RX ADMIN — NYSTATIN CREAM 1 APPLICATION(S): 100000 CREAM TOPICAL at 05:22

## 2022-11-04 RX ADMIN — SIMETHICONE 80 MILLIGRAM(S): 80 TABLET, CHEWABLE ORAL at 13:38

## 2022-11-04 RX ADMIN — Medication 1 MILLIGRAM(S): at 07:54

## 2022-11-04 RX ADMIN — SIMETHICONE 80 MILLIGRAM(S): 80 TABLET, CHEWABLE ORAL at 22:06

## 2022-11-04 RX ADMIN — Medication 1 MILLIGRAM(S): at 15:58

## 2022-11-04 RX ADMIN — Medication 1 TABLET(S): at 11:40

## 2022-11-04 NOTE — PROGRESS NOTE ADULT - SUBJECTIVE AND OBJECTIVE BOX
GI PROGRESS NOTE    Patient is a 27y old  Male who presents with a chief complaint of Altered mental status (04 Nov 2022 14:07)      HPI:  Patient is a 27 yo Israeli speaking Male, with no known past medical history, s/p Left temporal parietal craniotomy noted in imaging, presenting to the ED with altered mental status since this morning. Pt moaning, not able to provide history and pt's brother's contact information not in chart as family left ED prior to phone number being collected. As per EMS charts, pt was found to be 'asleep' when EMS arrived..as per family, pt has a seizure that lasted about 2-4 minutes. Family states pt has a seizure earlier in the day as well. Pt family denies hx of seizures. They stated pt drinks alcohol on a daily basis and hasn't drank any alcohol in the past 2 days. Family denies any recent trauma related injuries to pt.' Pt's brother told ED attending that the jaundice developed acutely over the last 1 week.     In the ED, pt's vitals were  Temp 100.1, , /67, RR 18 on room air O2 sat 96%  s/p IV Vanc + Zosyn, NS 1 L bolus, Keppra 2 gm x 1, Ativan, Mg, Kcl riders, Thiamine (02 Oct 2022 20:13)    GI HPI   Patient resting in bed. HH stable. No more hematochezia. INR trending up. LFTs at baseline. No BMs overnight .No more drainage from previous paracentesis site.       ______________________________________________________________________  PMH/PSH:  PAST MEDICAL & SURGICAL HISTORY:  No pertinent past medical history      No significant past surgical history        ______________________________________________________________________  MEDS:  MEDICATIONS  (STANDING):  dextrose 5%. 1000 milliLiter(s) (50 mL/Hr) IV Continuous <Continuous>  dextrose 50% Injectable 25 Gram(s) IV Push once  folic acid 1 milliGRAM(s) Oral <User Schedule>  glucagon  Injectable 1 milliGRAM(s) IntraMuscular once  insulin lispro (ADMELOG) corrective regimen sliding scale   SubCutaneous Before meals and at bedtime  lactulose Syrup 20 Gram(s) Oral every 8 hours  multivitamin 1 Tablet(s) Oral daily  nystatin Powder 1 Application(s) Topical two times a day  pantoprazole    Tablet 40 milliGRAM(s) Oral before breakfast  simethicone 80 milliGRAM(s) Chew three times a day  thiamine 100 milliGRAM(s) Oral daily    MEDICATIONS  (PRN):  aluminum hydroxide/magnesium hydroxide/simethicone Suspension 30 milliLiter(s) Oral every 4 hours PRN Dyspepsia  dextrose Oral Gel 15 Gram(s) Oral once PRN Blood Glucose LESS THAN 70 milliGRAM(s)/deciliter    ______________________________________________________________________  ALL:   Allergies    No Known Allergies    Intolerances      ______________________________________________________________________  SH: Social History:  From home  Hx of Alcohol abuse from family  Unknown drug hx/smoking hx (02 Oct 2022 20:13)    ______________________________________________________________________  FH:  FAMILY HISTORY:  No pertinent family history in first degree relatives      ______________________________________________________________________  ROS:    Unable to fully assess due to mental status   ______________________________________________________________________  PHYSICAL EXAM:  T(C): 36.7 (11-04-22 @ 11:35), Max: 36.7 (11-04-22 @ 11:35)  HR: 100 (11-04-22 @ 11:35)  BP: 120/70 (11-04-22 @ 11:35)  RR: 20 (11-04-22 @ 11:35)  SpO2: 99% (11-04-22 @ 11:35)  Wt(kg): --    11-03 - 11-04  --------------------------------------------------------  IN:  Total IN: 0 mL    OUT:    Drain (mL): 550 mL  Total OUT: 550 mL    Total NET: -550 mL          GEN: NAD   HEENT: sclera icteric   CVS- S1 S2  ABD: soft nontender, non distended, bowel sounds+  LUNGS: clear to auscultation  NEURO: noin focal neuro exam; AAO x 3  Extremities: no cyanosis, no calf tenderness, no edema, no clubbing  Skin: generalized jaundice       ______________________________________________________________________  LABS:                        8.3    45.31 )-----------( 262      ( 04 Nov 2022 11:19 )             25.0     11-03    133<L>  |  104  |  7   ----------------------------<  94  3.5   |  17<L>  |  0.59    Ca    8.0<L>      03 Nov 2022 07:10    TPro  5.3<L>  /  Alb  1.9<L>  /  TBili  17.5<H>  /  DBili  13.4<H>  /  AST  53<H>  /  ALT  10  /  AlkPhos  242<H>  11-03    LIVER FUNCTIONS - ( 03 Nov 2022 07:10 )  Alb: 1.9 g/dL / Pro: 5.3 g/dL / ALK PHOS: 242 U/L / ALT: 10 U/L DA / AST: 53 U/L / GGT: x           ______________________________________________________________________  IMAGING:    ______________________________________________________________________  < from: US Abdomen Limited (11.01.22 @ 20:21) >    ACC: [id number] EXAM:  US ABDOMEN LIMITED                          PROCEDURE DATE:  11/01/2022          INTERPRETATION:  CLINICAL INFORMATION: Evaluate for ascites.    COMPARISON: Abdominal CT dated 10/28/2022    IMPRESSION:    There is moderate amount of ascites.        --- End of Report ---    < end of copied text >  < from: CT Abdomen and Pelvis w/ IV Cont (10.28.22 @ 20:21) >  ACC: [id number] EXAM:  CT ABDOMEN AND PELVIS IC                          PROCEDURE DATE:  10/28/2022          INTERPRETATION:  CLINICAL INFORMATION: Abdominal distention. Liver   failure.    COMPARISON: CT abdomen pelvis 10/16/2022    CONTRAST/COMPLICATIONS:  IV Contrast: Omnipaque 350  90 cc administered   10 cc discarded  Oral Contrast: NONE  Complications: None reported at time of study completion    PROCEDURE:  CT of the Abdomen and Pelvis was performed.  Sagittal and coronal reformats were performed.    FINDINGS: Respiratory motion artifact degrades images in the lung bases   and upper chest.  LOWER CHEST: Marked hepatomegaly with a craniocaudad span of 26 cm.    LIVER: Within normal limits.  BILE DUCTS: Normal caliber.  GALLBLADDER: Cholelithiasis and nonspecific gallbladder wall edema..  SPLEEN: Within normal limits.  PANCREAS: Within normal limits.  ADRENALS: Within normal limits.  KIDNEYS/URETERS: Within normal limits.    BLADDER: Within normal limits.  REPRODUCTIVE ORGANS: Prostate within normal limits.    BOWEL: No bowel obstruction. Stable metallic density in the rectum.   Normal appendix.  PERITONEUM: Moderate volume ascites, increased from prior study..  VESSELS: Within normal limits.  RETROPERITONEUM/LYMPH NODES: No lymphadenopathy.  ABDOMINAL WALL: Bilateral fat-containing inguinal hernias. High riding   left testicle in the left inguinal canal..  BONES: Within normal limits.    IMPRESSION:  Marked hepatomegaly.  Moderate volume ascites, increased from prior study.        --- End of Report ---      < end of copied text >

## 2022-11-04 NOTE — PROVIDER CONTACT NOTE (CRITICAL VALUE NOTIFICATION) - TEST AND RESULT REPORTED:
wbc=53.28
45.31
wbc=43.75
Potassium 2,4
WBC 46.41
WBC 54.47
wbc 44
WBC
WBC 52
WBC 53.26
WBC 60.87
WBC 42.88
WBC 48.92
wbc=54.2
WBC 54.47
wbc 54.97
WBC 48.95

## 2022-11-04 NOTE — PROGRESS NOTE ADULT - PROVIDER SPECIALTY LIST ADULT
Critical Care
Heme/Onc
Hepatology
Infectious Disease
Internal Medicine
Critical Care
Gastroenterology
Heme/Onc
Hepatology
Infectious Disease
Internal Medicine
Intervent Radiology
Critical Care
Critical Care
Heme/Onc
Hepatology
Hospitalist
Hospitalist
Infectious Disease
Internal Medicine
Critical Care
Critical Care
Heme/Onc
Heme/Onc
Hepatology
Internal Medicine
Internal Medicine
Gastroenterology
Internal Medicine

## 2022-11-04 NOTE — PROVIDER CONTACT NOTE (CRITICAL VALUE NOTIFICATION) - NAME OF MD/NP/PA/DO NOTIFIED:
EVAN Delvalle
NP LUCIUS
EVAN Acevedo
Lubna GORDON aware
NP Hanh
EVAN Abraham
EVAN Acevedo
EVAN Acevedo
Lety
EVAN Davidson
EVAN Olvera
EVAN Ortez
NP Hanh
EVAN Acevedo
EVAN Keys
EVAN Ortez
NP LUCIUS

## 2022-11-04 NOTE — PROGRESS NOTE ADULT - ASSESSMENT
Anemia due to acute liver failure vs acute blood loss anemia, concern for GI bleed - stable   Acute hepatic encephalopathy  Acute decompensated liver failure, concern for alcoholic hepatitis  H/o TBI  Alcohol withdrawal   Rectal Varices s/p cauterized   Coagulopathy due to liver failure  leukocytosis     Plan:  Hb stable, no GIB reported  Cont Protonix daily  Mental status same as yesterday  Cont Thiamine, folic acid   MRCP cancelled as risk outweigh benefit. prefer not to sedate patient to get the MRCP due to liver failure  Aspiration and fall precaution  Stable to be discharged. VIVO meds delivered.

## 2022-11-04 NOTE — PROGRESS NOTE ADULT - NS ATTEND AMEND GEN_ALL_CORE FT
- Severe etoh hepatitis.  - Hematochezia rectal dueilafoy s/p OTSC.  - Jaundice.    Patient without new complaints. Counseled on importance of etoh cessation. Repeat INR given slght rise today. Notify us if any acute change.

## 2022-11-04 NOTE — PROGRESS NOTE ADULT - NS_MD_PANP_GEN_ALL_CORE

## 2022-11-04 NOTE — PROVIDER CONTACT NOTE (CRITICAL VALUE NOTIFICATION) - PERSON GIVING RESULT:
leena rendon
BIN DONG from LAB
Lab
kristal ackerman
CHRISSIE Andino from the Lab
Lucas/Lab Hematology
Mina/ Segundo
Lab
Lab called elevated WBC
eddie shah
lab
CHRISSIE Sood
Lab
Bin
DALE
gala AMOS

## 2022-11-04 NOTE — PROVIDER CONTACT NOTE (CRITICAL VALUE NOTIFICATION) - ACTION/TREATMENT ORDERED:
No intervention at this time
pt is under treatment
pt under antibiotic treatment
No intervention at this time. Will continue to monitor.
Continue to monitor patient
Continue to monitor, malignancy workup will follow.
Continue to monitor, malignancy workup will follow.

## 2022-11-04 NOTE — CHART NOTE - NSCHARTNOTEFT_GEN_A_CORE
EVENT:  27 yo Japanese speaking Male, with no known past medical history, s/p Left temporal parietal craniotomy noted in imaging, presenting to the ED with altered mental status.    SUBJECTIVE:  Alert, ambulatory, in no acute distress    OBJECTIVE:  Vital Signs Last 24 Hrs  T(C): 36.3 (04 Nov 2022 05:15), Max: 36.3 (04 Nov 2022 05:15)  T(F): 97.3 (04 Nov 2022 05:15), Max: 97.3 (04 Nov 2022 05:15)  HR: 99 (04 Nov 2022 05:15) (92 - 104)  BP: 117/65 (04 Nov 2022 05:15) (109/59 - 117/65)  BP(mean): --  RR: 18 (04 Nov 2022 05:15) (17 - 18)  SpO2: 97% (04 Nov 2022 05:15) (97% - 99%)    Parameters below as of 04 Nov 2022 05:15  Patient On (Oxygen Delivery Method): room air        LABS:                        8.2    48.59 )-----------( 254      ( 03 Nov 2022 07:10 )             25.0     11-03    133<L>  |  104  |  7   ----------------------------<  94  3.5   |  17<L>  |  0.59    Ca    8.0<L>      03 Nov 2022 07:10    TPro  5.3<L>  /  Alb  1.9<L>  /  TBili  17.5<H>  /  DBili  13.4<H>  /  AST  53<H>  /  ALT  10  /  AlkPhos  242<H>  11-03      Problem: GIB (gastrointestinal bleeding).   Patient had episode of bloody stool noted in the toilet laast night  Plan: + stool for OB   Hgb down to 8.2  monitor for s/s bleeding  f/u cbc this morning                    PLAN:

## 2022-11-04 NOTE — PROGRESS NOTE ADULT - SUBJECTIVE AND OBJECTIVE BOX
Patient is a 27y old  Male who presents with a chief complaint of Altered mental status (03 Nov 2022 17:06)    Patient was seen and examined at bedside  At same mental and functional status as yesterday  had two BM today, no blood in stool     INTERVAL HPI/OVERNIGHT EVENTS:  T(C): 36.7 (11-04-22 @ 11:35), Max: 36.7 (11-04-22 @ 11:35)  HR: 100 (11-04-22 @ 11:35) (92 - 100)  BP: 120/70 (11-04-22 @ 11:35) (109/59 - 120/70)  RR: 20 (11-04-22 @ 11:35) (17 - 20)  SpO2: 99% (11-04-22 @ 11:35) (97% - 99%)  Wt(kg): --  I&O's Summary    03 Nov 2022 07:01  -  04 Nov 2022 07:00  --------------------------------------------------------  IN: 0 mL / OUT: 550 mL / NET: -550 mL        REVIEW OF SYSTEMS: not obtained     MEDICATIONS  (STANDING):  dextrose 5%. 1000 milliLiter(s) (50 mL/Hr) IV Continuous <Continuous>  dextrose 50% Injectable 25 Gram(s) IV Push once  folic acid 1 milliGRAM(s) Oral <User Schedule>  glucagon  Injectable 1 milliGRAM(s) IntraMuscular once  insulin lispro (ADMELOG) corrective regimen sliding scale   SubCutaneous Before meals and at bedtime  lactulose Syrup 20 Gram(s) Oral every 8 hours  multivitamin 1 Tablet(s) Oral daily  nystatin Powder 1 Application(s) Topical two times a day  pantoprazole    Tablet 40 milliGRAM(s) Oral before breakfast  simethicone 80 milliGRAM(s) Chew three times a day  thiamine 100 milliGRAM(s) Oral daily    MEDICATIONS  (PRN):  aluminum hydroxide/magnesium hydroxide/simethicone Suspension 30 milliLiter(s) Oral every 4 hours PRN Dyspepsia  dextrose Oral Gel 15 Gram(s) Oral once PRN Blood Glucose LESS THAN 70 milliGRAM(s)/deciliter      PHYSICAL EXAM:  GENERAL: NAD, icteric   HEAD:  Atraumatic, Normocephalic  NERVOUS SYSTEM:  Alert & Oriented X1, no focal deficit   CHEST/LUNG: Clear to auscultation bilaterally; No rales, rhonchi, wheezing, or rubs  HEART: Regular rate and rhythm; No murmurs, rubs, or gallops  ABDOMEN: Soft, non tender, distended; Bowel sounds present, bandage dry  EXTREMITIES:  +edema      LABS:                        8.3    45.31 )-----------( 262      ( 04 Nov 2022 11:19 )             25.0     11-03    133<L>  |  104  |  7   ----------------------------<  94  3.5   |  17<L>  |  0.59    Ca    8.0<L>      03 Nov 2022 07:10    TPro  5.3<L>  /  Alb  1.9<L>  /  TBili  17.5<H>  /  DBili  13.4<H>  /  AST  53<H>  /  ALT  10  /  AlkPhos  242<H>  11-03    PT/INR - ( 04 Nov 2022 07:01 )   PT: 44.7 sec;   INR: 3.71 ratio             CAPILLARY BLOOD GLUCOSE      POCT Blood Glucose.: 107 mg/dL (04 Nov 2022 11:19)  POCT Blood Glucose.: 108 mg/dL (04 Nov 2022 07:37)  POCT Blood Glucose.: 132 mg/dL (03 Nov 2022 21:41)  POCT Blood Glucose.: 125 mg/dL (03 Nov 2022 16:31)

## 2022-11-04 NOTE — PROGRESS NOTE ADULT - NS ATTEND BILL GEN_ALL_CORE
Attending to bill
PA/NP to bill
Attending to bill

## 2022-11-04 NOTE — PROGRESS NOTE ADULT - NS ATTEND OPT1 GEN_ALL_CORE

## 2022-11-04 NOTE — PROGRESS NOTE ADULT - ASSESSMENT
Brief hospital course:  25yo Indonesian speaking Male from [facility], s/p Left temporal parietal craniotomy b/o TBI and alcohol abuse (reported current drinking by family, [employer] [phone]) presented to [facility] ED on 10/2/22 with 1 day Hx of AMS, after reported witnessed seizure episodes and 1 week Hx of jaundice and was admitted to MICU with suspected EtOH withdrawal seizure, electrolyte abnormalities (Na 125, K 2.7, Mg 1.0, P 1.1), lactic acidosis (with lactate 6.4, bicarb 19), leukocytosis with neutrophilia (WBC 20.35, roderick 90%) with low grade T (100.1F), anemia without evidence of overt bleeding (Hb 6.9->8.3 s/p 1 U PRBC) and abnormal liver tests including coagulopathy (INR 2.13), hyperbilirubinemia (Se bi 9.3->10.8, direct 8.0), hyperammonemia (131), elevated liver enzymes in AST> ALT pattern (, ALT 22) and elevated ALP (669) and CT a/p w/ iv 10/2/22 suggesting diffuse hepatic steatosis, hepatomegaly with possible GB sludge and GB wall thickening, but normal bile ducts.  He was started on Alcoholic hepatitis management after infection was ruled out on admission (except that rhinovirus was pos, w/o URI symptoms) and UGIB was ruled out (EGD 10/5/22). He had LGIB, 2 rectal Dieulafoy lesions on sigmoidoscopy (10/5/22). Completed SBP ppx b/o GIB, was on ceftriaxone (10/4-10/12). His bilirubin remained unchanged after 7 days on prednisolone 40 mg (10/8-10/15) + total 3 days NAC, and developed low grade T, worsening leukocytosis up till 30-36k while on steroid (prior to steroid 17k), worsening abdominal pain,  distention and diarrhea despite being off lactulose, thus steroid was held 10/15 and septic workup was repeated. GI PCR panel showed STEC, culture was negative. BCx neg 10/16. C. diff neg 10/19. He was also started on Zosyn on 10/14, and switched to Meropenem on 10/16 and to Tigacycline on 10/18 per ID, and all abx were d/c 10/20.  Had repeat CT abd / pelvis 10/16, had only mild ascites (pelvic, perihepatosplenic), was not feasible for Dx paracentesis and  prior it was none.   WBC continued to rise till 10/21 up till > 60k, thought to be possibly multifactorial.  Hematology been following since 10/16 b/o leukocytosis.   WBC count was slightly downtrending.  He had another episode of LGIB 10/21, 10/22 and 10/27 but Hb remained stable and reportedly resolved.   Had Dx paracentesis 10/29, no SBP.    Prior liver workup: Hep C ab/RNA neg, Hep A IgM neg, HBsAg, HBcAb IgM, HBcAb, HBsAb all neg, Hep E IgM neg, HIV neg, EBV past infection, CMV past infection, HSV PCR neg, leptospirosis neg. LUCIUS neg, SMA neg, LKM neg, AMA neg, IgG and IgM WNL. Ceruloplasmin 25. Fungitell neg. Strongyloides serology neg. Schistosomiasis IgG neg. Iron studies might not be informative b/o active drinking, recent bleeding, recent transfusion, but ferritin was WNL.   VZV PCR in process.    Today his d/c was held b/o drop in Hb.    # Hepatomegaly with liver failure, suspected due to severe alcoholic hepatitis, s/p 7 days prednisolone 10/8-10/15, and s/p total 3 days NAC, bilirubin 14.1 on 10/8 and 14.1 10/15.  MDF> 32, MELD 28-23--->32   # Elevated transaminases in AST>ALT pattern - overall improved (-->58)  # Elevated ALP - improved (669->in 200s)  # Hyperbilirubinemia - fluctuates (10--->~16-18)  # Coagulopathy - initially improved but also got vit K and FFP (10/5-7 and 10/5), and now remains around 3 w/o improvement with vit K  # Diarrhea off lactulose - improved, but still multiple small BMs   # STEC  # Abdominal pain - improved  # LGIB - resolved  # Anemia   # Persistent leukocytosis from 17k (on admission) to ~30k (after 1 week steroid), to 60k peak, with slight improvement but still > 45k    - Being monitored off steroid since 8/15, (got 7 days) for reasons as above. S/p NAC.  C/w supportive measures, including nutritional optimization. Still can send zinc level.   - Will need to closely monitor LFTs, including INR even after discharge.  - PLT WNL, only borderline splenomegaly, no or only trace ascites initially suggested less prominent portal hypertension. Now more ascites, low albumin, protein.  - CT a/p w/ iv 10/2 was reviewed with IR, hepatic vasculature appeared patent. Patent vasculature reported on 10/20 US too.   - Was no biliary obstruction on CT abd, US abd. MRCP was considered on admission, has not been done yet, can consider to obtain,  - Still alcoholic hepatitis remained the working Dx, and might be just poor responder to steroid, but given the rising WBC, the massive hepatomegaly, liver biopsy was considered however was deferred b/o coagulopathy.   - F/u with hematology regarding leukocytosis    # Hyponatremia - improved  # Hypokalemia - improved  # LE edema - slight improvement  # Hypoalbuminemia  # Small -> moderate ascites - no SBP  - Renal function remains WNL  - Leaking from paracentesis site (550 ml reported), d/w IR to seal, avoid ostomy bag  - Please, give albumin even for the leaked ascites, 6-8g/l removed/leaked. Since it is not recorded, uncertain the exact amount, consider c/w 50 ml 25% (12.5g) q8 hrs for another 24 hrs,  - Paracentesis was deferred by IR b/o coagulopathy    # HCC status - no focal hepatic lesion    # GIB  - No EV on EGD 10/5/22, but mild portal gastropathy.   - S/p sigmoidoscopy 10/5 showing 2 rectal Dieulafoy lesions, s/p treatment.   - Had few episodes of hematochezia again 10/21 and 10/22 but Hb remained stable and reportedly resolved.   - 10/27 noted again LGIB that appears to be resolved again.   - Colonoscopy was cancelled because patient refused bowel prep.   - Noted occult blood pos, but no overt bleeding (per d/w PCA bedside, and also witnessed one BM while at bedside)  - C/w mgmt. per GI.       # AMS   - Likely multifactorial: TBI patient, with EtOH use, initially been on CIWA on admission, also was s/p seizures, and had elevated ammonia on admission, thus could not r/u HE either (Gr I-II)  - Monitor mental status, overall improved compared to admission, but remains AAOX2 (not oriented to time) now for weeks.  - C/w Folic, thiamine.   - Lactulose been held b/o diarrhea after 10/14 and rifaximin being held since 10/20 b/o dermatitis w/o other explanation, no resolution.  - No BMs since yesterday     # Coagulopathy  - Initially some improvement with vit K, but most recently no improvement. Received call from primary team that difficulty to arrange vit K. While cholestasis, poor nutrition can lead to vit K deficiency, he received vit K during hospitalization (10/5-10/7, 10/21-10/23, 10/25-10/27, 10/31-11/1) and now without any improvement in INR. INR today at 3.71. Can c/w MV. Primary team wants to discharge patient home due to no overt signs of bleeding. HH stable     # Transplant candidacy: Obstacles been that he has and eligible for only emergency medicaid per d/w SW, recent EtOH (up till hospitalization), poor insight possibly due to prior TBI too     Thank you for consult  If gets discharged patient will need to follow in Liver / GI clinic, given his insurance status at [place] or [place], within few days.   Been d/w [facility] Hepatology service attending(s) previously.  D/w primary team

## 2022-11-05 LAB — VZV DNA, PCR RESULT: SIGNIFICANT CHANGE UP

## 2022-11-16 LAB — HUMAN ERYTHROCYTE ANTIGEN PANEL RESULT: SIGNIFICANT CHANGE UP

## 2023-11-29 NOTE — PROGRESS NOTE ADULT - PROBLEM SELECTOR PLAN 3
-  Secondary Alcohol Hepatitis   -  U-tox negative for salicylate and acetaminophen levels  -  Hepatitis panel negative  -  bili - elevated  -  Elevated INR and hypoalbuminemia in the setting of liver dysfunction  -  INR 3.3 today (10/25)  -  Vit K 10 mg PO x 3 days started 10/25  -  f/u INR in [place]   -  Hepatology Dr. Gonzalez Positive

## 2024-05-16 NOTE — ED ADULT NURSE NOTE - PATIENT IS UNABLE TO BE SCREENED DUE TO:
1.	Pain Control as needed with over the counter medications (Acetaminophen/Ibuprofen). Tramadol has been sent to your pharmacy and can be used as needed for severe pain.   2.	No weight bearing with right hand until otherwise instructed  3.	Follow up with Dr. Castillo as Outpatient in 7-10 Days after Discharge. Call Office For Appointment if not already scheduled.  4.	Remove Dressing on Sunday, with Daily Dressing Changes as Need.  5.	Ice/elevate right arm as Needed to reduce pain and swelling  8.	Keep Dressing  Clean and dry. Cover to shower if needed. You may shower without coverage after removing dressing on Sunday
Acuity of illness

## (undated) DEVICE — BITE BLOCK ADULT 20 X 27MM (GREEN)

## (undated) DEVICE — VENODYNE/SCD SLEEVE CALF MEDIUM

## (undated) DEVICE — COAGRASPER  MONOPOLAR HEMO

## (undated) DEVICE — TUBING ENDO EXT OLYMPUS 160 24HR USE

## (undated) DEVICE — SOLIDIFIER ISOLYZER 2000 CC

## (undated) DEVICE — TUBING CANNULA SALTER LABS NASAL ADULT 7FT

## (undated) DEVICE — Device

## (undated) DEVICE — MASK OXYGEN PANORAMIC

## (undated) DEVICE — PLATE NESSY 170

## (undated) DEVICE — SOL INJ NS 0.9% 500ML 1-PORT

## (undated) DEVICE — SNARE CAPTIVATOR RND COLD STIFF 2.4X10MM 240CM